# Patient Record
Sex: MALE | Race: WHITE | NOT HISPANIC OR LATINO | Employment: FULL TIME | URBAN - METROPOLITAN AREA
[De-identification: names, ages, dates, MRNs, and addresses within clinical notes are randomized per-mention and may not be internally consistent; named-entity substitution may affect disease eponyms.]

---

## 2017-01-21 ENCOUNTER — OFFICE VISIT (OUTPATIENT)
Dept: URGENT CARE | Facility: URGENT CARE | Age: 52
End: 2017-01-21
Payer: COMMERCIAL

## 2017-01-21 VITALS
HEART RATE: 103 BPM | OXYGEN SATURATION: 97 % | HEIGHT: 72 IN | BODY MASS INDEX: 24.52 KG/M2 | TEMPERATURE: 98.4 F | SYSTOLIC BLOOD PRESSURE: 134 MMHG | DIASTOLIC BLOOD PRESSURE: 83 MMHG | WEIGHT: 181 LBS

## 2017-01-21 DIAGNOSIS — H61.101: Primary | ICD-10-CM

## 2017-01-21 PROCEDURE — 99213 OFFICE O/P EST LOW 20 MIN: CPT | Performed by: FAMILY MEDICINE

## 2017-01-21 NOTE — PATIENT INSTRUCTIONS
Warm compress to ear.  Follow up with either Dermatology or ENT specialist for recheck.      Hematoma  A hematoma is a collection of blood trapped outside of a blood vessel. It is what we think of as a bruise or a contusion. It is usually seen under the skin as a black and blue spot on your arm or leg, or a bump on your head after an injury. It can be almost anywhere on or in your body. It can also occur in an internal organ where it can be more serious.  A hematoma is caused by an injury with damage to small blood vessels. This causes blood to leak into the tissues. Blood forms a pocket under the skin that swells and looks like a purplish patch.  Gradually the blood in the hematoma is absorbed back into the body. The swelling and pain of the hematoma will go away. This takes from 1 to 4 weeks, depending on the size of the hematoma. The skin over the hematoma may turn bluish then brown and yellow as the blood is dissolved and absorbed. Usually, this only takes a couple of weeks but can last months.  Home care    Limit motion of the joints near the hematoma. If the hematoma is large and painful, avoid sports and other vigorous physical activity until the swelling and pain goes away.    Apply an ice pack (ice cubes in a plastic bag, wrapped in a thin towel or a frozen bag of peas) over the injured area for 20 minutes every 1 to 2 hours the first day. Continue with ice packs 3 to 4 times a day for the next 2 days. Continue the use of ice packs for relief of pain and swelling as needed.    If you need anything for pain, you can take acetaminophen or ibuprofen, unless you were given a different pain medicine to use. Talk with your doctor before using these medicines if you have chronic liver or kidney disease, or ever had a stomach ulcer or gastrointestinal bleeding, or are taking blood thinner medicines.  Follow-up care  Follow up with your healthcare provider, or as advised. If X-rays or a CT scan were done, you will  be notified if there is a change in the reading, especially if it affects treatment.  When to seek medical advice  Call your healthcare provider right away f any of the following occur:    Redness around the hematoma    Increase in pain or warmth in the hematoma    Increase in size of the hematoma    Fever of 100.4 F (38 C) or higher, or as directed by your healthcare provider    If the hematoma is on the arm or leg, watch for:    Increased swelling or pain in the extremity    Numbness or tingling or blue color of the hand or foot    6012-5753 [employer]. [address] [place] [address]. All rights reserved. This information is not intended as a substitute for professional medical care. Always follow your healthcare professional's instructions.

## 2017-01-21 NOTE — PROGRESS NOTES
SUBJECTIVE:  Chief Complaint   Patient presents with     Urgent Care     Sore     c/o sore on right ear      Cesar Montejo is a 51 year old male who presents with a chief complaint of right ear soreness, unsure of duration of problems but states that last night as he was combing hair that he felt that the ear pinna was more thick than the other side.  Denies any trauma, no recent URI symptoms, no ear canal pain.  Patient is very anxious, has had basal cell carcinoma and squamous cell carcinoma and worried that has developed a cancerous lesion on his ear.    Past Medical History   Diagnosis Date     High cholesterol      Insomnia      Anxiety      Gastro-oesophageal reflux disease      Basal cell carcinoma      Squamous cell carcinoma (H)      Current Outpatient Prescriptions   Medication Sig Dispense Refill     mirtazapine (REMERON) 15 MG tablet Take 1 tablet (15 mg) by mouth At Bedtime 90 tablet 3     simvastatin (ZOCOR) 40 MG tablet Take 1 tablet (40 mg) by mouth At Bedtime 90 tablet 3     metroNIDAZOLE (METROCREAM) 0.75 % cream Apply topically 2 times daily 45 g 11     Omega-3 Fatty Acids (OMEGA-3 FISH OIL PO) Take 2 g by mouth daily       ASPIRIN PO Take 81 mg by mouth daily       Social History   Substance Use Topics     Smoking status: Former Smoker -- 0.50 packs/day for 5 years     Types: Cigarettes     Smokeless tobacco: Never Used      Comment: Only in college,one pack per week     Alcohol Use: Yes      Comment: social occ.       ROS:  CONSTITUTIONAL:NEGATIVE for fever, chills, change in weight  INTEGUMENTARY/SKIN: POSITIVE for lumps or bumps   ENT/MOUTH: NEGATIVE for ear, mouth and throat problems  RESP:NEGATIVE for significant cough or SOB  CV: NEGATIVE for chest pain, palpitations or peripheral edema  GI: NEGATIVE for nausea, abdominal pain, heartburn, or change in bowel habits      EXAM:   /83 mmHg  Pulse 103  Temp(Src) 98.4  F (36.9  C) (Tympanic)  Ht 6' (1.829 m)  Wt 181 lb (82.101 kg)   BMI 24.54 kg/m2  SpO2 97%  GENERAL APPEARANCE: healthy, alert and no distress  EARS: pinna appears normal, no masses or growth, no enlarge glands  SKIN: no suspicious lesions or rashes  PSYCH: alert, affect - anxious      ASSESSMENT/PLAN:  (H61.101) Pinna disorder, right  (primary encounter diagnosis)  Plan: OTOLARYNGOLOGY REFERRAL, DERMATOLOGY REFERRAL            Tried reassurance that pinna appears normal, okay to try warm compress, no concerns for infection and no skin changes that are consistent with basal cell or squamous cell carcinoma at this time.  Reviewed that may have sustained a small bruising on pinna of ear and this may be the reason for feeling a more thickness to area but no appreciable hematoma/cysts/nodules on area of concern.  Will place referral to ENT and Dermatology for further evaluation per patient's request.    Stu Martinez MD  January 21, 2017 2:05 PM

## 2017-01-21 NOTE — NURSING NOTE
Chief Complaint   Patient presents with     Urgent Care     Sore     c/o sore on right ear        Initial /83 mmHg  Pulse 103  Temp(Src) 98.4  F (36.9  C) (Tympanic)  Ht 6' (1.829 m)  Wt 181 lb (82.101 kg)  BMI 24.54 kg/m2  SpO2 97% Estimated body mass index is 24.54 kg/(m^2) as calculated from the following:    Height as of this encounter: 6' (1.829 m).    Weight as of this encounter: 181 lb (82.101 kg).  BP completed using cuff size: regular  Anjelica Thornton MA

## 2017-01-21 NOTE — MR AVS SNAPSHOT
After Visit Summary   1/21/2017    Cesar Montejo    MRN: [id number]           Patient Information     Date Of Birth          1965        Visit Information        Provider Department      1/21/2017 12:00 PM Stu Martinez MD [facility]        Today's Diagnoses     Pinna disorder, right    -  1       Care Instructions    Warm compress to ear.  Follow up with either Dermatology or ENT specialist for recheck.      Hematoma  A hematoma is a collection of blood trapped outside of a blood vessel. It is what we think of as a bruise or a contusion. It is usually seen under the skin as a black and blue spot on your arm or leg, or a bump on your head after an injury. It can be almost anywhere on or in your body. It can also occur in an internal organ where it can be more serious.  A hematoma is caused by an injury with damage to small blood vessels. This causes blood to leak into the tissues. Blood forms a pocket under the skin that swells and looks like a purplish patch.  Gradually the blood in the hematoma is absorbed back into the body. The swelling and pain of the hematoma will go away. This takes from 1 to 4 weeks, depending on the size of the hematoma. The skin over the hematoma may turn bluish then brown and yellow as the blood is dissolved and absorbed. Usually, this only takes a couple of weeks but can last months.  Home care    Limit motion of the joints near the hematoma. If the hematoma is large and painful, avoid sports and other vigorous physical activity until the swelling and pain goes away.    Apply an ice pack (ice cubes in a plastic bag, wrapped in a thin towel or a frozen bag of peas) over the injured area for 20 minutes every 1 to 2 hours the first day. Continue with ice packs 3 to 4 times a day for the next 2 days. Continue the use of ice packs for relief of pain and swelling as needed.    If you need anything for pain, you can take acetaminophen or ibuprofen,  unless you were given a different pain medicine to use. Talk with your doctor before using these medicines if you have chronic liver or kidney disease, or ever had a stomach ulcer or gastrointestinal bleeding, or are taking blood thinner medicines.  Follow-up care  Follow up with your healthcare provider, or as advised. If X-rays or a CT scan were done, you will be notified if there is a change in the reading, especially if it affects treatment.  When to seek medical advice  Call your healthcare provider right away f any of the following occur:    Redness around the hematoma    Increase in pain or warmth in the hematoma    Increase in size of the hematoma    Fever of 100.4 F (38 C) or higher, or as directed by your healthcare provider    If the hematoma is on the arm or leg, watch for:    Increased swelling or pain in the extremity    Numbness or tingling or blue color of the hand or foot    [id number] [employer]. [address] [place] [address]. All rights reserved. This information is not intended as a substitute for professional medical care. Always follow your healthcare professional's instructions.              Follow-ups after your visit        Additional Services     DERMATOLOGY REFERRAL       Your provider has referred you to: [facility]: Dermatology Clinic [facility] [phone]   [email or web address]    Please be aware that coverage of these services is subject to the terms and limitations of your health insurance plan.  Call member services at your health plan with any benefit or coverage questions.      Please bring the following with you to your appointment:    (1) Any X-Rays, CTs or MRIs which have been performed.  Contact the facility where they were done to arrange for  prior to your scheduled appointment.    (2) List of current medications  (3) This referral request   (4) Any documents/labs given to you for this referral             OTOLARYNGOLOGY REFERRAL       Your provider has referred you to: [facility]: Adult Ear, Nose and Throat Clinic (Otolaryngology) - [place] [phone]  [email or web address]    Please be aware that coverage of these services is subject to the terms and limitations of your health insurance plan.  Call member services at your health plan with any benefit or coverage questions.      Please bring the following with you to your appointment:    (1) Any X-Rays, CTs or MRIs which have been performed.  Contact the facility where they were done to arrange for  prior to your scheduled appointment.   (2) List of current medications  (3) This referral request   (4) Any documents/labs given to you for this referral                  Who to contact     If you have questions or need follow up information about today's clinic visit or your schedule please contact [facility] directly at [phone].  Normal or non-critical lab and imaging results will be communicated to you by Maestrohart, letter or phone within 4 business days after the clinic has received the results. If you do not hear from us within 7 days, please contact the clinic through Cylancet or phone. If you have a critical or abnormal lab result, we will notify you by phone as soon as possible.  Submit refill requests through myVBO or call your pharmacy and they will forward the refill request to us. Please allow 3 business days for your refill to be completed.          Additional Information About Your Visit        myVBO Information     myVBO gives you secure access to your electronic health record. If you see a primary care provider, you can also send messages to your care team and make appointments. If you have questions, please call your primary care clinic.  If you do not have a primary care provider, please call [phone] and they will assist you.        Care EveryWhere ID     This is your  Care EveryWhere ID. This could be used by other organizations to access your [place] medical records  [id number]        Your Vitals Were     Pulse Temperature Height BMI (Body Mass Index) Pulse Oximetry       103 98.4  F (36.9  C) (Tympanic) 6' (1.829 m) 24.54 kg/m2 97%        Blood Pressure from Last 3 Encounters:   01/21/17 134/83   12/16/16 112/74   12/11/16 122/90    Weight from Last 3 Encounters:   01/21/17 181 lb (82.101 kg)   12/16/16 182 lb 8 oz (82.781 kg)   10/05/16 186 lb 11.7 oz (84.7 kg)              We Performed the Following     DERMATOLOGY REFERRAL     OTOLARYNGOLOGY REFERRAL        Primary Care Provider Office Phone # Fax #    Rocky Messina [phone]       [facility] [address] [address]        Thank you!     Thank you for choosing [facility]  for your care. Our goal is always to provide you with excellent care. Hearing back from our patients is one way we can continue to improve our services. Please take a few minutes to complete the written survey that you may receive in the mail after your visit with us. Thank you!             Your Updated Medication List - Protect others around you: Learn how to safely use, store and throw away your medicines at www.disposemymeds.org.          This list is accurate as of: 1/21/17  1:20 PM.  Always use your most recent med list.                   Brand Name Dispense Instructions for use    ASPIRIN PO      Take 81 mg by mouth daily       metroNIDAZOLE 0.75 % cream    METROCREAM    45 g    Apply topically 2 times daily       mirtazapine 15 MG tablet    REMERON    90 tablet    Take 1 tablet (15 mg) by mouth At Bedtime       OMEGA-3 FISH OIL PO      Take 2 g by mouth daily       simvastatin 40 MG tablet    ZOCOR    90 tablet    Take 1 tablet (40 mg) by mouth At Bedtime

## 2017-01-25 ENCOUNTER — OFFICE VISIT (OUTPATIENT)
Dept: DERMATOLOGY | Facility: CLINIC | Age: 52
End: 2017-01-25

## 2017-01-25 DIAGNOSIS — D48.5 NEOPLASM OF UNCERTAIN BEHAVIOR OF SKIN: Primary | ICD-10-CM

## 2017-01-25 DIAGNOSIS — Z85.828 HISTORY OF NONMELANOMA SKIN CANCER: ICD-10-CM

## 2017-01-25 RX ORDER — LIDOCAINE HYDROCHLORIDE AND EPINEPHRINE 10; 10 MG/ML; UG/ML
3 INJECTION, SOLUTION INFILTRATION; PERINEURAL ONCE
Qty: 3 ML | Refills: 0 | OUTPATIENT
Start: 2017-01-25 | End: 2017-01-25

## 2017-01-25 ASSESSMENT — PAIN SCALES - GENERAL
PAINLEVEL: MILD PAIN (2)
PAINLEVEL: NO PAIN (0)

## 2017-01-25 NOTE — PATIENT INSTRUCTIONS
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
## 2017-01-25 NOTE — NURSING NOTE
Dermatology Rooming Note    Cesar Montejo's goals for this visit include:   Chief Complaint   Patient presents with     Derm Problem     Rosendo is here today for a growth behind his ear.      Saira Hernandez MA

## 2017-01-25 NOTE — MR AVS SNAPSHOT
After Visit Summary   1/25/2017    Cesar Montejo    MRN: [id number]           Patient Information     Date Of Birth          1965        Visit Information        Provider Department      1/25/2017 1:15 PM Seferino Quintana MD [facility] Dermatology        Today's Diagnoses     Neoplasm of uncertain behavior of skin    -  1       Care Instructions    Wound Care After a Biopsy    What is a skin biopsy?  A skin biopsy allows the doctor to examine a very small piece of tissue under the microscope to determine the diagnosis and the best treatment for the skin condition. A local anesthetic (numbing medicine)  is injected with a very small needle into the skin area to be tested. A small piece of skin is taken from the area. Sometimes a suture (stitch) is used.     What are the risks of a skin biopsy?  I will experience scar, bleeding, swelling, pain, crusting and redness. I may experience incomplete removal or recurrence. Risks of this procedure are excessive bleeding, bruising, infection, nerve damage, numbness, thick (hypertrophic or keloidal) scar and non-diagnostic biopsy.    How should I care for my wound for the first 24 hours?    Keep the wound dry and covered for 24 hours    If it bleeds, hold direct pressure on the area for 15 minutes. If bleeding does not stop then go to the emergency room    Avoid strenuous exercise the first 1-2 days or as your doctor instructs you    How should I care for the wound after 24 hours?    After 24 hours, remove the bandage    You may bathe or shower as normal    If you had a scalp biopsy, you can shampoo as usual and can use shower water to clean the biopsy site daily    Clean the wound twice a day with gentle soap and water    Do not scrub, be gentle    Apply white petroleum/Vaseline after cleaning the wound with a cotton swab or a clean finger, and keep the site covered with a Bandaid /bandage. Bandages are not necessary with a scalp biopsy    If you are  unable to cover the site with a Bandaid /bandage, re-apply ointment 2-3 times a day to keep the site moist. Moisture will help with healing    Avoid strenuous activity for first 1-2 days    Avoid lakes, rivers, pools, and oceans until the stitches are removed or the site is healed    How do I clean my wound?    Wash hands for 15 minutes with soap or use hand  before all wound care    Clean the wound with gentle soap and water    Apply white petroleum/Vaseline  to wound after it is clean    Replace the Bandaid /bandage to keep the wound covered for the first few days or as instructed by your doctor    If you had a scalp biopsy, warm shower water to the area on a daily basis should suffice    What should I use to clean my wound?     Cotton-tipped applicators (Qtips )    White petroleum jelly (Vaseline ). Use a clean new container and use Q-tips to apply.    Bandaids   as needed    Gentle soap     How should I care for my wound long term?    Do not get your wound dirty    Keep up with wound care for one week or until the area is healed.    A small scab will form and fall off by itself when the area is completely healed. The area will be red and will become pink in color as it heals. Sun protection is very important for how your scar will turn out. Sunscreen with an SPF 30 or greater is recommended once the area is healed.    You should have some soreness but it should be mild and slowly go away over several days. Talk to your doctor about using tylenol for pain,    When should I call my doctor?  If you have increased:     Pain or swelling    Pus or drainage (clear or slightly yellow drainage is ok)    Temperature over 100F    Spreading redness or warmth around wound    When will I hear about my results?  The biopsy results can take 2-3 weeks to come back. The clinic will call you with the results, send you a [employer] message, or have you schedule a follow-up clinic or phone time to discuss the results. Contact  our clinics if you do not hear from us in 3 weeks.     Who should I call with questions?    [facility]: [phone]     [facility]: [phone]    For urgent needs outside of business hours call the [facility] at [phone] and ask for the dermatology resident on call            Follow-ups after your visit        Who to contact     Please call your clinic at [phone] to:    Ask questions about your health    Make or cancel appointments    Discuss your medicines    Learn about your test results    Speak to your doctor   If you have compliments or concerns about an experience at your clinic, or if you wish to file a complaint, please contact [facility] Patient Relations at [phone] or email us at [email or web address]         Additional Information About Your Visit        MyChart Information     [employer] gives you secure access to your electronic health record. If you see a primary care provider, you can also send messages to your care team and make appointments. If you have questions, please call your primary care clinic.  If you do not have a primary care provider, please call [phone] and they will assist you.      [employer] is an electronic gateway that provides easy, online access to your medical records. With [employer], you can request a clinic appointment, read your test results, renew a prescription or communicate with your care team.     To access your existing account, please contact your [facility] or call [phone] for assistance.        Care EveryWhere ID     This is your Care EveryWhere ID. This could be used by other organizations to access your [facility] medical records  [id number]         Blood Pressure from Last 3 Encounters:   01/21/17 134/83   12/16/16 112/74   12/11/16 122/90    Weight from Last 3 Encounters:   01/21/17 82.101 kg (181 lb)    12/16/16 82.781 kg (182 lb 8 oz)   10/05/16 84.7 kg (186 lb 11.7 oz)              We Performed the Following     BIOPSY SKIN/SUBQ/MUC MEM, SINGLE LESION     Surgical pathology exam          Today's Medication Changes          These changes are accurate as of: 1/25/17  1:22 PM.  If you have any questions, ask your nurse or doctor.               Start taking these medicines.        Dose/Directions    lidocaine 1% with EPINEPHrine 1:100,000 1 %-1:691058 injection   Used for:  Neoplasm of uncertain behavior of skin   Started by:  Seferino Quintana MD        Dose:  3 mL   Inject 3 mLs into the skin once for 1 dose   Quantity:  3 mL   Refills:  0            Where to get your medicines      Some of these will need a paper prescription and others can be bought over the counter.  Ask your nurse if you have questions.     You don't need a prescription for these medications    - lidocaine 1% with EPINEPHrine 1:100,000 1 %-1:873758 injection             Primary Care Provider Office Phone # Fax #    Rocky Rigo Messina [phone]       [facility] [address] [facility] [address]  [facility] [address]        Thank you!     Thank you for choosing [facility]  for your care. Our goal is always to provide you with excellent care. Hearing back from our patients is one way we can continue to improve our services. Please take a few minutes to complete the written survey that you may receive in the mail after your visit with us. Thank you!             Your Updated Medication List - Protect others around you: Learn how to safely use, store and throw away your medicines at www.disposemymeds.org.          This list is accurate as of: 1/25/17  1:22 PM.  Always use your most recent med list.                   Brand Name Dispense Instructions for use    ASPIRIN PO      Take 81 mg by mouth daily       lidocaine 1% with EPINEPHrine 1:100,000 1 %-1:807785 injection     3 mL    Inject 3 mLs into the skin once  for 1 dose       metroNIDAZOLE 0.75 % cream    METROCREAM    45 g    Apply topically 2 times daily       mirtazapine 15 MG tablet    REMERON    90 tablet    Take 1 tablet (15 mg) by mouth At Bedtime       OMEGA-3 FISH OIL PO      Take 2 g by mouth daily       simvastatin 40 MG tablet    ZOCOR    90 tablet    Take 1 tablet (40 mg) by mouth At Bedtime
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
## 2017-01-28 ENCOUNTER — HOSPITAL ENCOUNTER (EMERGENCY)
Facility: CLINIC | Age: 52
Discharge: HOME | End: 2017-01-28
Attending: EMERGENCY MEDICINE | Admitting: EMERGENCY MEDICINE
Payer: COMMERCIAL

## 2017-01-28 VITALS
RESPIRATION RATE: 16 BRPM | SYSTOLIC BLOOD PRESSURE: 144 MMHG | DIASTOLIC BLOOD PRESSURE: 86 MMHG | HEART RATE: 81 BPM | TEMPERATURE: 97.5 F | BODY MASS INDEX: 25.36 KG/M2 | OXYGEN SATURATION: 94 % | WEIGHT: 187 LBS

## 2017-01-28 DIAGNOSIS — S86.812A STRAIN OF CALF MUSCLE, LEFT, INITIAL ENCOUNTER: ICD-10-CM

## 2017-01-28 PROCEDURE — 99283 EMERGENCY DEPT VISIT LOW MDM: CPT | Mod: Z6 | Performed by: EMERGENCY MEDICINE

## 2017-01-28 PROCEDURE — 99282 EMERGENCY DEPT VISIT SF MDM: CPT | Performed by: EMERGENCY MEDICINE

## 2017-01-28 NOTE — ED PROVIDER NOTES
History     Chief Complaint   Patient presents with     Leg Pain     L calf pain that started yesterday. Pt states he has increased warm. No redness.     HPI  Cesar Montejo is a 51 year old male who presents with pain in the left leg discomfort.   Pt notes that he was sitting in a position on Tuesday that caused his foot to become numb. Sensation fully returned and he did not have any pain afterwards. Yesterday he noted discomfort in the back of the left calf. He has had similar symptoms previously (see ED visit from 12/2016 with Dr. Kincaid). No past hx of DVT or PE. Directed to the ED today after calling the nurse line. No foot or ankle swelling. No skin redness, rash or lesions. No fall. Pt has a mostly sedentary job at a [employer]; pt is a  at the . He travels regularly and is scheduled to fly to [place] on Monday ( 3 days from now).     I have reviewed the Medications, Allergies, Past Medical and Surgical History, and Social History in the [employer] system.    Past Medical History   Diagnosis Date     High cholesterol      Insomnia      Anxiety      Gastro-oesophageal reflux disease      Basal cell carcinoma      Squamous cell carcinoma (H)        Past Surgical History   Procedure Laterality Date     Mohs micrographic procedure       Laser co2 lesion oral N/A 10/5/2016     Procedure: LASER CO2 LESION ORAL;  Surgeon: Josué Rojo DDS;  Location:  OR       Family History   Problem Relation Age of Onset     Asthma No family hx of      C.A.D. No family hx of      Prostate Cancer No family hx of      Lipids Mother      on meds     CEREBROVASCULAR DISEASE Mother      TIA     Alzheimer Disease Mother      Skin Cancer Mother      SCC     Lipids Father      on meds     Hypertension Father      controlled with meds     Thyroid Disease Father      ?not sure but possibly     DIABETES Father      Cancer - colorectal Maternal Grandmother      still alive at 99     CANCER Maternal Grandfather      lung but  lifetime smoker     Melanoma Maternal Grandfather        Social History   Substance Use Topics     Smoking status: Former Smoker -- 0.50 packs/day for 5 years     Types: Cigarettes     Smokeless tobacco: Never Used      Comment: Only in college,one pack per week     Alcohol Use: Yes      Comment: social occ.       Review of Systems   All systems were reviewed and are negative.       Physical Exam   BP: 144/86 mmHg  Pulse: 81  Temp: 97.5  F (36.4  C)  Resp: 16  Weight: 84.823 kg (187 lb)  SpO2: 94 %    Physical Exam  Gen:A&Ox3, no acute distress  HEENT:PERRL, no facial tenderness or wounds, head atraumatic  CV:RRR without murmurs  PULM:Clear to auscultation bilaterally  Abd:soft, nontender, nondistended. Bowel sounds present and normal  UE:No traumatic injuries, skin normal  LE:no traumatic injuries, skin normal, no LE edema. Focal area of muscle tenderness of the left gastroc. + DP and PT pulses bilaterally and normal distal perfusion. No venous stasis changes or varicosities.   Neuro:CN II-XII intact, strength 5/5 throughout, sensation intact, gait stable. Able to walk on heels and toes. Mild tremor.  Skin: no rashes or ecchymoses      ED Course     Procedures  Critical Care time:  none    Assessments & Plan (with Medical Decision Making)   50 yo M presenting with left calf pain.   Vitals stable.   Has focal muscle tenderness that is most likely a mild strain or sprain.   Very low suspicion for serious muscle disorder, electrolyte abnormality or DVT.  Pt has a hx of 2 negative US in the last year to assess for DVT and given my very low pretest probability I did not feel that testing was needed today. I have encouraged him to use ibuprofen or tylenol as needed for pain, and to do gentle stretching of the sore muscle. He will monitor for any sign of leg swelling or skin changes and return for re-evaluation if new symptoms develop.     OK to travel for work as scheduled. He and I discussed ways to prevent venous  stasis while traveling.     I have reviewed the nursing notes.    I have reviewed the findings, diagnosis, plan and need for follow up with the patient.    Discharge Medication List as of 1/28/2017  1:53 AM          Final diagnoses:   Strain of calf muscle, left, initial encounter       1/28/2017   [facility], [place], EMERGENCY DEPARTMENT  MD NEAL Posada, Isabella Quevedo MD  01/28/17 0201

## 2017-01-28 NOTE — ED AVS SNAPSHOT
[facility], Emergency Department    [address]    [facility] [address]    Phone:  [phone]    Fax:  [phone]                                       Cesar Montejo   MRN: [id number]    Department:  [facility], Emergency Department   Date of Visit:  1/28/2017           After Visit Summary Signature Page     I have received my discharge instructions, and my questions have been answered. I have discussed any challenges I see with this plan with the nurse or doctor.    ..........................................................................................................................................  Patient/Patient Representative Signature      ..........................................................................................................................................  Patient Representative Print Name and Relationship to Patient    ..................................................               ................................................  Date                                            Time    ..........................................................................................................................................  Reviewed by Signature/Title    ...................................................              ..............................................  Date                                                            Time

## 2017-01-28 NOTE — ED AVS SNAPSHOT
[facility], Emergency Department    [address]    [place] [address]    Phone:  [phone]    Fax:  [phone]                                       Cesar Montejo   MRN: [id number]    Department:  [facility], Emergency Department   Date of Visit:  1/28/2017           Patient Information     Date Of Birth          1965        Your diagnoses for this visit were:     Strain of calf muscle, left, initial encounter        You were seen by Isabella De La Cruz MD.        Discharge Instructions       Thank you for coming to the [facility] Emergency Department.     Please follow up with your primary care clinic on next week if pain does not go away with ibuprofen or tylenol, and gentle stretching. OK to stay as active as usual.     Return to the ER if you notice worsening pain, swelling in the foot, ankle, leg or thigh, or shortness of breath.     24 Hour Appointment Hotline       To make an appointment at any [facility] clinic, call [phone] ([phone]). If you don't have a family doctor or clinic, we will help you find one. [facility] clinics are conveniently located to serve the needs of you and your family.             Review of your medicines      Our records show that you are taking the medicines listed below. If these are incorrect, please call your family doctor or clinic.        Dose / Directions Last dose taken    ASPIRIN PO   Dose:  81 mg        Take 81 mg by mouth daily   Refills:  0        metroNIDAZOLE 0.75 % cream   Commonly known as:  METROCREAM   Quantity:  45 g        Apply topically 2 times daily   Refills:  11        mirtazapine 15 MG tablet   Commonly known as:  REMERON   Dose:  15 mg   Quantity:  90 tablet        Take 1 tablet (15 mg) by mouth At Bedtime   Refills:  3        OMEGA-3 FISH OIL PO   Dose:  2 g        Take 2 g by mouth daily   Refills:  0        simvastatin 40 MG tablet   Commonly known as:  ZOCOR   Dose:  40 mg   Quantity:  90  tablet        Take 1 tablet (40 mg) by mouth At Bedtime   Refills:  3                Orders Needing Specimen Collection     None      Pending Results     No orders found from 1/27/2017 to 1/29/2017.            Pending Culture Results     No orders found from 1/27/2017 to 1/29/2017.            Thank you for choosing [facility]       Thank you for choosing [facility] for your care. Our goal is always to provide you with excellent care. Hearing back from our patients is one way we can continue to improve our services. Please take a few minutes to complete the written survey that you may receive in the mail after you visit with us. Thank you!        [employer] Information     [employer] gives you secure access to your electronic health record. If you see a primary care provider, you can also send messages to your care team and make appointments. If you have questions, please call your primary care clinic.  If you do not have a primary care provider, please call [phone] and they will assist you.        Care EveryWhere ID     This is your Care EveryWhere ID. This could be used by other organizations to access your [facility] medical records  [id number]        After Visit Summary       This is your record. Keep this with you and show to your community pharmacist(s) and doctor(s) at your next visit.

## 2017-01-29 ENCOUNTER — APPOINTMENT (OUTPATIENT)
Dept: ULTRASOUND IMAGING | Facility: CLINIC | Age: 52
End: 2017-01-29
Attending: EMERGENCY MEDICINE
Payer: COMMERCIAL

## 2017-01-29 ENCOUNTER — HOSPITAL ENCOUNTER (EMERGENCY)
Facility: CLINIC | Age: 52
Discharge: HOME | End: 2017-01-29
Attending: EMERGENCY MEDICINE | Admitting: EMERGENCY MEDICINE
Payer: COMMERCIAL

## 2017-01-29 VITALS
DIASTOLIC BLOOD PRESSURE: 83 MMHG | HEART RATE: 84 BPM | OXYGEN SATURATION: 97 % | SYSTOLIC BLOOD PRESSURE: 117 MMHG | RESPIRATION RATE: 16 BRPM | TEMPERATURE: 98.3 F

## 2017-01-29 DIAGNOSIS — M79.662 PAIN OF LEFT LOWER LEG: ICD-10-CM

## 2017-01-29 PROCEDURE — 99284 EMERGENCY DEPT VISIT MOD MDM: CPT | Mod: 25 | Performed by: EMERGENCY MEDICINE

## 2017-01-29 PROCEDURE — 93971 EXTREMITY STUDY: CPT | Mod: LT

## 2017-01-29 PROCEDURE — 99283 EMERGENCY DEPT VISIT LOW MDM: CPT | Mod: Z6 | Performed by: EMERGENCY MEDICINE

## 2017-01-29 ASSESSMENT — ENCOUNTER SYMPTOMS
COUGH: 0
FEVER: 0
LIGHT-HEADEDNESS: 0
PALPITATIONS: 0
ABDOMINAL PAIN: 0
SHORTNESS OF BREATH: 1

## 2017-01-29 NOTE — ED AVS SNAPSHOT
[facility], Emergency Department    [address]    [place] [address]    Phone:  [phone]    Fax:  [phone]                                       Cesar Montejo   MRN: [id number]    Department:  [facility], Emergency Department   Date of Visit:  1/29/2017           Patient Information     Date Of Birth          1965        Your diagnoses for this visit were:     Pain of left lower leg        You were seen by Thomas Alegria MD.        Discharge Instructions       Please make an appointment to follow up with Your Primary Care Provider in 5-8 days if not improving.    Ibuprofen 600 mg every 8 hours with food as needed.      24 Hour Appointment Hotline       To make an appointment at any [place] clinic, call [phone] ([phone]). If you don't have a family doctor or clinic, we will help you find one. [place] clinics are conveniently located to serve the needs of you and your family.             Review of your medicines      Our records show that you are taking the medicines listed below. If these are incorrect, please call your family doctor or clinic.        Dose / Directions Last dose taken    ASPIRIN PO   Dose:  81 mg        Take 81 mg by mouth daily   Refills:  0        metroNIDAZOLE 0.75 % cream   Commonly known as:  METROCREAM   Quantity:  45 g        Apply topically 2 times daily   Refills:  11        mirtazapine 15 MG tablet   Commonly known as:  REMERON   Dose:  15 mg   Quantity:  90 tablet        Take 1 tablet (15 mg) by mouth At Bedtime   Refills:  3        OMEGA-3 FISH OIL PO   Dose:  2 g        Take 2 g by mouth daily   Refills:  0        simvastatin 40 MG tablet   Commonly known as:  ZOCOR   Dose:  40 mg   Quantity:  90 tablet        Take 1 tablet (40 mg) by mouth At Bedtime   Refills:  3                Procedures and tests performed during your visit     US Lower Extremity Venous Duplex Left      Orders Needing Specimen Collection     None      Pending  Results     No orders found from 1/28/2017 to 1/30/2017.            Pending Culture Results     No orders found from 1/28/2017 to 1/30/2017.            Thank you for choosing [facility]       Thank you for choosing [facility] for your care. Our goal is always to provide you with excellent care. Hearing back from our patients is one way we can continue to improve our services. Please take a few minutes to complete the written survey that you may receive in the mail after you visit with us. Thank you!        [employer] Information     [employer] gives you secure access to your electronic health record. If you see a primary care provider, you can also send messages to your care team and make appointments. If you have questions, please call your primary care clinic.  If you do not have a primary care provider, please call [phone] and they will assist you.        Care EveryWhere ID     This is your Care EveryWhere ID. This could be used by other organizations to access your [facility] medical records  [id number]        After Visit Summary       This is your record. Keep this with you and show to your community pharmacist(s) and doctor(s) at your next visit.

## 2017-01-29 NOTE — ED AVS SNAPSHOT
[facility], Emergency Department    [address]    [facility] [address]    Phone:  [phone]    Fax:  [phone]                                       Cesar Montejo   MRN: [id number]    Department:  [facility], Emergency Department   Date of Visit:  1/29/2017           After Visit Summary Signature Page     I have received my discharge instructions, and my questions have been answered. I have discussed any challenges I see with this plan with the nurse or doctor.    ..........................................................................................................................................  Patient/Patient Representative Signature      ..........................................................................................................................................  Patient Representative Print Name and Relationship to Patient    ..................................................               ................................................  Date                                            Time    ..........................................................................................................................................  Reviewed by Signature/Title    ...................................................              ..............................................  Date                                                            Time

## 2017-01-29 NOTE — DISCHARGE INSTRUCTIONS
Please make an appointment to follow up with Your Primary Care Provider in 5-8 days if not improving.    Ibuprofen 600 mg every 8 hours with food as needed.

## 2017-01-29 NOTE — ED PROVIDER NOTES
"  History     Chief Complaint   Patient presents with     Leg Pain     Onset 4 days ago with left calf pain, worsening now, noticed \"I am a little more short of breath than normal.\"     HPI  Cesar Montejo is a 51 year old male with a history of hypercholesterolemia, GERD, and anxiety and depression who presents for evaluation of left leg pain. The patient reports that he developed left calf pain on Thursday, 3 days ago, while sitting. The pain persisted, so he was seen in the ER here yesterday by Dr. De La Cruz. Pain was thought musculoskeletal, and with low suspicion for DVT ultrasound was deferred. The patient presents today with pain persisting. He describes pain in the left calf just below the knee. Pain has been migrating superiorly since onset. The patient also endorses intermittent pain in the left thigh and left ankle as well. He denies any recent trauma or new exercises. He denies any swelling of the leg. The patient reports that last night he was watching TV and doing light cleaning when he had an episode of mild shortness of breath with dizziness. He had another episode of this just prior to coming in, but he states he does not feel short of breath lying down here presently. The patient denies any palpitations or any feeling that he might pass out. The patient endorses a history of GERD with associated chronic chest discomfort. He has had some chest discomfort in the past few days and believes this is related to his GERD but is not entirely sure. The patient denies any cough, sore throat or recent illness.    Calcific tendonitis in right? Shoulder at one point    I have reviewed the Medications, Allergies, Past Medical and Surgical History, and Social History in the Epic system.    No current facility-administered medications for this encounter.     Current Outpatient Prescriptions   Medication     mirtazapine (REMERON) 15 MG tablet     Omega-3 Fatty Acids (OMEGA-3 FISH OIL PO)     simvastatin (ZOCOR) 40 MG " tablet     ASPIRIN PO     metroNIDAZOLE (METROCREAM) 0.75 % cream     Past Medical History   Diagnosis Date     High cholesterol      Insomnia      Anxiety      Gastro-oesophageal reflux disease      Basal cell carcinoma      Squamous cell carcinoma (H)        Past Surgical History   Procedure Laterality Date     Mohs micrographic procedure       Laser co2 lesion oral N/A 10/5/2016     Procedure: LASER CO2 LESION ORAL;  Surgeon: Josué Rojo DDS;  Location:  OR       Family History   Problem Relation Age of Onset     Asthma No family hx of      C.A.D. No family hx of      Prostate Cancer No family hx of      Lipids Mother      on meds     CEREBROVASCULAR DISEASE Mother      TIA     Alzheimer Disease Mother      Skin Cancer Mother      SCC     Lipids Father      on meds     Hypertension Father      controlled with meds     Thyroid Disease Father      ?not sure but possibly     DIABETES Father      Cancer - colorectal Maternal Grandmother      still alive at 99     CANCER Maternal Grandfather      lung but lifetime smoker     Melanoma Maternal Grandfather        Social History   Substance Use Topics     Smoking status: Former Smoker -- 0.50 packs/day for 5 years     Types: Cigarettes     Smokeless tobacco: Never Used      Comment: Only in college,one pack per week     Alcohol Use: Yes      Comment: social occ.      No Known Allergies    Review of Systems   Constitutional: Negative for fever.   Respiratory: Positive for shortness of breath (intermittent). Negative for cough.    Cardiovascular: Positive for chest pain (intermittent discomfort similar to GERD discomfort). Negative for palpitations and leg swelling.   Gastrointestinal: Negative for abdominal pain.   Musculoskeletal:        Left leg pain, see HPI   Neurological: Negative for syncope and light-headedness.   All other systems reviewed and are negative.      Physical Exam   BP: 118/77 mmHg  Pulse: 91  Heart Rate: 91  Temp: 97.9  F (36.6  C)  Resp:  16  SpO2: 95 %  Physical Exam   Constitutional: He is oriented to person, place, and time. He appears well-developed and well-nourished. No distress.   HENT:   Head: Normocephalic and atraumatic.   Eyes: No scleral icterus.   Neck: Normal range of motion. Neck supple.   Musculoskeletal:        Legs:  Neurological: He is alert and oriented to person, place, and time.   Skin: Skin is warm and dry. No rash noted. He is not diaphoretic. No erythema. No pallor.       ED Course     Procedures       4:26 PM  The patient was seen and examined by Thomas Alegria MD, in Room 02.        Critical Care time:  none               Labs Ordered and Resulted from Time of ED Arrival Up to the Time of Departure from the ED - No data to display    Assessments & Plan (with Medical Decision Making)   The patient has pain in the back of his thigh and calf on the left side.  There is no DVT on ultrasound.  He admits that he is very anxious and feels that his shortness of breath which is now resolved his more related to that.  His pain is likely secondary to muscle strain as there is no other objective finding of DVT and no trauma to warrant further imaging.  He will follow up with his doctor if his symptoms do not resolve significant consider doing a repeat ultrasound.  If his swelling gets worse she will return to the ED.    I have reviewed the nursing notes.    I have reviewed the findings, diagnosis, plan and need for follow up with the patient.    Discharge Medication List as of 1/29/2017  5:50 PM          Final diagnoses:   Pain of left lower leg     ILuis Miguel, am serving as a trained medical scribe to document services personally performed by Thomas Alegria MD, based on the provider's statements to me.      Thomas CHOI MD, was physically present and have reviewed and verified the accuracy of this note documented by Luis Miguel Klein.    1/29/2017   [facility], [place], EMERGENCY DEPARTMENT      Thomas Alegria,  MD  01/30/17 [id number]

## 2017-01-30 LAB — COPATH REPORT: NORMAL

## 2017-02-03 ENCOUNTER — OFFICE VISIT (OUTPATIENT)
Dept: FAMILY MEDICINE | Facility: CLINIC | Age: 52
End: 2017-02-03
Payer: COMMERCIAL

## 2017-02-03 VITALS
DIASTOLIC BLOOD PRESSURE: 74 MMHG | HEART RATE: 120 BPM | OXYGEN SATURATION: 95 % | SYSTOLIC BLOOD PRESSURE: 118 MMHG | WEIGHT: 177.7 LBS | TEMPERATURE: 97.6 F | HEIGHT: 72 IN | BODY MASS INDEX: 24.07 KG/M2

## 2017-02-03 DIAGNOSIS — B02.9 HERPES ZOSTER WITHOUT COMPLICATION: ICD-10-CM

## 2017-02-03 DIAGNOSIS — Z82.49 FH ISCHEMIC HEART DISEASE: ICD-10-CM

## 2017-02-03 DIAGNOSIS — R73.09 ELEVATED GLUCOSE: Primary | ICD-10-CM

## 2017-02-03 LAB — HBA1C MFR BLD: 5.4 % (ref 4.3–6)

## 2017-02-03 PROCEDURE — 83036 HEMOGLOBIN GLYCOSYLATED A1C: CPT | Performed by: FAMILY MEDICINE

## 2017-02-03 PROCEDURE — 36415 COLL VENOUS BLD VENIPUNCTURE: CPT | Performed by: FAMILY MEDICINE

## 2017-02-03 PROCEDURE — 99214 OFFICE O/P EST MOD 30 MIN: CPT | Performed by: FAMILY MEDICINE

## 2017-02-03 NOTE — MR AVS SNAPSHOT
After Visit Summary   2/3/2017    Cesar Montejo    MRN: [id number]           Patient Information     Date Of Birth          1965        Visit Information        Provider Department      2/3/2017 2:30 PM Rocky Messina MD [facility] [place] [place]        Today's Diagnoses     Elevated glucose    -  1     FH ischemic heart disease         Herpes zoster without complication            Follow-ups after your visit        Additional Services     CARDIOLOGY EVAL ADULT REFERRAL       Your provider has referred you to:  [facility]: [facility] [place]note 2[facility] and [facility] [phone]   [email or web address]    Please be aware that coverage of these services is subject to the terms and limitations of your health insurance plan.  Call member services at your health plan with any benefit or coverage questions.      Type of Referral:  New Cardiology Consult    Timeframe requested:  Within 1 month    Please bring the following to your appointment:  >>   Any x-rays, CTs or MRIs which have been performed.  Contact the facility where they were done to arrange for  prior to your scheduled appointment.  Any new CT, MRI or other procedures ordered by your specialist must be performed at a [facility] facility or coordinated by your clinic's referral office.   >>   List of current medications  >>   This referral request   >>   Any documents/labs given to you for this referral                  Who to contact     If you have questions or need follow up information about today's clinic visit or your schedule please contact [facility] directly at [phone].  Normal or non-critical lab and imaging results will be communicated to you by MyChart, letter or phone within 4 business days after the clinic has received the results. If you do not hear from us within 7 days, please contact the clinic through MyChart or phone.  If you have a critical or abnormal lab result, we will notify you by phone as soon as possible.  Submit refill requests through [employer] or call your pharmacy and they will forward the refill request to us. Please allow 3 business days for your refill to be completed.          Additional Information About Your Visit        [facility] Information     [employer] gives you secure access to your electronic health record. If you see a primary care provider, you can also send messages to your care team and make appointments. If you have questions, please call your primary care clinic.  If you do not have a primary care provider, please call [phone] and they will assist you.        Care EveryWhere ID     This is your Care EveryWhere ID. This could be used by other organizations to access your [facility] medical records  [id number]        Your Vitals Were     Pulse Temperature Height BMI (Body Mass Index) Pulse Oximetry       120 97.6  F (36.4  C) (Oral) 6' (1.829 m) 24.10 kg/m2 95%        Blood Pressure from Last 3 Encounters:   02/03/17 118/74   01/29/17 117/83   01/28/17 144/86    Weight from Last 3 Encounters:   02/03/17 177 lb 11.2 oz (80.604 kg)   01/28/17 187 lb (84.823 kg)   01/21/17 181 lb (82.101 kg)              We Performed the Following     CARDIOLOGY EVAL ADULT REFERRAL     Hemoglobin A1c        Primary Care Provider Office Phone # Fax #    Rocky Messina [phone]       [facility] [address] [address]        Thank you!     Thank you for choosing [facility]  for your care. Our goal is always to provide you with excellent care. Hearing back from our patients is one way we can continue to improve our services. Please take a few minutes to complete the written survey that you may receive in the mail after your visit with us. Thank you!             Your Updated Medication List - Protect others around you: Learn how to safely use, store  and throw away your medicines at www.disposemymeds.org.          This list is accurate as of: 2/3/17  2:43 PM.  Always use your most recent med list.                   Brand Name Dispense Instructions for use    ASPIRIN PO      Take 81 mg by mouth daily       metroNIDAZOLE 0.75 % cream    METROCREAM    45 g    Apply topically 2 times daily       mirtazapine 15 MG tablet    REMERON    90 tablet    Take 1 tablet (15 mg) by mouth At Bedtime       OMEGA-3 FISH OIL PO      Take 2 g by mouth daily       simvastatin 40 MG tablet    ZOCOR    90 tablet    Take 1 tablet (40 mg) by mouth At Bedtime

## 2017-02-03 NOTE — NURSING NOTE
Chief Complaint   Patient presents with     ER F/U     Derm Problem       Initial /74 mmHg  Pulse 120  Temp(Src) 97.6  F (36.4  C) (Oral)  Ht 6' (1.829 m)  Wt 177 lb 11.2 oz (80.604 kg)  BMI 24.10 kg/m2  SpO2 95% Estimated body mass index is 24.1 kg/(m^2) as calculated from the following:    Height as of this encounter: 6' (1.829 m).    Weight as of this encounter: 177 lb 11.2 oz (80.604 kg).  BP completed using cuff size: regular    Amanda Alejandro MA

## 2017-02-03 NOTE — PROGRESS NOTES
SUBJECTIVE:                                                    Cesar Montejo is a 51 year old male who presents to clinic today for the following health issues:      ED/UC Followup:    Facility:  [facility]    Date of visit: 1/31/2017  Reason for visit: Had a rash form on the left leg near knee.   Current Status: Was diagnosed with shingles. Did not have a shaving of the rash. Has itching, not much pain from the beginning and overall looks the same.         Encounter Diagnoses   Name Primary?     Elevated glucose, worries about DM Yes     FH ischemic heart disease, mom with CAD before 59 yo with no other CAD risk factors      Herpes zoster without complication         Problem list and histories reviewed & adjusted, as indicated.  Additional history: as documented    Problem list, Medication list, Allergies, and Medical/Social/Surgical histories reviewed in [facility] and updated as appropriate.    ROS:  Constitutional, HEENT, cardiovascular, pulmonary, gi and gu systems are negative, except as otherwise noted.    OBJECTIVE:                                                    /74 mmHg  Pulse 120  Temp(Src) 97.6  F (36.4  C) (Oral)  Ht 6' (1.829 m)  Wt 177 lb 11.2 oz (80.604 kg)  BMI 24.10 kg/m2  SpO2 95%  Body mass index is 24.1 kg/(m^2).  GENERAL: alert, no distress and fit  NECK: no adenopathy, no asymmetry, masses, or scars and thyroid normal to palpation  RESP: lungs clear to auscultation - no rales, rhonchi or wheezes  CV: regular rate and rhythm, normal S1 S2, no S3 or S4, no murmur, click or rub, no peripheral edema and peripheral pulses strong  ABDOMEN: soft, nontender, no hepatosplenomegaly, no masses and bowel sounds normal  SKIN: zosteriform eruption - left inner thigh    Diagnostic Test Results:  Results for orders placed or performed in visit on 02/03/17   Hemoglobin A1c   Result Value Ref Range    Hemoglobin A1C 5.4 4.3 - 6.0 %        ASSESSMENT/PLAN:                                                             1. Elevated glucose  negative A!C   work on diet/ exercise  - Hemoglobin A1c  - CARDIOLOGY EVAL ADULT REFERRAL    2.  ischemic heart disease  Follow up with consultant as planned.   - CARDIOLOGY EVAL ADULT REFERRAL    3. Herpes zoster without complication  resolving      See Patient Instructions    Rocky Messina MD  [facility] [place]

## 2017-02-06 ENCOUNTER — TELEPHONE (OUTPATIENT)
Dept: FAMILY MEDICINE | Facility: CLINIC | Age: 52
End: 2017-02-06

## 2017-02-06 ENCOUNTER — OFFICE VISIT (OUTPATIENT)
Dept: FAMILY MEDICINE | Facility: CLINIC | Age: 52
End: 2017-02-06
Payer: COMMERCIAL

## 2017-02-06 VITALS
HEART RATE: 104 BPM | TEMPERATURE: 96.5 F | SYSTOLIC BLOOD PRESSURE: 135 MMHG | OXYGEN SATURATION: 96 % | WEIGHT: 178.2 LBS | BODY MASS INDEX: 24.16 KG/M2 | DIASTOLIC BLOOD PRESSURE: 90 MMHG

## 2017-02-06 DIAGNOSIS — L24.9 IRRITANT CONTACT DERMATITIS, UNSPECIFIED TRIGGER: Primary | ICD-10-CM

## 2017-02-06 DIAGNOSIS — R59.0 ANTERIOR CERVICAL LYMPHADENOPATHY: ICD-10-CM

## 2017-02-06 LAB
BASOPHILS # BLD AUTO: 0 10E9/L (ref 0–0.2)
BASOPHILS NFR BLD AUTO: 0.8 %
DIFFERENTIAL METHOD BLD: NORMAL
EOSINOPHIL # BLD AUTO: 0.1 10E9/L (ref 0–0.7)
EOSINOPHIL NFR BLD AUTO: 1.2 %
ERYTHROCYTE [DISTWIDTH] IN BLOOD BY AUTOMATED COUNT: 13.3 % (ref 10–15)
HCT VFR BLD AUTO: 48 % (ref 40–53)
HGB BLD-MCNC: 16.2 G/DL (ref 13.3–17.7)
LYMPHOCYTES # BLD AUTO: 1.6 10E9/L (ref 0.8–5.3)
LYMPHOCYTES NFR BLD AUTO: 31.6 %
MCH RBC QN AUTO: 31.4 PG (ref 26.5–33)
MCHC RBC AUTO-ENTMCNC: 33.8 G/DL (ref 31.5–36.5)
MCV RBC AUTO: 93 FL (ref 78–100)
MONOCYTES # BLD AUTO: 0.7 10E9/L (ref 0–1.3)
MONOCYTES NFR BLD AUTO: 13.5 %
NEUTROPHILS # BLD AUTO: 2.8 10E9/L (ref 1.6–8.3)
NEUTROPHILS NFR BLD AUTO: 52.9 %
PLATELET # BLD AUTO: 201 10E9/L (ref 150–450)
RBC # BLD AUTO: 5.16 10E12/L (ref 4.4–5.9)
WBC # BLD AUTO: 5.2 10E9/L (ref 4–11)

## 2017-02-06 PROCEDURE — 99214 OFFICE O/P EST MOD 30 MIN: CPT | Performed by: PHYSICIAN ASSISTANT

## 2017-02-06 PROCEDURE — 85025 COMPLETE CBC W/AUTO DIFF WBC: CPT | Performed by: PHYSICIAN ASSISTANT

## 2017-02-06 PROCEDURE — 36415 COLL VENOUS BLD VENIPUNCTURE: CPT | Performed by: PHYSICIAN ASSISTANT

## 2017-02-06 RX ORDER — METHYLPREDNISOLONE 4 MG
TABLET, DOSE PACK ORAL
Qty: 21 TABLET | Refills: 0 | Status: SHIPPED | OUTPATIENT
Start: 2017-02-06 | End: 2017-04-13

## 2017-02-06 NOTE — PATIENT INSTRUCTIONS
Take medrol pack as directed  Use all lotions, soaps and detergents without fragrances or dyes  Return to clinic for any new or worsening symptoms or go to ER Urgent care in off hours      Atopic Dermatitis (Adult)  Atopic dermatitis is a dry, itchy red rash. It s also known as eczema. The rash is chronic, or ongoing. It can come and go over time. The disease is often genetic and passed down in families. It causes a problem with the skin barrier that makes the skin more sensitive to the environment and other factors. The increased skin sensitivity causes an itch, which causes scratching. Scratching can worsen the itching or also break the skin. This can put the skin at risk of infection.  The condition is most common in people with asthma, hay fever, hives, or dry or sensitive skin. The rash may be caused by extreme heat or heavy sweating. Skin irritants can cause the rash to flare up. These can include wool or silk clothing, grease, oils, some medicines, and harsh soaps and detergents. Emotional stress can also be a trigger.  Treatment is done to relieve the itching and inflammation of the skin.  Home care  Follow these tips to care for your condition:    Keep the areas of rash clean by bathing at least every other day. Use lukewarm water to bathe. Don t use hot water, which can dry out the skin.    Don t use soaps with strong detergents. Use mild soaps made for sensitive skin.    Apply a cream or ointment to damp skin right after bathing.    Avoid things that irritate your skin. Wear absorbent, soft fabrics next to the skin rather than rough or scratchy materials.    Use mild laundry soap free of scents and perfumes. Make sure to rinse all the soap out of your clothes.    Treat any skin infection as directed.    Use oral diphenhydramine to help reduce itching. This is an antihistamine you can buy at drug and grocery stores. It can make you sleepy, so use lower doses during the daytime. Or you can use loratadine.  This is an antihistamine that will not make you sleepy. Do not use diphenhydramine if you have glaucoma or have trouble urinating due to an enlarged prostate.  Follow-up care  If your symptoms don t get better or if they get worse in the next 7 days, make an appointment with your healthcare provider.  When to seek medical advice  Call your healthcare provider right away  if any of these occur:    Increasing area of redness or pain in the skin    Yellow crusts or wet drainage from the rash    Fever of 100.4 F (38 C) or higher, or as directed by your health care provider    3925-5744 [employer]. [address] [place] [address], [place] [address]. All rights reserved. This information is not intended as a substitute for professional medical care. Always follow your healthcare professional's instructions.

## 2017-02-06 NOTE — PROGRESS NOTES
SUBJECTIVE:                                                    Cesar Montejo is a 51 year old male who presents to clinic today for the following health issues:    Shingles     Onset: 1 week     Description:   Location: Inner left thigh, neck, and left arm  Character: painful, red  Itching (Pruritis): YES    Progression of Symptoms:  worsening    Accompanying Signs & Symptoms:  Fever: no   Body aches or joint pain: no  Sore throat symptoms: no  Recent cold symptoms: no    History:   Previous similar rash: no     Precipitating factors:   Exposure to similar rash: no   New exposures: None   Recent travel: no     Alleviating factors:   Acyclovir     Therapies Tried and outcome:     Was on a work trip last week, and he started having pain in his calf for several days prior  Developed a rash  Was seen in the ER (they were afraid of a clot). Negative for blood clot. He think the calf pain was unrelated to the rash  diagnosed with shingles. Started on acyclovir. The itching has gone away. The rash hasn't started crusting over yet so he's wondering if the rash is progressing like it should. He actually never developed blisters    About 3 days ago he developed a rash on his right neck and left wrist. It is very itchy. Looks a little like the rash on his leg when it started. No associated pain  He had squamous cell carcinoma removed on his right cheek and was told to look out for enlarged lymph nodes etc. He has noticed some enlarged lymph nodes on his neck and is concerned about this        Problem list and histories reviewed & adjusted, as indicated.  Additional history: as documented    ROS:  C: NEGATIVE for fever, chills, change in weight  E/M: NEGATIVE for ear, mouth and throat problems  R: NEGATIVE for significant cough or SOB  CV: NEGATIVE for chest pain, palpitations or peripheral edema  GI: NEGATIVE for nausea, abdominal pain, heartburn, or change in bowel habits    Patient Active Problem List   Diagnosis      Insomnia     CARDIOVASCULAR SCREENING; LDL GOAL LESS THAN 130     Hypercholesteremia     GERD (gastroesophageal reflux disease)     Mixed anxiety and depressive disorder     Knee pain     Right shoulder pain     Calcific tendonitis     Solar lentiginosis     Skin cancer screening     Dermatitis, seborrheic     Keratosis, actinic     Family history of skin cancer     AK (actinic keratosis)     History of actinic keratosis     Past Surgical History   Procedure Laterality Date     Mohs micrographic procedure       Laser co2 lesion oral N/A 10/5/2016     Procedure: LASER CO2 LESION ORAL;  Surgeon: Josué Rojo DDS;  Location:  OR       Social History   Substance Use Topics     Smoking status: Former Smoker -- 0.50 packs/day for 5 years     Types: Cigarettes     Smokeless tobacco: Never Used      Comment: Only in college,one pack per week     Alcohol Use: Yes      Comment: social occ.     Family History   Problem Relation Age of Onset     Asthma No family hx of      C.A.D. No family hx of      Prostate Cancer No family hx of      Lipids Mother      on meds     CEREBROVASCULAR DISEASE Mother      TIA     Alzheimer Disease Mother      Skin Cancer Mother      SCC     Lipids Father      on meds     Hypertension Father      controlled with meds     Thyroid Disease Father      ?not sure but possibly     DIABETES Father      Cancer - colorectal Maternal Grandmother      still alive at 99     CANCER Maternal Grandfather      lung but lifetime smoker     Melanoma Maternal Grandfather            Problem list, Medication list, Allergies, and Medical/Social/Surgical histories reviewed in [facility] and updated as appropriate.  Labs reviewed in EPIC    OBJECTIVE:                                                    /90 mmHg  Pulse 104  Temp(Src) 96.5  F (35.8  C) (Oral)  Wt 178 lb 3.2 oz (80.831 kg)  SpO2 96% Body mass index is 24.16 kg/(m^2).   GENERAL: healthy, alert, well nourished, well hydrated, no distress  EYES: Eyes  grossly normal to inspection, extraocular movements - intact, and PERRL  HENT: ear canals- normal; TMs- normal; Nose- normal; Mouth- no ulcers, no lesions.   NECK: no tenderness, mild anterior cervical adenopathy, no asymmetry, no masses, no stiffness; thyroid- normal to palpation  RESP: lungs clear to auscultation - no rales, no rhonchi, no wheezes  CV: regular rates and rhythm, normal S1 S2, no S3 or S4 and no murmur, no click or rub -  SKIN: red papular rash of right side of neck without vesicles. 2-3 mm papule of left volar wrist. No lymphangitis, fluctuance, induration, bleeding or discharge    Results for orders placed or performed in visit on 02/06/17 (from the past 24 hour(s))   CBC with platelets and differential   Result Value Ref Range    WBC 5.2 4.0 - 11.0 10e9/L    RBC Count 5.16 4.4 - 5.9 10e12/L    Hemoglobin 16.2 13.3 - 17.7 g/dL    Hematocrit 48.0 40.0 - 53.0 %    MCV 93 78 - 100 fl    MCH 31.4 26.5 - 33.0 pg    MCHC 33.8 31.5 - 36.5 g/dL    RDW 13.3 10.0 - 15.0 %    Platelet Count 201 150 - 450 10e9/L    Diff Method Automated Method     % Neutrophils 52.9 %    % Lymphocytes 31.6 %    % Monocytes 13.5 %    % Eosinophils 1.2 %    % Basophils 0.8 %    Absolute Neutrophil 2.8 1.6 - 8.3 10e9/L    Absolute Lymphocytes 1.6 0.8 - 5.3 10e9/L    Absolute Monocytes 0.7 0.0 - 1.3 10e9/L    Absolute Eosinophils 0.1 0.0 - 0.7 10e9/L    Absolute Basophils 0.0 0.0 - 0.2 10e9/L          ASSESSMENT/PLAN:                                                        ICD-10-CM    1. Irritant contact dermatitis, unspecified trigger L24.9 methylPREDNISolone (MEDROL DOSEPAK) 4 MG tablet   2. Anterior cervical lymphadenopathy R59.0 CBC with platelets and differential     Rash is actually more constant with contact dermatitis as opposed to shingles. Medrol pack to help with rash. He is not aware of any new contacts, but he did recently travel. Possible he came in contact with something while traveling. CBC reassuring. Also  reassured patient lymph nodes appear benign and reactive to his current rash. Patient also evaluated by Dr. Messina who agrees. Return to clinic for any new or worsening symptoms or go to ER Urgent care in off hours   Patient Instructions   Take medrol pack as directed  Use all lotions, soaps and detergents without fragrances or dyes  Return to clinic for any new or worsening symptoms or go to ER Urgent care in off hours      Atopic Dermatitis (Adult)  Atopic dermatitis is a dry, itchy red rash. It s also known as eczema. The rash is chronic, or ongoing. It can come and go over time. The disease is often genetic and passed down in families. It causes a problem with the skin barrier that makes the skin more sensitive to the environment and other factors. The increased skin sensitivity causes an itch, which causes scratching. Scratching can worsen the itching or also break the skin. This can put the skin at risk of infection.  The condition is most common in people with asthma, hay fever, hives, or dry or sensitive skin. The rash may be caused by extreme heat or heavy sweating. Skin irritants can cause the rash to flare up. These can include wool or silk clothing, grease, oils, some medicines, and harsh soaps and detergents. Emotional stress can also be a trigger.  Treatment is done to relieve the itching and inflammation of the skin.  Home care  Follow these tips to care for your condition:    Keep the areas of rash clean by bathing at least every other day. Use lukewarm water to bathe. Don t use hot water, which can dry out the skin.    Don t use soaps with strong detergents. Use mild soaps made for sensitive skin.    Apply a cream or ointment to damp skin right after bathing.    Avoid things that irritate your skin. Wear absorbent, soft fabrics next to the skin rather than rough or scratchy materials.    Use mild laundry soap free of scents and perfumes. Make sure to rinse all the soap out of your clothes.    Treat  any skin infection as directed.    Use oral diphenhydramine to help reduce itching. This is an antihistamine you can buy at drug and grocery stores. It can make you sleepy, so use lower doses during the daytime. Or you can use loratadine. This is an antihistamine that will not make you sleepy. Do not use diphenhydramine if you have glaucoma or have trouble urinating due to an enlarged prostate.  Follow-up care  If your symptoms don t get better or if they get worse in the next 7 days, make an appointment with your healthcare provider.  When to seek medical advice  Call your healthcare provider right away  if any of these occur:    Increasing area of redness or pain in the skin    Yellow crusts or wet drainage from the rash    Fever of 100.4 F (38 C) or higher, or as directed by your health care provider    4944-7476 [employer]. [address] [place] [address]. All rights reserved. This information is not intended as a substitute for professional medical care. Always follow your healthcare professional's instructions.                Estimated body mass index is 24.16 kg/(m^2) as calculated from the following:    Height as of 2/3/17: 6' (1.829 m).    Weight as of this encounter: 178 lb 3.2 oz (80.831 kg).       Argelia Hendrix [facility]  [facility] [place]

## 2017-02-06 NOTE — TELEPHONE ENCOUNTER
Telephone call to patient. He reports the following:     -rash began on left inner thigh, was seen in ED on 1/30/17  -rash is now on right side of neck, painful when touched  -itching, left wrist rash  -on day 2 of 7 antiviral medication (prescribed while in ED in [place])  -afebrile, no chills  -denies headache    Scheduled for same day appointment 2/6/17 at 1700.     Vida Liriano RN  [facility]

## 2017-02-06 NOTE — TELEPHONE ENCOUNTER
Pt was seen last week for shingles. He states he now has a rash on his neck and wrist that are consistent with shingles he had on his calf. Also he states he is having pain in his neck. He is hoping  can fit him into the schedule today to address this. Pt scheduled for Thursday at 130 pm first avail with . Please call him at [phone] OK to CARMELO thank you.    Fiordaliza CHILDERS  Central Scheduling

## 2017-02-09 ENCOUNTER — OFFICE VISIT (OUTPATIENT)
Dept: FAMILY MEDICINE | Facility: CLINIC | Age: 52
End: 2017-02-09
Payer: COMMERCIAL

## 2017-02-09 VITALS
HEART RATE: 92 BPM | DIASTOLIC BLOOD PRESSURE: 72 MMHG | WEIGHT: 180.4 LBS | OXYGEN SATURATION: 97 % | TEMPERATURE: 97 F | SYSTOLIC BLOOD PRESSURE: 134 MMHG | HEIGHT: 72 IN | BODY MASS INDEX: 24.43 KG/M2

## 2017-02-09 DIAGNOSIS — L25.8 CONTACT DERMATITIS DUE TO OTHER AGENT: Primary | ICD-10-CM

## 2017-02-09 DIAGNOSIS — S76.211A GROIN STRAIN, RIGHT, INITIAL ENCOUNTER: ICD-10-CM

## 2017-02-09 PROCEDURE — 99213 OFFICE O/P EST LOW 20 MIN: CPT | Performed by: FAMILY MEDICINE

## 2017-02-09 NOTE — NURSING NOTE
Chief Complaint   Patient presents with     Shingles       Initial /72 mmHg  Pulse 92  Temp(Src) 97  F (36.1  C) (Oral)  Ht 6' (1.829 m)  Wt 180 lb 6.4 oz (81.829 kg)  BMI 24.46 kg/m2  SpO2 97% Estimated body mass index is 24.46 kg/(m^2) as calculated from the following:    Height as of this encounter: 6' (1.829 m).    Weight as of this encounter: 180 lb 6.4 oz (81.829 kg).  Medication Reconciliation: complete     Amanda Alejandro MA

## 2017-02-09 NOTE — PROGRESS NOTES
SUBJECTIVE:                                                    Cesar Montejo is a 51 year old male who presents to clinic today for the following health issues:      Rash     Onset: few weeks      Description:   Location: left thigh and on neck  Character: raised, painful, red  Itching (Pruritis): YES    Progression of Symptoms:  same    Accompanying Signs & Symptoms:  Fever: no   Body aches or joint pain: no   Sore throat symptoms: no   Recent cold symptoms: no    History:   Previous similar rash: no     Precipitating factors:   Exposure to similar rash: no   New exposures: None   Recent travel: no      Therapies Tried and outcome: none      Has had pain on/ off with certain movements left groin    Problem list and histories reviewed & adjusted, as indicated.  Additional history: as documented    Problem list, Medication list, Allergies, and Medical/Social/Surgical histories reviewed in EPIC and updated as appropriate.    ROS:  Constitutional, HEENT, cardiovascular, pulmonary, gi and gu systems are negative, except as otherwise noted.    OBJECTIVE:                                                    /72 mmHg  Pulse 92  Temp(Src) 97  F (36.1  C) (Oral)  Ht 6' (1.829 m)  Wt 180 lb 6.4 oz (81.829 kg)  BMI 24.46 kg/m2  SpO2 97%  Body mass index is 24.46 kg/(m^2).  GENERAL: healthy, alert and no distress  MS: normal muscle tone and tenderness to palpation left upper inquinal ligament  SKIN: maculopapular eruption - left lower Abdominal wall and  neck , rash resolved on leg         ASSESSMENT/PLAN:                                                            1. Contact dermatitis due to other agent  Improving, limit soap/ chemicals  Use hydrocortisone as needed  Follow up by phone in 2 weeks if not improving.     2. Groin strain, right, initial encounter  Stretch/ avoid painful  activiteis  Follow up by phone in 2 weeks if not improving.       See Patient Instructions    Rocky Messina MD  [place]  [facility]

## 2017-04-12 ENCOUNTER — OFFICE VISIT (OUTPATIENT)
Dept: DERMATOLOGY | Facility: CLINIC | Age: 52
End: 2017-04-12

## 2017-04-12 DIAGNOSIS — D22.39 FIBROUS PAPULE OF NOSE: Primary | ICD-10-CM

## 2017-04-12 DIAGNOSIS — Z12.83 SKIN CANCER SCREENING: ICD-10-CM

## 2017-04-12 DIAGNOSIS — Z85.828 HISTORY OF NONMELANOMA SKIN CANCER: ICD-10-CM

## 2017-04-12 DIAGNOSIS — L57.0 AK (ACTINIC KERATOSIS): ICD-10-CM

## 2017-04-12 ASSESSMENT — PAIN SCALES - GENERAL: PAINLEVEL: NO PAIN (0)
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
## 2017-04-12 NOTE — PATIENT INSTRUCTIONS
Sun Protection    We recommend daily use of sunscreen of SPF 30 or higher, reapplied every 2 hours when outside or sooner if swimming or sweating. It is best to try to avoid prolonged midday sun whenever possibly, and wear wide-brimmed hats, long-sleeved shirts, particular when outdoors for extended time periods.

## 2017-04-12 NOTE — PROGRESS NOTES
SUBJECTIVE:                                                    Cesar Montejo is a 51 year old male who presents to clinic today for the following health issues:    Constipation     Onset: 2.5 weeks    Description:   Frequency of bowel movements: 2 weeks.  Stool consistency: small caliber    Progression of Symptoms:  same    Accompanying Signs & Symptoms:  Abdominal pain (cramping?): no, but did for 4 or 5 hours about 3 days ago  Blood in stool: YES- fresh blood on tissues (but has it at times from irritation)  Rectal pain: no- not pain but pressure  Nausea/vomiting: YES- nausea last night and some today (more likely from stress)  Weight loss or gain: YES- minor weight loss, 4 pounds roughly   History:   History of abdominal surgery: no     Precipitating factors:   Recent use of narcotics, anticholinergics, calcium channel blockers, antacids, or iron supplements: no   Chronic Laxative Use: no          Therapies Tried and outcome: none    He is quite anxious over his recent bowel problems  It started with having very small bowel movements (hasn't struggled with constipation before)  Then he went about 3 days without having a bowel movement followed by some hard stool and then some loose stool    He hasn't changed his diet recently  Drinks about 7-8 cups of water daily  Drinks about 3 cups of coffee daily  His typical diet consists of oatmeal, whole wheat pasta, fish, chicken breast, broccoli, bananas and oranges        Problem list and histories reviewed & adjusted, as indicated.  Additional history: as documented    ROS:  C: NEGATIVE for fever, chills, change in weight  E/M: NEGATIVE for ear, mouth and throat problems  R: NEGATIVE for significant cough or SOB  CV: NEGATIVE for chest pain, palpitations or peripheral edema  GI: NEGATIVE for hemorrhoids, jaundice, melena, vomiting and weight loss  : No frequency, urgency, hematuria, dysuria     Patient Active Problem List   Diagnosis     Insomnia     CARDIOVASCULAR  SCREENING; LDL GOAL LESS THAN 130     Hypercholesteremia     GERD (gastroesophageal reflux disease)     Mixed anxiety and depressive disorder     Knee pain     Right shoulder pain     Calcific tendonitis     Solar lentiginosis     Skin cancer screening     Dermatitis, seborrheic     Keratosis, actinic     Family history of skin cancer     AK (actinic keratosis)     History of actinic keratosis     Past Surgical History:   Procedure Laterality Date     LASER CO2 LESION ORAL N/A 10/5/2016    Procedure: LASER CO2 LESION ORAL;  Surgeon: Josué Rojo DDS;  Location: [facility] OR     MOHS MICROGRAPHIC PROCEDURE         Social History   Substance Use Topics     Smoking status: Former Smoker     Packs/day: 0.50     Years: 5.00     Types: Cigarettes     Smokeless tobacco: Never Used      Comment: Only in college,one pack per week     Alcohol use Yes      Comment: social occ.     Family History   Problem Relation Age of Onset     Lipids Mother      on meds     CEREBROVASCULAR DISEASE Mother      TIA     Alzheimer Disease Mother      Skin Cancer Mother      SCC     Lipids Father      on meds     Hypertension Father      controlled with meds     Thyroid Disease Father      ?not sure but possibly     DIABETES Father      Cancer - colorectal Maternal Grandmother      still alive at 99     CANCER Maternal Grandfather      lung but lifetime smoker     Melanoma Maternal Grandfather      Asthma No family hx of      C.A.D. No family hx of      Prostate Cancer No family hx of            Problem list, Medication list, Allergies, and Medical/Social/Surgical histories reviewed in [facility] and updated as appropriate.  Labs reviewed in EPIC    OBJECTIVE:                                                    /80  Pulse 105  Temp 97.1  F (36.2  C) (Oral)  Ht 6' (1.829 m)  Wt 180 lb (81.6 kg)  SpO2 97%  BMI 24.41 kg/m2 Body mass index is 24.41 kg/(m^2).   General appearance: alert,no distress  Hydration: well hydrated  Eyes: KEN  Nose:  normal  Oropharynx: normal without erythema, edema or exudates. moist mucus membranes.   Neck: normal, supple and no adenopathy  Lungs: clear to auscultation and percussion  Heart: regular rate and rhythm,no murmurs, clicks, rubs or gallops  Abdomen: flat, normal bowel sounds.   Tenderness: none, negative Kelley's, negative McBurney's  Masses: none  Organomegaly: none  Rectal: mild perirectal erythema. No hemorrhoids, blood or fissures. Stool brown.          ASSESSMENT/PLAN:                                                        ICD-10-CM    1. Slow transit constipation K59.01 XR KUB   2. BRBPR (bright red blood per rectum) K62.5 GASTROENTEROLOGY ADULT REF PROCEDURE ONLY     Patient is extremely concerned about having cancer so we'll go ahead and have him get a colonoscopy done. See plan below. Return to clinic for any new or worsening symptoms or go to ER Urgent care in off hours    Patient Instructions   Schedule colonoscopy  Start miralax 1 cap in 8 ounces of water daily  Use over the counter fleet enema to get things moving  Be sure to get 25 g grams of fiber daily  Keep drinking water and exercising  Get xray done  Return to clinic for any new or worsening symptoms or go to ER Urgent care in off hours     Eating a High-Fiber Diet  Fiber is what gives strength and structure to plants. Most grains, beans, vegetables, and fruits contain fiber. Foods rich in fiber are often low in calories and fat, and they fill you up more. They may also reduce your risks for certain health problems. To find out the amount of fiber in canned, packaged, or frozen foods, read the Nutrition Facts label. It tells you how much fiber is in a serving.    Types of fiber and their benefits  There are two types of fiber: insoluble and soluble. They both aid digestion and help you maintain a healthy weight.    Insoluble fiber. This is found in whole grains, cereals, certain fruits and vegetables such as apple skin, corn, and carrots.  Insoluble fiber may prevent constipation and reduce the risk for certain types of cancer.    Soluble fiber. This type of fiber is in oats, beans, and certain fruits and vegetables such as strawberries and peas. Soluble fiber can reduce cholesterol, which may help lower the risk for heart disease. It also helps control blood sugar levels.  Look for high-fiber foods  Try these foods to add fiber to your diet:    Whole-grain breads and cereals. Try to eat 6 to 8 ounces a day. Include wheat and oat bran cereals, whole-wheat muffins or toast, and corn tortillas in your meals.    Fruits. Try to eat 2 cups a day. Apples, oranges, strawberries, pears, and bananas are good sources. (Note: Fruit juice is low in fiber.)    Vegetables. Try to eat at least 2.5 cups a day. Add asparagus, carrots, broccoli, peas, and corn to your meals.    Beans. One cup of cooked lentils gives you over 15 grams of fiber. Try navy beans, lentils, and chickpeas.    Seeds. A small handful of seeds gives you about 3 grams of fiber. Try sunflower seeds.  Keep track of your fiber  Keep track of how much fiber you eat. Start by reading food labels. Then eat a variety of foods high in fiber. As you begin to eat more fiber, ask your healthcare provider how much water you should be drinking to keep your digestive system working smoothly.  You should aim for a certain amount of fiber in your diet each day. If you are a woman, that amount is between 25 and 28 grams per day. Men should aim for 30 to 33 grams per day. After age 50, your daily fiber needs drop to 22 grams for women and 28 grams for men.  Before you reach for the fiber supplements, think about this. Fiber is found naturally in healthy whole foods. It gives you that feeling of fullness after you eat. Taking fiber supplements or eating fiber-enriched foods will not give you this full feeling.  Your fiber intake is a good measure for the quality of your overall diet. If you are missing out on your  daily amount of fiber, you may be lacking other important nutrients as well.    9776-5506 [employer]. [address] [place] [address], [place] [address]. All rights reserved. This information is not intended as a substitute for professional medical care. Always follow your healthcare professional's instructions.        Constipation (Adult)  Constipation means that you have bowel movements that are less frequent than usual. Stools often become very hard and difficult to pass.  Constipation is very common. At some point in life it affects almost everyone. Since everyone's bowel habits are different, what is constipation to one person may not be to another. Your healthcare provider may do tests to diagnose constipation. It depends on what he or she finds when evaluating you.    Symptoms of constipation include:    Abdominal pain    Bloating    Vomiting    Painful bowel movements    Itching, swelling, bleeding, or pain around the anus  Causes  Constipation can have many causes. These include:    Diet low in fiber    Too much dairy    Not drinking enough liquids    Lack of exercise or physical activity. This is especially true for older adults.    Changes in lifestyle or daily routine, including pregnancy, aging, work, and travel    Frequent use or misuse of laxatives    Ignoring the urge to have a bowel movement or delaying it until later    Medicines, such as certain prescription pain medicines, iron supplements, antacids, certain antidepressants, and calcium supplements    Diseases like irritable bowel syndrome, bowel obstructions, stroke, diabetes, thyroid disease, Parkinson disease, hemorrhoids, and colon cancer  Complications  Potential complications of constipation can include:    Hemorrhoids    Rectal bleeding from hemorrhoids or anal fissures (skin tears)    Hernias    Dependency on laxatives    Chronic constipation    Fecal impaction    Bowel obstruction or perforation  Home care  All treatment should be  done after talking with your healthcare provider. This is especially true if you have another medical problems, are taking prescription medicines, or are an older adult. Treatment most often involves lifestyle changes. You may also need medicines. Your healthcare provider will tell you which will work best for you. Follow the advice below to help avoid this problem in the future.  Lifestyle changes  These lifestyle changes can help prevent constipation:    Diet. Eat a high-fiber diet, with fresh fruit and vegetables, and reduce dairy intake, meats, and processed foods    Fluids. It's important to get enough fluids each day. Drink plenty of water when you eat more fiber. If you are on diet that limits the amount of fluid you can have, talk about this with your healthcare provider.    Regular exercise. Check with your healthcare provider first.  Medications  Take any medicines as directed. Some laxatives are safe to use only every now and then. Others can be taken on a regular basis. Talk with your doctor or pharmacist if you have questions.  Prescription pain medicines can cause constipation. If you are taking this kind of medicine, ask your healthcare provider if you should also take a stool softener.  Medicines you may take to treat constipation include:    Fiber supplements    Stool softeners    Laxatives    Enemas    Rectal suppositories  Follow-up care  Follow up with your healthcare provider if symptoms don't get better in the next few days. You may need to have more tests or see a specialist.  Call 911  Call 911 if any of these occur:    Trouble breathing    Stiff, rigid abdomen that is severely painful to touch    Confusion    Fainting or loss of consciousness    Rapid heart rate    Chest pain  When to seek medical advice  Call your healthcare provider right away if any of these occur:    Fever over 100.4 F (38 C)    Failure to resume normal bowel movements    Pain in your abdomen or back gets worse    Nausea  or vomiting    Swelling in your abdomen    Blood in the stool    Black, tarry stool    Involuntary weight loss    Weakness    8611-4609 [employer]. [address] [place] [address], [place] [address]. All rights reserved. This information is not intended as a substitute for professional medical care. Always follow your healthcare professional's instructions.              Estimated body mass index is 24.41 kg/(m^2) as calculated from the following:    Height as of this encounter: 6' (1.829 m).    Weight as of this encounter: 180 lb (81.6 kg).       Argelia De Los Santos  [facility]

## 2017-04-12 NOTE — NURSING NOTE
Dermatology Rooming Note    Cesar Montejo's goals for this visit include:   Chief Complaint   Patient presents with     Derm Problem     Travis is here today for a skin check. Has concerning areas on his face.           Alyssa Trent LPN
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
## 2017-04-12 NOTE — MR AVS SNAPSHOT
After Visit Summary   4/12/2017    Cesar Montejo    MRN: [id number]           Patient Information     Date Of Birth          1965        Visit Information        Provider Department      4/12/2017 12:00 PM Seferino Quintana MD [facility] Dermatology        Today's Diagnoses     History of nonmelanoma skin cancer    -  1    Skin cancer screening        AK (actinic keratosis)          Care Instructions    Sun Protection    We recommend daily use of sunscreen of SPF 30 or higher, reapplied every 2 hours when outside or sooner if swimming or sweating. It is best to try to avoid prolonged midday sun whenever possibly, and wear wide-brimmed hats, long-sleeved shirts, particular when outdoors for extended time periods.        Follow-ups after your visit        Follow-up notes from your care team     Return in about 6 months (around 10/12/2017).      Your next 10 appointments already scheduled     Apr 13, 2017 12:00 PM CDT   Office Visit with Argelia De Los Santos PA-C   [facility] ([facility])    [address] [place] [id number] [phone]           Bring a current list of meds and any records pertaining to this visit.  For Physicals, please bring immunization records and any forms needing to be filled out.  Please arrive 10 minutes early to complete paperwork.            May 08, 2017  8:00 AM CDT   (Arrive by 7:45 AM)   New Patient Visit with Osman Clark MD   [facility] ([facility])    [address] [place] [id number] [phone]            Jun 08, 2017 12:45 PM CDT   (Arrive by 12:30 PM)   Return Visit with Rober Mari MD   [facility] Dermatology ([facility])    [address] [place] [id number] [phone]              Who to contact     Please call your clinic at [phone] to:    Ask questions about  your health    Make or cancel appointments    Discuss your medicines    Learn about your test results    Speak to your doctor   If you have compliments or concerns about an experience at your clinic, or if you wish to file a complaint, please contact [facility] Patient Relations at [phone] or email us at [email or web address]         Additional Information About Your Visit        MyChart Information     [employer] gives you secure access to your electronic health record. If you see a primary care provider, you can also send messages to your care team and make appointments. If you have questions, please call your primary care clinic.  If you do not have a primary care provider, please call [phone] and they will assist you.      [employer] is an electronic gateway that provides easy, online access to your medical records. With [employer], you can request a clinic appointment, read your test results, renew a prescription or communicate with your care team.     To access your existing account, please contact your [facility] or call [phone] for assistance.        Care EveryWhere ID     This is your Care EveryWhere ID. This could be used by other organizations to access your [place] medical records  [id number]         Blood Pressure from Last 3 Encounters:   02/09/17 134/72   02/06/17 135/90   02/03/17 118/74    Weight from Last 3 Encounters:   02/09/17 81.8 kg (180 lb 6.4 oz)   02/06/17 80.8 kg (178 lb 3.2 oz)   02/03/17 80.6 kg (177 lb 11.2 oz)              We Performed the Following     DESTRUCT PREMALIGNANT LESION, FIRST        Primary Care Provider Office Phone # Fax #    Rocky Messina [phone]       [facility] [address] [facility] [address]  [facility] [address]        Thank you!     Thank you for choosing [facility] DERMATOLOGY  for your care. Our goal is always to provide you with excellent care. Hearing  back from our patients is one way we can continue to improve our services. Please take a few minutes to complete the written survey that you may receive in the mail after your visit with us. Thank you!             Your Updated Medication List - Protect others around you: Learn how to safely use, store and throw away your medicines at www.disposemymeds.org.          This list is accurate as of: 4/12/17 12:19 PM.  Always use your most recent med list.                   Brand Name Dispense Instructions for use    ASPIRIN PO      Take 81 mg by mouth daily       methylPREDNISolone 4 MG tablet    MEDROL DOSEPAK    21 tablet    Follow package instructions       metroNIDAZOLE 0.75 % cream    METROCREAM    45 g    Apply topically 2 times daily       mirtazapine 15 MG tablet    REMERON    90 tablet    Take 1 tablet (15 mg) by mouth At Bedtime       OMEGA-3 FISH OIL PO      Take 2 g by mouth daily Reported on 4/12/2017       simvastatin 40 MG tablet    ZOCOR    90 tablet    Take 1 tablet (40 mg) by mouth At Bedtime

## 2017-04-13 ENCOUNTER — OFFICE VISIT (OUTPATIENT)
Dept: FAMILY MEDICINE | Facility: CLINIC | Age: 52
End: 2017-04-13
Payer: COMMERCIAL

## 2017-04-13 ENCOUNTER — TELEPHONE (OUTPATIENT)
Dept: GASTROENTEROLOGY | Facility: CLINIC | Age: 52
End: 2017-04-13

## 2017-04-13 ENCOUNTER — HOSPITAL ENCOUNTER (OUTPATIENT)
Dept: GENERAL RADIOLOGY | Facility: CLINIC | Age: 52
Discharge: HOME | End: 2017-04-13
Attending: PHYSICIAN ASSISTANT | Admitting: PHYSICIAN ASSISTANT
Payer: COMMERCIAL

## 2017-04-13 VITALS
TEMPERATURE: 97.1 F | HEIGHT: 72 IN | BODY MASS INDEX: 24.38 KG/M2 | DIASTOLIC BLOOD PRESSURE: 80 MMHG | WEIGHT: 180 LBS | SYSTOLIC BLOOD PRESSURE: 120 MMHG | OXYGEN SATURATION: 97 % | HEART RATE: 105 BPM

## 2017-04-13 DIAGNOSIS — K59.01 SLOW TRANSIT CONSTIPATION: ICD-10-CM

## 2017-04-13 DIAGNOSIS — K59.01 SLOW TRANSIT CONSTIPATION: Primary | ICD-10-CM

## 2017-04-13 DIAGNOSIS — Z12.11 ENCOUNTER FOR SCREENING COLONOSCOPY: Primary | ICD-10-CM

## 2017-04-13 DIAGNOSIS — K62.5 BRBPR (BRIGHT RED BLOOD PER RECTUM): ICD-10-CM

## 2017-04-13 PROCEDURE — 74010 XR KUB: CPT | Mod: 52

## 2017-04-13 PROCEDURE — 99214 OFFICE O/P EST MOD 30 MIN: CPT | Performed by: PHYSICIAN ASSISTANT

## 2017-04-13 NOTE — NURSING NOTE
Chief Complaint   Patient presents with     Constipation       Initial /80  Pulse 105  Temp 97.1  F (36.2  C) (Oral)  Ht 6' (1.829 m)  Wt 180 lb (81.6 kg)  SpO2 97%  BMI 24.41 kg/m2 Estimated body mass index is 24.41 kg/(m^2) as calculated from the following:    Height as of this encounter: 6' (1.829 m).    Weight as of this encounter: 180 lb (81.6 kg).    Xin Gaffney MA

## 2017-04-13 NOTE — MR AVS SNAPSHOT
After Visit Summary   4/13/2017    Cesar Montejo    MRN: [id number]           Patient Information     Date Of Birth          1965        Visit Information        Provider Department      4/13/2017 12:00 PM Argelia De Los Santos PA-C [facility]        Today's Diagnoses     Slow transit constipation    -  1    BRBPR (bright red blood per rectum)          Care Instructions    Schedule colonoscopy  Start miralax 1 cap in 8 ounces of water daily  Use over the counter fleet enema to get things moving  Be sure to get 25 g grams of fiber daily  Keep drinking water and exercising  Get xray done  Return to clinic for any new or worsening symptoms or go to ER Urgent care in off hours     Eating a High-Fiber Diet  Fiber is what gives strength and structure to plants. Most grains, beans, vegetables, and fruits contain fiber. Foods rich in fiber are often low in calories and fat, and they fill you up more. They may also reduce your risks for certain health problems. To find out the amount of fiber in canned, packaged, or frozen foods, read the Nutrition Facts label. It tells you how much fiber is in a serving.    Types of fiber and their benefits  There are two types of fiber: insoluble and soluble. They both aid digestion and help you maintain a healthy weight.    Insoluble fiber. This is found in whole grains, cereals, certain fruits and vegetables such as apple skin, corn, and carrots. Insoluble fiber may prevent constipation and reduce the risk for certain types of cancer.    Soluble fiber. This type of fiber is in oats, beans, and certain fruits and vegetables such as strawberries and peas. Soluble fiber can reduce cholesterol, which may help lower the risk for heart disease. It also helps control blood sugar levels.  Look for high-fiber foods  Try these foods to add fiber to your diet:    Whole-grain breads and cereals. Try to eat 6 to 8 ounces a day. Include wheat and oat bran  cereals, whole-wheat muffins or toast, and corn tortillas in your meals.    Fruits. Try to eat 2 cups a day. Apples, oranges, strawberries, pears, and bananas are good sources. (Note: Fruit juice is low in fiber.)    Vegetables. Try to eat at least 2.5 cups a day. Add asparagus, carrots, broccoli, peas, and corn to your meals.    Beans. One cup of cooked lentils gives you over 15 grams of fiber. Try navy beans, lentils, and chickpeas.    Seeds. A small handful of seeds gives you about 3 grams of fiber. Try sunflower seeds.  Keep track of your fiber  Keep track of how much fiber you eat. Start by reading food labels. Then eat a variety of foods high in fiber. As you begin to eat more fiber, ask your healthcare provider how much water you should be drinking to keep your digestive system working smoothly.  You should aim for a certain amount of fiber in your diet each day. If you are a woman, that amount is between 25 and 28 grams per day. Men should aim for 30 to 33 grams per day. After age 50, your daily fiber needs drop to 22 grams for women and 28 grams for men.  Before you reach for the fiber supplements, think about this. Fiber is found naturally in healthy whole foods. It gives you that feeling of fullness after you eat. Taking fiber supplements or eating fiber-enriched foods will not give you this full feeling.  Your fiber intake is a good measure for the quality of your overall diet. If you are missing out on your daily amount of fiber, you may be lacking other important nutrients as well.    0284-7663 [employer]. [address] [place] [address]. All rights reserved. This information is not intended as a substitute for professional medical care. Always follow your healthcare professional's instructions.        Constipation (Adult)  Constipation means that you have bowel movements that are less frequent than usual. Stools often become very hard and difficult to pass.  Constipation is  very common. At some point in life it affects almost everyone. Since everyone's bowel habits are different, what is constipation to one person may not be to another. Your healthcare provider may do tests to diagnose constipation. It depends on what he or she finds when evaluating you.    Symptoms of constipation include:    Abdominal pain    Bloating    Vomiting    Painful bowel movements    Itching, swelling, bleeding, or pain around the anus  Causes  Constipation can have many causes. These include:    Diet low in fiber    Too much dairy    Not drinking enough liquids    Lack of exercise or physical activity. This is especially true for older adults.    Changes in lifestyle or daily routine, including pregnancy, aging, work, and travel    Frequent use or misuse of laxatives    Ignoring the urge to have a bowel movement or delaying it until later    Medicines, such as certain prescription pain medicines, iron supplements, antacids, certain antidepressants, and calcium supplements    Diseases like irritable bowel syndrome, bowel obstructions, stroke, diabetes, thyroid disease, Parkinson disease, hemorrhoids, and colon cancer  Complications  Potential complications of constipation can include:    Hemorrhoids    Rectal bleeding from hemorrhoids or anal fissures (skin tears)    Hernias    Dependency on laxatives    Chronic constipation    Fecal impaction    Bowel obstruction or perforation  Home care  All treatment should be done after talking with your healthcare provider. This is especially true if you have another medical problems, are taking prescription medicines, or are an older adult. Treatment most often involves lifestyle changes. You may also need medicines. Your healthcare provider will tell you which will work best for you. Follow the advice below to help avoid this problem in the future.  Lifestyle changes  These lifestyle changes can help prevent constipation:    Diet. Eat a high-fiber diet, with fresh  fruit and vegetables, and reduce dairy intake, meats, and processed foods    Fluids. It's important to get enough fluids each day. Drink plenty of water when you eat more fiber. If you are on diet that limits the amount of fluid you can have, talk about this with your healthcare provider.    Regular exercise. Check with your healthcare provider first.  Medications  Take any medicines as directed. Some laxatives are safe to use only every now and then. Others can be taken on a regular basis. Talk with your doctor or pharmacist if you have questions.  Prescription pain medicines can cause constipation. If you are taking this kind of medicine, ask your healthcare provider if you should also take a stool softener.  Medicines you may take to treat constipation include:    Fiber supplements    Stool softeners    Laxatives    Enemas    Rectal suppositories  Follow-up care  Follow up with your healthcare provider if symptoms don't get better in the next few days. You may need to have more tests or see a specialist.  Call 911  Call 911 if any of these occur:    Trouble breathing    Stiff, rigid abdomen that is severely painful to touch    Confusion    Fainting or loss of consciousness    Rapid heart rate    Chest pain  When to seek medical advice  Call your healthcare provider right away if any of these occur:    Fever over 100.4 F (38 C)    Failure to resume normal bowel movements    Pain in your abdomen or back gets worse    Nausea or vomiting    Swelling in your abdomen    Blood in the stool    Black, tarry stool    Involuntary weight loss    Weakness    9549-5997 [employer]. [address] [place] [address]. All rights reserved. This information is not intended as a substitute for professional medical care. Always follow your healthcare professional's instructions.              Follow-ups after your visit        Additional Services     GASTROENTEROLOGY ADULT REF PROCEDURE ONLY       Last Lab  Result: Creatinine (mg/dL)       Date                     Value                 12/11/2016               0.82             ----------  Body mass index is 24.41 kg/(m^2).     Needed:  No  Language:  English    Patient will be contacted to schedule procedure.     Please be aware that coverage of these services is subject to the terms and limitations of your health insurance plan.  Call member services at your health plan with any benefit or coverage questions.  Any procedures must be performed at a [facility] facility OR coordinated by your clinic's referral office.    Please bring the following with you to your appointment:    (1) Any X-Rays, CTs or MRIs which have been performed.  Contact the facility where they were done to arrange for  prior to your scheduled appointment.    (2) List of current medications   (3) This referral request   (4) Any documents/labs given to you for this referral                  Your next 10 appointments already scheduled     May 08, 2017  8:00 AM CDT   (Arrive by 7:45 AM)   New Patient Visit with Osman Clark MD   [facility] ([facility])    [address] [place] [id number] [phone]            Jun 08, 2017 12:45 PM CDT   (Arrive by 12:30 PM)   Return Visit with Rober Mari MD   [facility] ([facility])    [address] [place] [id number] [phone]              Who to contact     If you have questions or need follow up information about today's clinic visit or your schedule please contact [facility] [place] directly at [phone].  Normal or non-critical lab and imaging results will be communicated to you by MyChart, letter or phone within 4 business days after the clinic has received the results. If you do not hear from us within 7 days, please contact the clinic through MyChart or phone. If you have a critical or  abnormal lab result, we will notify you by phone as soon as possible.  Submit refill requests through [email or web address] or call your pharmacy and they will forward the refill request to us. Please allow 3 business days for your refill to be completed.          Additional Information About Your Visit        [employer] Information     [email or web address] gives you secure access to your electronic health record. If you see a primary care provider, you can also send messages to your care team and make appointments. If you have questions, please call your primary care clinic.  If you do not have a primary care provider, please call [phone] and they will assist you.        Care EveryWhere ID     This is your Care EveryWhere ID. This could be used by other organizations to access your [facility] medical records  [id number]        Your Vitals Were     Pulse Temperature Height Pulse Oximetry BMI (Body Mass Index)       105 97.1  F (36.2  C) (Oral) 6' (1.829 m) 97% 24.41 kg/m2        Blood Pressure from Last 3 Encounters:   04/13/17 120/80   02/09/17 134/72   02/06/17 135/90    Weight from Last 3 Encounters:   04/13/17 180 lb (81.6 kg)   02/09/17 180 lb 6.4 oz (81.8 kg)   02/06/17 178 lb 3.2 oz (80.8 kg)              We Performed the Following     GASTROENTEROLOGY ADULT REF PROCEDURE ONLY        Primary Care Provider Office Phone # Fax #    Rocky Rigo Messina [phone]       [facility] [address] [facility] [address]  [facility] [address]        Thank you!     Thank you for choosing [facility]  for your care. Our goal is always to provide you with excellent care. Hearing back from our patients is one way we can continue to improve our services. Please take a few minutes to complete the written survey that you may receive in the mail after your visit with us. Thank you!             Your Updated Medication List - Protect others around you: Learn how to safely use, store and throw away your medicines at  www.disposemymeds.org.          This list is accurate as of: 4/13/17 12:26 PM.  Always use your most recent med list.                   Brand Name Dispense Instructions for use    ASPIRIN PO      Take 81 mg by mouth daily       methylPREDNISolone 4 MG tablet    MEDROL DOSEPAK    21 tablet    Follow package instructions       metroNIDAZOLE 0.75 % cream    METROCREAM    45 g    Apply topically 2 times daily       mirtazapine 15 MG tablet    REMERON    90 tablet    Take 1 tablet (15 mg) by mouth At Bedtime       OMEGA-3 FISH OIL PO      Take 2 g by mouth daily Reported on 4/12/2017       simvastatin 40 MG tablet    ZOCOR    90 tablet    Take 1 tablet (40 mg) by mouth At Bedtime

## 2017-04-13 NOTE — PATIENT INSTRUCTIONS
Schedule colonoscopy  Start miralax 1 cap in 8 ounces of water daily  Use over the counter fleet enema to get things moving  Be sure to get 25 g grams of fiber daily  Keep drinking water and exercising  Get xray done  Return to clinic for any new or worsening symptoms or go to ER Urgent care in off hours     Eating a High-Fiber Diet  Fiber is what gives strength and structure to plants. Most grains, beans, vegetables, and fruits contain fiber. Foods rich in fiber are often low in calories and fat, and they fill you up more. They may also reduce your risks for certain health problems. To find out the amount of fiber in canned, packaged, or frozen foods, read the Nutrition Facts label. It tells you how much fiber is in a serving.    Types of fiber and their benefits  There are two types of fiber: insoluble and soluble. They both aid digestion and help you maintain a healthy weight.    Insoluble fiber. This is found in whole grains, cereals, certain fruits and vegetables such as apple skin, corn, and carrots. Insoluble fiber may prevent constipation and reduce the risk for certain types of cancer.    Soluble fiber. This type of fiber is in oats, beans, and certain fruits and vegetables such as strawberries and peas. Soluble fiber can reduce cholesterol, which may help lower the risk for heart disease. It also helps control blood sugar levels.  Look for high-fiber foods  Try these foods to add fiber to your diet:    Whole-grain breads and cereals. Try to eat 6 to 8 ounces a day. Include wheat and oat bran cereals, whole-wheat muffins or toast, and corn tortillas in your meals.    Fruits. Try to eat 2 cups a day. Apples, oranges, strawberries, pears, and bananas are good sources. (Note: Fruit juice is low in fiber.)    Vegetables. Try to eat at least 2.5 cups a day. Add asparagus, carrots, broccoli, peas, and corn to your meals.    Beans. One cup of cooked lentils gives you over 15 grams of fiber. Try navy beans,  lentils, and chickpeas.    Seeds. A small handful of seeds gives you about 3 grams of fiber. Try sunflower seeds.  Keep track of your fiber  Keep track of how much fiber you eat. Start by reading food labels. Then eat a variety of foods high in fiber. As you begin to eat more fiber, ask your healthcare provider how much water you should be drinking to keep your digestive system working smoothly.  You should aim for a certain amount of fiber in your diet each day. If you are a woman, that amount is between 25 and 28 grams per day. Men should aim for 30 to 33 grams per day. After age 50, your daily fiber needs drop to 22 grams for women and 28 grams for men.  Before you reach for the fiber supplements, think about this. Fiber is found naturally in healthy whole foods. It gives you that feeling of fullness after you eat. Taking fiber supplements or eating fiber-enriched foods will not give you this full feeling.  Your fiber intake is a good measure for the quality of your overall diet. If you are missing out on your daily amount of fiber, you may be lacking other important nutrients as well.    4651-8202 [employer]. [address] [place], [place] [id number]. All rights reserved. This information is not intended as a substitute for professional medical care. Always follow your healthcare professional's instructions.        Constipation (Adult)  Constipation means that you have bowel movements that are less frequent than usual. Stools often become very hard and difficult to pass.  Constipation is very common. At some point in life it affects almost everyone. Since everyone's bowel habits are different, what is constipation to one person may not be to another. Your healthcare provider may do tests to diagnose constipation. It depends on what he or she finds when evaluating you.    Symptoms of constipation include:    Abdominal pain    Bloating    Vomiting    Painful bowel movements    Itching, swelling,  bleeding, or pain around the anus  Causes  Constipation can have many causes. These include:    Diet low in fiber    Too much dairy    Not drinking enough liquids    Lack of exercise or physical activity. This is especially true for older adults.    Changes in lifestyle or daily routine, including pregnancy, aging, work, and travel    Frequent use or misuse of laxatives    Ignoring the urge to have a bowel movement or delaying it until later    Medicines, such as certain prescription pain medicines, iron supplements, antacids, certain antidepressants, and calcium supplements    Diseases like irritable bowel syndrome, bowel obstructions, stroke, diabetes, thyroid disease, Parkinson disease, hemorrhoids, and colon cancer  Complications  Potential complications of constipation can include:    Hemorrhoids    Rectal bleeding from hemorrhoids or anal fissures (skin tears)    Hernias    Dependency on laxatives    Chronic constipation    Fecal impaction    Bowel obstruction or perforation  Home care  All treatment should be done after talking with your healthcare provider. This is especially true if you have another medical problems, are taking prescription medicines, or are an older adult. Treatment most often involves lifestyle changes. You may also need medicines. Your healthcare provider will tell you which will work best for you. Follow the advice below to help avoid this problem in the future.  Lifestyle changes  These lifestyle changes can help prevent constipation:    Diet. Eat a high-fiber diet, with fresh fruit and vegetables, and reduce dairy intake, meats, and processed foods    Fluids. It's important to get enough fluids each day. Drink plenty of water when you eat more fiber. If you are on diet that limits the amount of fluid you can have, talk about this with your healthcare provider.    Regular exercise. Check with your healthcare provider first.  Medications  Take any medicines as directed. Some laxatives  are safe to use only every now and then. Others can be taken on a regular basis. Talk with your doctor or pharmacist if you have questions.  Prescription pain medicines can cause constipation. If you are taking this kind of medicine, ask your healthcare provider if you should also take a stool softener.  Medicines you may take to treat constipation include:    Fiber supplements    Stool softeners    Laxatives    Enemas    Rectal suppositories  Follow-up care  Follow up with your healthcare provider if symptoms don't get better in the next few days. You may need to have more tests or see a specialist.  Call 911  Call 911 if any of these occur:    Trouble breathing    Stiff, rigid abdomen that is severely painful to touch    Confusion    Fainting or loss of consciousness    Rapid heart rate    Chest pain  When to seek medical advice  Call your healthcare provider right away if any of these occur:    Fever over 100.4 F (38 C)    Failure to resume normal bowel movements    Pain in your abdomen or back gets worse    Nausea or vomiting    Swelling in your abdomen    Blood in the stool    Black, tarry stool    Involuntary weight loss    Weakness    5404-9300 [employer]. [address] [place] [address], [place] [address]. All rights reserved. This information is not intended as a substitute for professional medical care. Always follow your healthcare professional's instructions.

## 2017-04-17 ENCOUNTER — HOSPITAL ENCOUNTER (OUTPATIENT)
Facility: CLINIC | Age: 52
Discharge: HOME | End: 2017-04-17
Attending: INTERNAL MEDICINE | Admitting: INTERNAL MEDICINE
Payer: COMMERCIAL

## 2017-04-17 VITALS
HEART RATE: 87 BPM | SYSTOLIC BLOOD PRESSURE: 117 MMHG | DIASTOLIC BLOOD PRESSURE: 59 MMHG | OXYGEN SATURATION: 96 % | RESPIRATION RATE: 11 BRPM

## 2017-04-17 LAB — COLONOSCOPY: NORMAL

## 2017-04-17 PROCEDURE — 45378 DIAGNOSTIC COLONOSCOPY: CPT | Performed by: INTERNAL MEDICINE

## 2017-04-17 PROCEDURE — 25000132 ZZH RX MED GY IP 250 OP 250 PS 637: Performed by: INTERNAL MEDICINE

## 2017-04-17 RX ORDER — SODIUM CHLORIDE, SODIUM LACTATE, POTASSIUM CHLORIDE, CALCIUM CHLORIDE 600; 310; 30; 20 MG/100ML; MG/100ML; MG/100ML; MG/100ML
INJECTION, SOLUTION INTRAVENOUS CONTINUOUS
Status: DISCONTINUED | OUTPATIENT
Start: 2017-04-17 | End: 2017-04-17 | Stop reason: HOSPADM

## 2017-04-17 RX ORDER — LIDOCAINE 40 MG/G
CREAM TOPICAL
Status: DISCONTINUED | OUTPATIENT
Start: 2017-04-17 | End: 2017-04-17 | Stop reason: HOSPADM

## 2017-04-17 RX ORDER — SIMETHICONE
LIQUID (ML) MISCELLANEOUS PRN
Status: DISCONTINUED | OUTPATIENT
Start: 2017-04-17 | End: 2017-04-17 | Stop reason: HOSPADM

## 2017-04-17 RX ORDER — ONDANSETRON 2 MG/ML
4 INJECTION INTRAMUSCULAR; INTRAVENOUS
Status: DISCONTINUED | OUTPATIENT
Start: 2017-04-17 | End: 2017-04-17 | Stop reason: HOSPADM

## 2017-04-17 NOTE — DISCHARGE INSTRUCTIONS
Diverticulosis    Diverticulosis means that small pouches have formed in the wall of your large intestine (colon). Most often, this problem causes no symptoms and is common as people age. But the pouches in the colon are at risk of becoming infected. When this happens, the condition is called diverticulitis. Although most people with diverticulosis never develop diverticulitis, it is still not uncommon. Rectal bleeding can also occur and in less common situations, a type of colon inflammation called colitis.  While most people do not have symptoms, some people with diverticulosis may have:    Abdominal cramps and pain    Bloating    Constipation    Change in bowel habits  Causes  The exact cause of diverticulosis (and diverticulitis) has not been proved, but a few things are associated with the condition:    Low-fiber diet    Constipation    Lack of exercise  Your healthcare provider will talk with you about how to manage your condition. Diet changes may be all that are needed to help control diverticulosis and prevent progression to diverticulitis. If you develop diverticulitis, you will likely need other treatments.  Home care  You may be told to take fiber supplements daily. Fiber adds bulk to the stool so that it passes through the colon more easily. Stool softeners may be recommended. You may also be given medications for pain relief. Be sure to take all medications as directed.  In the past, people were told to avoid corn, nuts, and seeds. This is no longer necessary.  Follow these guidelines when caring for yourself at home:    Eat unprocessed foods that are high in fiber. Whole grains, fruits, and vegetables are good choices.    Drink 6 to 8 glasses of water every day unless your healthcare provider has you limit how much fluid you should have.    Watch for changes in your bowel movements. Tell your provider if you notice any changes.    Begin an exercise program. Ask your provider how to get started.  Generally, walking is the best.    Get plenty of rest and sleep.  Follow-up care  Follow up with your healthcare provider, or as advised. Regular visits may be needed to check on your health. Sometimes special procedures such as colonoscopy, are needed after an episode of diverticulitis or blooding. Be sure to keep all your appointments.  If a stool sample was taken, or cultures were done, you should be told if they are positive, or if your treatment needs to be changed. You can call as directed for the results.  If X-rays were done, a radiologist will look at them. You will be told if there is a change in your treatment.  If antibiotics were prescribed, be sure to finish them all.  When to seek medical advice  Call your healthcare provider right away if any of these occur:    Fever of 100.4 F (38 C) or higher, or as directed by your healthcare provider    Severe cramps in the lower left side of the abdomen or pain that is getting worse    Tenderness in the lower left side of the abdomen or worsening pain throughout the abdomen    Diarrhea or constipation that doesn't get better within 24 hours    Nausea and vomiting    Bleeding from the rectum  Call 911  Call emergency services if any of the following occur:    Trouble breathing    Confusion    Very drowsy or trouble awakening    Fainting or loss of consciousness    Rapid heart rate    Chest pain    9354-2963 [employer]. [address] [place] [address], [place] [address]. All rights reserved. This information is not intended as a substitute for professional medical care. Always follow your healthcare professional's instructions.

## 2017-04-17 NOTE — IP AVS SNAPSHOT
MRN:[id number]                      After Visit Summary   4/17/2017    Cesar Montejo    MRN: [id number]           Thank you!     Thank you for choosing [facility] for your care. Our goal is always to provide you with excellent care. Hearing back from our patients is one way we can continue to improve our services. Please take a few minutes to complete the written survey that you may receive in the mail after you visit with us. Thank you!        Patient Information     Date Of Birth          1965        About your hospital stay     You were admitted on:  April 17, 2017 You last received care in the:  [facility], Endoscopy    You were discharged on:  April 17, 2017       Who to Call     For medical emergencies, please call 911.  For non-urgent questions about your medical care, please call your primary care provider or clinic, [phone]  For questions related to your surgery, please call your surgery clinic        Attending Provider     Provider Specialty    Larry Fields MD Gastroenterology       Primary Care Provider Office Phone # Fax #    Rocky Rigo Messina [phone]       [address] [address]        Your next 10 appointments already scheduled     May 08, 2017  8:00 AM CDT   (Arrive by 7:45 AM)   New Patient Visit with Osman Clark MD   [facility] ([facility] [place])    [address] [place] [address] [id number] [phone]            Jun 08, 2017 12:45 PM CDT   (Arrive by 12:30 PM)   Return Visit with Rober Mari MD   [facility] ([facility])    [address] [place] [address] [id number] [phone]              Further instructions from your care team         Diverticulosis    Diverticulosis means that small pouches have formed in the wall of your large intestine (colon). Most often, this  problem causes no symptoms and is common as people age. But the pouches in the colon are at risk of becoming infected. When this happens, the condition is called diverticulitis. Although most people with diverticulosis never develop diverticulitis, it is still not uncommon. Rectal bleeding can also occur and in less common situations, a type of colon inflammation called colitis.  While most people do not have symptoms, some people with diverticulosis may have:    Abdominal cramps and pain    Bloating    Constipation    Change in bowel habits  Causes  The exact cause of diverticulosis (and diverticulitis) has not been proved, but a few things are associated with the condition:    Low-fiber diet    Constipation    Lack of exercise  Your healthcare provider will talk with you about how to manage your condition. Diet changes may be all that are needed to help control diverticulosis and prevent progression to diverticulitis. If you develop diverticulitis, you will likely need other treatments.  Home care  You may be told to take fiber supplements daily. Fiber adds bulk to the stool so that it passes through the colon more easily. Stool softeners may be recommended. You may also be given medications for pain relief. Be sure to take all medications as directed.  In the past, people were told to avoid corn, nuts, and seeds. This is no longer necessary.  Follow these guidelines when caring for yourself at home:    Eat unprocessed foods that are high in fiber. Whole grains, fruits, and vegetables are good choices.    Drink 6 to 8 glasses of water every day unless your healthcare provider has you limit how much fluid you should have.    Watch for changes in your bowel movements. Tell your provider if you notice any changes.    Begin an exercise program. Ask your provider how to get started. Generally, walking is the best.    Get plenty of rest and sleep.  Follow-up care  Follow up with your healthcare provider, or as advised.  Regular visits may be needed to check on your health. Sometimes special procedures such as colonoscopy, are needed after an episode of diverticulitis or blooding. Be sure to keep all your appointments.  If a stool sample was taken, or cultures were done, you should be told if they are positive, or if your treatment needs to be changed. You can call as directed for the results.  If X-rays were done, a radiologist will look at them. You will be told if there is a change in your treatment.  If antibiotics were prescribed, be sure to finish them all.  When to seek medical advice  Call your healthcare provider right away if any of these occur:    Fever of 100.4 F (38 C) or higher, or as directed by your healthcare provider    Severe cramps in the lower left side of the abdomen or pain that is getting worse    Tenderness in the lower left side of the abdomen or worsening pain throughout the abdomen    Diarrhea or constipation that doesn't get better within 24 hours    Nausea and vomiting    Bleeding from the rectum  Call 911  Call emergency services if any of the following occur:    Trouble breathing    Confusion    Very drowsy or trouble awakening    Fainting or loss of consciousness    Rapid heart rate    Chest pain    [id number] [employer]. [address] [place] [address]. All rights reserved. This information is not intended as a substitute for professional medical care. Always follow your healthcare professional's instructions.          Pending Results     No orders found from 4/15/2017 to 4/18/2017.            Admission Information     Date & Time Provider Department Dept. Phone    4/17/2017 Larry Fields MD [facility] [place], [facility] [phone]      Your Vitals Were     Blood Pressure Pulse Respirations Pulse Oximetry          117/59 92 11 96%        MyChart Information     [employer] gives you secure access to your electronic health record. If you see a primary care provider, you can  also send messages to your care team and make appointments. If you have questions, please call your primary care clinic.  If you do not have a primary care provider, please call [phone] and they will assist you.        Care EveryWhere ID     This is your Care EveryWhere ID. This could be used by other organizations to access your [place] medical records  [id number]           Review of your medicines      UNREVIEWED medicines. Ask your doctor about these medicines        Dose / Directions    ASPIRIN PO        Dose:  81 mg   Take 81 mg by mouth daily   Refills:  0       metroNIDAZOLE 0.75 % cream   Commonly known as:  METROCREAM   Used for:  Acne rosacea        Apply topically 2 times daily   Quantity:  45 g   Refills:  11       mirtazapine 15 MG tablet   Commonly known as:  REMERON   Used for:  Mixed anxiety and depressive disorder, Insomnia        Dose:  15 mg   Take 1 tablet (15 mg) by mouth At Bedtime   Quantity:  90 tablet   Refills:  3       OMEGA-3 FISH OIL PO        Dose:  2 g   Take 2 g by mouth daily Reported on 4/12/2017   Refills:  0       simvastatin 40 MG tablet   Commonly known as:  ZOCOR   Used for:  Hypercholesteremia, CARDIOVASCULAR SCREENING; LDL GOAL LESS THAN 130        Dose:  40 mg   Take 1 tablet (40 mg) by mouth At Bedtime   Quantity:  90 tablet   Refills:  3                Protect others around you: Learn how to safely use, store and throw away your medicines at www.disposemymeds.org.             Medication List: This is a list of all your medications and when to take them. Check marks below indicate your daily home schedule. Keep this list as a reference.      Medications           Morning Afternoon Evening Bedtime As Needed    ASPIRIN PO   Take 81 mg by mouth daily                                metroNIDAZOLE 0.75 % cream   Commonly known as:  METROCREAM   Apply topically 2 times daily                                mirtazapine 15 MG tablet   Commonly known as:  REMERON   Take 1  tablet (15 mg) by mouth At Bedtime                                OMEGA-3 FISH OIL PO   Take 2 g by mouth daily Reported on 4/12/2017                                simvastatin 40 MG tablet   Commonly known as:  ZOCOR   Take 1 tablet (40 mg) by mouth At Bedtime

## 2017-04-17 NOTE — OR NURSING
Colonoscopy without sedation.  Pt wearing 2lpm 02 via NC.  No interventions.  Pt on left side for exam, tolerated procedure.  Post exam, pt denies SOB/CP/abd pain.

## 2017-04-17 NOTE — IP AVS SNAPSHOT
[facility] [place], Endoscopy    [address] [address]    Phone:  [phone]                                       After Visit Summary   4/17/2017    Cesar Montejo    MRN: [id number]           After Visit Summary Signature Page     I have received my discharge instructions, and my questions have been answered. I have discussed any challenges I see with this plan with the nurse or doctor.    ..........................................................................................................................................  Patient/Patient Representative Signature      ..........................................................................................................................................  Patient Representative Print Name and Relationship to Patient    ..................................................               ................................................  Date                                            Time    ..........................................................................................................................................  Reviewed by Signature/Title    ...................................................              ..............................................  Date                                                            Time

## 2017-04-27 ENCOUNTER — OFFICE VISIT (OUTPATIENT)
Dept: FAMILY MEDICINE | Facility: CLINIC | Age: 52
End: 2017-04-27
Payer: COMMERCIAL

## 2017-04-27 VITALS
TEMPERATURE: 97.1 F | DIASTOLIC BLOOD PRESSURE: 78 MMHG | OXYGEN SATURATION: 98 % | WEIGHT: 178.1 LBS | HEIGHT: 72 IN | SYSTOLIC BLOOD PRESSURE: 128 MMHG | HEART RATE: 113 BPM | BODY MASS INDEX: 24.12 KG/M2

## 2017-04-27 DIAGNOSIS — F41.8 MIXED ANXIETY AND DEPRESSIVE DISORDER: ICD-10-CM

## 2017-04-27 DIAGNOSIS — J06.9 VIRAL URI: ICD-10-CM

## 2017-04-27 DIAGNOSIS — Z87.448: ICD-10-CM

## 2017-04-27 DIAGNOSIS — R07.0 THROAT PAIN: Primary | ICD-10-CM

## 2017-04-27 LAB
DEPRECATED S PYO AG THROAT QL EIA: NORMAL
ERYTHROCYTE [DISTWIDTH] IN BLOOD BY AUTOMATED COUNT: 12.5 % (ref 10–15)
HCT VFR BLD AUTO: 49.1 % (ref 40–53)
HGB BLD-MCNC: 16.6 G/DL (ref 13.3–17.7)
MCH RBC QN AUTO: 31.5 PG (ref 26.5–33)
MCHC RBC AUTO-ENTMCNC: 33.8 G/DL (ref 31.5–36.5)
MCV RBC AUTO: 93 FL (ref 78–100)
MICRO REPORT STATUS: NORMAL
PLATELET # BLD AUTO: 226 10E9/L (ref 150–450)
RBC # BLD AUTO: 5.27 10E12/L (ref 4.4–5.9)
SPECIMEN SOURCE: NORMAL
WBC # BLD AUTO: 5.3 10E9/L (ref 4–11)

## 2017-04-27 PROCEDURE — 87880 STREP A ASSAY W/OPTIC: CPT | Performed by: FAMILY MEDICINE

## 2017-04-27 PROCEDURE — 87081 CULTURE SCREEN ONLY: CPT | Performed by: FAMILY MEDICINE

## 2017-04-27 PROCEDURE — 85027 COMPLETE CBC AUTOMATED: CPT | Performed by: FAMILY MEDICINE

## 2017-04-27 PROCEDURE — 99214 OFFICE O/P EST MOD 30 MIN: CPT | Performed by: FAMILY MEDICINE

## 2017-04-27 PROCEDURE — 36415 COLL VENOUS BLD VENIPUNCTURE: CPT | Performed by: FAMILY MEDICINE

## 2017-04-27 PROCEDURE — 80053 COMPREHEN METABOLIC PANEL: CPT | Performed by: FAMILY MEDICINE

## 2017-04-27 NOTE — MR AVS SNAPSHOT
After Visit Summary   4/27/2017    Cesar Montejo    MRN: [id number]           Patient Information     Date Of Birth          1965        Visit Information        Provider Department      4/27/2017 1:30 PM Rocky Messina MD [facility]        Today's Diagnoses     Throat pain    -  1    H/O cystic kidney disease        Mixed anxiety and depressive disorder           Follow-ups after your visit        Additional Services     OTOLARYNGOLOGY REFERRAL       Your provider has referred you to: N: [facility] [phone]   [email or web address]    Please be aware that coverage of these services is subject to the terms and limitations of your health insurance plan.  Call member services at your health plan with any benefit or coverage questions.      Please bring the following with you to your appointment:    (1) Any X-Rays, CTs or MRIs which have been performed.  Contact the facility where they were done to arrange for  prior to your scheduled appointment.   (2) List of current medications  (3) This referral request   (4) Any documents/labs given to you for this referral                  Your next 10 appointments already scheduled     May 08, 2017  8:00 AM CDT   (Arrive by 7:45 AM)   New Patient Visit with Osman Clark MD   [facility] ([facility])    [address] [place] [id number] [phone]            Jun 08, 2017 12:45 PM CDT   (Arrive by 12:30 PM)   Return Visit with Rober Mari MD   [facility] ([facility])    [address] [place] [address] [id number] [phone]              Future tests that were ordered for you today     Open Future Orders        Priority Expected Expires Ordered    US Renal Limited Routine 7/26/2017 4/27/2018 4/27/2017             Who to contact     If you have questions or need follow up information about today's clinic visit or your schedule please contact [facility] directly at [phone].  Normal or non-critical lab and imaging results will be communicated to you by MyChart, letter or phone within 4 business days after the clinic has received the results. If you do not hear from us within 7 days, please contact the clinic through [employer] or phone. If you have a critical or abnormal lab result, we will notify you by phone as soon as possible.  Submit refill requests through [employer] or call your pharmacy and they will forward the refill request to us. Please allow 3 business days for your refill to be completed.          Additional Information About Your Visit        [employer] Information     [employer] gives you secure access to your electronic health record. If you see a primary care provider, you can also send messages to your care team and make appointments. If you have questions, please call your primary care clinic.  If you do not have a primary care provider, please call [phone] and they will assist you.        Care EveryWhere ID     This is your Care EveryWhere ID. This could be used by other organizations to access your [place] medical records  [id number]        Your Vitals Were     Pulse Temperature Height Pulse Oximetry BMI (Body Mass Index)       113 97.1  F (36.2  C) (Oral) 6' (1.829 m) 98% 24.15 kg/m2        Blood Pressure from Last 3 Encounters:   04/27/17 128/78   04/17/17 117/59   04/13/17 120/80    Weight from Last 3 Encounters:   04/27/17 178 lb 1.6 oz (80.8 kg)   04/13/17 180 lb (81.6 kg)   02/09/17 180 lb 6.4 oz (81.8 kg)              We Performed the Following     CBC with platelets     Comprehensive metabolic panel     OTOLARYNGOLOGY REFERRAL     Strep, Rapid Screen        Primary Care Provider Office Phone # Fax #    Rocky Messina [phone]       [facility]  [place] [address]  [employer] [place] [address]        Thank you!     Thank you for choosing [facility]  for your care. Our goal is always to provide you with excellent care. Hearing back from our patients is one way we can continue to improve our services. Please take a few minutes to complete the written survey that you may receive in the mail after your visit with us. Thank you!             Your Updated Medication List - Protect others around you: Learn how to safely use, store and throw away your medicines at www.disposemymeds.org.          This list is accurate as of: 4/27/17  2:33 PM.  Always use your most recent med list.                   Brand Name Dispense Instructions for use    ASPIRIN PO      Take 81 mg by mouth daily       mirtazapine 15 MG tablet    REMERON    90 tablet    Take 1 tablet (15 mg) by mouth At Bedtime       simvastatin 40 MG tablet    ZOCOR    90 tablet    Take 1 tablet (40 mg) by mouth At Bedtime

## 2017-04-27 NOTE — PROGRESS NOTES
Chief Complaint   Patient presents with     URI     Pharyngitis       Initial /78  Pulse 113  Temp 97.1  F (36.2  C) (Oral)  Ht 6' (1.829 m)  Wt 178 lb 1.6 oz (80.8 kg)  SpO2 98%  BMI 24.15 kg/m2 Estimated body mass index is 24.15 kg/(m^2) as calculated from the following:    Height as of this encounter: 6' (1.829 m).    Weight as of this encounter: 178 lb 1.6 oz (80.8 kg).  Medication Reconciliation: complete     Amanda Alejandro MA

## 2017-04-27 NOTE — PROGRESS NOTES
SUBJECTIVE:                                                    Cesar Montejo is a 51 year old male who presents to clinic today for the following health issues:      Acute Illness   Acute illness concerns: cold   Onset: 5-6 days    Fever: no    Chills/Sweats: no    Headache (location?): no    Sinus Pressure:YES- some    Conjunctivitis:  no    Ear Pain: YES: both for a few days     Rhinorrhea: YES- some     Congestion: YES    Sore Throat: YES     Cough: YES - dry cough with a little bit of phlegm     Wheeze: no    Decreased Appetite: no     Nausea: no    Vomiting: no    Diarrhea:  no     Dysuria/Freq.: no    Fatigue/Achiness: YES    Sick/Strep Exposure: no     Therapies Tried and outcome: none      Encounter Diagnoses   Name Primary?     Throat pain as above Yes     H/O cystic kidney disease per a CT ? 15 years ago, never had follow up   no urinay symptoms       Mixed anxiety and depressive disorder         Problem list and histories reviewed & adjusted, as indicated.  Additional history: as documented    Labs reviewed in EPIC    Reviewed and updated as needed this visit by clinical staff       Reviewed and updated as needed this visit by Provider         ROS:  Constitutional, HEENT, cardiovascular, pulmonary, gi and gu systems are negative, except as otherwise noted.    OBJECTIVE:                                                    /78  Pulse 113  Temp 97.1  F (36.2  C) (Oral)  Ht 6' (1.829 m)  Wt 178 lb 1.6 oz (80.8 kg)  SpO2 98%  BMI 24.15 kg/m2  Body mass index is 24.15 kg/(m^2).  GENERAL: alert, mild distress and fatigued  EYES: Eyes grossly normal to inspection, PERRL and conjunctivae and sclerae normal  HENT: normal cephalic/atraumatic, ear canals and TM's normal, nose and mouth without ulcers or lesions, nasal mucosa edematous , rhinorrhea clear, oropharynx clear and oral mucous membranes moist  NECK: bilateral anterior cervical adenopathy, no asymmetry, masses, or scars and thyroid normal to  palpation  RESP: lungs clear to auscultation - no rales, rhonchi or wheezes  CV: regular rate and rhythm, normal S1 S2, no S3 or S4, no murmur, click or rub, no peripheral edema and peripheral pulses strong  ABDOMEN: soft, nontender, no hepatosplenomegaly, no masses and bowel sounds normal  SKIN: no suspicious lesions or rashes  PSYCH: mentation appears normal, affect flat, anxious, fatigued and judgement and insight intact  LYMPH: normal  post cervical, supraclavicular, and axillary nodes    Diagnostic Test Results:  Results for orders placed or performed in visit on 04/27/17   CBC with platelets   Result Value Ref Range    WBC 5.3 4.0 - 11.0 10e9/L    RBC Count 5.27 4.4 - 5.9 10e12/L    Hemoglobin 16.6 13.3 - 17.7 g/dL    Hematocrit 49.1 40.0 - 53.0 %    MCV 93 78 - 100 fl    MCH 31.5 26.5 - 33.0 pg    MCHC 33.8 31.5 - 36.5 g/dL    RDW 12.5 10.0 - 15.0 %    Platelet Count 226 150 - 450 10e9/L   Strep, Rapid Screen   Result Value Ref Range    Specimen Description Throat     Rapid Strep A Screen       NEGATIVE: No Group A streptococcal antigen detected by immunoassay, await   culture report.      Micro Report Status FINAL 04/27/2017         ASSESSMENT/PLAN:                                                            1. Throat pain  Viral syndrome  - Strep, Rapid Screen  - CBC with platelets  - OTOLARYNGOLOGY REFERRAL  - Beta strep group A culture    2. H/O cystic kidney disease  recheck  - US Renal Limited; Future  - Comprehensive metabolic panel    3. Mixed anxiety and depressive disorder  Well controlled   Follow up with consultant as planned.   - Comprehensive metabolic panel    Regular exercise  See Patient Instructions    Rocky Messina MD  [facility]

## 2017-04-28 LAB
ALBUMIN SERPL-MCNC: 4.6 G/DL (ref 3.4–5)
ALP SERPL-CCNC: 56 U/L (ref 40–150)
ALT SERPL W P-5'-P-CCNC: 26 U/L (ref 0–70)
ANION GAP SERPL CALCULATED.3IONS-SCNC: 8 MMOL/L (ref 3–14)
AST SERPL W P-5'-P-CCNC: 13 U/L (ref 0–45)
BACTERIA SPEC CULT: NORMAL
BILIRUB SERPL-MCNC: 0.6 MG/DL (ref 0.2–1.3)
BUN SERPL-MCNC: 10 MG/DL (ref 7–30)
CALCIUM SERPL-MCNC: 9.4 MG/DL (ref 8.5–10.1)
CHLORIDE SERPL-SCNC: 105 MMOL/L (ref 94–109)
CO2 SERPL-SCNC: 27 MMOL/L (ref 20–32)
CREAT SERPL-MCNC: 0.96 MG/DL (ref 0.66–1.25)
GFR SERPL CREATININE-BSD FRML MDRD: 83 ML/MIN/1.7M2
GLUCOSE SERPL-MCNC: 115 MG/DL (ref 70–99)
MICRO REPORT STATUS: NORMAL
POTASSIUM SERPL-SCNC: 4.1 MMOL/L (ref 3.4–5.3)
PROT SERPL-MCNC: 7.7 G/DL (ref 6.8–8.8)
SODIUM SERPL-SCNC: 140 MMOL/L (ref 133–144)
SPECIMEN SOURCE: NORMAL

## 2017-04-28 ASSESSMENT — ENCOUNTER SYMPTOMS
PANIC: 0
STIFFNESS: 0
NECK MASS: 0
HOARSE VOICE: 1
SYNCOPE: 0
ARTHRALGIAS: 0
HEARTBURN: 1
BLOATING: 1
PALPITATIONS: 1
MUSCLE CRAMPS: 1
LOSS OF CONSCIOUSNESS: 0
MUSCLE WEAKNESS: 0
EXERCISE INTOLERANCE: 0
INCREASED ENERGY: 0
INSOMNIA: 0
DEPRESSION: 0
POLYDIPSIA: 0
ORTHOPNEA: 0
LEG PAIN: 0
TASTE DISTURBANCE: 0
PARALYSIS: 0
NUMBNESS: 0
TREMORS: 1
POLYPHAGIA: 0
SEIZURES: 0
WEAKNESS: 0
HEADACHES: 0
ABDOMINAL PAIN: 1
JAUNDICE: 0
TACHYCARDIA: 0
TROUBLE SWALLOWING: 1
SORE THROAT: 1
CHILLS: 0
NERVOUS/ANXIOUS: 1
WEIGHT LOSS: 0
TINGLING: 0
LIGHT-HEADEDNESS: 0
BLOOD IN STOOL: 0
SLEEP DISTURBANCES DUE TO BREATHING: 0
LEG SWELLING: 0
ALTERED TEMPERATURE REGULATION: 0
RECTAL BLEEDING: 0
SINUS PAIN: 1
VOMITING: 0
BACK PAIN: 1
WEIGHT GAIN: 0
MYALGIAS: 0
SWOLLEN GLANDS: 1
DECREASED CONCENTRATION: 0
FATIGUE: 0
HYPOTENSION: 0
CONSTIPATION: 1
BRUISES/BLEEDS EASILY: 0
NECK PAIN: 1
FEVER: 0
SMELL DISTURBANCE: 0
SINUS CONGESTION: 1
HYPERTENSION: 1
DIARRHEA: 0
RECTAL PAIN: 0
DIZZINESS: 0
JOINT SWELLING: 0
HALLUCINATIONS: 0
NAUSEA: 0
DISTURBANCES IN COORDINATION: 0
CLAUDICATION: 1
SPEECH CHANGE: 0
DECREASED APPETITE: 0
NIGHT SWEATS: 0
BOWEL INCONTINENCE: 0
MEMORY LOSS: 0

## 2017-05-02 ENCOUNTER — OFFICE VISIT (OUTPATIENT)
Dept: FAMILY MEDICINE | Facility: CLINIC | Age: 52
End: 2017-05-02
Payer: COMMERCIAL

## 2017-05-02 VITALS
HEIGHT: 72 IN | BODY MASS INDEX: 24.73 KG/M2 | DIASTOLIC BLOOD PRESSURE: 66 MMHG | TEMPERATURE: 96.8 F | HEART RATE: 91 BPM | SYSTOLIC BLOOD PRESSURE: 112 MMHG | OXYGEN SATURATION: 98 % | WEIGHT: 182.6 LBS

## 2017-05-02 DIAGNOSIS — Z71.1 FEARED CONDITION NOT DEMONSTRATED: Primary | ICD-10-CM

## 2017-05-02 DIAGNOSIS — F41.8 MIXED ANXIETY AND DEPRESSIVE DISORDER: ICD-10-CM

## 2017-05-02 PROCEDURE — 99213 OFFICE O/P EST LOW 20 MIN: CPT | Performed by: FAMILY MEDICINE

## 2017-05-02 NOTE — PROGRESS NOTES
"  SUBJECTIVE:                                                    Cesar Montejo is a 51 year old male who presents to clinic today for the following health issues:  {Provider please address medication reconciliation discrepancies--rooming staff please delete if no med/rec issues}    {Superlists:964219}    {additional problems for provider to add:464594}    Problem list and histories reviewed & adjusted, as indicated.  Additional history: {NONE - AS DOCUMENTED:327259::\"as documented\"}    {HIST REVIEW/ LINKS 2:605663}    Reviewed and updated as needed this visit by clinical staff       Reviewed and updated as needed this visit by Provider         {PROVIDER CHARTING PREFERENCE:537444}    "

## 2017-05-02 NOTE — MR AVS SNAPSHOT
After Visit Summary   5/2/2017    Cesar Montejo    MRN: [id number]           Patient Information     Date Of Birth          1965        Visit Information        Provider Department      5/2/2017 2:30 PM Rocky Messina MD [facility]        Today's Diagnoses     Feared condition not demonstrated    -  1    Mixed anxiety and depressive disorder           Follow-ups after your visit        Your next 10 appointments already scheduled     May 08, 2017  8:00 AM CDT   (Arrive by 7:45 AM)   New Patient Visit with Osman Clark MD   [facility] ([facility])    [address] [place] [id number] [phone]            Jun 08, 2017 12:45 PM CDT   (Arrive by 12:30 PM)   Return Visit with Rober Mari MD   [facility] ([facility])    [address] [place] [address] [id number] [phone]              Who to contact     If you have questions or need follow up information about today's clinic visit or your schedule please contact [facility] directly at [phone].  Normal or non-critical lab and imaging results will be communicated to you by MyChart, letter or phone within 4 business days after the clinic has received the results. If you do not hear from us within 7 days, please contact the clinic through MyChart or phone. If you have a critical or abnormal lab result, we will notify you by phone as soon as possible.  Submit refill requests through [employer] or call your pharmacy and they will forward the refill request to us. Please allow 3 business days for your refill to be completed.          Additional Information About Your Visit        MyChart Information     [employer] gives you secure access to your electronic health record. If you see a primary care provider, you can also send messages to your care team and make appointments. If you have  questions, please call your primary care clinic.  If you do not have a primary care provider, please call [phone] and they will assist you.        Care EveryWhere ID     This is your Care EveryWhere ID. This could be used by other organizations to access your [place] medical records  [id number]        Your Vitals Were     Pulse Temperature Height Pulse Oximetry BMI (Body Mass Index)       91 96.8  F (36  C) (Oral) 6' (1.829 m) 98% 24.77 kg/m2        Blood Pressure from Last 3 Encounters:   05/02/17 112/66   04/27/17 128/78   04/17/17 117/59    Weight from Last 3 Encounters:   05/02/17 182 lb 9.6 oz (82.8 kg)   04/27/17 178 lb 1.6 oz (80.8 kg)   04/13/17 180 lb (81.6 kg)              Today, you had the following     No orders found for display       Primary Care Provider Office Phone # Fax #    Rocky Rigo Messina [phone]       [facility] [address] [address]        Thank you!     Thank you for choosing [facility]  for your care. Our goal is always to provide you with excellent care. Hearing back from our patients is one way we can continue to improve our services. Please take a few minutes to complete the written survey that you may receive in the mail after your visit with us. Thank you!             Your Updated Medication List - Protect others around you: Learn how to safely use, store and throw away your medicines at www.disposemymeds.org.          This list is accurate as of: 5/2/17  3:23 PM.  Always use your most recent med list.                   Brand Name Dispense Instructions for use    ASPIRIN PO      Take 81 mg by mouth daily       mirtazapine 15 MG tablet    REMERON    90 tablet    Take 1 tablet (15 mg) by mouth At Bedtime       simvastatin 40 MG tablet    ZOCOR    90 tablet    Take 1 tablet (40 mg) by mouth At Bedtime

## 2017-05-02 NOTE — PROGRESS NOTES
Chief Complaint   Patient presents with     RECHECK     Kidney issues       Initial /66  Pulse 91  Temp 96.8  F (36  C) (Oral)  Ht 6' (1.829 m)  Wt 182 lb 9.6 oz (82.8 kg)  SpO2 98%  BMI 24.77 kg/m2 Estimated body mass index is 24.77 kg/(m^2) as calculated from the following:    Height as of this encounter: 6' (1.829 m).    Weight as of this encounter: 182 lb 9.6 oz (82.8 kg).  Medication Reconciliation: complete     Amanda Alejandro MA

## 2017-05-02 NOTE — PROGRESS NOTES
SUBJECTIVE:                                                    Cesar Montejo is a 51 year old male who presents to clinic today for the following health issues:      Would like to discuss his recent ultrasound of his kidney. Has the results with him.    Encounter Diagnoses   Name Primary?     Feared condition not demonstrated Yes     Mixed anxiety and depressive disorder         Problem list and histories reviewed & adjusted, as indicated.  Additional history: as documented    Labs reviewed in [facility], care everywhere reviewed including CT/ ULTRASOUND from  back in [id number]    Reviewed and updated as needed this visit by clinical staff  Allergies       Reviewed and updated as needed this visit by Provider         ROS:  Constitutional, HEENT, cardiovascular, pulmonary, gi and gu systems are negative, except as otherwise noted.    OBJECTIVE:                                                    /66  Pulse 91  Temp 96.8  F (36  C) (Oral)  Ht 6' (1.829 m)  Wt 182 lb 9.6 oz (82.8 kg)  SpO2 98%  BMI 24.77 kg/m2  Body mass index is 24.77 kg/(m^2).  GENERAL: healthy, alert and no distress  PSYCH: mentation appears normal, anxious and judgement and insight intact    Diagnostic Test Results:  Results for orders placed or performed in visit on 04/28/17    Renal Limited    Narrative    EXAM: US RENAL LIMITED  4/28/2017 11:41 AM     HISTORY:  Personal history of other diseases of urinary system. 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       COMPARISON:  None available.    FINDINGS:    Right kidney: Measures 10.4 cm in length. Parenchyma is of normal  thickness and echogenicity. And echogenic focus in the interpolar  region of the right renal cortex may represent punctate renal stone,  however additional features of shadowing and twinkle artifact or not  definitively seen. No focal mass. No hydronephrosis.    Left kidney: Measures 10.6 cm in length.  Parenchyma is of normal  thickness and echogenicity. No focal mass. No hydronephrosis.     Bladder: Distended, unremarkable.      Impression    IMPRESSION:  Possible small nonobstructing right renal calculus. Otherwise normal  renal ultrasound without evidence of cystic renal disease.    I have personally reviewed the examination and initial interpretation  and I agree with the findings.    JUANCARLOS HOLLIDAY        ASSESSMENT/PLAN:                                                            1. Feared condition not demonstrated  Reviewed imaging from  10 years ago  Reviewed normal US    2. Mixed anxiety and depressive disorder  Doing better now  Follow up with consultant as planned.     20 minutes were spent with the patient with more than 50% of the time spent in counseling and coordination of care.   See Patient Instructions    Rocky Messina MD  [facility]

## 2017-05-04 ENCOUNTER — TRANSFERRED RECORDS (OUTPATIENT)
Dept: HEALTH INFORMATION MANAGEMENT | Facility: CLINIC | Age: 52
End: 2017-05-04

## 2017-05-05 ENCOUNTER — PRE VISIT (OUTPATIENT)
Dept: CARDIOLOGY | Facility: CLINIC | Age: 52
End: 2017-05-05

## 2017-05-08 ENCOUNTER — OFFICE VISIT (OUTPATIENT)
Dept: CARDIOLOGY | Facility: CLINIC | Age: 52
End: 2017-05-08
Attending: INTERNAL MEDICINE
Payer: COMMERCIAL

## 2017-05-08 VITALS
DIASTOLIC BLOOD PRESSURE: 76 MMHG | BODY MASS INDEX: 24.33 KG/M2 | OXYGEN SATURATION: 96 % | SYSTOLIC BLOOD PRESSURE: 115 MMHG | HEIGHT: 72 IN | HEART RATE: 84 BPM | WEIGHT: 179.6 LBS

## 2017-05-08 DIAGNOSIS — E78.5 HYPERLIPIDEMIA LDL GOAL <100: Primary | ICD-10-CM

## 2017-05-08 PROCEDURE — 99204 OFFICE O/P NEW MOD 45 MIN: CPT | Mod: GC | Performed by: INTERNAL MEDICINE

## 2017-05-08 PROCEDURE — 99212 OFFICE O/P EST SF 10 MIN: CPT | Mod: ZF

## 2017-05-08 RX ORDER — ATORVASTATIN CALCIUM 20 MG/1
20 TABLET, FILM COATED ORAL DAILY
Qty: 90 TABLET | Refills: 3 | Status: SHIPPED | OUTPATIENT
Start: 2017-05-08 | End: 2017-05-09 | Stop reason: ALTCHOICE

## 2017-05-08 ASSESSMENT — PAIN SCALES - GENERAL: PAINLEVEL: NO PAIN (0)

## 2017-05-08 NOTE — LETTER
5/8/2017      RE: Cesar Montejo  [address]     [facility] [address]       Dear Colleague,    Thank you for the opportunity to participate in the care of your patient, Cesar Montejo, at the [facility] acute [facility]. Please see a copy of my visit note below.    Date: May 8, 2017  Note: Cardiology Clinic New Patient  Author: Jacque Parmar MD    Reason for Visit: Patient with episode of chest pain, positive family history of CVD    HPI: Mr. Montejo is a 51-year-old male with PMH of HLD, BCC, SCC and anxiety who presents to cardiology clinic for CV risk assessment. He presented to the ED in December 2016 with ongoing left sided chest pain and mild SOB. His pain had started upon waking up from sleep. It was localized in the left lower chest above a small area, it would come and go, pressure-like. He could not identify alleviating factors but he thinks that it would get worse in deep inspiration. Finally, the pain went away on its own. He went to the ED where all tests (EKG, X-ray, D-dimer, troponin, CBC and CMP) were all normal with the exception of minimal elevation in blood sugar.  Of note, patient had stress echo in 2007 that was normal (see below) and MRA of the head and neck in 2015 that were normal too.     He smoked tobacco in the past (half a packet per day 18-23 years of age and 100 cigarettes per year 23-30 and never afterwards). He drinks 1-2 glasses of wine per day. He used marijuana in college but never cocaine or amphetamines. In terms of family history, his mother was diagnosed with unilateral carotid stenosis (70%) at the age of 57 and both his parents had TIA in their 60s. He exercises and he does stationary bike 6 days per week for half an hour, walks and other activities without any issues He had HbA1C check in 2/2017 and it was normal at 5.3%.     Today, he denies chest pain, SOB, palpitations, edema, diaphoresis. He has  not had any chest pain since December 2016.    ROS: All others reviewed and negative. Please see HPI for pertinent positives (10 point ROS).     PMH:   Past Medical History:   Diagnosis Date     Anxiety      Basal cell carcinoma      Gastro-oesophageal reflux disease      High cholesterol      Insomnia      Squamous cell carcinoma (H)        FH:   Family History   Problem Relation Age of Onset     Lipids Mother      on meds     CEREBROVASCULAR DISEASE Mother      TIA     Alzheimer Disease Mother      Skin Cancer Mother      SCC     Lipids Father      on meds     Hypertension Father      controlled with meds     Thyroid Disease Father      ?not sure but possibly     DIABETES Father      Cancer - colorectal Maternal Grandmother      still alive at 99     CANCER Maternal Grandfather      lung but lifetime smoker     Melanoma Maternal Grandfather      Asthma No family hx of      C.A.D. No family hx of      Prostate Cancer No family hx of        SH:   Social History     Social History     Marital status: Single     Spouse name: N/A     Number of children: N/A     Years of education: N/A     Occupational History     Not on file.     Social History Main Topics     Smoking status: Former Smoker     Packs/day: 0.50     Years: 5.00     Types: Cigarettes     Smokeless tobacco: Never Used      Comment: Only in college,one pack per week     Alcohol use Yes      Comment: social occ.     Drug use: No     Sexual activity: No     Other Topics Concern     Parent/Sibling W/ Cabg, Mi Or Angioplasty Before 65f 55m? No     Social History Narrative     Home Meds:   Current Outpatient Prescriptions on File Prior to Visit:  mirtazapine (REMERON) 15 MG tablet Take 1 tablet (15 mg) by mouth At Bedtime   simvastatin (ZOCOR) 20 MG tablet Take 1 tablet (20 mg) by mouth At Bedtime   ASPIRIN PO Take 81 mg by mouth daily     No current facility-administered medications on file prior to visit.     Physical examination:    Blood pressure 115/76,  pulse 84, height 1.829 m (6'), weight 81.5 kg (179 lb 9.6 oz), SpO2 96 %.  General: NAD, AAx3  HEENT: Externally normal, sclera clear  CV: regular rhythm, s1, s2, no murmurs or rubs  Lungs: CTAB, non-labored breathing, no wheezing, no crackles  Abd: soft, non-distended, non-tender  Ext: no edema  Skin: no rashes or concerning lesions noted  Neuro: grossly non-focal  Psych: mood/affect appropriate    Labs:    Results for JONATHAN FIORE (MRN [id number]) as of 5/8/2017 09:02   Ref. Range 9/26/2016 09:57 12/11/2016 11:29 12/11/2016 11:37 2/3/2017 14:45 4/27/2017 14:08   Hemoglobin A1C Latest Ref Range: 4.3 - 6.0 %    5.4    Cholesterol Latest Ref Range: <200 mg/dL 173       CRP Inflammation Latest Ref Range: 0.0 - 8.0 mg/L   <2.9     HDL Cholesterol Latest Ref Range: >39 mg/dL 39 (L)       LDL Cholesterol Calculated Latest Ref Range: <100 mg/dL 101 (H)       Non HDL Cholesterol Latest Ref Range: <130 mg/dL 134 (H)       Triglycerides Latest Ref Range: <150 mg/dL 165 (H)       Troponin I Latest Ref Range: 0.00 - 0.10 ug/L  0.00      TSH Latest Ref Range: 0.40 - 4.00 mU/L 1.32         EKG 12/11/2016: Sinus rhythm at 81 bpm with sinus arrhythmia.      Imaging:    Stress echocardiogram 03/2007:  Interpretation Summary  Normal exercise echocardiogram without evidence of coronary artery disease or stress-induced ischemia.  Normal resting LV function with EF of approximately 60-65%; normal response to exercise with increase to approximately 70-75%.  No stress induced regional wall motion abnormalities.  No ECG evidence of ischemia.  No subjective evidence of ischemia.  No significant valvular disease noted on routine screening color flow Doppler and pulsed Doppler examination.  Reduced functional capacity.     Stress Findings  No electrocardiographic evidence of ischemia.  Target Heart Rate was achieved.     MRA head and neck (2015):  Normal    Assessment:    Mr. Fiore is a 51-year-old male with a PMH of HLD, BCC, SCC and  anxiety who presents to cardiology clinic for CV risk assessment. His risk factor profile is: (+) HLD, (-)HTN, (-)DM, (+) Hx of tobacco, (+) Family history. He follows a healthy lifestyle now. His cholesterol was marginally abnormal in 09/26/2016 at 101. Given the patient's risk and concerns for CVD and his LDL cholesterol levels, we will switch him from Zocor 20 mg daily to Lipitor 20 mg daily since it is more potent. We discussed diet and exercise as preventive measures in addition to statin therapy. We will see Mr. Montejo back on an as-needed basis.    Case discussed and patient seen with Dr. Clark. Plan reflects our mutual opinion.     Jacque Parmar MD  Cardiology Fellow  [phone]        [facility] Cardiovascular Division    I have seen and examined the patient, reviewed labs and tests. I have discussed my findings and treatment recommendations with Dr. aJcque Parmar and agree with his assessment and plan as outlined in the note.    Osman Clark MD

## 2017-05-08 NOTE — MR AVS SNAPSHOT
After Visit Summary   5/8/2017    Cesar Montejo    MRN: [id number]           Patient Information     Date Of Birth          1965        Visit Information        Provider Department      5/8/2017 8:00 AM Osman Clark MD [facility]        Today's Diagnoses     Hyperlipidemia LDL goal <100    -  1      Care Instructions    You were seen today in the Cardiovascular Clinic at the [facility].     Cardiology Providers you saw during your visit: Dr Osman Clark  Diagnosis: High cholestrol    Results: No testing today    Recommendations:    1.  Please stop You Zocor (Simvastatin).  2. Please start Lipitor (Atorvastatin) 20 mg daily in the evening.    Follow-up: Follow up with Dr Clark as needed.  Please follow up with your primary care doctor.       For emergencies call 911.    For any scheduling needs, please call [phone].    Thank you for your visit today! If you have questions or concerns about today's visit, please call me.      Mary Larios RN  Cardiology Care Coordinator  [facility]  [email or web address]  [phone]           Follow-ups after your visit        Follow-up notes from your care team     Return if symptoms worsen or fail to improve, for Dr. Clark.      Your next 10 appointments already scheduled     Jun 08, 2017 12:45 PM CDT   (Arrive by 12:30 PM)   Return Visit with Rober Mari MD   [facility] ([facility])    [address] [place] [id number] [phone]              Who to contact     If you have questions or need follow up information about today's clinic visit or your schedule please contact [facility] directly at [phone].  Normal or non-critical lab and imaging results will be communicated to you by MyChart, letter or phone within 4 business days after the clinic has received the results. If you do not hear from  us within 7 days, please contact the clinic through [employer] or phone. If you have a critical or abnormal lab result, we will notify you by phone as soon as possible.  Submit refill requests through [employer] or call your pharmacy and they will forward the refill request to us. Please allow 3 business days for your refill to be completed.          Additional Information About Your Visit        [employer] Information     [employer] gives you secure access to your electronic health record. If you see a primary care provider, you can also send messages to your care team and make appointments. If you have questions, please call your primary care clinic.  If you do not have a primary care provider, please call [phone] and they will assist you.        Care EveryWhere ID     This is your Care EveryWhere ID. This could be used by other organizations to access your [facility] medical records  [id number]        Your Vitals Were     Pulse Height Pulse Oximetry BMI (Body Mass Index)          84 1.829 m (6') 96% 24.36 kg/m2         Blood Pressure from Last 3 Encounters:   05/08/17 115/76   05/02/17 112/66   04/27/17 128/78    Weight from Last 3 Encounters:   05/08/17 81.5 kg (179 lb 9.6 oz)   05/02/17 82.8 kg (182 lb 9.6 oz)   04/27/17 80.8 kg (178 lb 1.6 oz)              Today, you had the following     No orders found for display         Today's Medication Changes          These changes are accurate as of: 5/8/17  9:13 AM.  If you have any questions, ask your nurse or doctor.               Start taking these medicines.        Dose/Directions    atorvastatin 20 MG tablet   Commonly known as:  LIPITOR   Used for:  Hyperlipidemia LDL goal <100   Started by:  Osman Clark MD        Dose:  20 mg   Take 1 tablet (20 mg) by mouth daily   Quantity:  90 tablet   Refills:  3         Stop taking these medicines if you haven't already. Please contact your care team if you have questions.     simvastatin 40 MG tablet   Commonly  known as:  ZOCOR   Stopped by:  Osman Clark MD                Where to get your medicines      These medications were sent to [facility] [address] [place] [address] [address] AT [address] [place] [address] & [address]  [address] [facility] [address]     Phone:  [phone]     atorvastatin 20 MG tablet                Primary Care Provider Office Phone # Fax #    Rocky Rigo Messina [phone]       [facility] [address] [facility] [address]  [facility] [address]        Thank you!     Thank you for choosing [facility]  for your care. Our goal is always to provide you with excellent care. Hearing back from our patients is one way we can continue to improve our services. Please take a few minutes to complete the written survey that you may receive in the mail after your visit with us. Thank you!             Your Updated Medication List - Protect others around you: Learn how to safely use, store and throw away your medicines at www.disposemymeds.org.          This list is accurate as of: 5/8/17  9:13 AM.  Always use your most recent med list.                   Brand Name Dispense Instructions for use    ASPIRIN PO      Take 81 mg by mouth daily       atorvastatin 20 MG tablet    LIPITOR    90 tablet    Take 1 tablet (20 mg) by mouth daily       mirtazapine 15 MG tablet    REMERON    90 tablet    Take 1 tablet (15 mg) by mouth At Bedtime

## 2017-05-08 NOTE — NURSING NOTE
Chief Complaint   Patient presents with     New Patient     heart problem -- 51 yr old male with PMH significant for ischemic heart disease and elevated glucise presenting for evaluation        Med Reconcile: Reviewed and verified all current medications with the patient. The updated medication list was printed and given to the patient.  New Medication: Patient was educated regarding newly prescribed medication, including discussion of  the indication, administration, side effects, and when to report to MD or RN. Patient demonstrated understanding of this information and agreed to call with further questions or concerns.  Return Appointment: Patient given instructions regarding scheduling next clinic visit. Patient demonstrated understanding of this information and agreed to call with further questions or concerns.  Medication Change: Patient was educated regarding prescribed medication change, including discussion of the indication, administration, side effects, and when to report to MD or RN. Patient demonstrated understanding of this information and agreed to call with further questions or concerns.  Patient stated he understood all health information given and agreed to call with further questions or concerns.     Mary Larios RN, BSN  Cardiology Care Coordinator  [facility]  [email or web address]  [phone]

## 2017-05-08 NOTE — NURSING NOTE
Chief Complaint   Patient presents with     New Patient     heart problem -- 51 yr old male with PMH significant for ischemic heart disease and elevated glucise presenting for evaluation

## 2017-05-08 NOTE — PATIENT INSTRUCTIONS
You were seen today in the Cardiovascular Clinic at the [facility].     Cardiology Providers you saw during your visit: Dr Osman Clark  Diagnosis: High cholestrol    Results: No testing today    Recommendations:    1.  Please stop You Zocor (Simvastatin).  2. Please start Lipitor (Atorvastatin) 20 mg daily in the evening.    Follow-up: Follow up with Dr Clark as needed.  Please follow up with your primary care doctor.       For emergencies call 911.    For any scheduling needs, please call [phone].    Thank you for your visit today! If you have questions or concerns about today's visit, please call me.      Mary Larios RN  Cardiology Care Coordinator  [facility]  [email or web address]  [phone]

## 2017-05-08 NOTE — PROGRESS NOTES
Date: May 8, 2017  Note: Cardiology Clinic New Patient  Author: Jacque Parmar MD    Reason for Visit: Patient with episode of chest pain, positive family history of CVD    HPI: Mr. Montejo is a 51-year-old male with PMH of HLD, BCC, SCC and anxiety who presents to cardiology clinic for CV risk assessment. He presented to the ED in December 2016 with ongoing left sided chest pain and mild SOB. His pain had started upon waking up from sleep. It was localized in the left lower chest above a small area, it would come and go, pressure-like. He could not identify alleviating factors but he thinks that it would get worse in deep inspiration. Finally, the pain went away on its own. He went to the ED where all tests (EKG, X-ray, D-dimer, troponin, CBC and CMP) were all normal with the exception of minimal elevation in blood sugar.  Of note, patient had stress echo in 2007 that was normal (see below) and MRA of the head and neck in 2015 that were normal too.     He smoked tobacco in the past (half a packet per day 18-23 years of age and 100 cigarettes per year 23-30 and never afterwards). He drinks 1-2 glasses of wine per day. He used marijuana in college but never cocaine or amphetamines. In terms of family history, his mother was diagnosed with unilateral carotid stenosis (70%) at the age of 57 and both his parents had TIA in their 60s. He exercises and he does stationary bike 6 days per week for half an hour, walks and other activities without any issues He had HbA1C check in 2/2017 and it was normal at 5.3%.     Today, he denies chest pain, SOB, palpitations, edema, diaphoresis. He has not had any chest pain since December 2016.    ROS: All others reviewed and negative. Please see HPI for pertinent positives (10 point ROS).     PMH:   Past Medical History:   Diagnosis Date     Anxiety      Basal cell carcinoma      Gastro-oesophageal reflux disease      High cholesterol      Insomnia      Squamous cell carcinoma (H)         FH:   Family History   Problem Relation Age of Onset     Lipids Mother      on meds     CEREBROVASCULAR DISEASE Mother      TIA     Alzheimer Disease Mother      Skin Cancer Mother      SCC     Lipids Father      on meds     Hypertension Father      controlled with meds     Thyroid Disease Father      ?not sure but possibly     DIABETES Father      Cancer - colorectal Maternal Grandmother      still alive at 99     CANCER Maternal Grandfather      lung but lifetime smoker     Melanoma Maternal Grandfather      Asthma No family hx of      C.A.D. No family hx of      Prostate Cancer No family hx of        SH:   Social History     Social History     Marital status: Single     Spouse name: N/A     Number of children: N/A     Years of education: N/A     Occupational History     Not on file.     Social History Main Topics     Smoking status: Former Smoker     Packs/day: 0.50     Years: 5.00     Types: Cigarettes     Smokeless tobacco: Never Used      Comment: Only in college,one pack per week     Alcohol use Yes      Comment: social occ.     Drug use: No     Sexual activity: No     Other Topics Concern     Parent/Sibling W/ Cabg, Mi Or Angioplasty Before 65f 55m? No     Social History Narrative     Home Meds:   Current Outpatient Prescriptions on File Prior to Visit:  mirtazapine (REMERON) 15 MG tablet Take 1 tablet (15 mg) by mouth At Bedtime   simvastatin (ZOCOR) 20 MG tablet Take 1 tablet (20 mg) by mouth At Bedtime   ASPIRIN PO Take 81 mg by mouth daily     No current facility-administered medications on file prior to visit.     Physical examination:    Blood pressure 115/76, pulse 84, height 1.829 m (6'), weight 81.5 kg (179 lb 9.6 oz), SpO2 96 %.  General: NAD, AAx3  HEENT: Externally normal, sclera clear  CV: regular rhythm, s1, s2, no murmurs or rubs  Lungs: CTAB, non-labored breathing, no wheezing, no crackles  Abd: soft, non-distended, non-tender  Ext: no edema  Skin: no rashes or concerning lesions  noted  Neuro: grossly non-focal  Psych: mood/affect appropriate    Labs:    Results for JONATHAN FIORE (MRN [id number]) as of 5/8/2017 09:02   Ref. Range 9/26/2016 09:57 12/11/2016 11:29 12/11/2016 11:37 2/3/2017 14:45 4/27/2017 14:08   Hemoglobin A1C Latest Ref Range: 4.3 - 6.0 %    5.4    Cholesterol Latest Ref Range: <200 mg/dL 173       CRP Inflammation Latest Ref Range: 0.0 - 8.0 mg/L   <2.9     HDL Cholesterol Latest Ref Range: >39 mg/dL 39 (L)       LDL Cholesterol Calculated Latest Ref Range: <100 mg/dL 101 (H)       Non HDL Cholesterol Latest Ref Range: <130 mg/dL 134 (H)       Triglycerides Latest Ref Range: <150 mg/dL 165 (H)       Troponin I Latest Ref Range: 0.00 - 0.10 ug/L  0.00      TSH Latest Ref Range: 0.40 - 4.00 mU/L 1.32         EKG 12/11/2016: Sinus rhythm at 81 bpm with sinus arrhythmia.      Imaging:    Stress echocardiogram 03/2007:  Interpretation Summary  Normal exercise echocardiogram without evidence of coronary artery disease or stress-induced ischemia.  Normal resting LV function with EF of approximately 60-65%; normal response to exercise with increase to approximately 70-75%.  No stress induced regional wall motion abnormalities.  No ECG evidence of ischemia.  No subjective evidence of ischemia.  No significant valvular disease noted on routine screening color flow Doppler and pulsed Doppler examination.  Reduced functional capacity.     Stress Findings  No electrocardiographic evidence of ischemia.  Target Heart Rate was achieved.     MRA head and neck (2015):  Normal    Assessment:    Mr. Fiore is a 51-year-old male with a PMH of HLD, BCC, SCC and anxiety who presents to cardiology clinic for CV risk assessment. His risk factor profile is: (+) HLD, (-)HTN, (-)DM, (+) Hx of tobacco, (+) Family history. He follows a healthy lifestyle now. His cholesterol was marginally abnormal in 09/26/2016 at 101. Given the patient's risk and concerns for CVD and his LDL cholesterol levels, we will  switch him from Zocor 20 mg daily to Lipitor 20 mg daily since it is more potent. We discussed diet and exercise as preventive measures in addition to statin therapy. We will see Mr. Montejo back on an as-needed basis.    Case discussed and patient seen with Dr. Clark. Plan reflects our mutual opinion.     Jacque Parmar MD  Cardiology Fellow  [phone]        [facility]    I have seen and examined the patient, reviewed labs and tests. I have discussed my findings and treatment recommendations with Dr. Jacque Parmar and agree with his assessment and plan as outlined in the note.    Osman Clark MD

## 2017-05-09 ENCOUNTER — CARE COORDINATION (OUTPATIENT)
Dept: CARDIOLOGY | Facility: CLINIC | Age: 52
End: 2017-05-09

## 2017-05-09 DIAGNOSIS — E78.5 HYPERLIPIDEMIA LDL GOAL <100: Primary | ICD-10-CM

## 2017-05-09 NOTE — PROGRESS NOTES
Patient was started on Lipitor 20 mg tablets yesterday in clinic but decided to stay on Zocor.  Will sent a script for Zocor 20 mg as patient states he is only taking 20 mg daily not the 40 mg that was in his chart.    Mary Larios RN, BSN  Cardiology Care Coordinator  [facility]  [email or web address]  [phone]

## 2017-05-11 RX ORDER — SIMVASTATIN 20 MG
20 TABLET ORAL AT BEDTIME
Qty: 90 TABLET | Refills: 3 | Status: SHIPPED | OUTPATIENT
Start: 2017-05-11 | End: 2018-01-11 | Stop reason: DRUGHIGH

## 2017-06-08 ENCOUNTER — OFFICE VISIT (OUTPATIENT)
Dept: DERMATOLOGY | Facility: CLINIC | Age: 52
End: 2017-06-08

## 2017-06-08 DIAGNOSIS — Z85.828 HISTORY OF BASAL CELL CANCER: Primary | ICD-10-CM

## 2017-06-08 ASSESSMENT — PAIN SCALES - GENERAL: PAINLEVEL: NO PAIN (0)

## 2017-06-08 NOTE — PROGRESS NOTES
"[facility] Dermatology Note      Dermatology Problem List:  1. NMSC   -Superficial BCC, left cheeck   -SCCis right cheek, s/p Mohs 6/9/2016  2. Actinic keratoses  3. Actinic chelitis     Encounter Date: Jun 8, 2017    CC:  6 month skin check       History of Present Illness:  Mr. Cesar Montejo is a 51 year old male who presents today with a \"bump\" on left ear and \"rough patches\" on his forehead, additionally is here for his 6 month skin check due to his h/o superficial BCC on left cheek and SCC on right cheek 6/2016.     He is concerned about a \"bump\" on behind his left year, but it does not bleed, itch, or cause him pain.     Additionally, his forehead has some regions that feel more \"rough\" to him and he has previously had actinic keratoses in this region.      Has a history of sun exposure as a , cyclist and grounds keeps through teenage years and 20s.     He denies any lesions that are nonhealing, painful, itchy, burning, or bleeding.       Past Medical History:   Patient Active Problem List   Diagnosis     Insomnia     CARDIOVASCULAR SCREENING; LDL GOAL LESS THAN 130     Hypercholesteremia     GERD (gastroesophageal reflux disease)     Mixed anxiety and depressive disorder     Knee pain     Right shoulder pain     Calcific tendonitis     Solar lentiginosis     Skin cancer screening     Dermatitis, seborrheic     Keratosis, actinic     Family history of skin cancer     AK (actinic keratosis)     History of actinic keratosis     Past Medical History:   Diagnosis Date     Anxiety      Basal cell carcinoma      Gastro-oesophageal reflux disease      High cholesterol      Insomnia      Squamous cell carcinoma (H)      Past Surgical History:   Procedure Laterality Date     COLONOSCOPY N/A 4/17/2017    Procedure: COLONOSCOPY;  Surgeon: Larry Fields MD;  Location:  GI     LASER CO2 LESION ORAL N/A 10/5/2016    Procedure: LASER CO2 LESION ORAL;  Surgeon: Josué Rojo DDS;  " Location: [facility] OR     MOHS MICROGRAPHIC PROCEDURE         Social History:  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 The patient denies use of tanning beds.    Family History:  There is a family history of non-melanoma skin cancer in the patient's mother (BCC and SCC).   Additionally maternal grandfather and uncle had melanoma.     Medications:  Current Outpatient Prescriptions   Medication Sig Dispense Refill     simvastatin (ZOCOR) 20 MG tablet Take 1 tablet (20 mg) by mouth At Bedtime 90 tablet 3     mirtazapine (REMERON) 15 MG tablet Take 1 tablet (15 mg) by mouth At Bedtime 90 tablet 3     ASPIRIN PO Take 81 mg by mouth daily       No Known Allergies      Review of Systems:  -Constitutional: The patient denies fatigue, fevers, chills, unintended weight loss, and night sweats.  -HEENT: Patient denies nonhealing oral sores.  -Skin: As above in HPI. No additional skin concerns.    Physical exam:  Vitals: There were no vitals taken for this visit.  GEN: This is a well developed, well-nourished male in no acute distress, in a pleasant mood.    SKIN: Full skin, which includes the head/face, both arms, chest, back, abdomen,both legs, groin buttocks, digits and/or nails, was examined.  - round 4mm  hypopigmented macule at lesion of previously treated with cryotherapy, has been noted on previous exams and has not changed.   Well healed scars at the sites of prior surgery, no nodularity or erythema  -No other lesions of concern on areas examined.     Impression/Plan:  1. History of BCC and SCCis.  CLinically no evidence of recurrence today.    6 month skin exam today. Reassured patient that we do not see any concerning lesions. Discussed more frequent follow ups as the patient feels more comfortable being examined every 3 months instead of 6 months.     Follow 3 months.     Staff Involved:  Scribed by Manan Horan MS3 for Dr. Mari.      The medical student  acted as a scribe with respect to this patient.  I have performed all the key elements of the history and physical    Rober Mari MD  Dermatology Attending

## 2017-06-08 NOTE — NURSING NOTE
Dermatology Rooming Note    Cesar Montejo's goals for this visit include:   Chief Complaint   Patient presents with     Derm Problem     Cesar is here today for a skin check.  Has a concerning bump behind his left ear, and some rough spots on his forehead.         Alyssa Trent LPN

## 2017-06-08 NOTE — MR AVS SNAPSHOT
After Visit Summary   6/8/2017    Cesar Montejo    MRN: [id number]           Patient Information     Date Of Birth          1965        Visit Information        Provider Department      6/8/2017 12:45 PM Rober Mari MD [facility] Dermatology         Follow-ups after your visit        Your next 10 appointments already scheduled     Sep 07, 2017  1:00 PM CDT   (Arrive by 12:45 PM)   Return Visit with MD LISETH [facility] Dermatology ([facility] [place])    [address] [place] [id number] [phone]              Who to contact     Please call your clinic at [phone] to:    Ask questions about your health    Make or cancel appointments    Discuss your medicines    Learn about your test results    Speak to your doctor   If you have compliments or concerns about an experience at your clinic, or if you wish to file a complaint, please contact [facility] Patient Relations at [phone] or email us at [email or web address]         Additional Information About Your Visit        MyChart Information     [employer] gives you secure access to your electronic health record. If you see a primary care provider, you can also send messages to your care team and make appointments. If you have questions, please call your primary care clinic.  If you do not have a primary care provider, please call [phone] and they will assist you.      [employer] is an electronic gateway that provides easy, online access to your medical records. With [employer], you can request a clinic appointment, read your test results, renew a prescription or communicate with your care team.     To access your existing account, please contact your [facility] or call [phone] for assistance.        Care EveryWhere ID     This is your Care EveryWhere ID. This could be used by other organizations to access your  [facility] medical records  [id number]         Blood Pressure from Last 3 Encounters:   05/08/17 115/76   05/02/17 112/66   04/27/17 128/78    Weight from Last 3 Encounters:   05/08/17 81.5 kg (179 lb 9.6 oz)   05/02/17 82.8 kg (182 lb 9.6 oz)   04/27/17 80.8 kg (178 lb 1.6 oz)              Today, you had the following     No orders found for display       Primary Care Provider Office Phone # Fax #    Rocky Rigo Messina [phone]       [facility] [address] [address]        Thank you!     Thank you for choosing [facility] DERMATOLOGY  for your care. Our goal is always to provide you with excellent care. Hearing back from our patients is one way we can continue to improve our services. Please take a few minutes to complete the written survey that you may receive in the mail after your visit with us. Thank you!             Your Updated Medication List - Protect others around you: Learn how to safely use, store and throw away your medicines at www.disposemymeds.org.          This list is accurate as of: 6/8/17  1:34 PM.  Always use your most recent med list.                   Brand Name Dispense Instructions for use    ASPIRIN PO      Take 81 mg by mouth daily       mirtazapine 15 MG tablet    REMERON    90 tablet    Take 1 tablet (15 mg) by mouth At Bedtime       simvastatin 20 MG tablet    ZOCOR    90 tablet    Take 1 tablet (20 mg) by mouth At Bedtime

## 2017-06-08 NOTE — LETTER
"6/8/2017       RE: Cesar Montejo  [address]     [facility] [address]     Dear Colleague,    Thank you for referring your patient, Cesar Montejo, to the [facility] DERMATOLOGY at [facility] Please see a copy of my visit note below.    [facility] Dermatology Note      Dermatology Problem List:  1. NMSC   -Superficial BCC, left cheeck   -SCCis right cheek, s/p Mohs 6/9/2016  2. Actinic keratoses  3. Actinic chelitis     Encounter Date: Jun 8, 2017    CC:  6 month skin check       History of Present Illness:  Mr. Cesar Montejo is a 51 year old male who presents today with a \"bump\" on left ear and \"rough patches\" on his forehead, additionally is here for his 6 month skin check due to his h/o superficial BCC on left cheek and SCC on right cheek 6/2016.     He is concerned about a \"bump\" on behind his left year, but it does not bleed, itch, or cause him pain.     Additionally, his forehead has some regions that feel more \"rough\" to him and he has previously had actinic keratoses in this region.      Has a history of sun exposure as a , cyclist and grounds keeps through teenage years and 20s.     He denies any lesions that are nonhealing, painful, itchy, burning, or bleeding.       Past Medical History:   Patient Active Problem List   Diagnosis     Insomnia     CARDIOVASCULAR SCREENING; LDL GOAL LESS THAN 130     Hypercholesteremia     GERD (gastroesophageal reflux disease)     Mixed anxiety and depressive disorder     Knee pain     Right shoulder pain     Calcific tendonitis     Solar lentiginosis     Skin cancer screening     Dermatitis, seborrheic     Keratosis, actinic     Family history of skin cancer     AK (actinic keratosis)     History of actinic keratosis     Past Medical History:   Diagnosis Date     Anxiety      Basal cell carcinoma      Gastro-oesophageal reflux disease      High cholesterol      Insomnia      " Squamous cell carcinoma (H)      Past Surgical History:   Procedure Laterality Date     COLONOSCOPY N/A 4/17/2017    Procedure: COLONOSCOPY;  Surgeon: Larry Fields MD;  Location:  GI     LASER CO2 LESION ORAL N/A 10/5/2016    Procedure: LASER CO2 LESION ORAL;  Surgeon: Josué Rojo DDS;  Location:  OR     MOHS MICROGRAPHIC PROCEDURE         Social History:  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 The patient denies use of tanning beds.    Family History:  There is a family history of non-melanoma skin cancer in the patient's mother (BCC and SCC).   Additionally maternal grandfather and uncle had melanoma.     Medications:  Current Outpatient Prescriptions   Medication Sig Dispense Refill     simvastatin (ZOCOR) 20 MG tablet Take 1 tablet (20 mg) by mouth At Bedtime 90 tablet 3     mirtazapine (REMERON) 15 MG tablet Take 1 tablet (15 mg) by mouth At Bedtime 90 tablet 3     ASPIRIN PO Take 81 mg by mouth daily       No Known Allergies      Review of Systems:  -Constitutional: The patient denies fatigue, fevers, chills, unintended weight loss, and night sweats.  -HEENT: Patient denies nonhealing oral sores.  -Skin: As above in HPI. No additional skin concerns.    Physical exam:  Vitals: There were no vitals taken for this visit.  GEN: This is a well developed, well-nourished male in no acute distress, in a pleasant mood.    SKIN: Full skin, which includes the head/face, both arms, chest, back, abdomen,both legs, groin buttocks, digits and/or nails, was examined.  - round 4mm  hypopigmented macule at lesion of previously treated with cryotherapy, has been noted on previous exams and has not changed.   Well healed scars at the sites of prior surgery, no nodularity or erythema  -No other lesions of concern on areas examined.     Impression/Plan:  1. History of BCC and SCCis.  CLinically no evidence of recurrence today.    6 month skin exam today.  Reassured patient that we do not see any concerning lesions. Discussed more frequent follow ups as the patient feels more comfortable being examined every 3 months instead of 6 months.     Follow 3 months.     Staff Involved:  Scribed by Manan Horan MS3 for Dr. Mari.      The medical student acted as a scribe with respect to this patient.  I have performed all the key elements of the history and physical    Rober Mari MD  Dermatology Attending

## 2017-06-17 PROBLEM — Z85.828 HISTORY OF BASAL CELL CANCER: Status: ACTIVE | Noted: 2017-06-17

## 2017-07-31 ENCOUNTER — OFFICE VISIT (OUTPATIENT)
Dept: URGENT CARE | Facility: URGENT CARE | Age: 52
End: 2017-07-31
Payer: COMMERCIAL

## 2017-07-31 VITALS
TEMPERATURE: 98.4 F | OXYGEN SATURATION: 96 % | HEART RATE: 96 BPM | WEIGHT: 184.2 LBS | SYSTOLIC BLOOD PRESSURE: 128 MMHG | BODY MASS INDEX: 24.98 KG/M2 | DIASTOLIC BLOOD PRESSURE: 84 MMHG
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
## 2017-07-31 PROCEDURE — 99213 OFFICE O/P EST LOW 20 MIN: CPT | Performed by: FAMILY MEDICINE

## 2017-07-31 RX ORDER — DOXYCYCLINE 100 MG/1
200 CAPSULE ORAL ONCE
Qty: 2 CAPSULE | Refills: 0 | Status: SHIPPED | OUTPATIENT
Start: 2017-07-31 | End: 2017-07-31

## 2017-07-31 NOTE — MR AVS SNAPSHOT
After Visit Summary   7/31/2017    Cesar Montejo    MRN: [id number]           Patient Information     Date Of Birth          1965        Visit Information        Provider Department      7/31/2017 7:00 PM Hansel Elena DO [facility]        Today's Diagnoses     Tick bite, initial encounter    -  1       Follow-ups after your visit        Your next 10 appointments already scheduled     Sep 07, 2017  1:00 PM CDT   (Arrive by 12:45 PM)   Return Visit with Rober Mari MD   [facility] Dermatology ([facility] [place])    [address] [place] [address]  3rd [facility] [address] [phone]              Who to contact     If you have questions or need follow up information about today's clinic visit or your schedule please contact [facility] directly at [phone].  Normal or non-critical lab and imaging results will be communicated to you by MyChart, letter or phone within 4 business days after the clinic has received the results. If you do not hear from us within 7 days, please contact the clinic through MyChart or phone. If you have a critical or abnormal lab result, we will notify you by phone as soon as possible.  Submit refill requests through [employer] or call your pharmacy and they will forward the refill request to us. Please allow 3 business days for your refill to be completed.          Additional Information About Your Visit        MyChart Information     [employer] gives you secure access to your electronic health record. If you see a primary care provider, you can also send messages to your care team and make appointments. If you have questions, please call your primary care clinic.  If you do not have a primary care provider, please call [phone] and they will assist you.        Care EveryWhere ID     This is your Care EveryWhere ID. This could be used by other organizations to access your [place]  medical records  [id number]        Your Vitals Were     Pulse Temperature Pulse Oximetry BMI (Body Mass Index)          96 98.4  F (36.9  C) (Oral) 96% 24.98 kg/m2         Blood Pressure from Last 3 Encounters:   07/31/17 128/84   05/08/17 115/76   05/02/17 112/66    Weight from Last 3 Encounters:   07/31/17 184 lb 3.2 oz (83.6 kg)   05/08/17 179 lb 9.6 oz (81.5 kg)   05/02/17 182 lb 9.6 oz (82.8 kg)              Today, you had the following     No orders found for display         Today's Medication Changes          These changes are accurate as of: 7/31/17  7:44 PM.  If you have any questions, ask your nurse or doctor.               Start taking these medicines.        Dose/Directions    doxycycline 100 MG capsule   Commonly known as:  VIBRAMYCIN   Used for:  Tick bite, initial encounter   Started by:  Hansel Elena,         Dose:  200 mg   Take 2 capsules (200 mg) by mouth once for 1 dose   Quantity:  2 capsule   Refills:  0            Where to get your medicines      These medications were sent to [facility] [address] [address]     Phone:  [phone]     doxycycline 100 MG capsule                Primary Care Provider Office Phone # Fax #    Rocky Rigo Messina [phone]       [facility] [address] [facility] [address]  [facility] [address]        Equal Access to Services     CAROLINA PLASCENCIA AH: Hadii desean ruano hadkwano Soidris, waaxda luqadaha, qaybta kaalmada adeegyakelby, waxay nicole nelson. So [facility] [phone].    ATENCIÓN: Si habla español, tiene a persaud disposición servicios gratuitos de asistencia lingüística. Llame al [phone].    We comply with applicable federal civil rights laws and [place] laws. We do not discriminate on the basis of race, color, national origin, age, disability sex, sexual orientation or gender identity.            Thank you!     Thank you for choosing [facility]  for your care. Our goal is always to provide you with excellent care. Hearing back from our patients is one way we can continue to improve our services. Please take a few minutes to complete the written survey that you may receive in the mail after your visit with us. Thank you!             Your Updated Medication List - Protect others around you: Learn how to safely use, store and throw away your medicines at www.disposemymeds.org.          This list is accurate as of: 7/31/17  7:44 PM.  Always use your most recent med list.                   Brand Name Dispense Instructions for use Diagnosis    ASPIRIN PO      Take 81 mg by mouth daily        doxycycline 100 MG capsule    VIBRAMYCIN    2 capsule    Take 2 capsules (200 mg) by mouth once for 1 dose    Tick bite, initial encounter       mirtazapine 15 MG tablet    REMERON    90 tablet    Take 1 tablet (15 mg) by mouth At Bedtime    Mixed anxiety and depressive disorder, Insomnia       simvastatin 20 MG tablet    ZOCOR    90 tablet    Take 1 tablet (20 mg) by mouth At Bedtime    Hyperlipidemia LDL goal <100

## 2017-08-01 NOTE — PROGRESS NOTES
SUBJECTIVE:Cesar Montejo is a 51 year old male who presents to the clinic today for a rash.  Onset of rash was 1 day(s) ago.   Rash is still present.   Location of the rash: lower leg.  Associated symptoms include: redness.    Symptoms are mild and rash seems to be stable.  Therapies tried to improve the rash: removal of FB.  Previous history of a similar rash? No  Recent exposure history: tick (unknown type)    Past Medical History:   Diagnosis Date     Anxiety      Basal cell carcinoma      Gastro-oesophageal reflux disease      High cholesterol      Insomnia      Squamous cell carcinoma      No Known Allergies  Social History   Substance Use Topics     Smoking status: Former Smoker     Packs/day: 0.50     Years: 5.00     Types: Cigarettes     Smokeless tobacco: Never Used      Comment: Only in college,one pack per week     Alcohol use Yes      Comment: social occ.       ROS:CONSTITUTIONAL:NEGATIVE for fever, chills, change in weight    EXAM: VITALS: /84 (BP Location: Right arm, Patient Position: Chair, Cuff Size: Adult Regular)  Pulse 96  Temp 98.4  F (36.9  C) (Oral)  Wt 184 lb 3.2 oz (83.6 kg)  SpO2 96%  BMI 24.98 kg/m2  General:healthy,alert,no distress    Location: lower leg     Distribution: localized     Lesion grouping: unilateral     Lesion type: macular     Color: red with no other findingsPERTINENT EXAM: GENERAL APPEARANCE: healthy, alert and no distress      ICD-10-CM    1. Tick bite, initial encounter W57.XXXA doxycycline (VIBRAMYCIN) 100 MG capsule       Follow-up with primary clinic if not improving

## 2017-08-01 NOTE — NURSING NOTE
Chief Complaint   Patient presents with     Insect Bites     possible deer tick bite on lt ankle x noticed yesterday        Initial /84 (BP Location: Right arm, Patient Position: Chair, Cuff Size: Adult Regular)  Pulse 96  Temp 98.4  F (36.9  C) (Oral)  Wt 184 lb 3.2 oz (83.6 kg)  SpO2 96%  BMI 24.98 kg/m2 Estimated body mass index is 24.98 kg/(m^2) as calculated from the following:    Height as of 5/8/17: 6' (1.829 m).    Weight as of this encounter: 184 lb 3.2 oz (83.6 kg).  Medication Reconciliation: complete

## 2017-08-14 ENCOUNTER — OFFICE VISIT (OUTPATIENT)
Dept: DERMATOLOGY | Facility: CLINIC | Age: 52
End: 2017-08-14

## 2017-08-14 DIAGNOSIS — L98.8 SKIN MACULE: Primary | ICD-10-CM

## 2017-08-14 ASSESSMENT — PAIN SCALES - GENERAL: PAINLEVEL: NO PAIN (0)

## 2017-08-14 NOTE — MR AVS SNAPSHOT
After Visit Summary   8/14/2017    Cesar Montejo    MRN: [id number]           Patient Information     Date Of Birth          1965        Visit Information        Provider Department      8/14/2017 9:30 AM Cherie Gillespie MD [facility] Dermatology        Today's Diagnoses     Skin macule    -  1       Follow-ups after your visit        Your next 10 appointments already scheduled     Sep 07, 2017  1:00 PM CDT   (Arrive by 12:45 PM)   Return Visit with Rober Mari MD   [facility] Dermatology ([facility] [place])    [address] [place] [address] [phone]              Who to contact     Please call your clinic at [phone] to:    Ask questions about your health    Make or cancel appointments    Discuss your medicines    Learn about your test results    Speak to your doctor   If you have compliments or concerns about an experience at your clinic, or if you wish to file a complaint, please contact [facility] Patient Relations at [phone] or email us at [email or web address]         Additional Information About Your Visit        MyChart Information     [employer] gives you secure access to your electronic health record. If you see a primary care provider, you can also send messages to your care team and make appointments. If you have questions, please call your primary care clinic.  If you do not have a primary care provider, please call [phone] and they will assist you.      [employer] is an electronic gateway that provides easy, online access to your medical records. With [employer], you can request a clinic appointment, read your test results, renew a prescription or communicate with your care team.     To access your existing account, please contact your [facility] Clinic or call [phone] for assistance.        Care EveryWhere ID     This is your Care EveryWhere ID. This  could be used by other organizations to access your [place] medical records  [id number]         Blood Pressure from Last 3 Encounters:   07/31/17 128/84   05/08/17 115/76   05/02/17 112/66    Weight from Last 3 Encounters:   07/31/17 83.6 kg (184 lb 3.2 oz)   05/08/17 81.5 kg (179 lb 9.6 oz)   05/02/17 82.8 kg (182 lb 9.6 oz)              Today, you had the following     No orders found for display       Primary Care Provider Office Phone # Fax #    Rocky Rigo Messina [phone]       [address] [facility] [address]  [facility] [address]        Equal Access to Services     BRIANNA [facility] GulfportKRISTAL : Hadii desean ruano hadasho Soidris, waaxda luqadaha, qaybta kaalmada adeegyada, prashant jameson . So [facility] [phone].    ATENCIÓN: Si habla español, tiene a persaud disposición servicios gratuitos de asistencia lingüística. [facility] [phone].    We comply with applicable federal civil rights laws and [place] laws. We do not discriminate on the basis of race, color, national origin, age, disability sex, sexual orientation or gender identity.            Thank you!     Thank you for choosing [facility]  for your care. Our goal is always to provide you with excellent care. Hearing back from our patients is one way we can continue to improve our services. Please take a few minutes to complete the written survey that you may receive in the mail after your visit with us. Thank you!             Your Updated Medication List - Protect others around you: Learn how to safely use, store and throw away your medicines at www.disposemymeds.org.          This list is accurate as of: 8/14/17 11:59 PM.  Always use your most recent med list.                   Brand Name Dispense Instructions for use Diagnosis    ASPIRIN PO      Take 81 mg by mouth daily        mirtazapine 15 MG tablet    REMERON    90 tablet    Take 1 tablet (15 mg) by mouth At Bedtime    Mixed anxiety and depressive disorder, Insomnia        simvastatin 20 MG tablet    ZOCOR    90 tablet    Take 1 tablet (20 mg) by mouth At Bedtime    Hyperlipidemia LDL goal <100

## 2017-08-14 NOTE — PROGRESS NOTES
[facility] Dermatology Note    Dermatology Problem List:  1. NMSC  -Superficial BCC, left cheeck  -SCCis right cheek, s/p Mohs 6/9/2016    2. Actinic keratoses    3. Actinic chelitis  -s/p CO2 laser ablation of lower lip vermilion 10/5/16  -plans to have scalpel lower lip vermilionectomy at the end of August due to recurrence on biopsy      Encounter Date: Aug 14, 2017    CC:  Chief Complaint   Patient presents with     Derm Problem     Cesar is here for concerns of a lesion on his right thigh, that he noticed 6 weeks ago.         History of Present Illness:  Mr. Cesar Montejo is a 51 year old male who presents as a follow-up for a new spot on his R anterior thigh. The patient was last seen by Dr. Mari 6/8/17 when a 3-month skin check was performed, and no concerning lesions were present.  Over the past 6 weeks, he has noticed a new spot developing on his R anterior thigh.  He denies any pain, burning or pruritus associated with the lesion.  A few weeks ago, he tried to scratch the lesion and a small amount of bleeding occurred. He does not recall any trauma     Past Medical History:   Patient Active Problem List   Diagnosis     Insomnia     CARDIOVASCULAR SCREENING; LDL GOAL LESS THAN 130     Hypercholesteremia     GERD (gastroesophageal reflux disease)     Mixed anxiety and depressive disorder     Knee pain     Right shoulder pain     Calcific tendonitis     Solar lentiginosis     Skin cancer screening     Dermatitis, seborrheic     Keratosis, actinic     Family history of skin cancer     AK (actinic keratosis)     History of actinic keratosis     History of basal cell cancer     Past Medical History:   Diagnosis Date     Anxiety      Basal cell carcinoma      Gastro-oesophageal reflux disease      High cholesterol      Insomnia      Squamous cell carcinoma      Past Surgical History:   Procedure Laterality Date     COLONOSCOPY N/A 4/17/2017    Procedure: COLONOSCOPY;  Surgeon: Donnie  MD Laryr;  Location:  GI     LASER CO2 LESION ORAL N/A 10/5/2016    Procedure: LASER CO2 LESION ORAL;  Surgeon: Josué Rojo DDS;  Location:  OR     MOHS MICROGRAPHIC PROCEDURE       Social History:  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 The patient denies use of tanning beds.     Family History:  There is a family history of non-melanoma skin cancer in the patient's mother (BCC and SCC).   Additionally maternal grandfather and uncle had melanoma.     Medications:  Current Outpatient Prescriptions   Medication Sig Dispense Refill     simvastatin (ZOCOR) 20 MG tablet Take 1 tablet (20 mg) by mouth At Bedtime 90 tablet 3     mirtazapine (REMERON) 15 MG tablet Take 1 tablet (15 mg) by mouth At Bedtime 90 tablet 3     ASPIRIN PO Take 81 mg by mouth daily       No Known Allergies    Review of Systems:  -As per HPI  -Constitutional: The patient denies fatigue, fevers, chills, unintended weight loss, and night sweats.  -HEENT: Patient denies nonhealing oral sores.  -Skin: As above in HPI. No additional skin concerns.    Physical exam:  Vitals: There were no vitals taken for this visit.  GEN: This is a well developed, well-nourished male in no acute distress, in a pleasant mood.    SKIN: Focused examination of the face, R leg and back was performed.  -yellow, flaking scale present along lower lip  -0.3 x 0.3 mm erythematous/violaceous macule on R anterior thigh with one telangiectasia present on dermoscopy, but no concerning features for malignant present at this time   -Diffuse band of hyperpigmentation on posterior neck  -3 x light brown junctional nevi scattered across back with no concerning features present at this time  -No other lesions of concern on areas examined.     Impression/Plan:  1. 0.3 x 0.3 mm erythematous/violaceous macule on R anterior thigh. No concerning features present on dermoscopy upon evaluation today. Appears to be  consistent with hyperpigmented scar.    Benign nature was discussed. Reassured Mr. Montejo that this lesion does not appear to be consistent with an amelanotic melanoma which often has more of a palpable component. No further intervention required at this time.     Photographs taken for future reference, and to monitor clinically    Follow-up in 1 month is scheduled with Dr. Mari for a skin check.     Staff Involved:  Scribed by Areli Baig, MS4 for Dr. Gillespie.    .The medical student acted as scribe for this encounter.  The encounter documented above was completely performed by myself.  Cherie Gillespie MD

## 2017-08-14 NOTE — LETTER
8/14/2017       RE: Cesar Montejo  [address]   [facility] [address]     Dear Colleague,    Thank you for referring your patient, Cesar Montejo, to the [facility] DERMATOLOGY at [facility]. Please see a copy of my visit note below.    [facility] Dermatology Note    Dermatology Problem List:  1. NMSC  -Superficial BCC, left cheeck  -SCCis right cheek, s/p Mohs 6/9/2016    2. Actinic keratoses    3. Actinic chelitis  -s/p CO2 laser ablation of lower lip vermilion 10/5/16  -plans to have scalpel lower lip vermilionectomy at the end of August due to recurrence on biopsy      Encounter Date: Aug 14, 2017    CC:  Chief Complaint   Patient presents with     Derm Problem     Cesar is here for concerns of a lesion on his right thigh, that he noticed 6 weeks ago.         History of Present Illness:  Mr. Cesar Montejo is a 51 year old male who presents as a follow-up for a new spot on his R anterior thigh. The patient was last seen by Dr. Mari 6/8/17 when a 3-month skin check was performed, and no concerning lesions were present.  Over the past 6 weeks, he has noticed a new spot developing on his R anterior thigh.  He denies any pain, burning or pruritus associated with the lesion.  A few weeks ago, he tried to scratch the lesion and a small amount of bleeding occurred. He does not recall any trauma     Past Medical History:   Patient Active Problem List   Diagnosis     Insomnia     CARDIOVASCULAR SCREENING; LDL GOAL LESS THAN 130     Hypercholesteremia     GERD (gastroesophageal reflux disease)     Mixed anxiety and depressive disorder     Knee pain     Right shoulder pain     Calcific tendonitis     Solar lentiginosis     Skin cancer screening     Dermatitis, seborrheic     Keratosis, actinic     Family history of skin cancer     AK (actinic keratosis)     History of actinic keratosis     History of basal cell cancer     Past Medical History:    Diagnosis Date     Anxiety      Basal cell carcinoma      Gastro-oesophageal reflux disease      High cholesterol      Insomnia      Squamous cell carcinoma      Past Surgical History:   Procedure Laterality Date     COLONOSCOPY N/A 4/17/2017    Procedure: COLONOSCOPY;  Surgeon: Larry Fields MD;  Location:  GI     LASER CO2 LESION ORAL N/A 10/5/2016    Procedure: LASER CO2 LESION ORAL;  Surgeon: Josué Rojo DDS;  Location: [facility] OR     MOHS MICROGRAPHIC PROCEDURE       Social History:  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 The patient denies use of tanning beds.     Family History:  There is a family history of non-melanoma skin cancer in the patient's mother (BCC and SCC).   Additionally maternal grandfather and uncle had melanoma.     Medications:  Current Outpatient Prescriptions   Medication Sig Dispense Refill     simvastatin (ZOCOR) 20 MG tablet Take 1 tablet (20 mg) by mouth At Bedtime 90 tablet 3     mirtazapine (REMERON) 15 MG tablet Take 1 tablet (15 mg) by mouth At Bedtime 90 tablet 3     ASPIRIN PO Take 81 mg by mouth daily       No Known Allergies    Review of Systems:  -As per HPI  -Constitutional: The patient denies fatigue, fevers, chills, unintended weight loss, and night sweats.  -HEENT: Patient denies nonhealing oral sores.  -Skin: As above in HPI. No additional skin concerns.    Physical exam:  Vitals: There were no vitals taken for this visit.  GEN: This is a well developed, well-nourished male in no acute distress, in a pleasant mood.    SKIN: Focused examination of the face, R leg and back was performed.  -yellow, flaking scale present along lower lip  -0.3 x 0.3 mm erythematous/violaceous macule on R anterior thigh with one telangiectasia present on dermoscopy, but no concerning features for malignant present at this time   -Diffuse band of hyperpigmentation on posterior neck  -3 x light brown junctional nevi  scattered across back with no concerning features present at this time  -No other lesions of concern on areas examined.     Impression/Plan:  1. 0.3 x 0.3 mm erythematous/violaceous macule on R anterior thigh. No concerning features present on dermoscopy upon evaluation today. Appears to be consistent with hyperpigmented scar.    Benign nature was discussed. Reassured Mr. Montejo that this lesion does not appear to be consistent with an amelanotic melanoma which often has more of a palpable component. No further intervention required at this time.     Photographs taken for future reference, and to monitor clinically    Follow-up in 1 month is scheduled with Dr. Mari for a skin check.     Staff Involved:  Scribed by Areli Baig MS4 for Dr. Gillespie.    .The medical student acted as scribe for this encounter.  The encounter documented above was completely performed by myself.  Cherie Gillespie MD

## 2017-08-14 NOTE — NURSING NOTE
Dermatology Rooming Note    Cesar Montejo's goals for this visit include:   Chief Complaint   Patient presents with     Derm Problem     Cesar is here for concerns of a lesion on his right thigh, that he noticed 6 weeks ago.           Alyssa Trent LPN

## 2017-09-07 ENCOUNTER — OFFICE VISIT (OUTPATIENT)
Dept: DERMATOLOGY | Facility: CLINIC | Age: 52
End: 2017-09-07

## 2017-09-07 DIAGNOSIS — L56.8 ACTINIC CHEILITIS: ICD-10-CM

## 2017-09-07 DIAGNOSIS — D18.01 CHERRY ANGIOMA: ICD-10-CM

## 2017-09-07 DIAGNOSIS — Z85.828 HISTORY OF NONMELANOMA SKIN CANCER: ICD-10-CM

## 2017-09-07 DIAGNOSIS — L82.1 SEBORRHEIC KERATOSIS: Primary | ICD-10-CM

## 2017-09-07 ASSESSMENT — PAIN SCALES - GENERAL: PAINLEVEL: NO PAIN (0)

## 2017-09-07 NOTE — LETTER
9/7/2017       RE: Cesar Montejo  [address]   [facility] [address]     Dear Colleague,    Thank you for referring your patient, Cesar Montejo, to the [facility] DERMATOLOGY at [facility]. Please see a copy of my visit note below.    [facility] Dermatology Note      Dermatology Problem List:  1. NMSC  -Superficial BCC, left cheek  -SCCIS right cheek, s/p Mohs 6/16    2. Actinic keratoses    3. Actinic cheilitis  -s/p CO2 laser ablation of lower lip vermilion 10/16  -s/p lower lip vermilionectomy 8/2017    Encounter Date: Sep 7, 2017    CC:  Chief Complaint   Patient presents with     Skin Check     Personal hx of BCC and SCC. Cesar has one spot of concern today.       History of Present Illness:  Mr. Cesar Montejo is a 51 year old male with hx of skin cancer who presents as a follow-up for skin check. The patient was last seen on 8/14/17 when he presented for evaluation of a lesion on his R thigh thought to be a hyperpigmented scar. Mr. Montejo states that today he has two concerning spots. One is on his right cheek and has been present for a couple of months, and is concerning for him because of his history of skin cancer in that area. He has another spot on his left thumb that he reports has bled without trauma. The patient notes that he had his lower lip vermilionectomy several weeks ago as he'd had a biopsy showing reoccurrence of his actinic cheilitis despite a previous CO2 laser ablation.    Past Medical History:   Patient Active Problem List   Diagnosis     Insomnia     CARDIOVASCULAR SCREENING; LDL GOAL LESS THAN 130     Hypercholesteremia     GERD (gastroesophageal reflux disease)     Mixed anxiety and depressive disorder     Knee pain     Right shoulder pain     Calcific tendonitis     Solar lentiginosis     Skin cancer screening     Dermatitis, seborrheic     Keratosis, actinic     Family history of skin cancer     AK (actinic  keratosis)     History of actinic keratosis     History of basal cell cancer     Past Medical History:   Diagnosis Date     Anxiety      Basal cell carcinoma      Gastro-oesophageal reflux disease      High cholesterol      Insomnia      Squamous cell carcinoma      Past Surgical History:   Procedure Laterality Date     COLONOSCOPY N/A 4/17/2017    Procedure: COLONOSCOPY;  Surgeon: Larry Fields MD;  Location:  GI     LASER CO2 LESION ORAL N/A 10/5/2016    Procedure: LASER CO2 LESION ORAL;  Surgeon: Josué Rojo DDS;  Location:  OR     MOHS MICROGRAPHIC PROCEDURE         Social History:  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    Family History:  Family hx of non-melanoma skin cancer in patient's mother (BCC and SCC).  Additionally maternal grandfather and uncle had melanoma.    Medications:  Current Outpatient Prescriptions   Medication Sig Dispense Refill     simvastatin (ZOCOR) 20 MG tablet Take 1 tablet (20 mg) by mouth At Bedtime 90 tablet 3     mirtazapine (REMERON) 15 MG tablet Take 1 tablet (15 mg) by mouth At Bedtime 90 tablet 3     ASPIRIN PO Take 81 mg by mouth daily       No Known Allergies      Review of Systems:  -Not pertinent to the current visit.  -Skin: As above in HPI. No additional skin concerns.    Physical exam:  Vitals: There were no vitals taken for this visit.  GEN: This is a well developed, well-nourished male in no acute distress, in a pleasant mood.    SKIN: Full skin, which includes the head/face, both arms, chest, back, abdomen,both legs, genitalia and/or groin buttocks, digits and/or nails, was examined.  -left cheek: 1-2 mm brown macule  -Left thumb: 1 mm flesh colored papule with blood vessels visible under dermatoscope  -No other lesions of concern on areas examined.     Impression/Plan:  1. Seborrheic keratosis, non irritated, left cheek    Benign nature was discussed. No further intervention required at this time.     2. Angioma, left  thumb    Benign nature was discussed.No further intervention required at this time.     3. History of nonmelanoma skin ca x2 - clinically GIOVANI today.  Reassurance as to benign exam findings.    4. Actinic cheilitis, s/p lip vermillionectomy by outside provider.  Clinically no evidence of lip dysplasia today - reassurance.    Follow-up in 6 months, earlier for new or changing lesions.     Staff Involved:  Scribed by Brianna Elena MS4 for Dr. Mari.      The medical student acted as a scribe with respect to this patient.  I have performed all the key elements of the history and physical.    Rober Mari MD  Dermatology Attending

## 2017-09-07 NOTE — PROGRESS NOTES
[facility] Dermatology Note      Dermatology Problem List:  1. NMSC  -Superficial BCC, left cheek  -SCCIS right cheek, s/p Mohs 6/16    2. Actinic keratoses    3. Actinic cheilitis  -s/p CO2 laser ablation of lower lip vermilion 10/16  -s/p lower lip vermilionectomy 8/2017    Encounter Date: Sep 7, 2017    CC:  Chief Complaint   Patient presents with     Skin Check     Personal hx of BCC and SCC. Cesar has one spot of concern today.         History of Present Illness:  Mr. Cesar Montejo is a 51 year old male with hx of skin cancer who presents as a follow-up for skin check. The patient was last seen on 8/14/17 when he presented for evaluation of a lesion on his R thigh thought to be a hyperpigmented scar. Mr. Montejo states that today he has two concerning spots. One is on his right cheek and has been present for a couple of months, and is concerning for him because of his history of skin cancer in that area. He has another spot on his left thumb that he reports has bled without trauma. The patient notes that he had his lower lip vermilionectomy several weeks ago as he'd had a biopsy showing reoccurrence of his actinic cheilitis despite a previous CO2 laser ablation.    Past Medical History:   Patient Active Problem List   Diagnosis     Insomnia     CARDIOVASCULAR SCREENING; LDL GOAL LESS THAN 130     Hypercholesteremia     GERD (gastroesophageal reflux disease)     Mixed anxiety and depressive disorder     Knee pain     Right shoulder pain     Calcific tendonitis     Solar lentiginosis     Skin cancer screening     Dermatitis, seborrheic     Keratosis, actinic     Family history of skin cancer     AK (actinic keratosis)     History of actinic keratosis     History of basal cell cancer     Past Medical History:   Diagnosis Date     Anxiety      Basal cell carcinoma      Gastro-oesophageal reflux disease      High cholesterol      Insomnia      Squamous cell carcinoma      Past Surgical History:    Procedure Laterality Date     COLONOSCOPY N/A 4/17/2017    Procedure: COLONOSCOPY;  Surgeon: Larry Fields MD;  Location:  GI     LASER CO2 LESION ORAL N/A 10/5/2016    Procedure: LASER CO2 LESION ORAL;  Surgeon: Josué Rojo DDS;  Location: [facility] OR     MOHS MICROGRAPHIC PROCEDURE         Social History:  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    Family History:  Family hx of non-melanoma skin cancer in patient's mother (BCC and SCC).  Additionally maternal grandfather and uncle had melanoma.    Medications:  Current Outpatient Prescriptions   Medication Sig Dispense Refill     simvastatin (ZOCOR) 20 MG tablet Take 1 tablet (20 mg) by mouth At Bedtime 90 tablet 3     mirtazapine (REMERON) 15 MG tablet Take 1 tablet (15 mg) by mouth At Bedtime 90 tablet 3     ASPIRIN PO Take 81 mg by mouth daily       No Known Allergies      Review of Systems:  -Not pertinent to the current visit.  -Skin: As above in HPI. No additional skin concerns.    Physical exam:  Vitals: There were no vitals taken for this visit.  GEN: This is a well developed, well-nourished male in no acute distress, in a pleasant mood.    SKIN: Full skin, which includes the head/face, both arms, chest, back, abdomen,both legs, genitalia and/or groin buttocks, digits and/or nails, was examined.  -left cheek: 1-2 mm brown macule  -Left thumb: 1 mm flesh colored papule with blood vessels visible under dermatoscope  -No other lesions of concern on areas examined.     Impression/Plan:  1. Seborrheic keratosis, non irritated, left cheek    Benign nature was discussed. No further intervention required at this time.     2. Angioma, left thumb    Benign nature was discussed.No further intervention required at this time.     3. History of nonmelanoma skin ca x2 - clinically GIOVANI today.  Reassurance as to benign exam findings.    4. Actinic cheilitis, s/p lip vermillionectomy by outside provider.  Clinically no evidence of lip  dysplasia today - reassurance.    Follow-up in 6 months, earlier for new or changing lesions.     Staff Involved:  Scribed by Brianna Elena, MS4 for Dr. Mari.      The medical student acted as a scribe with respect to this patient.  I have performed all the key elements of the history and physical.    Rober Mari MD  Dermatology Attending

## 2017-09-07 NOTE — NURSING NOTE
Dermatology Rooming Note    Cesar Montejo's goals for this visit include:   Chief Complaint   Patient presents with     Skin Check     Personal hx of BCC and SCC. Cesar has one spot of concern today.     Martha Calvillo, CMA

## 2017-09-07 NOTE — MR AVS SNAPSHOT
After Visit Summary   9/7/2017    Cesar Montejo    MRN: [id number]           Patient Information     Date Of Birth          1965        Visit Information        Provider Department      9/7/2017 1:00 PM Rober Mari MD [facility] Dermatology        Today's Diagnoses     Seborrheic keratosis    -  1    Cherry angioma        Actinic cheilitis        History of nonmelanoma skin cancer           Follow-ups after your visit        Who to contact     Please call your clinic at [phone] to:    Ask questions about your health    Make or cancel appointments    Discuss your medicines    Learn about your test results    Speak to your doctor   If you have compliments or concerns about an experience at your clinic, or if you wish to file a complaint, please contact [facility] Patient Relations at [phone] or email us at [email or web address]         Additional Information About Your Visit        MyChart Information     [employer] gives you secure access to your electronic health record. If you see a primary care provider, you can also send messages to your care team and make appointments. If you have questions, please call your primary care clinic.  If you do not have a primary care provider, please call [phone] and they will assist you.      [employer] is an electronic gateway that provides easy, online access to your medical records. With [employer], you can request a clinic appointment, read your test results, renew a prescription or communicate with your care team.     To access your existing account, please contact your [facility] or call [phone] for assistance.        Care EveryWhere ID     This is your Care EveryWhere ID. This could be used by other organizations to access your [facility] medical records  [id number]         Blood Pressure from Last 3 Encounters:   07/31/17 128/84   05/08/17 115/76   05/02/17 112/66     Weight from Last 3 Encounters:   07/31/17 83.6 kg (184 lb 3.2 oz)   05/08/17 81.5 kg (179 lb 9.6 oz)   05/02/17 82.8 kg (182 lb 9.6 oz)              Today, you had the following     No orders found for display       Primary Care Provider Office Phone # Fax #    Rocky Rigo Messina [phone]       [address] [facility] [address]  [facility] [address]        Equal Access to Services     [facility] : Hadii aad ku hadasho Soomaali, waaxda luqadaha, qaybta kaalmada adeegyada, waxay idiin haybeto jameson . So [facility] [phone].    ATENCIÓN: Si habla español, tiene a persaud disposición servicios gratuitos de asistencia lingüística. Ll[facility]den[facility] [phone].    We comply with applicable federal civil rights laws and [place] laws. We do not discriminate on the basis of race, color, national origin, age, disability, sex, sexual orientation, or gender identity.            Thank you!     Thank you for choosing [facility] DERMATOLOGY  for your care. Our goal is always to provide you with excellent care. Hearing back from our patients is one way we can continue to improve our services. Please take a few minutes to complete the written survey that you may receive in the mail after your visit with us. Thank you!             Your Updated Medication List - Protect others around you: Learn how to safely use, store and throw away your medicines at www.disposemymeds.org.          This list is accurate as of: 9/7/17 11:59 PM.  Always use your most recent med list.                   Brand Name Dispense Instructions for use Diagnosis    ASPIRIN PO      Take 81 mg by mouth daily        mirtazapine 15 MG tablet    REMERON    90 tablet    Take 1 tablet (15 mg) by mouth At Bedtime    Mixed anxiety and depressive disorder, Insomnia       simvastatin 20 MG tablet    ZOCOR    90 tablet    Take 1 tablet (20 mg) by mouth At Bedtime    Hyperlipidemia LDL goal <100

## 2017-10-01 PROBLEM — D18.01 CHERRY ANGIOMA: Status: ACTIVE | Noted: 2017-10-01

## 2017-10-01 PROBLEM — L82.1 SEBORRHEIC KERATOSIS: Status: ACTIVE | Noted: 2017-10-01

## 2017-10-01 PROBLEM — L56.8 ACTINIC CHEILITIS: Status: ACTIVE | Noted: 2017-10-01

## 2017-10-01 PROBLEM — Z85.828 HISTORY OF NONMELANOMA SKIN CANCER: Status: ACTIVE | Noted: 2017-10-01

## 2017-11-14 ENCOUNTER — APPOINTMENT (OUTPATIENT)
Dept: CT IMAGING | Facility: CLINIC | Age: 52
End: 2017-11-14
Attending: EMERGENCY MEDICINE
Payer: COMMERCIAL

## 2017-11-14 ENCOUNTER — HOSPITAL ENCOUNTER (EMERGENCY)
Facility: CLINIC | Age: 52
Discharge: HOME | End: 2017-11-14
Attending: EMERGENCY MEDICINE | Admitting: EMERGENCY MEDICINE
Payer: COMMERCIAL

## 2017-11-14 VITALS
HEART RATE: 92 BPM | BODY MASS INDEX: 25.23 KG/M2 | OXYGEN SATURATION: 96 % | RESPIRATION RATE: 16 BRPM | DIASTOLIC BLOOD PRESSURE: 87 MMHG | TEMPERATURE: 97.1 F | SYSTOLIC BLOOD PRESSURE: 138 MMHG | WEIGHT: 186 LBS

## 2017-11-14 DIAGNOSIS — R53.83 FATIGUE, UNSPECIFIED TYPE: ICD-10-CM

## 2017-11-14 DIAGNOSIS — R41.0 CONFUSION: ICD-10-CM

## 2017-11-14 LAB
ALBUMIN SERPL-MCNC: 4.2 G/DL (ref 3.4–5)
ALP SERPL-CCNC: 49 U/L (ref 40–150)
ALT SERPL W P-5'-P-CCNC: 28 U/L (ref 0–70)
ANION GAP SERPL CALCULATED.3IONS-SCNC: 7 MMOL/L (ref 3–14)
AST SERPL W P-5'-P-CCNC: 16 U/L (ref 0–45)
BASOPHILS # BLD AUTO: 0.1 10E9/L (ref 0–0.2)
BASOPHILS NFR BLD AUTO: 0.9 %
BILIRUB SERPL-MCNC: 0.9 MG/DL (ref 0.2–1.3)
BUN SERPL-MCNC: 15 MG/DL (ref 7–30)
CALCIUM SERPL-MCNC: 8.8 MG/DL (ref 8.5–10.1)
CHLORIDE SERPL-SCNC: 107 MMOL/L (ref 94–109)
CO2 SERPL-SCNC: 26 MMOL/L (ref 20–32)
CREAT SERPL-MCNC: 0.85 MG/DL (ref 0.66–1.25)
DIFFERENTIAL METHOD BLD: NORMAL
EOSINOPHIL # BLD AUTO: 0 10E9/L (ref 0–0.7)
EOSINOPHIL NFR BLD AUTO: 0.4 %
ERYTHROCYTE [DISTWIDTH] IN BLOOD BY AUTOMATED COUNT: 12.2 % (ref 10–15)
GFR SERPL CREATININE-BSD FRML MDRD: >90 ML/MIN/1.7M2
GLUCOSE SERPL-MCNC: 99 MG/DL (ref 70–99)
HCT VFR BLD AUTO: 47.4 % (ref 40–53)
HGB BLD-MCNC: 15.9 G/DL (ref 13.3–17.7)
IMM GRANULOCYTES # BLD: 0 10E9/L (ref 0–0.4)
IMM GRANULOCYTES NFR BLD: 0.2 %
LYMPHOCYTES # BLD AUTO: 1.4 10E9/L (ref 0.8–5.3)
LYMPHOCYTES NFR BLD AUTO: 27.3 %
MCH RBC QN AUTO: 31.3 PG (ref 26.5–33)
MCHC RBC AUTO-ENTMCNC: 33.5 G/DL (ref 31.5–36.5)
MCV RBC AUTO: 93 FL (ref 78–100)
MONOCYTES # BLD AUTO: 0.7 10E9/L (ref 0–1.3)
MONOCYTES NFR BLD AUTO: 13.3 %
NEUTROPHILS # BLD AUTO: 3.1 10E9/L (ref 1.6–8.3)
NEUTROPHILS NFR BLD AUTO: 57.9 %
NRBC # BLD AUTO: 0 10*3/UL
NRBC BLD AUTO-RTO: 0 /100
PLATELET # BLD AUTO: 182 10E9/L (ref 150–450)
POTASSIUM SERPL-SCNC: 3.9 MMOL/L (ref 3.4–5.3)
PROT SERPL-MCNC: 7.3 G/DL (ref 6.8–8.8)
RBC # BLD AUTO: 5.08 10E12/L (ref 4.4–5.9)
SODIUM SERPL-SCNC: 140 MMOL/L (ref 133–144)
WBC # BLD AUTO: 5.3 10E9/L (ref 4–11)

## 2017-11-14 PROCEDURE — 99285 EMERGENCY DEPT VISIT HI MDM: CPT | Mod: 25 | Performed by: EMERGENCY MEDICINE

## 2017-11-14 PROCEDURE — 70450 CT HEAD/BRAIN W/O DYE: CPT

## 2017-11-14 PROCEDURE — 80053 COMPREHEN METABOLIC PANEL: CPT | Performed by: EMERGENCY MEDICINE

## 2017-11-14 PROCEDURE — 99285 EMERGENCY DEPT VISIT HI MDM: CPT | Mod: Z6 | Performed by: EMERGENCY MEDICINE

## 2017-11-14 PROCEDURE — 85025 COMPLETE CBC W/AUTO DIFF WBC: CPT | Performed by: EMERGENCY MEDICINE

## 2017-11-14 ASSESSMENT — ENCOUNTER SYMPTOMS
TREMORS: 0
NECK PAIN: 0
BACK PAIN: 0
COLOR CHANGE: 0
SEIZURES: 0
DIFFICULTY URINATING: 0
AGITATION: 0
HEADACHES: 0
SHORTNESS OF BREATH: 0
ADENOPATHY: 0
POLYDIPSIA: 0
SPEECH DIFFICULTY: 0
CHILLS: 0
VOMITING: 0
NECK STIFFNESS: 0
NAUSEA: 0
NUMBNESS: 0
SORE THROAT: 0
LIGHT-HEADEDNESS: 1
DIZZINESS: 0
BRUISES/BLEEDS EASILY: 0
WEAKNESS: 0
ABDOMINAL PAIN: 0
FEVER: 0

## 2017-11-14 NOTE — ED AVS SNAPSHOT
[facility], Emergency Department    [address]    [facility] [address]    Phone:  [phone]    Fax:  [phone]                                       Cesar Montejo   MRN: [id number]    Department:  [facility], Emergency Department   Date of Visit:  11/14/2017           After Visit Summary Signature Page     I have received my discharge instructions, and my questions have been answered. I have discussed any challenges I see with this plan with the nurse or doctor.    ..........................................................................................................................................  Patient/Patient Representative Signature      ..........................................................................................................................................  Patient Representative Print Name and Relationship to Patient    ..................................................               ................................................  Date                                            Time    ..........................................................................................................................................  Reviewed by Signature/Title    ...................................................              ..............................................  Date                                                            Time

## 2017-11-14 NOTE — ED AVS SNAPSHOT
[facility], Emergency Department    [address]    [place] [address]    Phone:  [phone]    Fax:  [phone]                                       Cesar Montejo   MRN: [id number]    Department:  [facility], Emergency Department   Date of Visit:  11/14/2017           Patient Information     Date Of Birth          1965        Your diagnoses for this visit were:     Fatigue, unspecified type     Confusion        You were seen by Susan Cespedes MD.        Discharge Instructions       Please make an appointment to follow up with Your Primary Care Provider in 2 days for further evaluation and recommendations. If your symptoms worsen please come back to the emergency department.          Discharge References/Attachments     CONFUSION (ENGLISH)    FATIGUE, MANAGING (ENGLISH)      24 Hour Appointment Hotline       To make an appointment at any [facility] clinic, call [phone] ([phone]). If you don't have a family doctor or clinic, we will help you find one. [facility] are conveniently located to serve the needs of you and your family.             Review of your medicines      Our records show that you are taking the medicines listed below. If these are incorrect, please call your family doctor or clinic.        Dose / Directions Last dose taken    ASPIRIN PO   Dose:  81 mg        Take 81 mg by mouth daily   Refills:  0        FISH OIL PO   Dose:  2 capsule        Take 2 capsules by mouth daily   Refills:  0        mirtazapine 15 MG tablet   Commonly known as:  REMERON   Dose:  15 mg   Quantity:  90 tablet        Take 1 tablet (15 mg) by mouth At Bedtime   Refills:  3        simvastatin 20 MG tablet   Commonly known as:  ZOCOR   Dose:  20 mg   Quantity:  90 tablet        Take 1 tablet (20 mg) by mouth At Bedtime   Refills:  3        UNABLE TO FIND        MEDICATION NAME: tumeric supplement daily   Refills:  0        VITAMIN D (CHOLECALCIFEROL) PO   Dose:  1 tablet        Take 1  tablet by mouth daily   Refills:  0                Procedures and tests performed during your visit     CBC with platelets differential    CT Head w/o Contrast    Comprehensive metabolic panel    Peripheral IV: Standard      Orders Needing Specimen Collection     None      Pending Results     Date and Time Order Name Status Description    11/14/2017 1133 CT Head w/o Contrast Preliminary             Pending Culture Results     No orders found from 11/12/2017 to 11/15/2017.            Pending Results Instructions     If you had any lab results that were not finalized at the time of your Discharge, you can call the ED Lab Result RN at [phone]. You will be contacted by this team for any positive Lab results or changes in treatment. The nurses are available 7 days a week from 10A to 6:30P.  You can leave a message 24 hours per day and they will return your call.        Thank you for choosing [facility]       Thank you for choosing [facility] for your care. Our goal is always to provide you with excellent care. Hearing back from our patients is one way we can continue to improve our services. Please take a few minutes to complete the written survey that you may receive in the mail after you visit with us. Thank you!        [employer] Information     [employer] gives you secure access to your electronic health record. If you see a primary care provider, you can also send messages to your care team and make appointments. If you have questions, please call your primary care clinic.  If you do not have a primary care provider, please call [phone] and they will assist you.        Care EveryWhere ID     This is your Care EveryWhere ID. This could be used by other organizations to access your [facility] medical records  [id number]        Equal Access to Services     CAROLINA PLASCENCIA : Jackie Mcdaniel, omayra houston, qaprashant lang . So [facility]  [phone].    ATENCIÓN: Si habla español, tiene a persaud disposición servicios gratuitos de asistencia lingüística. Llame al [phone].    We comply with applicable federal civil rights laws and [place] laws. We do not discriminate on the basis of race, color, national origin, age, disability, sex, sexual orientation, or gender identity.            After Visit Summary       This is your record. Keep this with you and show to your community pharmacist(s) and doctor(s) at your next visit.

## 2017-11-14 NOTE — ED NOTES
Pt reports history of concussions, he reports having post-concussion syndrome 2012. He states that he feels similar to having a concussion right now, but has had no injuries recently.

## 2017-11-14 NOTE — DISCHARGE INSTRUCTIONS
Please make an appointment to follow up with Your Primary Care Provider in 2 days for further evaluation and recommendations. If your symptoms worsen please come back to the emergency department.

## 2017-11-14 NOTE — ED PROVIDER NOTES
History     Chief Complaint   Patient presents with     Altered Mental Status     progressively more confused over the past 5-6 days (unable to concentrate, mental fatigue, making mistakes at work), hx of multiple concussions     Dizziness     balance worsening     HPI  Cesar Montejo is a 52 year old male with a history of hypercholesterolemia, GERD, anxiety, and depression who presents to the Emergency Department today for evaluation of confusion and dizziness. The patient states that about a week ago he started to develop issues with his balance. He then notes that on Thursday he started to develop confusion and difficulty concentrating. He reports that it became noticeably worse on Saturday when he was at the [employer] game and was not able to track what was going on during the game. He went to work today and yesterday and states that he was not able to get anything done as he was unable to concentrate. 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 He denies any recent head trauma but does have a history of concussions. Patient denies any medication for HTN or thyroid disease. He also denies a history of bleeding disorders or hemophilia. No pain or fevers.    I have reviewed the Medications, Allergies, Past Medical and Surgical History, and Social History in the Epic system.    Review of Systems   Constitutional: Negative for chills and fever.   HENT: Negative for congestion and sore throat.    Eyes: Negative for visual disturbance.   Respiratory: Negative for shortness of breath.    Cardiovascular: Negative for chest pain.   Gastrointestinal: Negative for abdominal pain, nausea and vomiting.   Endocrine: Negative for polydipsia and polyuria.   Genitourinary: Negative for difficulty urinating.   Musculoskeletal: Negative for back pain, gait problem, neck pain and neck stiffness.   Skin: Negative for color  change.   Allergic/Immunologic: Negative for immunocompromised state.   Neurological: Positive for light-headedness. Negative for dizziness, tremors, seizures, syncope, speech difficulty, weakness, numbness and headaches.        Balance issues   Hematological: Negative for adenopathy. Does not bruise/bleed easily.   Psychiatric/Behavioral: Negative for agitation and behavioral problems.       Physical Exam   BP: (!) 140/96  Pulse: 92  Heart Rate: 86  Temp: 97.1  F (36.2  C)  Resp: 16  Weight: 84.4 kg (186 lb)  SpO2: 95 %      Physical Exam   Constitutional: He is oriented to person, place, and time. He appears well-developed and well-nourished. No distress.   HENT:   Head: Normocephalic and atraumatic.   Right Ear: External ear normal.   Left Ear: External ear normal.   Nose: Nose normal.   Mouth/Throat: Oropharynx is clear and moist. No oropharyngeal exudate.   Eyes: Conjunctivae and EOM are normal. Pupils are equal, round, and reactive to light. No scleral icterus.   Neck: Normal range of motion. Neck supple.   Cardiovascular: Normal rate, normal heart sounds and intact distal pulses.    Pulmonary/Chest: Effort normal and breath sounds normal. No respiratory distress. He has no wheezes. He has no rales.   Abdominal: Soft. Bowel sounds are normal. There is no tenderness.   Musculoskeletal: Normal range of motion. He exhibits no edema or tenderness.   Lymphadenopathy:     He has no cervical adenopathy.   Neurological: He is alert and oriented to person, place, and time. He has normal strength and normal reflexes. He displays normal reflexes. No cranial nerve deficit or sensory deficit. He exhibits normal muscle tone. Coordination and gait normal. GCS eye subscore is 4. GCS verbal subscore is 5. GCS motor subscore is 6.   Reflex Scores:       Patellar reflexes are 2+ on the right side and 2+ on the left side.       Achilles reflexes are 2+ on the right side and 2+ on the left side.  No dysarthria, dysmetria,  diplopia, disdiadochokinesia. Strength 5/5 in b/l UEs with  and b/l LEs with dorsi- and plantar-flexion against resistance. Sensation to light touch and 2-point discrimination intact in b/l distal UEs and LEs. CNs II-XII intact. Normal gait.   Skin: Skin is warm. No rash noted. He is not diaphoretic.   Psychiatric: He has a normal mood and affect. His behavior is normal. Judgment and thought content normal.   Nursing note and vitals reviewed.      ED Course   11:23 AM  The patient was seen and examined by Dr. Cespedes in Room 03.    ED Course     Procedures             Critical Care time:  none             Labs Ordered and Resulted from Time of ED Arrival Up to the Time of Departure from the ED   CBC WITH PLATELETS DIFFERENTIAL   COMPREHENSIVE METABOLIC PANEL   PERIPHERAL IV CATHETER            Assessments & Plan (with Medical Decision Making)   52-year-old man presenting with several days of confusion, lightheadedness and balance problems. Differential diagnosis: electrolyte abnormalities, dehydration, unlikely stroke, brain tumor, unlikely intracranial hemorrhage.    After thorough history and physical exam patient appears to be in no acute distress. There are no obvious neurologic deficits on the examination. I will obtain CT of the head and laboratory studies for further diagnostic evaluation. Patient agrees with the plan.     Patient s laboratory studies returned with no leukocytosis, WBC is normal at 5,300. There is no anemia, hemoglobin is normal at 15.9. Electrolytes show no evidence of dehydration, creatinine is normal 0.85. LFTs are normal. Sodium is normal at 140. I reviewed the patient s CT of the head and I read the radiology report; there is no evidence of intracranial hemorrhage or mass or any other etiology to explain patient s symptoms. At this point I believe patient is stable for discharge with primary care follow-up for further evaluation. He agrees with the plan. Gait is normal at  discharge.    I have reviewed the nursing notes.    I have reviewed the findings, diagnosis, plan and need for follow up with the patient.    Discharge Medication List as of 11/14/2017  1:39 PM          Final diagnoses:   Fatigue, unspecified type   Confusion   I, Jean Collazo, am serving as a trained medical scribe to document services personally performed by Susan Cespedes MD, based on the provider's statements to me.   I, Susan Cespedes MD, was physically present and have reviewed and verified the accuracy of this note documented by Jean Collazo.     11/14/2017   [facility], [place], EMERGENCY DEPARTMENT     Susan Cespedes MD  11/14/17 2070

## 2017-11-21 NOTE — PROGRESS NOTES
SUBJECTIVE:   Cesar Montejo is a 52 year old male who presents to clinic today for the following health issues:    Dizziness  Onset: 2 weeks     Description:   Do you feel faint:  YES  Does it feel like the surroundings (bed, room) are moving: no   Unsteady/off balance: YES  Have you passed out or fallen: no     Intensity: severe    Progression of Symptoms:  same    Accompanying Signs & Symptoms:  Heart palpitations: no   Nausea, vomiting: no   Weakness in arms or legs: YES- legs  Fatigue: YES  Vision or speech changes: no   Ringing in ears (Tinnitus): YES  Hearing Loss: no     History:   Head trauma/concussion hx: YES- several concussions   Previous similar symptoms: not unexplained   Recent bleeding history: no     Precipitating factors:   Worse with activity or head movement: YES  Any new medications (BP?): no   Alcohol/drug abuse/withdrawal: no     Alleviating factors:   Does staying in a fixed position give relief:  YES    Therapies Tried and outcome: Nothing tried, went to ER 11/14/17    Encounter Diagnoses   Name Primary?     Dizziness Yes     Palpitations at times      Sleep disturbance      Mixed anxiety and depressive disorder      History of multiple concussions       Depression and Anxiety Follow-Up    Status since last visit: No change    Other associated symptoms:None    Complicating factors:     Significant life event: No     Current substance abuse: None    PHQ-9 Score and MyChart F/U Questions 9/29/2015 4/15/2016 12/16/2016   Total Score 1 1 0   Q9: Suicide Ideation Not at all Not at all Not at all     PREMA-7 SCORE 12/16/2016   Total Score 1       PHQ-9  English  PHQ-9   Any Language  GAD7  Suicide Assessment Five-step Evaluation and Treatment (SAFE-T)        Problem list and histories reviewed & adjusted, as indicated.  Additional history: as documented    Labs reviewed in EPIC    Reviewed and updated as needed this visit by clinical staff     Reviewed and updated as needed this visit by  Provider         ROS:  Constitutional, HEENT, cardiovascular, pulmonary, gi and gu systems are negative, except as otherwise noted.      OBJECTIVE:   /84 (BP Location: Right arm, Patient Position: Chair, Cuff Size: Adult Regular)  Pulse 99  Temp 98.5  F (36.9  C) (Oral)  Wt 179 lb (81.2 kg)  SpO2 98%  BMI 24.28 kg/m2  Body mass index is 24.28 kg/(m^2).  GENERAL: alert, no distress and fatigued  EYES: Eyes grossly normal to inspection, PERRL and conjunctivae and sclerae normal  HENT: ear canals and TM's normal, nose and mouth without ulcers or lesions  NECK: no adenopathy, no asymmetry, masses, or scars and thyroid normal to palpation  RESP: lungs clear to auscultation - no rales, rhonchi or wheezes  CV: regular rate and rhythm, normal S1 S2, no S3 or S4, no murmur, click or rub, no peripheral edema and peripheral pulses strong  ABDOMEN: soft, nontender, no hepatosplenomegaly, no masses and bowel sounds normal  SKIN: no suspicious lesions or rashes  NEURO: Normal strength and tone, mentation intact and speech normal  PSYCH: mentation appears normal, affect flat, anxious and judgement and insight intact    Diagnostic Test Results:  Results for orders placed or performed in visit on 11/24/17   CBC with platelets   Result Value Ref Range    WBC 5.1 4.0 - 11.0 10e9/L    RBC Count 5.35 4.4 - 5.9 10e12/L    Hemoglobin 17.1 13.3 - 17.7 g/dL    Hematocrit 50.1 40.0 - 53.0 %    MCV 94 78 - 100 fl    MCH 32.0 26.5 - 33.0 pg    MCHC 34.1 31.5 - 36.5 g/dL    RDW 12.9 10.0 - 15.0 %    Platelet Count 218 150 - 450 10e9/L     negative Head CT last week  ASSESSMENT/PLAN:             1. Dizziness  Likely BPPV, patient declined meds  - EKG 12-lead complete w/read - Clinics  - CBC with platelets  - Comprehensive metabolic panel  - TSH with free T4 reflex  - NEUROLOGY ADULT REFERRAL  patient very worried about having multiple system atropy. I tried to reassure him that this is rare and unlikely  2. Palpitations  normal  exam  - EKG 12-lead complete w/read - Clinics  - CBC with platelets  - Comprehensive metabolic panel  - TSH with free T4 reflex    3. Sleep disturbance  Needs eval  - SLEEP EVALUATION & MANAGEMENT REFERRAL - [facility] [place]  [phone] (Age 2 and up); Future    4. Mixed anxiety and depressive disorder  Worse , seeing psych  Follow up with consultant as planned.     5. History of multiple concussions  Discussed issues  - NEUROLOGY ADULT REFERRAL  - SLEEP EVALUATION & MANAGEMENT REFERRAL - [facility] [place]  [phone] (Age 2 and up); Future    Regular exercise  See Patient Instructions    Rocky Messina MD  [facility]

## 2017-11-24 ENCOUNTER — OFFICE VISIT (OUTPATIENT)
Dept: FAMILY MEDICINE | Facility: CLINIC | Age: 52
End: 2017-11-24
Payer: COMMERCIAL

## 2017-11-24 VITALS
OXYGEN SATURATION: 98 % | TEMPERATURE: 98.5 F | DIASTOLIC BLOOD PRESSURE: 84 MMHG | WEIGHT: 179 LBS | HEART RATE: 99 BPM | BODY MASS INDEX: 24.28 KG/M2 | SYSTOLIC BLOOD PRESSURE: 112 MMHG

## 2017-11-24 DIAGNOSIS — R00.2 PALPITATIONS: ICD-10-CM

## 2017-11-24 DIAGNOSIS — Z87.820 HISTORY OF MULTIPLE CONCUSSIONS: ICD-10-CM

## 2017-11-24 DIAGNOSIS — R42 DIZZINESS: Primary | ICD-10-CM

## 2017-11-24 DIAGNOSIS — G47.9 SLEEP DISTURBANCE: ICD-10-CM

## 2017-11-24 DIAGNOSIS — F41.8 MIXED ANXIETY AND DEPRESSIVE DISORDER: ICD-10-CM

## 2017-11-24 LAB
ERYTHROCYTE [DISTWIDTH] IN BLOOD BY AUTOMATED COUNT: 12.9 % (ref 10–15)
HCT VFR BLD AUTO: 50.1 % (ref 40–53)
HGB BLD-MCNC: 17.1 G/DL (ref 13.3–17.7)
MCH RBC QN AUTO: 32 PG (ref 26.5–33)
MCHC RBC AUTO-ENTMCNC: 34.1 G/DL (ref 31.5–36.5)
MCV RBC AUTO: 94 FL (ref 78–100)
PLATELET # BLD AUTO: 218 10E9/L (ref 150–450)
RBC # BLD AUTO: 5.35 10E12/L (ref 4.4–5.9)
WBC # BLD AUTO: 5.1 10E9/L (ref 4–11)

## 2017-11-24 PROCEDURE — 93000 ELECTROCARDIOGRAM COMPLETE: CPT | Performed by: FAMILY MEDICINE

## 2017-11-24 PROCEDURE — 36415 COLL VENOUS BLD VENIPUNCTURE: CPT | Performed by: FAMILY MEDICINE

## 2017-11-24 PROCEDURE — 80053 COMPREHEN METABOLIC PANEL: CPT | Performed by: FAMILY MEDICINE

## 2017-11-24 PROCEDURE — 99214 OFFICE O/P EST MOD 30 MIN: CPT | Performed by: FAMILY MEDICINE

## 2017-11-24 PROCEDURE — 84443 ASSAY THYROID STIM HORMONE: CPT | Performed by: FAMILY MEDICINE

## 2017-11-24 PROCEDURE — 85027 COMPLETE CBC AUTOMATED: CPT | Performed by: FAMILY MEDICINE

## 2017-11-24 NOTE — MR AVS SNAPSHOT
After Visit Summary   11/24/2017    Cesar Montejo    MRN: [id number]           Patient Information     Date Of Birth          1965        Visit Information        Provider Department      11/24/2017 1:30 PM Rocky Messina MD [facility]        Today's Diagnoses     Dizziness    -  1    Palpitations        Sleep disturbance           Follow-ups after your visit        Additional Services     NEUROLOGY ADULT REFERRAL       Your provider has referred you for the following:   Consult at [facility]: Neurology Clinic [facility] [phone]   [email or web address]  General Neurology    Please be aware that coverage of these services is subject to the terms and limitations of your health insurance plan.  Call member services at your health plan with any benefit or coverage questions.      Please bring the following with you to your appointment:    (1) Any X-Rays, CTs or MRIs which have been performed.  Contact the facility where they were done to arrange for  prior to your scheduled appointment.    (2) List of current medications  (3) This referral request   (4) Any documents/labs given to you for this referral            SLEEP EVALUATION & MANAGEMENT REFERRAL - [facility] / [facility]  [phone] (Age 2 and up)       Please be aware that coverage of these services is subject to the terms and limitations of your health insurance plan.  Call member services at your health plan with any benefit or coverage questions.      Please bring the following to your appointment:    >>   List of current medications   >>   This referral request   >>   Any documents/labs given to you for this referral                      Future tests that were ordered for you today     Open Future Orders        Priority Expected Expires Ordered    SLEEP EVALUATION & MANAGEMENT REFERRAL - [facility]  [place] [facility]  [phone] (Age 2 and up) Routine  11/24/2018 11/24/2017            Who to contact     If you have questions or need follow up information about today's clinic visit or your schedule please contact [facility] directly at [phone].  Normal or non-critical lab and imaging results will be communicated to you by MyChart, letter or phone within 4 business days after the clinic has received the results. If you do not hear from us within 7 days, please contact the clinic through Vidmind[employer] or phone. If you have a critical or abnormal lab result, we will notify you by phone as soon as possible.  Submit refill requests through Pirq or call your pharmacy and they will forward the refill request to us. Please allow 3 business days for your refill to be completed.          Additional Information About Your Visit        MyChart Information     Pirq gives you secure access to your electronic health record. If you see a primary care provider, you can also send messages to your care team and make appointments. If you have questions, please call your primary care clinic.  If you do not have a primary care provider, please call [phone] and they will assist you.        Care EveryWhere ID     This is your Care EveryWhere ID. This could be used by other organizations to access your [place] medical records  [id number]        Your Vitals Were     Pulse Temperature Pulse Oximetry BMI (Body Mass Index)          99 98.5  F (36.9  C) (Oral) 98% 24.28 kg/m2         Blood Pressure from Last 3 Encounters:   11/24/17 112/84   11/14/17 138/87   07/31/17 128/84    Weight from Last 3 Encounters:   11/24/17 179 lb (81.2 kg)   11/14/17 186 lb (84.4 kg)   07/31/17 184 lb 3.2 oz (83.6 kg)              We Performed the Following     CBC with platelets     Comprehensive metabolic panel     EKG 12-lead complete w/read - Clinics     NEUROLOGY ADULT REFERRAL     TSH with free T4 reflex         Primary Care Provider Office Phone # Fax #    Rocky Rigo Messina [phone]       [address]  [facility] [address]        Equal Access to Services     CAROLINA PLASCENCIA : Hadii aad ku hadkwano Sojeremiahali, waaxda luqadaha, qaybta kaalmada adeegyada, prashant marquezemmy streetfidencio vinson trino nelson. So [facility] [phone].    ATENCIÓN: Si habla español, tiene a persaud disposición servicios gratuitos de asistencia lingüística. Llame al [phone].    We comply with applicable federal civil rights laws and [place] laws. We do not discriminate on the basis of race, color, national origin, age, disability, sex, sexual orientation, or gender identity.            Thank you!     Thank you for choosing [facility]  for your care. Our goal is always to provide you with excellent care. Hearing back from our patients is one way we can continue to improve our services. Please take a few minutes to complete the written survey that you may receive in the mail after your visit with us. Thank you!             Your Updated Medication List - Protect others around you: Learn how to safely use, store and throw away your medicines at www.disposemymeds.org.          This list is accurate as of: 11/24/17  2:32 PM.  Always use your most recent med list.                   Brand Name Dispense Instructions for use Diagnosis    ASPIRIN PO      Take 81 mg by mouth daily        FISH OIL PO      Take 2 capsules by mouth daily        mirtazapine 15 MG tablet    REMERON    90 tablet    Take 1 tablet (15 mg) by mouth At Bedtime    Mixed anxiety and depressive disorder, Insomnia       simvastatin 20 MG tablet    ZOCOR    90 tablet    Take 1 tablet (20 mg) by mouth At Bedtime    Hyperlipidemia LDL goal <100       UNABLE TO FIND      MEDICATION NAME: tumeric supplement daily        VITAMIN D (CHOLECALCIFEROL) PO      Take 1 tablet by mouth daily

## 2017-11-24 NOTE — NURSING NOTE
Chief Complaint   Patient presents with     Dizziness       Initial /84 (BP Location: Right arm, Patient Position: Chair, Cuff Size: Adult Regular)  Pulse 99  Temp 98.5  F (36.9  C) (Oral)  Wt 179 lb (81.2 kg)  SpO2 98%  BMI 24.28 kg/m2 Estimated body mass index is 24.28 kg/(m^2) as calculated from the following:    Height as of 5/8/17: 6' (1.829 m).    Weight as of this encounter: 179 lb (81.2 kg).  Medication Reconciliation: complete     Demetria Peck CMA

## 2017-11-25 LAB
ALBUMIN SERPL-MCNC: 4.4 G/DL (ref 3.4–5)
ALP SERPL-CCNC: 53 U/L (ref 40–150)
ALT SERPL W P-5'-P-CCNC: 26 U/L (ref 0–70)
ANION GAP SERPL CALCULATED.3IONS-SCNC: 13 MMOL/L (ref 3–14)
AST SERPL W P-5'-P-CCNC: 13 U/L (ref 0–45)
BILIRUB SERPL-MCNC: 0.8 MG/DL (ref 0.2–1.3)
BUN SERPL-MCNC: 17 MG/DL (ref 7–30)
CALCIUM SERPL-MCNC: 9 MG/DL (ref 8.5–10.1)
CHLORIDE SERPL-SCNC: 105 MMOL/L (ref 94–109)
CO2 SERPL-SCNC: 21 MMOL/L (ref 20–32)
CREAT SERPL-MCNC: 0.93 MG/DL (ref 0.66–1.25)
GFR SERPL CREATININE-BSD FRML MDRD: 86 ML/MIN/1.7M2
GLUCOSE SERPL-MCNC: 106 MG/DL (ref 70–99)
POTASSIUM SERPL-SCNC: 4.5 MMOL/L (ref 3.4–5.3)
PROT SERPL-MCNC: 7.5 G/DL (ref 6.8–8.8)
SODIUM SERPL-SCNC: 139 MMOL/L (ref 133–144)
TSH SERPL DL<=0.005 MIU/L-ACNC: 0.98 MU/L (ref 0.4–4)

## 2017-11-27 ENCOUNTER — TELEPHONE (OUTPATIENT)
Dept: FAMILY MEDICINE | Facility: CLINIC | Age: 52
End: 2017-11-27

## 2017-11-27 DIAGNOSIS — F41.8 MIXED ANXIETY AND DEPRESSIVE DISORDER: ICD-10-CM

## 2017-11-27 DIAGNOSIS — F07.81 POSTCONCUSSION SYNDROME: ICD-10-CM

## 2017-11-27 DIAGNOSIS — G44.009 ATYPICAL CLUSTER HEADACHE: ICD-10-CM

## 2017-11-27 DIAGNOSIS — Z87.448: Primary | ICD-10-CM

## 2017-11-27 NOTE — TELEPHONE ENCOUNTER
Dr. Messina,     Patient called to see if there is another neurology clinic he could go to that might have a sooner appointment available, the first available at [facility] is 1/11/17.       Vida Liriano RN  [facility]

## 2017-11-27 NOTE — TELEPHONE ENCOUNTER
He can try   Orders Placed This Encounter     NEUROLOGY ADULT REFERRAL     Referral Type:   Consultation

## 2017-11-29 ENCOUNTER — HOSPITAL ENCOUNTER (EMERGENCY)
Facility: CLINIC | Age: 52
Discharge: HOME | End: 2017-11-29
Attending: EMERGENCY MEDICINE | Admitting: EMERGENCY MEDICINE
Payer: COMMERCIAL

## 2017-11-29 ENCOUNTER — APPOINTMENT (OUTPATIENT)
Dept: GENERAL RADIOLOGY | Facility: CLINIC | Age: 52
End: 2017-11-29
Attending: EMERGENCY MEDICINE
Payer: COMMERCIAL

## 2017-11-29 ENCOUNTER — APPOINTMENT (OUTPATIENT)
Dept: MRI IMAGING | Facility: CLINIC | Age: 52
End: 2017-11-29
Attending: EMERGENCY MEDICINE
Payer: COMMERCIAL

## 2017-11-29 VITALS
SYSTOLIC BLOOD PRESSURE: 126 MMHG | RESPIRATION RATE: 16 BRPM | OXYGEN SATURATION: 99 % | HEART RATE: 71 BPM | DIASTOLIC BLOOD PRESSURE: 91 MMHG | TEMPERATURE: 97.9 F

## 2017-11-29 DIAGNOSIS — R42 DISEQUILIBRIUM: ICD-10-CM

## 2017-11-29 DIAGNOSIS — M25.522 PAIN IN JOINT INVOLVING UPPER ARM, LEFT: ICD-10-CM

## 2017-11-29 LAB
ALBUMIN SERPL-MCNC: 4 G/DL (ref 3.4–5)
ALP SERPL-CCNC: 45 U/L (ref 40–150)
ALT SERPL W P-5'-P-CCNC: 26 U/L (ref 0–70)
ANION GAP SERPL CALCULATED.3IONS-SCNC: 9 MMOL/L (ref 3–14)
AST SERPL W P-5'-P-CCNC: 20 U/L (ref 0–45)
BASOPHILS # BLD AUTO: 0 10E9/L (ref 0–0.2)
BASOPHILS NFR BLD AUTO: 0.8 %
BILIRUB SERPL-MCNC: 0.5 MG/DL (ref 0.2–1.3)
BUN SERPL-MCNC: 11 MG/DL (ref 7–30)
CALCIUM SERPL-MCNC: 8.4 MG/DL (ref 8.5–10.1)
CHLORIDE SERPL-SCNC: 110 MMOL/L (ref 94–109)
CO2 SERPL-SCNC: 25 MMOL/L (ref 20–32)
CREAT SERPL-MCNC: 0.72 MG/DL (ref 0.66–1.25)
DIFFERENTIAL METHOD BLD: NORMAL
EOSINOPHIL # BLD AUTO: 0 10E9/L (ref 0–0.7)
EOSINOPHIL NFR BLD AUTO: 0.8 %
ERYTHROCYTE [DISTWIDTH] IN BLOOD BY AUTOMATED COUNT: 12.5 % (ref 10–15)
GFR SERPL CREATININE-BSD FRML MDRD: >90 ML/MIN/1.7M2
GLUCOSE SERPL-MCNC: 94 MG/DL (ref 70–99)
HCT VFR BLD AUTO: 44 % (ref 40–53)
HGB BLD-MCNC: 14.8 G/DL (ref 13.3–17.7)
IMM GRANULOCYTES # BLD: 0 10E9/L (ref 0–0.4)
IMM GRANULOCYTES NFR BLD: 0.2 %
LYMPHOCYTES # BLD AUTO: 2 10E9/L (ref 0.8–5.3)
LYMPHOCYTES NFR BLD AUTO: 39.9 %
MCH RBC QN AUTO: 31.6 PG (ref 26.5–33)
MCHC RBC AUTO-ENTMCNC: 33.6 G/DL (ref 31.5–36.5)
MCV RBC AUTO: 94 FL (ref 78–100)
MONOCYTES # BLD AUTO: 0.5 10E9/L (ref 0–1.3)
MONOCYTES NFR BLD AUTO: 10.2 %
NEUTROPHILS # BLD AUTO: 2.5 10E9/L (ref 1.6–8.3)
NEUTROPHILS NFR BLD AUTO: 48.1 %
NRBC # BLD AUTO: 0 10*3/UL
NRBC BLD AUTO-RTO: 0 /100
PLATELET # BLD AUTO: 191 10E9/L (ref 150–450)
POTASSIUM SERPL-SCNC: 4.2 MMOL/L (ref 3.4–5.3)
PROT SERPL-MCNC: 7.1 G/DL (ref 6.8–8.8)
RBC # BLD AUTO: 4.68 10E12/L (ref 4.4–5.9)
SODIUM SERPL-SCNC: 144 MMOL/L (ref 133–144)
TROPONIN I BLD-MCNC: 0 UG/L (ref 0–0.1)
TROPONIN I SERPL-MCNC: <0.015 UG/L (ref 0–0.04)
WBC # BLD AUTO: 5.1 10E9/L (ref 4–11)

## 2017-11-29 PROCEDURE — 85025 COMPLETE CBC W/AUTO DIFF WBC: CPT | Performed by: EMERGENCY MEDICINE

## 2017-11-29 PROCEDURE — 84484 ASSAY OF TROPONIN QUANT: CPT | Performed by: EMERGENCY MEDICINE

## 2017-11-29 PROCEDURE — 70553 MRI BRAIN STEM W/O & W/DYE: CPT

## 2017-11-29 PROCEDURE — 99285 EMERGENCY DEPT VISIT HI MDM: CPT | Mod: 25 | Performed by: EMERGENCY MEDICINE

## 2017-11-29 PROCEDURE — 70544 MR ANGIOGRAPHY HEAD W/O DYE: CPT

## 2017-11-29 PROCEDURE — 93005 ELECTROCARDIOGRAM TRACING: CPT | Performed by: EMERGENCY MEDICINE

## 2017-11-29 PROCEDURE — A9585 GADOBUTROL INJECTION: HCPCS | Performed by: EMERGENCY MEDICINE

## 2017-11-29 PROCEDURE — 84484 ASSAY OF TROPONIN QUANT: CPT

## 2017-11-29 PROCEDURE — 93010 ELECTROCARDIOGRAM REPORT: CPT | Mod: Z6 | Performed by: EMERGENCY MEDICINE

## 2017-11-29 PROCEDURE — 80053 COMPREHEN METABOLIC PANEL: CPT | Performed by: EMERGENCY MEDICINE

## 2017-11-29 PROCEDURE — 71020 XR CHEST 2 VW: CPT

## 2017-11-29 PROCEDURE — 25000128 H RX IP 250 OP 636: Performed by: EMERGENCY MEDICINE

## 2017-11-29 RX ORDER — GADOBUTROL 604.72 MG/ML
7.5 INJECTION INTRAVENOUS ONCE
Status: COMPLETED | OUTPATIENT
Start: 2017-11-29 | End: 2017-11-29

## 2017-11-29 RX ADMIN — GADOBUTROL 8 ML: 604.72 INJECTION INTRAVENOUS at 20:45

## 2017-11-29 ASSESSMENT — ENCOUNTER SYMPTOMS
FEVER: 0
NECK STIFFNESS: 0
SHORTNESS OF BREATH: 0
NUMBNESS: 1
HEADACHES: 0

## 2017-11-29 NOTE — ED AVS SNAPSHOT
[facility], Emergency Department    [address]    [place] [address]    Phone:  [phone]    Fax:  [phone]                                       Cesar Montejo   MRN: [id number]    Department:  [facility], Emergency Department   Date of Visit:  11/29/2017           Patient Information     Date Of Birth          1965        Your diagnoses for this visit were:     Pain in joint involving upper arm, left     Disequilibrium        You were seen by Memo Vasques MD.      Follow-up Information     Follow up with Rocky Messina MD.    Specialty:  Family Practice    Contact information:    [address]  [facility] [address] [phone]          Discharge Instructions         Dizziness (Uncertain Cause)  Dizziness is a common symptom. It may be described as lightheadedness, spinning, or feeling like you are going to faint. Dizziness can have many causes.  Be sure to tell the healthcare provider about:    All medicines you take, including prescription, over-the-counter, herbs, and supplements    Any other symptoms you have    Any health problems you are being treated for    Anything that causes the dizziness to get worse or better  Today's exam did not show an exact cause for your dizziness. Other tests may be needed. Follow up with your healthcare provider.  Home care    Dizziness that occurs with sudden standing may be a sign of mild dehydration. Drink extra fluids for the next few days.    If you recently started a new medicine, stopped a medicine, or had the dose of a current medicine changed, talk with the prescribing healthcare provider. Your medicine plan may need adjustment.    If dizziness lasts more than a few seconds, sit or lie down until it passes. This may help prevent injury in case you pass out.    Do not drive or use power tools or dangerous equipment until you have had no dizziness for at least 48 hours.  Follow-up care  Follow up with your  healthcare provider for further evaluation within the next 7 days or as advised.  When to seek medical advice  Call your healthcare provider for any of the following:    Worsening of symptoms or new symptoms    Passing out or seizure    Repeated vomiting    Headache    Palpitations (the sense that your heart is fluttering or beating fast or hard)    Shortness of breath    Blood in vomit or stool (black or red color)    Weakness of an arm or leg or one side of the face    Vision or hearing changes    Trouble walking or speaking    Chest, arm, neck, back, or jaw pain  Date Last Reviewed: 8/23/2015 2000-2017 [employer]. [address] [place] [address]. All rights reserved. This information is not intended as a substitute for professional medical care. Always follow your healthcare professional's instructions.          Future Appointments        Provider Department Dept Phone Center    12/7/2017 10:30 AM Mane Camarena MD [facility] [phone]     12/14/2017 9:00 AM FELIX Salazar [facility], Sleep Study [phone] [place]    1/11/2018 1:00 PM James Arreola MD [facility] [phone] [facility]      24 Hour Appointment Hotline       To make an appointment at any [facility], call [phone] ([phone]). If you don't have a family doctor or clinic, we will help you find one. [facility] are conveniently located to serve the needs of you and your family.             Review of your medicines      Our records show that you are taking the medicines listed below. If these are incorrect, please call your family doctor or clinic.        Dose / Directions Last dose taken    ASPIRIN PO   Dose:  81 mg        Take 81 mg by mouth daily   Refills:  0        FISH OIL PO   Dose:  2 capsule        Take 2 capsules by mouth daily   Refills:  0        mirtazapine 15 MG tablet   Commonly known as:  REMERON   Dose:  15 mg    Quantity:  90 tablet        Take 1 tablet (15 mg) by mouth At Bedtime   Refills:  3        simvastatin 20 MG tablet   Commonly known as:  ZOCOR   Dose:  20 mg   Quantity:  90 tablet        Take 1 tablet (20 mg) by mouth At Bedtime   Refills:  3        UNABLE TO FIND        MEDICATION NAME: tumeric supplement daily   Refills:  0        VITAMIN D (CHOLECALCIFEROL) PO   Dose:  1 tablet        Take 1 tablet by mouth daily   Refills:  0                Procedures and tests performed during your visit     Procedure/Test Number of Times Performed    CBC with platelets differential 1    Comprehensive metabolic panel 1    EKG 12 lead 1    ISTAT troponin nursing POCT 1    MR Brain w/o & w Contrast 1    MRA Head w/o Contrast Angiogram 1    Troponin I 2    Troponin POCT 1    XR Chest 2 Views 1      Orders Needing Specimen Collection     None      Pending Results     No orders found from 11/27/2017 to 11/30/2017.            Pending Culture Results     No orders found from 11/27/2017 to 11/30/2017.            Pending Results Instructions     If you had any lab results that were not finalized at the time of your Discharge, you can call the ED Lab Result RN at [phone]. You will be contacted by this team for any positive Lab results or changes in treatment. The nurses are available 7 days a week from 10A to 6:30P.  You can leave a message 24 hours per day and they will return your call.        Thank you for choosing [facility]       Thank you for choosing [facility] for your care. Our goal is always to provide you with excellent care. Hearing back from our patients is one way we can continue to improve our services. Please take a few minutes to complete the written survey that you may receive in the mail after you visit with us. Thank you!        [employer] Information     [employer] gives you secure access to your electronic health record. If you see a primary care provider, you can also send messages to your care team and make  appointments. If you have questions, please call your primary care clinic.  If you do not have a primary care provider, please call [phone] and they will assist you.        Care EveryWhere ID     This is your Care EveryWhere ID. This could be used by other organizations to access your [place] medical records  [id number]        Equal Access to Services     [place] PLASCENCIA : Jackie Mcdaniel, omayra houston, prashant massey . So [facility] [phone].    ATENCIÓN: Si habla español, tiene a persaud disposición servicios gratuitos de asistencia lingüística. Angelica al [phone].    We comply with applicable federal civil rights laws and [place] laws. We do not discriminate on the basis of race, color, national origin, age, disability, sex, sexual orientation, or gender identity.            After Visit Summary       This is your record. Keep this with you and show to your community pharmacist(s) and doctor(s) at your next visit.

## 2017-11-29 NOTE — TELEPHONE ENCOUNTER
Return call to patient - discussed Neurology options - opted for [facility] - scheduled 12/7/2017 - advised to call clinic to discuss any forms that need to be completed prior - patient verbalized understanding - no further questions at this time    Closing encounter - no further actions needed at this time    Vivien Spann RN

## 2017-11-29 NOTE — ED AVS SNAPSHOT
[facility], Emergency Department    [address]    [facility] [address]    Phone:  [phone]    Fax:  [phone]                                       Cesar Montejo   MRN: [id number]    Department:  [facility], Emergency Department   Date of Visit:  11/29/2017           After Visit Summary Signature Page     I have received my discharge instructions, and my questions have been answered. I have discussed any challenges I see with this plan with the nurse or doctor.    ..........................................................................................................................................  Patient/Patient Representative Signature      ..........................................................................................................................................  Patient Representative Print Name and Relationship to Patient    ..................................................               ................................................  Date                                            Time    ..........................................................................................................................................  Reviewed by Signature/Title    ...................................................              ..............................................  Date                                                            Time

## 2017-11-30 LAB — INTERPRETATION ECG - MUSE: NORMAL

## 2017-11-30 NOTE — DISCHARGE INSTRUCTIONS
Dizziness (Uncertain Cause)  Dizziness is a common symptom. It may be described as lightheadedness, spinning, or feeling like you are going to faint. Dizziness can have many causes.  Be sure to tell the healthcare provider about:    All medicines you take, including prescription, over-the-counter, herbs, and supplements    Any other symptoms you have    Any health problems you are being treated for    Anything that causes the dizziness to get worse or better  Today's exam did not show an exact cause for your dizziness. Other tests may be needed. Follow up with your healthcare provider.  Home care    Dizziness that occurs with sudden standing may be a sign of mild dehydration. Drink extra fluids for the next few days.    If you recently started a new medicine, stopped a medicine, or had the dose of a current medicine changed, talk with the prescribing healthcare provider. Your medicine plan may need adjustment.    If dizziness lasts more than a few seconds, sit or lie down until it passes. This may help prevent injury in case you pass out.    Do not drive or use power tools or dangerous equipment until you have had no dizziness for at least 48 hours.  Follow-up care  Follow up with your healthcare provider for further evaluation within the next 7 days or as advised.  When to seek medical advice  Call your healthcare provider for any of the following:    Worsening of symptoms or new symptoms    Passing out or seizure    Repeated vomiting    Headache    Palpitations (the sense that your heart is fluttering or beating fast or hard)    Shortness of breath    Blood in vomit or stool (black or red color)    Weakness of an arm or leg or one side of the face    Vision or hearing changes    Trouble walking or speaking    Chest, arm, neck, back, or jaw pain  Date Last Reviewed: 8/23/2015 2000-2017 [employer]. [address] [place] [address], [place] [address]. All rights reserved. This information is not intended as  a substitute for professional medical care. Always follow your healthcare professional's instructions.

## 2017-11-30 NOTE — ED PROVIDER NOTES
History     Chief Complaint   Patient presents with     Chest Pain     CP for 45 minutes withalso lt shoulder pain/Also c/o indigestion     HPI  Cesar Montejo is a 52 year old male with history of hyperlipidemia, anxiety, and prediabetes arriving to the Emergency Department for evaluation of left-sided sensory change.  Patient was evaluated in Emergency Department approximately, 15 days ago, at which time he had a chief complaint of altered mental status dizziness.  The patient had a very thorough workup including negative CT scan, white blood cells, as well as, electrolytes.  The patient states that today he developed left-sided sensory change which he believe he awoke with. He reports this tingling on the left upper extremity.  This is persistent throughout the course of the day, now primarily involving the left hand.  He denies other sensory deficits in the face, left lower extremity, or right lower extremity.  Additionally, the patient states he s had imbalance issues, trouble walking a straight line which is atypical for him.  He reports no loss of motor function.  He denies headache or head trauma.  Reports no fever or neck stiffness.  Over the course of the day,  the patient did develop a single sharp shooting episode of pain in the anteromedial portion of the biceps.  He states he became anxious with this and noted a transient chest pain that has resolved. This was described as tightness, mild in intensity with no associated breathing difficulties.  He reports no cardiac history or personal TIA history, but states he does have an upcoming follow-up with neurology for evaluation of this.  He reports no new medication changes. He denies illicit substance use.  No history of early cardiac disease.    This part of the medical record was transcribed by Luana Berrios Scribe, from a dictation done by Memo Vasques MD.     PAST MEDICAL HISTORY  Past Medical History:   Diagnosis Date     Anxiety      Basal  cell carcinoma      Gastro-oesophageal reflux disease      High cholesterol      Insomnia      Squamous cell carcinoma      PAST SURGICAL HISTORY  Past Surgical History:   Procedure Laterality Date     COLONOSCOPY N/A 4/17/2017    Procedure: COLONOSCOPY;  Surgeon: Larry Fields MD;  Location: [facility] GI     LASER CO2 LESION ORAL N/A 10/5/2016    Procedure: LASER CO2 LESION ORAL;  Surgeon: Josué Rojo DDS;  Location: [facility] OR     MOHS MICROGRAPHIC PROCEDURE       FAMILY HISTORY  Family History   Problem Relation Age of Onset     Lipids Mother      on meds     CEREBROVASCULAR DISEASE Mother      TIA     Alzheimer Disease Mother      Skin Cancer Mother      SCC     Lipids Father      on meds     Hypertension Father      controlled with meds     Thyroid Disease Father      ?not sure but possibly     DIABETES Father      Cancer - colorectal Maternal Grandmother      still alive at 99     CANCER Maternal Grandfather      lung but lifetime smoker     Melanoma Maternal Grandfather      Asthma No family hx of      C.A.D. No family hx of      Prostate Cancer No family hx of      SOCIAL HISTORY  Social History   Substance Use Topics     Smoking status: Former Smoker     Packs/day: 0.50     Years: 5.00     Types: Cigarettes     Smokeless tobacco: Never Used      Comment: Only in college,one pack per week     Alcohol use Yes      Comment: has a glass of wine occasionally, nothing within the past 24 hours     MEDICATIONS  No current facility-administered medications for this encounter.      Current Outpatient Prescriptions   Medication     Omega-3 Fatty Acids (FISH OIL PO)     VITAMIN D, CHOLECALCIFEROL, PO     simvastatin (ZOCOR) 20 MG tablet     mirtazapine (REMERON) 15 MG tablet     ASPIRIN PO     [DISCONTINUED] simvastatin (ZOCOR) 40 MG tablet     UNABLE TO FIND     ALLERGIES  No Known Allergies      I have reviewed the Medications, Allergies, Past Medical and Surgical History, and Social History in the Epic  system.    Review of Systems   Constitutional: Negative for fever.   Respiratory: Negative for shortness of breath.    Cardiovascular: Positive for chest pain (transient; now resolved).   Musculoskeletal: Negative for neck stiffness.   Neurological: Positive for numbness (left sided sensory deficits in LUE and left fore arm). Negative for headaches.        Positive for imbalance issue.    All other systems reviewed and are negative.      Physical Exam   BP: 139/90  Pulse: 71  Resp: 16  SpO2: 99 %      Physical Exam   Constitutional: He is oriented to person, place, and time. He appears well-developed and well-nourished. No distress.   HENT:   Head: Normocephalic and atraumatic.   Mouth/Throat: Oropharynx is clear and moist.   Eyes: Conjunctivae are normal. Pupils are equal, round, and reactive to light.   Neck: Normal range of motion.   Cardiovascular: Normal rate.    Pulmonary/Chest: Effort normal. No respiratory distress.   Musculoskeletal: He exhibits no edema or deformity.   Neurological: He is alert and oriented to person, place, and time. He has normal strength. No cranial nerve deficit or sensory deficit. GCS eye subscore is 4. GCS verbal subscore is 5. GCS motor subscore is 6.   Skin: Skin is warm. No rash noted.   Psychiatric: He has a normal mood and affect. His behavior is normal.       ED Course     ED Course     Procedures             Labs Ordered and Resulted from Time of ED Arrival Up to the Time of Departure from the ED   COMPREHENSIVE METABOLIC PANEL - Abnormal; Notable for the following:        Result Value    Chloride 110 (*)     Calcium 8.4 (*)     All other components within normal limits   CBC WITH PLATELETS DIFFERENTIAL   TROPONIN I   ISTAT TROPONIN NURSING POCT   TROPONIN POCT            Assessments & Plan (with Medical Decision Making)   52 year old male with history of hyperlipidemia and anxiety arriving to Emergency Department for persistent imbalance issues in new left-sided sensory  change.  Upon arrival, as the patient did have a transient left-sided chest tightness, he did undergo ECG which demonstrates no sign of strain or ischemic type pattern.  I obtained a  troponin which was negative.  Based on timing of onset of symptoms, I would obtain a 3 hour delta , and with low clinical suspicion, hold on serial cardiac biomarkers.  Presently, he is chest pain-free. He is speaking full sentences without evidence of increased work up breathing or respiratory distress.  This otherwise sounds atypical for dissection, pneumothorax, or pulmonary embolus.  I am concerned about his reported history of persistent imbalance issues and left-sided sensory change.  On my examination, there is no obvious neurologic deficit.  No gaze preference or deviation. Tongue protrudes midline and is without fasciculations.  He has no dysmetria.  He does report persistent sensory change on light touch in the left upper extremity, primarily from the forearm extending into the hands and ulnar distributions.  I feel this is somewhat atypical for stroke or TIA. He does have persistent imbalance issues which seems to be not consistent with vertigo.  He s had a negative CT scan for 15 days ago, and thus I would perform an MRI to further evaluate for potential ischemic disease.    MRI without obvious pathology.  No neuro deficits in the ED.  He has ambulated unassisted and at this time I believe may return to home with close outpatient follow up as needed or call/return emergently with change or worsening of symptoms.      This part of the medical record was transcribed by Isacc Noguera Medical Scribe, from a dictation done by Memo Vasques MD.       I have reviewed the nursing notes.    I have reviewed the findings, diagnosis, plan and need for follow up with the patient.    New Prescriptions    No medications on file       Final diagnoses:   Pain in joint involving upper arm, left   Disequilibrium       11/29/2017   [facility],  [place], EMERGENCY DEPARTMENT     Memo Vasques MD  11/29/17 [id number]

## 2017-12-07 ENCOUNTER — OFFICE VISIT (OUTPATIENT)
Dept: NEUROLOGY | Facility: CLINIC | Age: 52
End: 2017-12-07
Payer: COMMERCIAL

## 2017-12-07 VITALS
DIASTOLIC BLOOD PRESSURE: 88 MMHG | WEIGHT: 176.9 LBS | RESPIRATION RATE: 16 BRPM | TEMPERATURE: 98.7 F | BODY MASS INDEX: 23.96 KG/M2 | HEART RATE: 104 BPM | SYSTOLIC BLOOD PRESSURE: 128 MMHG | HEIGHT: 72 IN

## 2017-12-07 DIAGNOSIS — R29.818 DIFFICULTY BALANCING: Primary | ICD-10-CM

## 2017-12-07 DIAGNOSIS — Z87.820 HX OF MULTIPLE CONCUSSIONS: ICD-10-CM

## 2017-12-07 DIAGNOSIS — R41.9 COGNITIVE COMPLAINTS: ICD-10-CM

## 2017-12-07 PROCEDURE — 99244 OFF/OP CNSLTJ NEW/EST MOD 40: CPT | Performed by: PSYCHIATRY & NEUROLOGY

## 2017-12-07 NOTE — NURSING NOTE
COGNITIVE SCREEN  1) Repeat 3 items (Banana, Sunrise, Chair)    2) Clock draw: NORMAL  3) 3 item recall: Recalls 3 objects  Results: 3 items recalled: COGNITIVE IMPAIRMENT LESS LIKELY    Mini-CogTM Copyright S Prakash. Licensed by the author for use in [facility]; reprinted with permission ([email or web address]). All rights reserved.        Cesia Israel [facility]

## 2017-12-07 NOTE — MR AVS SNAPSHOT
After Visit Summary   12/7/2017    Cesar Montejo    MRN: [id number]           Patient Information     Date Of Birth          1965        Visit Information        Provider Department      12/7/2017 10:30 AM Mane Camarena MD [facility]        Today's Diagnoses     Difficulty balancing    -  1    Cognitive complaints        Hx of multiple concussions          Care Instructions    AFTER VISIT SUMMARY (AVS):    At today's visit we discussed various diagnostic possibilities for your symptoms and the plan, which includes:  Orders Placed This Encounter   Procedures     PHYSICAL THERAPY REFERRAL    I agree with ordered sleep evaluation. I do not feel that any additional investigations are needed from neurological standpoint at this time. Please return if your symptoms worsen.    No new medications were ordered.    Next follow-up appointment is on as needed basis.    Please do not hesitate to call me with any questions or concerns.    Thanks.            Follow-ups after your visit        Additional Services     PHYSICAL THERAPY REFERRAL       *This therapy referral will be filtered to a centralized scheduling office at [facility] and the patient will receive a call to schedule an appointment at a [facility] location most convenient for them. *     [facility] provides Physical Therapy evaluation and treatment and many specialty services across the [facility] system.  If requesting a specialty program, please choose from the list below.    If you have not heard from the scheduling office within 2 business days, please call [phone] for all locations, with the exception of [place], please call [phone].  Treatment: Evaluation & Treatment  Special Instructions/Modalities: Balance eval and treat  Special Programs: Balance/Vestibular    Please be aware that coverage of these services is subject to the terms and limitations of  "your health insurance plan.  Call member services at your health plan with any benefit or coverage questions.      **Note to Provider:  If you are referring outside of [place] for the therapy appointment, please list the name of the location in the \"special instructions\" above, print the referral and give to the patient to schedule the appointment.                  Follow-up notes from your care team     Return if symptoms worsen or fail to improve.      Your next 10 appointments already scheduled     Dec 14, 2017  9:00 AM CST   New Sleep Patient with FELIX Salazar CNP   [facility], [place], Sleep Study ([facility])    [address] [place] [id number] [phone]            Jan 11, 2018  1:00 PM CST   (Arrive by 12:45 PM)   New Patient Visit with James Arreola MD   [facility] ([facility])    [address]  [address] [facility] [address] [phone]              Who to contact     If you have questions or need follow up information about today's clinic visit or your schedule please contact [facility] directly at [phone].  Normal or non-critical lab and imaging results will be communicated to you by MyChart, letter or phone within 4 business days after the clinic has received the results. If you do not hear from us within 7 days, please contact the clinic through hikehart or phone. If you have a critical or abnormal lab result, we will notify you by phone as soon as possible.  Submit refill requests through [employer] or call your pharmacy and they will forward the refill request to us. Please allow 3 business days for your refill to be completed.          Additional Information About Your Visit        hikehart Information     [employer] Software gives you secure access to your electronic health record. If you see a primary care provider, you can also send messages to your care team and make " "appointments. If you have questions, please call your primary care clinic.  If you do not have a primary care provider, please call [phone] and they will assist you.        Care EveryWhere ID     This is your Care EveryWhere ID. This could be used by other organizations to access your [facility] medical records  [id number]        Your Vitals Were     Pulse Temperature Respirations Height BMI (Body Mass Index)       104 98.7  F (37.1  C) (Oral) 16 1.835 m (6' 0.25\") 23.83 kg/m2        Blood Pressure from Last 3 Encounters:   12/07/17 128/88   11/29/17 (!) 126/91   11/24/17 112/84    Weight from Last 3 Encounters:   12/07/17 80.2 kg (176 lb 14.4 oz)   11/24/17 81.2 kg (179 lb)   11/14/17 84.4 kg (186 lb)              We Performed the Following     PHYSICAL THERAPY REFERRAL        Primary Care Provider Office Phone # Fax #    Rocky Messina [phone]       [address] [place], [place] [address]E [facility] [address]  [facility] [address]        Equal Access to Services     [facility]: Hadii aad ku hadasho Soomaali, waaxda luqadaha, qaybta kaalmada adeegyada, prashant jameson . So [facility] [phone].    ATENCIÓN: Si habla español, tiene a persaud disposición servicios gratuitos de asistencia lingüística. [facility] [phone].    We comply with applicable federal civil rights laws and [place] laws. We do not discriminate on the basis of race, color, national origin, age, disability, sex, sexual orientation, or gender identity.            Thank you!     Thank you for choosing [facility]  for your care. Our goal is always to provide you with excellent care. Hearing back from our patients is one way we can continue to improve our services. Please take a few minutes to complete the written survey that you may receive in the mail after your visit with us. Thank you!             Your Updated Medication List - Protect others around you: Learn how to safely use, store and throw away your " medicines at www.disposemymeds.org.          This list is accurate as of: 12/7/17 11:28 AM.  Always use your most recent med list.                   Brand Name Dispense Instructions for use Diagnosis    ASPIRIN PO      Take 81 mg by mouth daily        FISH OIL PO      Take 2 capsules by mouth daily        mirtazapine 15 MG tablet    REMERON    90 tablet    Take 1 tablet (15 mg) by mouth At Bedtime    Mixed anxiety and depressive disorder, Insomnia       simvastatin 20 MG tablet    ZOCOR    90 tablet    Take 1 tablet (20 mg) by mouth At Bedtime    Hyperlipidemia LDL goal <100       UNABLE TO FIND      MEDICATION NAME: tumeric supplement daily        VITAMIN D (CHOLECALCIFEROL) PO      Take 1 tablet by mouth daily

## 2017-12-07 NOTE — PROGRESS NOTES
INITIAL NEUROLOGY CONSULTATION    DATE OF VISIT: 12/7/2017  CLINIC LOCATION: [facility]  MRN: [id number]  PATIENT NAME: Cesar Montejo  YOB: 1965    PRIMARY CARE PROVIDER: Rocky Messina MD     REASON FOR VISIT: Imbalance, history of multiple concussions.    HISTORY OF PRESENT ILLNESS:                                                    Mr. Cesar Montejo is 52 year old right handed male patient with history of multiple concussions, anxiety, depression, and insomnia, who was seen in consultation today requested by Rocky Messina MD, for imbalance.    Per patient's report, he suffered repeated concussions: four in 2010 and one in 2012. In 2010 he recovered relatively well; however, 2012 head injury resulted in prolonged postconcussion syndrome for 1.5 years in form of cognitive and balance difficulties.     The patient was evaluated by Dr. Elizondo ([facility]) in 2012 for memory concerns felt to be related to postconcussion syndrome. EEG was normal. Neuropsychologic evaluation demonstrated findings consistent with postconcussion syndrome.    Cognitive difficulty eventually resolved, but imbalance has never fully recovered. The patient continued to experience mild difficulty walking since that time. About one month ago, the imbalance worsened. Currently, the patient feels that he cannot stand still and also has difficulty standing on 1 foot. He feels that he walks with irregular pace. Symptoms usually worse by the end of the day, especially if it was cognitively challenging, and the patient is tired. The rest makes his symptoms slightly better. He did not notice any other aggravating or alleviating factors. At times he also feels lightheaded. It occurs when he assumes upright position and lasts up to 30 seconds. He feels that his heart rate usually goes up at that time. He denies any other associated symptoms. He did not try any  treatments.    In addition, for about the same period of time the patient noticed difficulty focusing and concentrating. He does not notice any significant memory difficulties or other cognitive concerns. Occasionally, he might forget a name of the person, but his job performance is not affected. He is also able to keep track of important appointments and dates, his finances, and medications without difficulty. His mother has advanced Alzheimer's disease, and the patient is her legal guardian, which could be stressful. The additional stressor is a new boss at work (new mario), who is difficult to work with. The patient is also very concerned and anxious regarding his own health and possibility of developing the serious neurodegenerative disorder, such as Alzheimer's disease, Lewy body dementia, or multiple system atrophy. He is concerned that he might have a REM behavior sleep disorder. His primary care provider ordered sleep evaluation.    Finally, recently the patient experienced left shoulder pain along with associated left arm subjective weakness and numbness. His symptoms were evaluated in the emergency department on 11/29/2017 and 11/14/2017 (summarized below).    Recent laboratory evaluation included normal CMP, CBC, troponin, and TSH (0.98).    Brain MRI and head MRA from 11/29/2017 were normal. Earlier head CT from 11/14/2017 was also normal. The patient had MRI brain along with neck and head MRA in 2015 performed for dizziness and imbalance. They were also normal, so as earlier head CT's from 2010, according to reports available in Care Everywhere.    Prior neurological history: negative for migraine headaches, stroke, brain neoplasms, seizure disorders, multiple sclerosis, meningitis, encephalitis, and major head injuries.    Neurologic Review of Systems - no amaurosis, diplopia, abnormal speech, unilateral numbness or weakness. He endorses insomnia, fatigue, sinus problems, heartburn, constipation,  anxiety, occasional numbness and tingling in all extremities, and hand tremor. These complaints have been already discussed with his primary care and other medical providers. Otherwise, he denies any other complaints on 14-point comprehensive review of systems.    PAST MEDICAL/SURGICAL HISTORY:                                                    I personally reviewed patient's past medical and surgical history with the patient at today's visit.  Past Medical History:   Diagnosis Date     Anxiety      Basal cell carcinoma      Gastro-oesophageal reflux disease      High cholesterol      Insomnia      Squamous cell carcinoma      Past Surgical History:   Procedure Laterality Date     COLONOSCOPY N/A 4/17/2017    Procedure: COLONOSCOPY;  Surgeon: Larry Fields MD;  Location: [facility] GI     LASER CO2 LESION ORAL N/A 10/5/2016    Procedure: LASER CO2 LESION ORAL;  Surgeon: Josué Rjoo DDS;  Location: [facility] OR     MOHS MICROGRAPHIC PROCEDURE       MEDICATIONS:                                                    I personally reviewed patient's medications and allergies with the patient at today's visit.  Current Outpatient Prescriptions on File Prior to Visit:  Omega-3 Fatty Acids (FISH OIL PO) Take 2 capsules by mouth daily   UNABLE TO FIND MEDICATION NAME: tumeric supplement daily   VITAMIN D, CHOLECALCIFEROL, PO Take 1 tablet by mouth daily   simvastatin (ZOCOR) 20 MG tablet Take 1 tablet (20 mg) by mouth At Bedtime   mirtazapine (REMERON) 15 MG tablet Take 1 tablet (15 mg) by mouth At Bedtime   ASPIRIN PO Take 81 mg by mouth daily     ALLERGIES:                                                    No Known Allergies  FAMILY/SOCIAL HISTORY:                                                    Family and social history was reviewed with the patient at today's visit. Family history is positive for TIA and Alzheimer's disease.   Problem (# of Occurrences) Relation (Name,Age of Onset)    Alzheimer Disease (1) Mother    CANCER  "(1) Maternal Grandfather: lung but lifetime smoker    CEREBROVASCULAR DISEASE (1) Mother: TIA    Cancer - colorectal (1) Maternal Grandmother: still alive at 99    DIABETES (1) Father    Hypertension (1) Father: controlled with meds    Lipids (2) Mother: on meds, Father: on meds    Melanoma (1) Maternal Grandfather    Skin Cancer (1) Mother: SCC    Thyroid Disease (1) Father: ?not sure but possibly       Negative family history of: Asthma, C.A.D., Prostate Cancer        Single, lives alone. Former smoker, quit in 1993. Consumes 1-2 glasses of red wine daily. Denies use of recreational drugs. 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  Social History   Substance Use Topics     Smoking status: Former Smoker     Packs/day: 0.50     Years: 5.00     Types: Cigarettes     Smokeless tobacco: Never Used      Comment: Only in college,one pack per week     Alcohol use Yes      Comment: has a glass of wine occasionally, nothing within the past 24 hours     REVIEW OF SYSTEMS:                                                    Patient has completed a [facility] Patient Health History, including a 14-system review which was personally reviewed, and pertinent positives are listed in HPI. He denies any additional problems on the further questioning.    EXAM:                                                    VITAL SIGNS:   /88 (BP Location: Right arm, Patient Position: Standing, Cuff Size: Adult Large)  Pulse 104  Temp 98.7  F (37.1  C) (Oral)  Resp 16  Ht 1.835 m (6' 0.25\")  Wt 80.2 kg (176 lb 14.4 oz)  BMI 23.83 kg/m2    Orthostatic vital signs:  Vitals:    12/07/17 1003 12/07/17 1119 12/07/17 1120   BP: 118/82 128/82 128/88   BP Location: Right arm Right arm Right arm   Patient Position: Sitting Supine Standing   Cuff Size: Adult Large Adult Large Adult Large   Pulse: 96 80 104   Resp: 16     Temp: 98.7  F (37.1  C)     TempSrc: Oral     Weight: " "80.2 kg (176 lb 14.4 oz)     Height: 1.835 m (6' 0.25\")       General: pt is in NAD, cooperative.  Skin: normal turgor, moist mucous membranes, no lesions/rashes noticed.  HEENT: ATNC, EOMI, PERRL, white sclera, normal conjunctiva, no nystagmus or ptosis. No carotid bruits bilaterally.  Respiratory: lung sounds clear to auscultation bilaterally, no crackles, wheezes, rhonchi. Symmetric lung excursion, no accessory respiratory muscle use.  Cardiovascular: normal S1/S2, no murmurs/rubs/gallops.   Abdomen: Not distended.   : deferred.    Neurological:  Mental: alert, follows commands, mini-cog is 5/5 with 3/3 on memory recall, no aphasia or dysarthria. Fund of knowledge is appropriate for age.  Cranial Nerves:  CN II: visual acuity - able to accurately count fingers with each eye. Visual fields intact, fundi: discs sharp, no papilledema and normal vessels bilaterally.  CN III, IV, VI: EOM intact, pupils equal and reactive  CN V: facial sensation nl  CN VII: face symmetric, no facial droop  CN VIII: hearing normal. Head impulse test is negative bilaterally.  CN IX: palate elevation symmetric, uvula at midline  CN XI SCM normal, shoulder shrug nl  CN XII: tongue midline  Motor: Strength: 5/5 in all major groups of all extremities. Normal tone. Has mild bilateral postural and action low amplitude high-frequency hand tremor. No abnormal movements. No pronator drift b/l.  Reflexes: Triceps, biceps, brachioradialis, patellar, and achilles reflexes normal and symmetric. No clonus noted. Toes are down-going b/l.   Sensory: temperature, light touch, pinprick, and vibration intact. Romberg: negative.  Coordination: FNF and heel-shin tests intact b/l. No dysdiadochokinesia with rapid alternating movements.  Gait:  Normal, able to tandem, toe, and heel walk. Has mild difficulty with reverse tandem walk.    DATA:     LABS: I personally reviewed the following labs:  Office Visit on 11/24/2017   Component Date Value Ref Range " Status     WBC 11/24/2017 5.1  4.0 - 11.0 10e9/L Final     RBC Count 11/24/2017 5.35  4.4 - 5.9 10e12/L Final     Hemoglobin 11/24/2017 17.1  13.3 - 17.7 g/dL Final     Hematocrit 11/24/2017 50.1  40.0 - 53.0 % Final     MCV 11/24/2017 94  78 - 100 fl Final     MCH 11/24/2017 32.0  26.5 - 33.0 pg Final     MCHC 11/24/2017 34.1  31.5 - 36.5 g/dL Final     RDW 11/24/2017 12.9  10.0 - 15.0 % Final     Platelet Count 11/24/2017 218  150 - 450 10e9/L Final     Sodium 11/24/2017 139  133 - 144 mmol/L Final     Potassium 11/24/2017 4.5  3.4 - 5.3 mmol/L Final     Chloride 11/24/2017 105  94 - 109 mmol/L Final     Carbon Dioxide 11/24/2017 21  20 - 32 mmol/L Final     Anion Gap 11/24/2017 13  3 - 14 mmol/L Final     Glucose 11/24/2017 106* 70 - 99 mg/dL Final     Urea Nitrogen 11/24/2017 17  7 - 30 mg/dL Final     Creatinine 11/24/2017 0.93  0.66 - 1.25 mg/dL Final     GFR Estimate 11/24/2017 86  >60 mL/min/1.7m2 Final    Non  GFR Calc     GFR Estimate If Black 11/24/2017 >90  >60 mL/min/1.7m2 Final    African American GFR Calc     Calcium 11/24/2017 9.0  8.5 - 10.1 mg/dL Final     Bilirubin Total 11/24/2017 0.8  0.2 - 1.3 mg/dL Final     Albumin 11/24/2017 4.4  3.4 - 5.0 g/dL Final     Protein Total 11/24/2017 7.5  6.8 - 8.8 g/dL Final     Alkaline Phosphatase 11/24/2017 53  40 - 150 U/L Final     ALT 11/24/2017 26  0 - 70 U/L Final     AST 11/24/2017 13  0 - 45 U/L Final     TSH 11/24/2017 0.98  0.40 - 4.00 mU/L Final     EEG/IMAGING/OTHER STUDIES: I reviewed pertinent medical records, including review of several available brain/vessel imaging studies and scanned reports, scanned previous neurology notes, scanned EEG report, and Care Everywhere, as documented in the history of present illness.    ASSESSMENT and PLAN:      ASSESSMENT: Cesar Montejo is a 52 year old male patient with history of multiple concussions, anxiety, depression, and insomnia, who presents with chronic imbalance since 2012, that  recently worsened.    As we thoroughly discussed with the patient, his neurological exam today is notable for bilateral hand tremor but, otherwise, non-focal. We reviewed together in detail recent brain and vessel images that are unremarkable.    We also had prolonged discussion regarding various diagnostic possibilities for patient's symptoms. At this point, I do not feel that they are related to emerging Alzheimer's disease, multiple system atrophy, or other neurodegenerative condition. I would fully support ordered sleep evaluation to see if he has REM behavior sleep disorder. In such case, the likelihood of developing neurodegenerative condition, such as Parkinson's disease or Parkinson plus disorder, would be increased and regular neurologist follow-up would be recommended.    I also do not appreciate any peripheral vestibular dysfunction, cerebellar pathology, and other central causes for perceived imbalance. His symptoms might be related to chronic posttraumatic dizziness versus increased anxiety and stress level that need to be adequately treated. Advised the patient to discuss it with his primary care provider. In addition, I feel that physical therapy for balance will be beneficial. Orthostatic hypotension (was not orthostatic today) and postural orthostatic tachycardia syndrome (heart rate increased by 24 beats upon standing) were also considered, but felt less likely.    Concentration difficulty might improve with the treatment of his anxiety and related mental health conditions. I counseled the patient that continued cognitive concerns could be further evaluated with repeat neuropsychologic evaluation in the future if they progress or affect his function.    DIAGNOSES:    ICD-10-CM    1. Difficulty balancing R29.818 PHYSICAL THERAPY REFERRAL   2. Cognitive complaints R41.9    3. Hx of multiple concussions Z87.820      PLAN: At today's visit we thoroughly discussed various diagnostic possibilities for  patient's symptoms and the plan, which includes:  Orders Placed This Encounter   Procedures     PHYSICAL THERAPY REFERRAL    I agree with ordered sleep evaluation. I do not feel that any additional investigations are needed from neurological standpoint at this time. I advised the patient to return if his symptoms worsen.    No new medications were ordered.    Next follow-up appointment is on as needed basis.    I encouraged the patient to call me with any questions or concerns.    Total Time:  61 minutes with > 50% spent counseling the patient on stated above assessment and recommendations, including nature of the diagnosis, possible future w/u and the results of performed testing, proposed plan, and prognosis. An additional time was used to answer multiple questions that the patient had.    Mane Camarena MD  / Neurology  [place]  (Chart documentation was completed in part with Dragon voice-recognition software. Even though reviewed, some grammatical, spelling, and word errors may remain.)

## 2017-12-07 NOTE — PATIENT INSTRUCTIONS
AFTER VISIT SUMMARY (AVS):    At today's visit we discussed various diagnostic possibilities for your symptoms and the plan, which includes:  Orders Placed This Encounter   Procedures     PHYSICAL THERAPY REFERRAL    I agree with ordered sleep evaluation. I do not feel that any additional investigations are needed from neurological standpoint at this time. Please return if your symptoms worsen.    No new medications were ordered.    Next follow-up appointment is on as needed basis.    Please do not hesitate to call me with any questions or concerns.    Thanks.

## 2017-12-14 ENCOUNTER — OFFICE VISIT (OUTPATIENT)
Dept: SLEEP MEDICINE | Facility: CLINIC | Age: 52
End: 2017-12-14
Attending: FAMILY MEDICINE
Payer: COMMERCIAL

## 2017-12-14 VITALS
DIASTOLIC BLOOD PRESSURE: 85 MMHG | HEART RATE: 99 BPM | HEIGHT: 72 IN | BODY MASS INDEX: 24.92 KG/M2 | OXYGEN SATURATION: 96 % | RESPIRATION RATE: 16 BRPM | WEIGHT: 184 LBS | SYSTOLIC BLOOD PRESSURE: 123 MMHG

## 2017-12-14 DIAGNOSIS — G47.00 INSOMNIA, UNSPECIFIED TYPE: ICD-10-CM

## 2017-12-14 DIAGNOSIS — R06.83 SNORING: Primary | ICD-10-CM

## 2017-12-14 DIAGNOSIS — G47.50 PARASOMNIA: ICD-10-CM

## 2017-12-14 DIAGNOSIS — F41.9 ANXIETY: ICD-10-CM

## 2017-12-14 DIAGNOSIS — G47.52 RBD (REM BEHAVIORAL DISORDER): ICD-10-CM

## 2017-12-14 PROCEDURE — 99211 OFF/OP EST MAY X REQ PHY/QHP: CPT | Mod: ZF

## 2017-12-14 NOTE — MR AVS SNAPSHOT
After Visit Summary   12/14/2017    Cesar Montejo    MRN: [id number]           Patient Information     Date Of Birth          1965        Visit Information        Provider Department      12/14/2017 9:00 AM Tiffanie Martinez APRN [facility] [place], Sleep Study        Today's Diagnoses     Snoring    -  1    RBD (REM behavioral disorder)          Care Instructions    If you need to cancel your sleep study, please call as soon as possible.  If you were prescribed a sleep aid, bring that to the sleep lab.  (bring in you own sleep aid)    Please remember: do not drive if sleepy    Triggers of confusional arousals: insufficient sleep, alcohol, stress/anxiety.  Your BMI is Body mass index is 24.79 kg/(m^2).  Weight management is a personal decision.  If you are interested in exploring weight loss strategies, the following discussion covers the approaches that may be successful. Body mass index (BMI) is one way to tell whether you are at a healthy weight, overweight, or obese. It measures your weight in relation to your height.  A BMI of 18.5 to 24.9 is in the healthy range. A person with a BMI of 25 to 29.9 is considered overweight, and someone with a BMI of 30 or greater is considered obese. More than two-thirds of American adults are considered overweight or obese.  Being overweight or obese increases the risk for further weight gain. Excess weight may lead to heart disease and diabetes.  Creating and following plans for healthy eating and physical activity may help you improve your health.  Weight control is part of healthy lifestyle and includes exercise, emotional health, and healthy eating habits. Careful eating habits lifelong are the mainstay of weight control. Though there are significant health benefits from weight loss, long-term weight loss with diet alone may be very difficult to achieve- studies show long-term success with dietary management in less than 10% of people. Attaining  a healthy weight may be especially difficult to achieve in those with severe obesity. In some cases, medications, devices and surgical management might be considered.  What can you do?  If you are overweight or obese and are interested in methods for weight loss, you should discuss this with your provider.     Consider reducing daily calorie intake by 500 calories.     Keep a food journal.     Avoiding skipping meals, consider cutting portions instead.    Diet combined with exercise helps maintain muscle while optimizing fat loss. Strength training is particularly important for building and maintaining muscle mass. Exercise helps reduce stress, increase energy, and improves fitness. Increasing exercise without diet control, however, may not burn enough calories to loose weight.       Start walking three days a week 10-20 minutes at a time    Work towards walking thirty minutes five days a week     Eventually, increase the speed of your walking for 1-2 minutes at time    In addition, we recommend that you review healthy lifestyles and methods for weight loss available through the National Institutes of Health patient information sites:  http://win.niddk.nih.gov/publications/index.htm    And look into health and wellness programs that may be available through your health insurance provider, employer, local community center, or [employer].                  Follow-ups after your visit        Follow-up notes from your care team     Return for psg, follow up with me.      Your next 10 appointments already scheduled     Jan 11, 2018  1:00 PM CST   (Arrive by 12:45 PM)   New Patient Visit with James Arreola MD   [facility] ([facility] [place] [facility])    [address] [place] [id number] [phone]              Future tests that were ordered for you today     Open Future Orders        Priority Expected Expires Ordered    Comprehensive Sleep Study Routine  6/12/2018 12/14/2017  "           Who to contact     If you have questions or need follow up information about today's clinic visit or your schedule please contact [facility], SLEEP STUDY directly at [phone].  Normal or non-critical lab and imaging results will be communicated to you by MyChart, letter or phone within 4 business days after the clinic has received the results. If you do not hear from us within 7 days, please contact the clinic through MyChart or phone. If you have a critical or abnormal lab result, we will notify you by phone as soon as possible.  Submit refill requests through [employer] or call your pharmacy and they will forward the refill request to us. Please allow 3 business days for your refill to be completed.          Additional Information About Your Visit        [employer] Information     [employer] gives you secure access to your electronic health record. If you see a primary care provider, you can also send messages to your care team and make appointments. If you have questions, please call your primary care clinic.  If you do not have a primary care provider, please call [phone] and they will assist you.        Care EveryWhere ID     This is your Care EveryWhere ID. This could be used by other organizations to access your [facility] medical records  [id number]        Your Vitals Were     Pulse Respirations Height Pulse Oximetry BMI (Body Mass Index)       99 16 1.835 m (6' 0.24\") 96% 24.79 kg/m2        Blood Pressure from Last 3 Encounters:   12/14/17 123/85   12/07/17 128/88   11/29/17 (!) 126/91    Weight from Last 3 Encounters:   12/14/17 83.5 kg (184 lb)   12/07/17 80.2 kg (176 lb 14.4 oz)   11/24/17 81.2 kg (179 lb)              We Performed the Following     SLEEP EVALUATION & MANAGEMENT REFERRAL - ADULT -[facility]  [phone] (Age 2 and up)        Primary Care Provider Office Phone # Fax #    Rocky Messina [phone] "       [address] [facility] [address]  [facility] [address]        Equal Access to Services     CARLOINA PLASCENCIA : Hadii aad ku hadkwanmer Soidris, waaxda luqadaha, qaybta kaalmada cesar, prashant mckenziedeeptirajesh nelson. So [facility] [phone].    ATENCIÓN: Si habla español, tiene a persaud disposición servicios gratuitos de asistencia lingüística. Llame al [phone].    We comply with applicable federal civil rights laws and [place] laws. We do not discriminate on the basis of race, color, national origin, age, disability, sex, sexual orientation, or gender identity.            Thank you!     Thank you for choosing [facility], SLEEP STUDY  for your care. Our goal is always to provide you with excellent care. Hearing back from our patients is one way we can continue to improve our services. Please take a few minutes to complete the written survey that you may receive in the mail after your visit with us. Thank you!             Your Updated Medication List - Protect others around you: Learn how to safely use, store and throw away your medicines at www.disposemymeds.org.          This list is accurate as of: 12/14/17 11:00 AM.  Always use your most recent med list.                   Brand Name Dispense Instructions for use Diagnosis    ASPIRIN PO      Take 81 mg by mouth daily        FISH OIL PO      Take 2 capsules by mouth daily        mirtazapine 15 MG tablet    REMERON    90 tablet    Take 1 tablet (15 mg) by mouth At Bedtime    Mixed anxiety and depressive disorder, Insomnia       simvastatin 20 MG tablet    ZOCOR    90 tablet    Take 1 tablet (20 mg) by mouth At Bedtime    Hyperlipidemia LDL goal <100       UNABLE TO FIND      MEDICATION NAME: tumeric supplement daily        VITAMIN D (CHOLECALCIFEROL) PO      Take 1 tablet by mouth daily

## 2017-12-14 NOTE — PROGRESS NOTES
Allergies:    No Known Allergies    Medications:    Current Outpatient Prescriptions   Medication Sig Dispense Refill     Omega-3 Fatty Acids (FISH OIL PO) Take 2 capsules by mouth daily       UNABLE TO FIND MEDICATION NAME: tumeric supplement daily       VITAMIN D, CHOLECALCIFEROL, PO Take 1 tablet by mouth daily       simvastatin (ZOCOR) 20 MG tablet Take 1 tablet (20 mg) by mouth At Bedtime 90 tablet 3     mirtazapine (REMERON) 15 MG tablet Take 1 tablet (15 mg) by mouth At Bedtime 90 tablet 3     ASPIRIN PO Take 81 mg by mouth daily         Problem List:  Patient Active Problem List    Diagnosis Date Noted     History of multiple concussions 11/24/2017     Priority: Medium     Seborrheic keratosis 10/01/2017     Priority: Medium     Cherry angioma 10/01/2017     Priority: Medium     Actinic cheilitis 10/01/2017     Priority: Medium     History of nonmelanoma skin cancer 10/01/2017     Priority: Medium     History of basal cell cancer 06/17/2017     Priority: Medium     History of actinic keratosis 12/21/2016     Priority: Medium     AK (actinic keratosis) 08/03/2016     Priority: Medium     Solar lentiginosis 05/17/2016     Priority: Medium     Skin cancer screening 05/17/2016     Priority: Medium     Dermatitis, seborrheic 05/17/2016     Priority: Medium     Keratosis, actinic 05/17/2016     Priority: Medium     Family history of skin cancer 05/17/2016     Priority: Medium     Right shoulder pain 04/19/2016     Priority: Medium     Calcific tendonitis 04/19/2016     Priority: Medium     Knee pain 02/27/2014     Priority: Medium     Mixed anxiety and depressive disorder 08/25/2012     Priority: Medium     Insomnia 02/10/2012     Priority: Medium     CARDIOVASCULAR SCREENING; LDL GOAL LESS THAN 130 02/10/2012     Priority: Medium     Hypercholesteremia 02/10/2012     Priority: Medium     GERD (gastroesophageal reflux disease) 02/10/2012     Priority: Medium        Past Medical/Surgical History:  Past Medical  "History:   Diagnosis Date     Anxiety      Basal cell carcinoma      Gastro-oesophageal reflux disease      High cholesterol      Insomnia      Squamous cell carcinoma      Past Surgical History:   Procedure Laterality Date     COLONOSCOPY N/A 4/17/2017    Procedure: COLONOSCOPY;  Surgeon: Larry Fields MD;  Location: [facility] GI     LASER CO2 LESION ORAL N/A 10/5/2016    Procedure: LASER CO2 LESION ORAL;  Surgeon: Josué Rojo DDS;  Location: [facility] OR     MOHS MICROGRAPHIC PROCEDURE             Physical Examination:  Vitals: /85  Pulse 99  Resp 16  Ht 1.835 m (6' 0.24\")  Wt 83.5 kg (184 lb)  SpO2 96%  BMI 24.79 kg/m2  BMI= Body mass index is 24.79 kg/(m^2).    Neck Cir (cm): 41 cm    [place] Total Score 12/14/2017   Total score - [place] 5       GENERAL APPEARANCE: alert and mild distress  EYES: Eyes grossly normal to inspection  HENT: oropharynx crowded, uvula elongated, soft palate dependent and narrow oral airwa  NECK: thyroid normal to palpation  RESP: lungs clear to auscultation - no rales, rhonchi or wheezes  CV: regular rates and rhythm, normal S1 S2, no S3 or S4 and no murmur, click or rub  Musculoskeletal:  Mallampati Class: IV.  Tonsillar Stage: 1  hidden by pillars.  Dental: Dentition in good condition.  Good advancement of lower jaw without tmd  Skin: without facial rash                "

## 2017-12-14 NOTE — NURSING NOTE
"Chief Complaint   Patient presents with     Consult     insomnia and balance issues. Per the patient would lke to rule out rem behavior disorder       Initial /85  Pulse 99  Resp 16  Ht 1.835 m (6' 0.24\")  Wt 83.5 kg (184 lb)  SpO2 96%  BMI 24.79 kg/m2 Estimated body mass index is 24.79 kg/(m^2) as calculated from the following:    Height as of this encounter: 1.835 m (6' 0.24\").    Weight as of this encounter: 83.5 kg (184 lb).  Medication Reconciliation: complete   Dana Recinos [facility]      "

## 2017-12-14 NOTE — PATIENT INSTRUCTIONS
If you need to cancel your sleep study, please call as soon as possible.  If you were prescribed a sleep aid, bring that to the sleep lab.  (bring in you own sleep aid)    Please remember: do not drive if sleepy    Triggers of confusional arousals: insufficient sleep, alcohol, stress/anxiety.  Your BMI is Body mass index is 24.79 kg/(m^2).  Weight management is a personal decision.  If you are interested in exploring weight loss strategies, the following discussion covers the approaches that may be successful. Body mass index (BMI) is one way to tell whether you are at a healthy weight, overweight, or obese. It measures your weight in relation to your height.  A BMI of 18.5 to 24.9 is in the healthy range. A person with a BMI of 25 to 29.9 is considered overweight, and someone with a BMI of 30 or greater is considered obese. More than two-thirds of American adults are considered overweight or obese.  Being overweight or obese increases the risk for further weight gain. Excess weight may lead to heart disease and diabetes.  Creating and following plans for healthy eating and physical activity may help you improve your health.  Weight control is part of healthy lifestyle and includes exercise, emotional health, and healthy eating habits. Careful eating habits lifelong are the mainstay of weight control. Though there are significant health benefits from weight loss, long-term weight loss with diet alone may be very difficult to achieve- studies show long-term success with dietary management in less than 10% of people. Attaining a healthy weight may be especially difficult to achieve in those with severe obesity. In some cases, medications, devices and surgical management might be considered.  What can you do?  If you are overweight or obese and are interested in methods for weight loss, you should discuss this with your provider.     Consider reducing daily calorie intake by 500 calories.     Keep a food journal.      Avoiding skipping meals, consider cutting portions instead.    Diet combined with exercise helps maintain muscle while optimizing fat loss. Strength training is particularly important for building and maintaining muscle mass. Exercise helps reduce stress, increase energy, and improves fitness. Increasing exercise without diet control, however, may not burn enough calories to loose weight.       Start walking three days a week 10-20 minutes at a time    Work towards walking thirty minutes five days a week     Eventually, increase the speed of your walking for 1-2 minutes at time    In addition, we recommend that you review healthy lifestyles and methods for weight loss available through the National Institutes of Health patient information sites:  http://win.niddk.nih.gov/publications/index.htm    And look into health and wellness programs that may be available through your health insurance provider, employer, local community center, or [employer].

## 2017-12-15 NOTE — PROGRESS NOTES
DATE OF SERVICE:  12/14/2017      CHIEF COMPLAINT:  I have been asked to see . Cesar Montejo who prefers to be called Travis in consultation by Dr. Rocky Messina for problems with worsening insomnia, worsening balance problems and patient reports a concern for possible RBD.      HISTORY OF PRESENT ILLNESS:  The patient reports a previous diagnosis of insomnia done following a polysomnogram around 2007.  He was studied at [facility] or [facility].  He was having difficulties with sleep initiation and he was put on mirtazapine.  It was reported he had significant alpha intrusion since that study.  He reports that he has developed more of a problem with sleep maintenance than with sleep initiation.  He does not have significant excessive daytime sleepiness, but is tired all the time.  He is under a fair amount of stress both professionally and then socially being the guardian and power of  of his mother who has Alzheimer's and has had several biopsies for various types of cancers.      He enters bed most frequently at about 10:30 at night with the lights out at about 11:00 and he is asleep in 10-20 minutes.  Without mirtazapine, it does take a significant length of time.  He is an admitted night owl; however, his schedule does not vary often.  On weekends enters bed at about 1:00 a.m.  Exits bed at 7:00 a.m. on workdays, 9:00 a.m. on weekends.  Wakes up at least 5 times per night, but may only be awake for 1-4 minutes.  Total sleep time is about 6-1/2 to 7 hours, he does not nap on weekends.  Activities in bed do not include screens or reading.      SLEEP DISRUPTIONS:  He notices that he will have a tremor as he relaxes in bed,  or at any point of time he may jerk himself awake when he begins to fall asleep. He also may jerk himself awake as he  enters a very vivid dream.  He will then re-fall back asleep without any difficulties.  He does not feel he does kicking.  He reports a rare nightmare, has  minimal effect on daytime function, but certainly can be an expression of his stress level.  This has been going on for some time but seems to be more frequent recently.  He has been noted to sit up in bed while still asleep and seems to be moving things around and is doing the same in his dream.  He also will have very vivid dreams about losing his passport which he has concerns about since he travels significantly.  He denies any symptoms of hypnopompic hallucinations.  No signs of cataplexy or significant sleep paralysis,  has never left the bed, does talk and seems to be reenacting some dream, grinds his teeth.  Family members do report that snoring is loud.  He will have a rare snort arousal, but he has not been observed by others to not be breathing.  He has a dry throat in the morning and trouble breathing through his nose at times.  He has significant difficulties with heartburn or reflux, having been on Nexium for a couple years, stopping it due to issues with regard to bone density.  Previous to that, he reports that he may have had less GERD, but did have some bloating while he was on Nexium and is concerned about side effects of drug, uncertain if sleep is more restorative on or off Nexium.  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  Sleep environment is conducive to good sleep.  Family history is positive for snoring, sleep apnea and RLS.        ALCOHOL AND CAFFEINE:  Will have some caffeine at about 2:00 p.m.  Otherwise, he has less than 3 servings a day.  Alcohol:  He will have 1 or 2 glasses I believe of wine between the hours of 8:00 and 9:00 p.m.  With friends on a weekend, he might have 3 beers at a bar, but that is infrequent.  No use of marijuana, nicotine; does exercise.      His  [place] Sleepiness Scale is 5/24.  States that fatigue and tiredness is affecting his productivity at work but denies any difficulties with driving and 20/30 days he is tired and fatigued, 20/30 days stress and anxiety have been an issue over the past 3-4 years.  He has not been prescribed anything else besides mirtazapine and seems to not be interested when we did discuss this, but is open to considering a referral and I will refer him to Dr. Alford at the [facility].      REVIEW OF SYSTEMS:  A 14-point comprehensive review of systems is completed and negative with the exception of the following:   CONSTITUTIONAL:  Occasional awakening with sweats with a dream.     EYES:  Occasional blurred vision.     ENT:  Sinus pain, postnasal drip and runny nose have been chronic for him.     CARDIOVASCULAR:  Occasional irregular beats.  He can awaken from sleep with this 3-5 times a year, is pre-hypertensive in his blood pressure.   NERVOUS SYSTEM:  Numbness, weakness in arms or legs, he is seeing Neurology for that so occasional might be anxiety he admits.    SKIN:  Unexplained rashes in the last year.   DIGESTIVE:  Constipation, diarrhea.   MUSCLES AND BONES:  Has muscle pain.   MENTAL HEALTH:  Anxiety.      PAST MEDICAL HISTORY:  Multiple surgeries for skin cancer.  Medical conditions:  GERD, skin cancer, prediabetes, insomnia, high cholesterol, tremor and 2 previous concussions.  He is on mirtazapine, Zocor, turmeric, fish oil and a low dose aspirin.     Allergies:    No Known Allergies    Medications:    Current Outpatient Prescriptions   Medication Sig Dispense Refill     Omega-3 Fatty Acids (FISH OIL PO) Take 2 capsules by mouth daily       UNABLE TO FIND MEDICATION NAME: tumeric supplement daily       VITAMIN D, CHOLECALCIFEROL, PO Take 1 tablet by mouth daily       simvastatin (ZOCOR) 20 MG tablet Take 1 tablet (20 mg) by mouth At Bedtime 90 tablet 3     mirtazapine (REMERON) 15 MG tablet Take 1 tablet  (15 mg) by mouth At Bedtime 90 tablet 3     ASPIRIN PO Take 81 mg by mouth daily         Problem List:  Patient Active Problem List    Diagnosis Date Noted     History of multiple concussions 11/24/2017     Priority: Medium     Seborrheic keratosis 10/01/2017     Priority: Medium     Cherry angioma 10/01/2017     Priority: Medium     Actinic cheilitis 10/01/2017     Priority: Medium     History of nonmelanoma skin cancer 10/01/2017     Priority: Medium     History of basal cell cancer 06/17/2017     Priority: Medium     History of actinic keratosis 12/21/2016     Priority: Medium     AK (actinic keratosis) 08/03/2016     Priority: Medium     Solar lentiginosis 05/17/2016     Priority: Medium     Skin cancer screening 05/17/2016     Priority: Medium     Dermatitis, seborrheic 05/17/2016     Priority: Medium     Keratosis, actinic 05/17/2016     Priority: Medium     Family history of skin cancer 05/17/2016     Priority: Medium     Right shoulder pain 04/19/2016     Priority: Medium     Calcific tendonitis 04/19/2016     Priority: Medium     Knee pain 02/27/2014     Priority: Medium     Mixed anxiety and depressive disorder 08/25/2012     Priority: Medium     Insomnia 02/10/2012     Priority: Medium     CARDIOVASCULAR SCREENING; LDL GOAL LESS THAN 130 02/10/2012     Priority: Medium     Hypercholesteremia 02/10/2012     Priority: Medium     GERD (gastroesophageal reflux disease) 02/10/2012     Priority: Medium        Past Medical/Surgical History:  Past Medical History:   Diagnosis Date     Anxiety      Basal cell carcinoma      Gastro-oesophageal reflux disease      High cholesterol      Insomnia      Squamous cell carcinoma      Past Surgical History:   Procedure Laterality Date     COLONOSCOPY N/A 4/17/2017    Procedure: COLONOSCOPY;  Surgeon: Larry Fields MD;  Location:  GI     LASER CO2 LESION ORAL N/A 10/5/2016    Procedure: LASER CO2 LESION ORAL;  Surgeon: Josué Rojo DDS;  Location:  OR     MOHS  "MICROGRAPHIC PROCEDURE       Physical Examination:  Vitals: /85  Pulse 99  Resp 16  Ht 1.835 m (6' 0.24\")  Wt 83.5 kg (184 lb)  SpO2 96%  BMI 24.79 kg/m2  BMI= Body mass index is 24.79 kg/(m^2).    Neck Cir (cm): 41 cm    [place] Total Score 12/14/2017   Total score - [place] 5       GENERAL APPEARANCE: alert and mild distress  EYES: Eyes grossly normal to inspection  HENT: oropharynx crowded, uvula elongated, soft palate dependent and narrow oral airwa  NECK: thyroid normal to palpation  RESP: lungs clear to auscultation - no rales, rhonchi or wheezes  CV: regular rates and rhythm, normal S1 S2, no S3 or S4 and no murmur, click or rub  Musculoskeletal:  Mallampati Class: IV.  Tonsillar Stage: 1  hidden by pillars.  Dental: Dentition in good condition.  Good advancement of lower jaw without tmd  Skin: without facial rash     ASSESSMENT AND PLAN:  It is my impression that Mr. Montejo, who has a STOP-Bang score of 6 and although he does not have a regular bed partner, had past history of loud snoring, fragmented sleep, dry throat, GERD, teeth grinding, has a moderate pretest probability for obstructive sleep apnea.  He also is concerned regarding the possible development of a neurologic disease such as Parkinson's or perhaps Alzheimer's down the road with his taking his mirtazapine, as this drug may unmasks these symptoms in the setting of some what sounds like confusional arousals, some dream enactment. Nothing aggressive as one would expect to find out of REM sleep, no fighting dreams or no sleep-related injury.  Due to his concern about possible increased muscle tone, the frequency of his stressful nightmares and his concerns about possible sleep apnea, I have ordered a split night polysomnogram and the following plan of care has been developed:   1.  Snoring and a STOP-Bang score of 6/8 with some confusional arousals and parasomnias . Split night polysomnogram with parasomnia and 4 limb monitoring.  We " did discuss treatment options for obstructive sleep apnea and he seems open to most.  I will see him in followup following this study.  He will continue to take his mirtazapine as he has up to this point with regard to this study.   2.  Insomnia.  Pretty good control at sleep initiation, despite excessive stress; however, sleep fragmentation has become more of a problem along with what he presumes to be a sleep apnea-hypopnea syndrome.  He is interested down the road in considering getting off of mirtazapine and I think improved control over his anxiety may help in that matter as well.   3.  Parasomnias.  It sounds like confusional arousals, some sorting or moving of things while he is sleeping, some sitting up in bed.  A 4-limb monitoring and close attention to muscle tone while in REM sleep.  I did discuss the fact that observing this on 1 particular night, either abnormal behaviors or muscle tone elevation, may be somewhat limited, especially on his mirtazapine, but he wishes to proceed in this regard.  We did talk about possible melatonin as a treatment option.  He had seen clonazepam being discussed, but we will have a further discussion about some of the side effects of clonazepam versus melatonin.     4. RBD: see above.  4.  Anxiety.  Anxiety possibly playing a role and as well some insufficient sleep and anxiety, possibly alcohol close to bedtime possibly playing a role in his confusional arousals as well as some of his nightmares and I will refer him to Varun Alford.      Thank you for allowing me to participate in this kind man's care.      Time spent with patient 60 minutes, of which greater than 50% was spent in counseling, education and coordination of care.         FELIX KURTZ, CNP             D: 2017 13:06   T: 2017 22:24   MT:       Name:     JONATHAN FIORE   MRN:      [id number]        Account:      [id number]   :      1965           Visit Date:   2017       Document: [id number]

## 2018-01-02 NOTE — TELEPHONE ENCOUNTER
APPT INFO    Date /Time: 1/11/18   Reason for Appt: Dizziness/Concussion/Imbalance/Headache   Ref Provider/Clinic: Calin Resendiz   Are there internal records? Yes/No?  IF YES, list clinic names: Yes  See Above  Sleep  [facility] ED   Are there outside records? Yes/No? No   Patient Contact (Y/N) & Call Details: No referred   Action: Reviewed records; Records are in EPIC     OUTSIDE RECORDS CHECKLIST     CLINIC NAME COMMENTS REC (x) IMG (x)

## 2018-01-09 ENCOUNTER — TELEPHONE (OUTPATIENT)
Dept: SLEEP MEDICINE | Facility: CLINIC | Age: 53
End: 2018-01-09

## 2018-01-10 DIAGNOSIS — F41.8 MIXED ANXIETY AND DEPRESSIVE DISORDER: ICD-10-CM

## 2018-01-10 DIAGNOSIS — G47.00 INSOMNIA: ICD-10-CM

## 2018-01-10 NOTE — TELEPHONE ENCOUNTER
"Requested Prescriptions   Pending Prescriptions Disp Refills     mirtazapine (REMERON) 15 MG tablet [Pharmacy Med Name: MIRTAZAPINE 15MG TABLETS] 90 tablet 0    Last Written Prescription Date:  12/19/16  Last Fill Quantity: 90,  # refills: 3   Last Office Visit with G, P or [facility] prescribing provider:  11/24/17   Future Office Visit:      Sig: TAKE 1 TABLET(15 MG) BY MOUTH AT BEDTIME    Atypical Antidepressants Protocol Passed    1/10/2018 10:12 AM       Passed - Recent or future visit with authorizing provider's specialty    Patient had office visit in the last year or has a visit in the next 30 days with authorizing provider.  See \"Patient Info\" tab in inbasket, or \"Choose Columns\" in Meds & Orders section of the refill encounter.              Passed - Patient is age 18 or older          "

## 2018-01-11 ENCOUNTER — OFFICE VISIT (OUTPATIENT)
Dept: NEUROLOGY | Facility: CLINIC | Age: 53
End: 2018-01-11
Payer: COMMERCIAL

## 2018-01-11 ENCOUNTER — PRE VISIT (OUTPATIENT)
Dept: NEUROLOGY | Facility: CLINIC | Age: 53
End: 2018-01-11

## 2018-01-11 VITALS
WEIGHT: 185.1 LBS | OXYGEN SATURATION: 97 % | HEART RATE: 98 BPM | SYSTOLIC BLOOD PRESSURE: 135 MMHG | BODY MASS INDEX: 24.53 KG/M2 | HEIGHT: 73 IN | TEMPERATURE: 97.4 F | RESPIRATION RATE: 20 BRPM | DIASTOLIC BLOOD PRESSURE: 85 MMHG

## 2018-01-11 DIAGNOSIS — F09 COGNITIVE DISORDER: Primary | ICD-10-CM

## 2018-01-11 RX ORDER — SIMVASTATIN 40 MG
0.5 TABLET ORAL DAILY
COMMUNITY
Start: 2011-08-23 | End: 2019-02-01

## 2018-01-11 RX ORDER — OMEGA-3/DHA/EPA/FISH OIL 60 MG-90MG
1 CAPSULE ORAL DAILY
COMMUNITY
End: 2018-03-21

## 2018-01-11 ASSESSMENT — ENCOUNTER SYMPTOMS
PANIC: 0
TROUBLE SWALLOWING: 0
PALPITATIONS: 1
INCREASED ENERGY: 0
WEIGHT GAIN: 0
DIZZINESS: 1
WEIGHT LOSS: 0
SORE THROAT: 0
HYPOTENSION: 0
SINUS CONGESTION: 1
ABDOMINAL PAIN: 0
FEVER: 0
SEIZURES: 0
CONSTIPATION: 0
HOARSE VOICE: 0
PARALYSIS: 0
INSOMNIA: 1
MEMORY LOSS: 0
BLOOD IN STOOL: 0
NIGHT SWEATS: 0
LEG PAIN: 0
HYPERTENSION: 0
POLYDIPSIA: 0
DECREASED CONCENTRATION: 1
EXERCISE INTOLERANCE: 0
RECTAL PAIN: 0
SINUS PAIN: 1
TINGLING: 1
HEARTBURN: 1
FATIGUE: 1
DIARRHEA: 0
JAUNDICE: 0
DECREASED APPETITE: 0
SMELL DISTURBANCE: 0
LOSS OF CONSCIOUSNESS: 0
CHILLS: 1
HEADACHES: 0
HALLUCINATIONS: 0
DEPRESSION: 0
TASTE DISTURBANCE: 0
TREMORS: 1
VOMITING: 0
SYNCOPE: 0
BOWEL INCONTINENCE: 0
NECK MASS: 0
ORTHOPNEA: 0
NERVOUS/ANXIOUS: 1
POLYPHAGIA: 0
NAUSEA: 0
NUMBNESS: 0
SPEECH CHANGE: 0
DISTURBANCES IN COORDINATION: 1
WEAKNESS: 0
SLEEP DISTURBANCES DUE TO BREATHING: 0
LIGHT-HEADEDNESS: 0

## 2018-01-11 ASSESSMENT — PAIN SCALES - GENERAL: PAINLEVEL: NO PAIN (0)

## 2018-01-11 NOTE — PROGRESS NOTES
2018        Rocky Messina MD   [facility]    [address]   [place] [address]      RE: Cesar Montejo    MRN: [id number]   : 1965              Dear Dr. Messina:         Cesar Montejo is a 55-year-old who came in for the Neurology Clinic to get a second opinion regarding his thoughts that he may have a postconcussion syndrome with cognitive decline and imbalance.      He was seen in this clinic before on  and at that time reported also the same thing - cognitive and balance issue.  He has had several concussions; 4 in  without major loss of consciousness and in  without loss of consciousness in either.  Some childhood concussions were seen.  He had been evaluated for memory concern by Dr. Elizondo and they thought it was related to postconcussion syndrome.  The patient had some neuropsych testing which apparently supported that.  He does complain of poor balance as well since last year's concussions.  He had an MRI recently which was reviewed and is completely normal.      His balance problems are usually later in the day and related to heavy work and he feels like when he had concussions.  His cognitive decline was nonspecific.  He has had no major medical problems.      He has had imaging studies, as I say, which will be described below.  Occasionally he gets arm and hand tingling in the right and he was taken to the emergency room and evaluated.      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  He feels, however, his articles are not as scholarly as they have been in the past.      SOCIAL HISTORY:  Indicates he drinks very little wine.  He has a history of generalized anxiety disorder which he is not discussing.      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      He has had neuropsych in  which was said to have been supportive of concussive syndrome.  He diagnosis  lists right shoulder pain, neck pain, mixed anxiety and depressive disorder, history of multiple concussions, seborrheic dermatitis, colitis.      The patient on exam is a very pleasant man.  His blood pressure is 135/85.  Pulse is 98.  His mental status exam is excellent.  He was very inquisitive in terms of questions.  Cranial nerves showed normal symmetrical face.  The eye movements are full without nystagmus and there is no abnormal saccadic activity of the eyes.  Pursuit is normal.  The motor exam and cerebellar are normal.  His sensory exam is also normal.  There are no frontal release signs.  His gait and station are unremarkable.  He does have a fine tremor of the outstretched hand.  There was no cogwheeling, minimal tremor of the neck but there was a slight tremor of his legs.  He says his father had essential tremor.      In summary, this man complains of cognitive decline and balance issue and has completely normal neurological exam.  He has had neuropsych testing which supposedly has shown a postconcussion syndrome pattern.  I have reviewed the MRI and there is no lesion to account for some of his complaints and compared with the one in 2015 there has been no cortical atrophy.  Cerebellar folia are more prominent than normal.  On his exam, he has no cerebellar findings.  His motor exam is normal without any cogwheel problems.      In summary, we will do full neuropsych testing and try to compare from  to see if there is any cognitive problem developing.        Sincerely,        MD KILLIAN Garcia MD             D: 2018 13:58   T: 2018 14:43   MT: tb      Name:     JONATHAN FIORE   MRN:      [id number]        Account:      [id number]   :      1965           Service Date: 2018      Document: [id number]

## 2018-01-11 NOTE — NURSING NOTE
Chief Complaint   Patient presents with     Consult     [facility]- BALANCE AND COGNITIVE PROBLEMS     Anoop Saucedo, CMA

## 2018-01-11 NOTE — LETTER
2018       RE: Cesar Montejo  [address]   [facility] [id number]     Dear Colleague,    Thank you for referring your patient, Cesar Montejo, to the [facility] NEUROLOGY at [facility]. Please see a copy of my visit note below.    2018        Rocky Messina MD   [facility]    [address]   [place] [address]      RE: Cesar Montejo    MRN: [id number]   : 1965              Dear Dr. 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      He was seen in this clinic before on  and at that time reported also the same thing - cognitive and balance issue.  He has had several concussions; 4 in  without major loss of consciousness and in  without loss of consciousness in either.  Some childhood concussions were seen.  He had been evaluated for memory concern by Dr. Elizondo and they thought it was related to postconcussion syndrome.  The patient had some neuropsych testing which apparently supported that.  He does complain of poor balance as well since last year's concussions.  He had an MRI recently which was reviewed and is completely normal.      His balance problems are usually later in the day and related to heavy work and he feels like when he had concussions.  His cognitive decline was nonspecific.  He has had no major medical problems.      He has had imaging studies, as I say, which will be described below.  Occasionally he gets arm and hand tingling in the right and he was taken to the emergency room and evaluated.      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  He feels, however, his articles are not as scholarly as they have been in the past.      SOCIAL HISTORY:  Indicates he drinks very  little wine.  He has a history of generalized anxiety disorder which he is not discussing.      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      He has had neuropsych in 2012 which was said to have been supportive of concussive syndrome.  He diagnosis lists right shoulder pain, neck pain, mixed anxiety and depressive disorder, history of multiple concussions, seborrheic dermatitis, colitis.      The patient on exam is a very pleasant man.  His blood pressure is 135/85.  Pulse is 98.  His mental status exam is excellent.  He was very inquisitive in terms of questions.  Cranial nerves showed normal symmetrical face.  The eye movements are full without nystagmus and there is no abnormal saccadic activity of the eyes.  Pursuit is normal.  The motor exam and cerebellar are normal.  His sensory exam is also normal.  There are no frontal release signs.  His gait and station are unremarkable.  He does have a fine tremor of the outstretched hand.  There was no cogwheeling, minimal tremor of the neck but there was a slight tremor of his legs.  He says his father had essential tremor.      In summary, this man complains of cognitive decline and balance issue and has completely normal neurological exam.  He has had neuropsych testing which supposedly has shown a postconcussion syndrome pattern.  I have reviewed the MRI and there is no lesion to account for some of his complaints and compared with the one in 2015 there has been no cortical atrophy.  Cerebellar folia are more prominent than normal.  On his exam, he has no cerebellar findings.  His motor exam is normal without any cogwheel problems.      In summary, we will do full neuropsych testing and try to compare from 2012 to see if there is any cognitive problem developing.        Sincerely,        James Arreola MD                 D: 01/11/2018 13:58   T: 01/11/2018 14:43   MT: tb      Name:     JONATHAN FIORE   MRN:       [id number]        Account:      [id number]   :      1965           Service Date: 2018

## 2018-01-11 NOTE — MR AVS SNAPSHOT
After Visit Summary   1/11/2018    Cesar Montejo    MRN: [id number]           Patient Information     Date Of Birth          1965        Visit Information        Provider Department      1/11/2018 1:00 PM James Arreola MD [facility]        Today's Diagnoses     Cognitive disorder    -  1       Follow-ups after your visit        Additional Services     NEUROPSYCHOLOGY REFERRAL       Your provider has referred you to:  Cognitive declines reported. NP don in 2012 please compare. Alleges memory loss and other cognitive domain issues.  All scheduling is subject to the client's specific insurance plan & benefits, provider/location availability, and provider clinical specialities.  Please arrive 15 minutes early for your first appointment and bring your completed paperwork.    Please be aware that coverage of these services is subject to the terms and limitations of your health insurance plan.  Call member services at your health plan with any benefit or coverage questions.    Please bring the following to your appointment:  >>   Any x-rays, CTs or MRIs which have been performed.  Contact the facility where they were done to arrange for  prior to your scheduled appointment.  Any new CT, MRI or other procedures ordered by your specialist must be performed at a [facility] or coordinated by your clinic's referral office.    >>   List of current medications   >>   This referral request   >>   Any documents/labs given to you for this referral                  Follow-up notes from your care team     Return if symptoms worsen or fail to improve.      Your next 10 appointments already scheduled     Jan 23, 2018  8:00 PM CST   PSG Split with SLEEP STUDY  6   [facility], Sleep Study ([facility])    [address] [place] [address]   [phone]            Feb 06, 2018  1:00 PM CST   Return Sleep Patient with Tiffanie Almaraz  "FELIX Martinez CNP   [facility], [place], Sleep Study ([facility])    [address] [place] [id number] [phone]              Who to contact     Please call your clinic at [phone] to:    Ask questions about your health    Make or cancel appointments    Discuss your medicines    Learn about your test results    Speak to your doctor   If you have compliments or concerns about an experience at your clinic, or if you wish to file a complaint, please contact [facility] Patient Relations at [phone] or email us at [email or web address]         Additional Information About Your Visit        SamEnricoharYeHive Information     [employer] gives you secure access to your electronic health record. If you see a primary care provider, you can also send messages to your care team and make appointments. If you have questions, please call your primary care clinic.  If you do not have a primary care provider, please call [phone] and they will assist you.      [employer] is an electronic gateway that provides easy, online access to your medical records. With [employer], you can request a clinic appointment, read your test results, renew a prescription or communicate with your care team.     To access your existing account, please contact your [facility] or call [phone] for assistance.        Care EveryWhere ID     This is your Care EveryWhere ID. This could be used by other organizations to access your [place] medical records  [id number]        Your Vitals Were     Pulse Temperature Respirations Height Pulse Oximetry BMI (Body Mass Index)    98 97.4  F (36.3  C) 20 1.842 m (6' 0.5\") 97% 24.76 kg/m2       Blood Pressure from Last 3 Encounters:   01/11/18 135/85   12/14/17 123/85   12/07/17 128/88    Weight from Last 3 Encounters:   01/11/18 84 kg (185 lb 1.6 oz)   12/14/17 83.5 kg (184 lb)   12/07/17 80.2 kg (176 " lb 14.4 oz)              We Performed the Following     NEUROPSYCHOLOGY REFERRAL          Today's Medication Changes          These changes are accurate as of: 1/11/18 11:59 PM.  If you have any questions, ask your nurse or doctor.               These medicines have changed or have updated prescriptions.        Dose/Directions    Fish Oil 500 MG Caps   This may have changed:  Another medication with the same name was removed. Continue taking this medication, and follow the directions you see here.   Changed by:  James Arreola MD        Dose:  1 capsule   Take 1 capsule by mouth daily   Refills:  0       simvastatin 40 MG tablet   Commonly known as:  ZOCOR   This may have changed:  Another medication with the same name was removed. Continue taking this medication, and follow the directions you see here.   Changed by:  James Arreola MD        Dose:  0.5 tablet   Take 0.5 tablets by mouth daily   Refills:  0         Stop taking these medicines if you haven't already. Please contact your care team if you have questions.     UNABLE TO FIND   Stopped by:  James Arreola MD                    Primary Care Provider Office Phone # Fax #    Rocky Rigo Messina [phone]       [address] [facility] [address]  [facility] [address]        Equal Access to Services     [facility] AH: Hadii aad bindu hadasho Sojeremiahali, waaxda luqadaha, qaybta kaalmada adeegyada, prashant jameson . So [facility] [phone].    ATENCIÓN: Si habla español, tiene a persaud disposición servicios gratuitos de asistencia lingüística. Kajalame al [phone].    We comply with applicable federal civil rights laws and [place] laws. We do not discriminate on the basis of race, color, national origin, age, disability, sex, sexual orientation, or gender identity.            Thank you!     Thank you for choosing [facility] NEUROLOGY  for your care. Our goal is always to provide you with excellent care. Hearing back from our patients  is one way we can continue to improve our services. Please take a few minutes to complete the written survey that you may receive in the mail after your visit with us. Thank you!             Your Updated Medication List - Protect others around you: Learn how to safely use, store and throw away your medicines at www.disposemymeds.org.          This list is accurate as of: 1/11/18 11:59 PM.  Always use your most recent med list.                   Brand Name Dispense Instructions for use Diagnosis    ASPIRIN PO      Take 81 mg by mouth daily        Fish Oil 500 MG Caps      Take 1 capsule by mouth daily        mirtazapine 15 MG tablet    REMERON    90 tablet    TAKE 1 TABLET(15 MG) BY MOUTH AT BEDTIME    Mixed anxiety and depressive disorder, Insomnia       simvastatin 40 MG tablet    ZOCOR     Take 0.5 tablets by mouth daily        VITAMIN D (CHOLECALCIFEROL) PO      Take 1,000 Units by mouth daily

## 2018-01-12 RX ORDER — MIRTAZAPINE 15 MG/1
TABLET, FILM COATED ORAL
Qty: 90 TABLET | Refills: 3 | Status: SHIPPED | OUTPATIENT
Start: 2018-01-12 | End: 2019-01-21

## 2018-01-12 NOTE — TELEPHONE ENCOUNTER
PHQ-9 SCORE 9/29/2015 4/15/2016 12/16/2016   Total Score - - -   Total Score 1 1 0     PREMA-7 SCORE 12/16/2016   Total Score 1       Prescription approved per [facility] Refill Protocol.    Signed Prescriptions:                        Disp   Refills    mirtazapine (REMERON) 15 MG tablet         90 tab*3        Sig: TAKE 1 TABLET(15 MG) BY MOUTH AT BEDTIME  Authorizing Provider: KATHI MAURICIO  Ordering User: JAZMINE HE      Closing encounter - no further actions needed at this time    Jazmine He RN

## 2018-01-18 ENCOUNTER — TELEPHONE (OUTPATIENT)
Dept: SLEEP MEDICINE | Facility: CLINIC | Age: 53
End: 2018-01-18

## 2018-01-18 NOTE — TELEPHONE ENCOUNTER
Contacted patient to confirm scheduled study on Tuesday 1/23/18. Patient cancelled due to work conflict. Did not wish to reschedule at this time. Will contact us if needed.

## 2018-01-21 ENCOUNTER — HOSPITAL ENCOUNTER (EMERGENCY)
Facility: CLINIC | Age: 53
Discharge: HOME | End: 2018-01-21
Attending: EMERGENCY MEDICINE | Admitting: EMERGENCY MEDICINE
Payer: COMMERCIAL

## 2018-01-21 ENCOUNTER — APPOINTMENT (OUTPATIENT)
Dept: CT IMAGING | Facility: CLINIC | Age: 53
End: 2018-01-21
Attending: EMERGENCY MEDICINE
Payer: COMMERCIAL

## 2018-01-21 VITALS
SYSTOLIC BLOOD PRESSURE: 131 MMHG | DIASTOLIC BLOOD PRESSURE: 91 MMHG | HEART RATE: 65 BPM | OXYGEN SATURATION: 95 % | WEIGHT: 183.25 LBS | BODY MASS INDEX: 24.51 KG/M2 | RESPIRATION RATE: 16 BRPM | TEMPERATURE: 98 F

## 2018-01-21 DIAGNOSIS — M54.2 NECK PAIN: ICD-10-CM

## 2018-01-21 LAB
ANION GAP SERPL CALCULATED.3IONS-SCNC: 9 MMOL/L (ref 3–14)
BASOPHILS # BLD AUTO: 0 10E9/L (ref 0–0.2)
BASOPHILS NFR BLD AUTO: 0.6 %
BUN SERPL-MCNC: 16 MG/DL (ref 7–30)
CALCIUM SERPL-MCNC: 9.1 MG/DL (ref 8.5–10.1)
CHLORIDE SERPL-SCNC: 107 MMOL/L (ref 94–109)
CO2 SERPL-SCNC: 26 MMOL/L (ref 20–32)
CREAT BLD-MCNC: 0.8 MG/DL (ref 0.66–1.25)
CREAT SERPL-MCNC: 0.88 MG/DL (ref 0.66–1.25)
DIFFERENTIAL METHOD BLD: NORMAL
EOSINOPHIL # BLD AUTO: 0.1 10E9/L (ref 0–0.7)
EOSINOPHIL NFR BLD AUTO: 0.8 %
ERYTHROCYTE [DISTWIDTH] IN BLOOD BY AUTOMATED COUNT: 12.5 % (ref 10–15)
GFR SERPL CREATININE-BSD FRML MDRD: >90 ML/MIN/1.7M2
GFR SERPL CREATININE-BSD FRML MDRD: >90 ML/MIN/1.7M2
GLUCOSE SERPL-MCNC: 113 MG/DL (ref 70–99)
HCT VFR BLD AUTO: 50.4 % (ref 40–53)
HGB BLD-MCNC: 17.1 G/DL (ref 13.3–17.7)
IMM GRANULOCYTES # BLD: 0 10E9/L (ref 0–0.4)
IMM GRANULOCYTES NFR BLD: 0.2 %
LYMPHOCYTES # BLD AUTO: 2.1 10E9/L (ref 0.8–5.3)
LYMPHOCYTES NFR BLD AUTO: 31.9 %
MCH RBC QN AUTO: 31.8 PG (ref 26.5–33)
MCHC RBC AUTO-ENTMCNC: 33.9 G/DL (ref 31.5–36.5)
MCV RBC AUTO: 94 FL (ref 78–100)
MONOCYTES # BLD AUTO: 0.8 10E9/L (ref 0–1.3)
MONOCYTES NFR BLD AUTO: 12.5 %
NEUTROPHILS # BLD AUTO: 3.6 10E9/L (ref 1.6–8.3)
NEUTROPHILS NFR BLD AUTO: 54 %
NRBC # BLD AUTO: 0 10*3/UL
NRBC BLD AUTO-RTO: 0 /100
PLATELET # BLD AUTO: 190 10E9/L (ref 150–450)
POTASSIUM SERPL-SCNC: 3.8 MMOL/L (ref 3.4–5.3)
RBC # BLD AUTO: 5.38 10E12/L (ref 4.4–5.9)
SODIUM SERPL-SCNC: 142 MMOL/L (ref 133–144)
WBC # BLD AUTO: 6.6 10E9/L (ref 4–11)

## 2018-01-21 PROCEDURE — 25000128 H RX IP 250 OP 636: Performed by: EMERGENCY MEDICINE

## 2018-01-21 PROCEDURE — 99284 EMERGENCY DEPT VISIT MOD MDM: CPT | Mod: Z6 | Performed by: EMERGENCY MEDICINE

## 2018-01-21 PROCEDURE — 70496 CT ANGIOGRAPHY HEAD: CPT

## 2018-01-21 PROCEDURE — 80048 BASIC METABOLIC PNL TOTAL CA: CPT | Performed by: EMERGENCY MEDICINE

## 2018-01-21 PROCEDURE — 82565 ASSAY OF CREATININE: CPT

## 2018-01-21 PROCEDURE — 85025 COMPLETE CBC W/AUTO DIFF WBC: CPT | Performed by: EMERGENCY MEDICINE

## 2018-01-21 PROCEDURE — 99285 EMERGENCY DEPT VISIT HI MDM: CPT | Mod: 25 | Performed by: EMERGENCY MEDICINE

## 2018-01-21 PROCEDURE — 25000125 ZZHC RX 250: Performed by: EMERGENCY MEDICINE

## 2018-01-21 RX ORDER — IOPAMIDOL 755 MG/ML
100 INJECTION, SOLUTION INTRAVASCULAR ONCE
Status: COMPLETED | OUTPATIENT
Start: 2018-01-21 | End: 2018-01-21

## 2018-01-21 RX ADMIN — SODIUM CHLORIDE 80 ML: 9 INJECTION, SOLUTION INTRAVENOUS at 16:25

## 2018-01-21 RX ADMIN — IOPAMIDOL 70 ML: 755 INJECTION, SOLUTION INTRAVENOUS at 16:25

## 2018-01-21 ASSESSMENT — ENCOUNTER SYMPTOMS
TROUBLE SWALLOWING: 0
FEVER: 0
NAUSEA: 0
PALPITATIONS: 0
DIAPHORESIS: 0
SLEEP DISTURBANCE: 0
FACIAL SWELLING: 0
SORE THROAT: 0
SHORTNESS OF BREATH: 0
BRUISES/BLEEDS EASILY: 0
NECK PAIN: 1
HEADACHES: 0
SINUS PAIN: 0
BACK PAIN: 0
ARTHRALGIAS: 0
WEAKNESS: 0
COLOR CHANGE: 0
DYSPHORIC MOOD: 0
CONSTIPATION: 0
COUGH: 0
CONFUSION: 0
NERVOUS/ANXIOUS: 0
DIARRHEA: 0
RHINORRHEA: 0
VOICE CHANGE: 0
HEMATURIA: 0
DIFFICULTY URINATING: 0
BLOOD IN STOOL: 0
DYSURIA: 0
SPEECH DIFFICULTY: 0
DIZZINESS: 0
NUMBNESS: 0
EYE PAIN: 0
VOMITING: 0
EYE REDNESS: 0
ADENOPATHY: 0
LIGHT-HEADEDNESS: 0
CHILLS: 0
ABDOMINAL PAIN: 0
FACIAL ASYMMETRY: 0
FREQUENCY: 0
POLYDIPSIA: 0
NECK STIFFNESS: 0

## 2018-01-21 NOTE — DISCHARGE INSTRUCTIONS
Please make an appointment to follow up with Your Primary Care Provider within 7 days.   The cause of your neck pain is not clear.  You can use ibuprofen or Tylenol as needed for pain.  Heat or ice to the area may also help.

## 2018-01-21 NOTE — ED NOTES
Patient feels as if he has noticed some more instability (unsteady gait) while standing/walking. Has been to neurologists-but nothing dx at this time. -Per patient.

## 2018-01-21 NOTE — ED AVS SNAPSHOT
[facility], Emergency Department    [address]    [facility] [place] [address]    Phone:  [phone]    Fax:  [phone]                                       Cesar Montejo   MRN: [id number]    Department:  [facility], Emergency Department   Date of Visit:  1/21/2018           After Visit Summary Signature Page     I have received my discharge instructions, and my questions have been answered. I have discussed any challenges I see with this plan with the nurse or doctor.    ..........................................................................................................................................  Patient/Patient Representative Signature      ..........................................................................................................................................  Patient Representative Print Name and Relationship to Patient    ..................................................               ................................................  Date                                            Time    ..........................................................................................................................................  Reviewed by Signature/Title    ...................................................              ..............................................  Date                                                            Time

## 2018-01-21 NOTE — ED AVS SNAPSHOT
[facility], Emergency Department    [address]    [place] [address]    Phone:  [phone]    Fax:  [phone]                                       Cesar Montejo   MRN: [id number]    Department:  [facility], Emergency Department   Date of Visit:  1/21/2018           Patient Information     Date Of Birth          1965        Your diagnoses for this visit were:     Neck pain, uncertain cause        You were seen by Adrienne Ruiz MD.        Discharge Instructions       Please make an appointment to follow up with Your Primary Care Provider within 7 days.   The cause of your neck pain is not clear.  You can use ibuprofen or Tylenol as needed for pain.  Heat or ice to the area may also help.    Discharge References/Attachments     NECK PAIN (ENGLISH)      24 Hour Appointment Hotline       To make an appointment at any [facility] clinic, call [phone] ([phone]). If you don't have a family doctor or clinic, we will help you find one. [facility] clinics are conveniently located to serve the needs of you and your family.             Review of your medicines      Our records show that you are taking the medicines listed below. If these are incorrect, please call your family doctor or clinic.        Dose / Directions Last dose taken    ASPIRIN PO   Dose:  81 mg        Take 81 mg by mouth daily   Refills:  0        Fish Oil 500 MG Caps   Dose:  1 capsule        Take 1 capsule by mouth daily   Refills:  0        mirtazapine 15 MG tablet   Commonly known as:  REMERON   Quantity:  90 tablet        TAKE 1 TABLET(15 MG) BY MOUTH AT BEDTIME   Refills:  3        simvastatin 40 MG tablet   Commonly known as:  ZOCOR   Dose:  0.5 tablet        Take 0.5 tablets by mouth daily   Refills:  0        VITAMIN D (CHOLECALCIFEROL) PO   Dose:  1000 Units        Take 1,000 Units by mouth daily   Refills:  0                Procedures and tests performed during your visit     Basic metabolic panel    CBC with  platelets differential    CT Head Neck Angio w/o & w Contrast    Creatinine POCT    ISTAT creatinine nursing POCT      Orders Needing Specimen Collection     None      Pending Results     Date and Time Order Name Status Description    1/21/2018 1516 CT Head Neck Angio w/o & w Contrast Preliminary             Pending Culture Results     No orders found from 1/19/2018 to 1/22/2018.            Pending Results Instructions     If you had any lab results that were not finalized at the time of your Discharge, you can call the ED Lab Result RN at [phone]. You will be contacted by this team for any positive Lab results or changes in treatment. The nurses are available 7 days a week from 10A to 6:30P.  You can leave a message 24 hours per day and they will return your call.        Thank you for choosing [facility]       Thank you for choosing [facility] for your care. Our goal is always to provide you with excellent care. Hearing back from our patients is one way we can continue to improve our services. Please take a few minutes to complete the written survey that you may receive in the mail after you visit with us. Thank you!        DLS Information     DLS gives you secure access to your electronic health record. If you see a primary care provider, you can also send messages to your care team and make appointments. If you have questions, please call your primary care clinic.  If you do not have a primary care provider, please call [phone] and they will assist you.        Care EveryWhere ID     This is your Care EveryWhere ID. This could be used by other organizations to access your [facility] medical records  [id number]        Equal Access to Services     [facility] PLASCENCIA : Hadii desean Mcdaniel, watyda celso, qaybta kaalmada adeegyada, waxay nicole madsen adefidencio nelson. So [facility] [phone].    ATENCIÓN: Si habla español, tiene a persaud disposición servicios gratuitos de asistencia lingüística.  Angelica mcdaniel [phone].    We comply with applicable federal civil rights laws and [place] laws. We do not discriminate on the basis of race, color, national origin, age, disability, sex, sexual orientation, or gender identity.            After Visit Summary       This is your record. Keep this with you and show to your community pharmacist(s) and doctor(s) at your next visit.

## 2018-01-21 NOTE — ED PROVIDER NOTES
History     Chief Complaint   Patient presents with     Neck Pain     Has had balance problems 1 month ago and was evaluated for stroke.  2 days ago started to have right sided neck and face pain.  Denies numbness or tingling.  States he has it around his right eye.     HPI  Cesar Montejo is a 52 year old male with a history of basal and squamous cell carcinoma (status post Mohs surgery in 2016), numerous concussions, and hypercholesterolemia who presents to the emergency department for the evaluation of right sided neck and face pain.    Today in the emergency department, the patient is complaining of a 2 day history of sudden-onset right-sided anterolateral constant neck and face pain.  He states that the pain begins just above to his clavicle and follows up to end just below the jawline.  This has been gradually worsening since its onset, but the patient denies trying any over-the-counter medications for pain.  He states that it is worsened with movement of the jaw, swallowing, or turning his head, and he describes the quality of the pain to be dull and throbbing.  He denies any swelling or erythema, chest pain differing from baseline GERD pain, shortness of breath, palpitations, wheezing, cough, numbness, tingling, weakness, changes to gait, confusion, speech difficulties, or fevers.  No previous history of neck pain.  No familial or personal history of connective tissue disorders or CAD.  The patient, as mentioned, has a history of squamous cell carcinoma for which he underwent Mohs surgery in 2016, and he is primarily worried about a potential malignant etiology of today's presentation.  There is no history of trauma and he denies fevers.  He was working at his desk when the pain started.    Social: The patient is a professor of science history.  He is a former smoker      PAST MEDICAL HISTORY:   Past Medical History:   Diagnosis Date     Anxiety      Basal cell carcinoma      Gastro-oesophageal reflux  disease      High cholesterol      Insomnia      Squamous cell carcinoma        PAST SURGICAL HISTORY:   Past Surgical History:   Procedure Laterality Date     COLONOSCOPY N/A 4/17/2017    Procedure: COLONOSCOPY;  Surgeon: Larry Fields MD;  Location: [facility]     LASER CO2 LESION ORAL N/A 10/5/2016    Procedure: LASER CO2 LESION ORAL;  Surgeon: Josué Rojo DDS;  Location: [facility] OR     MOHS MICROGRAPHIC PROCEDURE         FAMILY HISTORY:   Family History   Problem Relation Age of Onset     Lipids Mother      on meds     CEREBROVASCULAR DISEASE Mother      TIA     Alzheimer Disease Mother      Skin Cancer Mother      SCC     Lipids Father      on meds     Hypertension Father      controlled with meds     Thyroid Disease Father      ?not sure but possibly     DIABETES Father      CEREBROVASCULAR DISEASE Father      Cancer - colorectal Maternal Grandmother      still alive at 99     CANCER Maternal Grandfather      lung but lifetime smoker     Melanoma Maternal Grandfather      Asthma No family hx of      C.A.D. No family hx of      Prostate Cancer No family hx of        SOCIAL HISTORY:   Social History   Substance Use Topics     Smoking status: Former Smoker     Packs/day: 0.50     Years: 5.00     Types: Cigarettes     Quit date: 1/11/1993     Smokeless tobacco: Never Used     Alcohol use 0.6 - 1.2 oz/week     1 - 2 Glasses of wine per week      Comment: DAILY       Patient's Medications   New Prescriptions    No medications on file   Previous Medications    ASPIRIN PO    Take 81 mg by mouth daily    MIRTAZAPINE (REMERON) 15 MG TABLET    TAKE 1 TABLET(15 MG) BY MOUTH AT BEDTIME    OMEGA-3 FATTY ACIDS (FISH OIL) 500 MG CAPS    Take 1 capsule by mouth daily    SIMVASTATIN (ZOCOR) 40 MG TABLET    Take 0.5 tablets by mouth daily    VITAMIN D, CHOLECALCIFEROL, PO    Take 1,000 Units by mouth daily    Modified Medications    No medications on file   Discontinued Medications    No medications on file        No Known  Allergies        I have reviewed the Medications, Allergies, Past Medical and Surgical History, and Social History in the Epic system.    Review of Systems   Constitutional: Negative for chills, diaphoresis and fever.   HENT: Negative for congestion, dental problem, drooling, ear pain, facial swelling, rhinorrhea, sinus pain, sore throat, tinnitus, trouble swallowing and voice change.         R facial pain   Eyes: Negative for pain, redness and visual disturbance.   Respiratory: Negative for cough and shortness of breath.    Cardiovascular: Negative for chest pain, palpitations and leg swelling.   Gastrointestinal: Negative for abdominal pain, blood in stool, constipation, diarrhea, nausea and vomiting.   Endocrine: Negative for polydipsia and polyuria.   Genitourinary: Negative for difficulty urinating, dysuria, frequency, hematuria and urgency.   Musculoskeletal: Positive for neck pain. Negative for arthralgias, back pain, gait problem and neck stiffness.   Skin: Negative for color change and rash.   Allergic/Immunologic: Negative for immunocompromised state.   Neurological: Negative for dizziness, facial asymmetry, speech difficulty, weakness, light-headedness, numbness and headaches.   Hematological: Negative for adenopathy. Does not bruise/bleed easily.   Psychiatric/Behavioral: Negative for confusion, dysphoric mood and sleep disturbance. The patient is not nervous/anxious.        Physical Exam   BP: (!) 125/92  Pulse: 104  Temp: 98  F (36.7  C)  Resp: 16  Weight: 83.1 kg (183 lb 4 oz)  SpO2: 96 %      Physical Exam   Neck: Trachea normal, normal range of motion and full passive range of motion without pain. Neck supple. Normal carotid pulses and no JVD present. No tracheal tenderness present. Carotid bruit is not present. No tracheal deviation present.       Pulmonary/Chest: No stridor.     Physical Exam   Constitutional:   well nourished, well developed, resting comfortably, anxious   HENT:   Head:  Normocephalic and atraumatic.   Eyes: Conjunctivae are normal. Pupils are equal, round, and reactive to light.   EOMI  pharynx has no erythema or exudate, mucous membranes are moist, uvula midline, floor of mouth is soft,  Neck:   See diagram above no adenopathy, no bony tenderness  Cardiovascular: regular rate and rhythm without murmurs or gallops  Pulmonary/Chest: Clear to auscultation bilaterally, with no wheezes or retractions. No respiratory distress.  GI: Soft with good bowel sounds.  Non-tender, non-distended, with no guarding, no rebound, no peritoneal signs.   Back:  No bony or CVA tenderness   Musculoskeletal:  no edema or clubbing   Skin: Skin is warm and dry. No rash noted.   Neurological: alert and oriented to person, place, and time. Nonfocal exam, cranial nerves II through XII are grossly intact.  Psychiatric:  anxiousmood and affect.   ED Course     ED Course     Procedures             Critical Care time:  none           Results for orders placed or performed during the hospital encounter of 01/21/18 (from the past 24 hour(s))   CBC with platelets differential   Result Value Ref Range    WBC 6.6 4.0 - 11.0 10e9/L    RBC Count 5.38 4.4 - 5.9 10e12/L    Hemoglobin 17.1 13.3 - 17.7 g/dL    Hematocrit 50.4 40.0 - 53.0 %    MCV 94 78 - 100 fl    MCH 31.8 26.5 - 33.0 pg    MCHC 33.9 31.5 - 36.5 g/dL    RDW 12.5 10.0 - 15.0 %    Platelet Count 190 150 - 450 10e9/L    Diff Method Automated Method     % Neutrophils 54.0 %    % Lymphocytes 31.9 %    % Monocytes 12.5 %    % Eosinophils 0.8 %    % Basophils 0.6 %    % Immature Granulocytes 0.2 %    Nucleated RBCs 0 0 /100    Absolute Neutrophil 3.6 1.6 - 8.3 10e9/L    Absolute Lymphocytes 2.1 0.8 - 5.3 10e9/L    Absolute Monocytes 0.8 0.0 - 1.3 10e9/L    Absolute Eosinophils 0.1 0.0 - 0.7 10e9/L    Absolute Basophils 0.0 0.0 - 0.2 10e9/L    Abs Immature Granulocytes 0.0 0 - 0.4 10e9/L    Absolute Nucleated RBC 0.0    Basic metabolic panel   Result Value Ref  Range    Sodium 142 133 - 144 mmol/L    Potassium 3.8 3.4 - 5.3 mmol/L    Chloride 107 94 - 109 mmol/L    Carbon Dioxide 26 20 - 32 mmol/L    Anion Gap 9 3 - 14 mmol/L    Glucose 113 (H) 70 - 99 mg/dL    Urea Nitrogen 16 7 - 30 mg/dL    Creatinine 0.88 0.66 - 1.25 mg/dL    GFR Estimate >90 >60 mL/min/1.7m2    GFR Estimate If Black >90 >60 mL/min/1.7m2    Calcium 9.1 8.5 - 10.1 mg/dL   Creatinine POCT   Result Value Ref Range    Creatinine 0.8 0.66 - 1.25 mg/dL    GFR Estimate >90 >60 mL/min/1.7m2    GFR Estimate If Black >90 >60 mL/min/1.7m2   CT Head Neck Angio w/o & w Contrast    Narrative    CT ANGIOGRAM OF THE HEAD AND NECK WITHOUT AND WITH CONTRAST  1/21/2018  4:38 PM     HISTORY: Right anterior/lateral neck pain, history of squamous cell  carcinoma of right cheek, Mohs surgery in 2016.     TECHNIQUE:  Precontrast localizing scans were followed by CT  angiography with an injection of 70 mL Isovue 370 IV with scans  through the head and neck.  Images were transferred to a separate 3-D  workstation where multiplanar reformations and 3-D images were  created.  Estimates of carotid stenoses are made relative to the  distal internal carotid artery diameters except as noted. Radiation  dose for this scan was reduced using automated exposure control,  adjustment of the mA and/or kV according to patient size, or iterative  reconstruction technique.    COMPARISON: MR angiogram 11/29/2017    CT HEAD FINDINGS:  No contrast enhancing lesions.   Cerebral blood  flow is grossly normal.    CT ANGIOGRAM HEAD FINDINGS:  Arteries are widely patent with no  aneurysm, significant stenosis, occlusion or intraarterial thrombus.  Venous circulation is unremarkable.     CT ANGIOGRAM NECK FINDINGS:   Right carotid artery: No significant stenosis.      Left carotid artery: No significant stenosis.      Vertebral arteries: No significant stenosis.      Other findings: Cysts in the maxillary sinuses.      Impression    IMPRESSION: Normal  CT angiogram head and neck. No change.       Labs Ordered and Resulted from Time of ED Arrival Up to the Time of Departure from the ED   BASIC METABOLIC PANEL - Abnormal; Notable for the following:        Result Value    Glucose 113 (*)     All other components within normal limits   CBC WITH PLATELETS DIFFERENTIAL   CREATININE POCT   ISTAT CREATININE NURSING POCT            Assessments & Plan (with Medical Decision Making)       I have reviewed the nursing notes.  Emergency Department course:  The patient was seen and examined at 1505 pm.     Chart review shows the patient was seen on 11/29/2017 in the emergency department after presenting with complaints of a left-sided sensory change.  The patient was thoroughly evaluated, and due to report of persistent sensory change of light touch in the left upper extremity, primarily from the forearm extending into the hands and ulnar distributions, he underwent an MRI/A, as a previous CT scan 15 days prior was negative, to further evaluate for potential ischemic change.  This was negative for any sign of intracranial hemorrhage, mass, or recent infarct, though a cyst in the floor the right maxillary antrum was noted with no discernible change when compared to 4/18/2015.    Patient was then seen in neurology clinic on 12/7/2017, where it was thought that his symptoms were likely secondary to postconcussion syndrome with cognitive decline and imbalance, as he had been seen in the same clinic in 12/07 with similar complaints of cognitive and balance issues and a subsequent neuropsychological evaluation supported such a diagnosis.  He presented again to see a different neurologist on 1/11/2018 seeking a second opinion, and though the prior diagnosis was supported, he was referred to do another full neuropsychological evaluation for the purpose of comparison to the one done most recently in 2012.    Today the patient presents with left anteriolateral neck pain with no objective  neurologic deficits findings.    Laboratory studies show an unremarkable CBC and basic metabolic panel apart from a slightly elevated glucose of 113.    I obtained a CT angiogram head and neck to rule out dissection or mass.  CT Angio was read as normal, with widely patent arteries.    The etiology of the patient's neck pain is unclear.  His laboratory and radiographic studies are unremarkable.  I recommend that he take ibuprofen or Tylenol as needed for pain.  He can use heat or ice to the sore areas.  He should follow-up with his regular physician within a week.       I have reviewed the findings, diagnosis, plan and need for follow up with the patient.    New Prescriptions    No medications on file       Final diagnoses:   Neck pain, uncertain cause   I, Matthew Alvarez , am serving as a trained medical scribe to document services personally performed by Adrienne Ruiz MD, based on the provider's statements to me.      IAdrienne MD, was physically present and have reviewed and verified the accuracy of this note documented by Matthew Alvarez.   This note was created in part by the use of Dragon voice recognition dictation system. Inadvertent grammatical errors and typographical errors may still exist.  Adrienne Ruiz MD          1/21/2018   [facility], [place], EMERGENCY DEPARTMENT     Adrienne Ruiz MD  01/21/18 [id number]

## 2018-02-07 ENCOUNTER — OFFICE VISIT (OUTPATIENT)
Dept: NEUROPSYCHOLOGY | Facility: CLINIC | Age: 53
End: 2018-02-07
Payer: COMMERCIAL

## 2018-02-07 DIAGNOSIS — R41.3 COMPLAINTS OF MEMORY DISTURBANCE: Primary | ICD-10-CM

## 2018-02-07 NOTE — MR AVS SNAPSHOT
After Visit Summary   2/7/2018    Cesar Montejo    MRN: [id number]           Patient Information     Date Of Birth          1965        Visit Information        Provider Department      2/7/2018 12:30 PM [place], PhD [facility] Neuropsychology        Today's Diagnoses     Complaints of memory disturbance    -  1       Follow-ups after your visit        Who to contact     Please call your clinic at [phone] to:    Ask questions about your health    Make or cancel appointments    Discuss your medicines    Learn about your test results    Speak to your doctor            Additional Information About Your Visit        MyChart Information     [employer] gives you secure access to your electronic health record. If you see a primary care provider, you can also send messages to your care team and make appointments. If you have questions, please call your primary care clinic.  If you do not have a primary care provider, please call [phone] and they will assist you.      [employer] is an electronic gateway that provides easy, online access to your medical records. With [employer], you can request a clinic appointment, read your test results, renew a prescription or communicate with your care team.     To access your existing account, please contact your [facility] or call [phone] for assistance.        Care EveryWhere ID     This is your Care EveryWhere ID. This could be used by other organizations to access your [facility] medical records  [id number]         Blood Pressure from Last 3 Encounters:   01/21/18 (!) 131/91   01/11/18 135/85   12/14/17 123/85    Weight from Last 3 Encounters:   01/21/18 83.1 kg (183 lb 4 oz)   01/11/18 84 kg (185 lb 1.6 oz)   12/14/17 83.5 kg (184 lb)              We Performed the Following     [id number] TESTING, PER HR/PSYCHOLOGIST     NEUROPSYCH TESTING BY [facility]        Primary Care Provider Office Phone # Fax #    Rocky  Rigo Messina [phone]       [address] [facility] [address]  [facility] [address]        Equal Access to Services     [place] PLASCENCIA : Hadii aad ku hadkwanmer Valeriaidris, watyda julio césarqfabian, qajoeta kaalmada cesar, prashant madsen jadfidencio vinson laAriadnebeto nelson. So [facility] [phone].    ATENCIÓN: Si habla español, tiene a persaud disposición servicios gratuitos de asistencia lingüística. Llame al [phone].    We comply with applicable federal civil rights laws and [place] laws. We do not discriminate on the basis of race, color, national origin, age, disability, sex, sexual orientation, or gender identity.            Thank you!     Thank you for choosing [facility] NEUROPSYCHOLOGY  for your care. Our goal is always to provide you with excellent care. Hearing back from our patients is one way we can continue to improve our services. Please take a few minutes to complete the written survey that you may receive in the mail after your visit with us. Thank you!             Your Updated Medication List - Protect others around you: Learn how to safely use, store and throw away your medicines at www.disposemymeds.org.          This list is accurate as of 2/7/18 11:59 PM.  Always use your most recent med list.                   Brand Name Dispense Instructions for use Diagnosis    ASPIRIN PO      Take 81 mg by mouth daily        Fish Oil 500 MG Caps      Take 1 capsule by mouth daily        mirtazapine 15 MG tablet    REMERON    90 tablet    TAKE 1 TABLET(15 MG) BY MOUTH AT BEDTIME    Mixed anxiety and depressive disorder, Insomnia       simvastatin 40 MG tablet    ZOCOR     Take 0.5 tablets by mouth daily        VITAMIN D (CHOLECALCIFEROL) PO      Take 1,000 Units by mouth daily
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
## 2018-02-16 NOTE — PROGRESS NOTES
Date: 18  Staff: ADAMS  Tech:    MM  NAME: Cesar Montejo  MRN:  [id number]  :  [birth year]  Age: 52  Sex: Male  Hand: Right  Educ:  20+    DCT   Err: 1 U  E-score: 8    WRAT4   SS %ile Grade Equiv.     Word Reading  116 86 >12.9     Math Computation 130 98 >12.9    WAIS-IV     VCI: (103) CHUYITA: (117)      WMI: 105 PSI:  94      FSIQ: (106)     Raw SS     Similarities  26 10     Vocabulary  44 11     Block Design  45 11     Matrix Reasoning 23 15     Digit Span  25 9     Arithmetic  18 13     Symbol Search 28 9     Coding  59 9    [place] AST   Err: Repetition, Written Math (but declined to actually write down problem; other mental math problem was correct)   Figures: OK, slightly small    COWAT (FAS)   Raw: 30  SS: 8 T: 37    Animal Naming Test   Raw: 16  SS: 8 T: 35    [place] Naming Test   Raw: 54/60 SS: 9 T: 35    Trail Making Test    Raw  Err SS T   A 47  1 6 29   B 73  0 10 43    Letter Cancellation Test   Time:  135   Err: 9    Matthew-O   Raw T %ile     Time to Copy  175   >16     Copy    29  ?1     Short Delay Recall 17 45 31     Long Delay Recall 13 36 8     Recognition Total 19 41 18    AVLT     Trial 1    2    3     4     5     B   6    30     60    5    9    11   10   13   3    11   10   8          Mean (SD)   T5: 12.1(2.1)    T6: 9.9(2.8)  30 : 9.9(3.2)        30  Recog Hits/FPs 12/2 M(SD): 13.9(1.4)       60  Recog Hits/FPs 12/      AVLT-X Exagg. Index 2    WMS-IV  Raw SS/%ile     LM I  20 8     LM II  22 10     LM Recog. 26 >75th     VR I  33 9     VR II  22 9     VR Recog. 6 51-75th    Finger Tapping    Avg  SS T   RH  52.67 10 46   LH 36.33 6 30     Grooved Pegboard    Raw  Drops SS T   RH  64  0 10 51   LH 88  3 7 39    BDI-II  Raw: 4 Interpretation: Minimal    STAI  S: 33 %ile: 52 Interpretation: Average  T: 37 %ile: 68 Interpretation: Average    MMPI-2-RF   RCd: 42   RC1: 61   RC2: 42   RC3: 43   RC4: 54   RC6: 43   RC7: 48   RC8: 39   RC9: 46

## 2018-02-21 NOTE — PROGRESS NOTES
SUBJECTIVE:   Cesar Montejo is a 52 year old male who presents to clinic today for the following health issues:    Acute Illness   Acute illness concerns: Sinus problems  Onset: Over 1 year, headaches x3 weeks     Fever: no    Chills/Sweats: no    Headache (location?): YES- top of head, temples    Sinus Pressure:YES    Conjunctivitis:  no    Ear Pain: YES- occasional ear pain over the last 3 months. Pain lasts 20-30 seconds     Rhinorrhea: no    Congestion: YES- varies day to day     Sore Throat: no     Cough: no    Wheeze: no    Decreased Appetite: no    Nausea: no    Vomiting: no    Diarrhea:  no    Dysuria/Freq.: no    Fatigue/Achiness: no    Sick/Strep Exposure: no     Therapies Tried and outcome: Nothing tried       Seeing neurology for his symptoms, is declining a slppe study, awaiting neuropsych testing    Problem list and histories reviewed & adjusted, as indicated.  Additional history: as documented    Labs reviewed in EPIC    Reviewed and updated as needed this visit by clinical staff       Reviewed and updated as needed this visit by Provider         ROS:  Constitutional, HEENT, cardiovascular, pulmonary, gi and gu systems are negative, except as otherwise noted.    OBJECTIVE:     /88 (BP Location: Right arm, Patient Position: Chair, Cuff Size: Adult Regular)  Pulse 110  Temp 97.6  F (36.4  C) (Oral)  Wt 188 lb (85.3 kg)  SpO2 98%  BMI 25.15 kg/m2  Body mass index is 25.15 kg/(m^2).  GENERAL: alert, mild distress and fatigued  HENT: normal cephalic/atraumatic, ear canals and TM's normal, nose and mouth without ulcers or lesions, nasal mucosa edematous , rhinorrhea green, oropharynx clear, oral mucous membranes moist and sinuses: frontal, ethmoid, sphenoid tenderness on left, frontal, ethmoid swelling on right  RESP: lungs clear to auscultation - no rales, rhonchi or wheezes  CV: regular rate and rhythm, normal S1 S2, no S3 or S4, no murmur, click or rub, no peripheral edema and peripheral  pulses strong  ABDOMEN: soft, nontender, no hepatosplenomegaly, no masses and bowel sounds normal  NEURO: Normal strength and tone, mentation intact and speech normal    Diagnostic Test Results:  none     ASSESSMENT/PLAN:             1. Acute sinusitis with symptoms greater than 10 days  Will treat  - amoxicillin (AMOXIL) 500 MG capsule; Take 1 capsule (500 mg) by mouth 3 times daily  Dispense: 30 capsule; Refill: 0    Try steam/ test  Follow up only if unimproved.   Rocky Messina MD  [facility] [place]

## 2018-02-22 ENCOUNTER — OFFICE VISIT (OUTPATIENT)
Dept: FAMILY MEDICINE | Facility: CLINIC | Age: 53
End: 2018-02-22
Payer: COMMERCIAL

## 2018-02-22 VITALS
SYSTOLIC BLOOD PRESSURE: 126 MMHG | BODY MASS INDEX: 25.15 KG/M2 | WEIGHT: 188 LBS | TEMPERATURE: 97.6 F | OXYGEN SATURATION: 98 % | HEART RATE: 110 BPM | DIASTOLIC BLOOD PRESSURE: 88 MMHG

## 2018-02-22 DIAGNOSIS — J01.90 ACUTE SINUSITIS WITH SYMPTOMS GREATER THAN 10 DAYS: Primary | ICD-10-CM

## 2018-02-22 PROCEDURE — 99213 OFFICE O/P EST LOW 20 MIN: CPT | Performed by: FAMILY MEDICINE

## 2018-02-22 RX ORDER — AMOXICILLIN 500 MG/1
500 CAPSULE ORAL 3 TIMES DAILY
Qty: 30 CAPSULE | Refills: 0 | Status: SHIPPED | OUTPATIENT
Start: 2018-02-22 | End: 2018-03-21

## 2018-02-22 NOTE — MR AVS SNAPSHOT
After Visit Summary   2/22/2018    Cesar Montejo    MRN: [id number]           Patient Information     Date Of Birth          1965        Visit Information        Provider Department      2/22/2018 1:15 PM Rocky Messina [place] [facility] [place]        Today's Diagnoses     Acute sinusitis with symptoms greater than 10 days    -  1       Follow-ups after your visit        Who to contact     If you have questions or need follow up information about today's clinic visit or your schedule please contact [facility] directly at [phone].  Normal or non-critical lab and imaging results will be communicated to you by [employer], letter or phone within 4 business days after the clinic has received the results. If you do not hear from us within 7 days, please contact the clinic through [employer] or phone. If you have a critical or abnormal lab result, we will notify you by phone as soon as possible.  Submit refill requests through [employer] or call your pharmacy and they will forward the refill request to us. Please allow 3 business days for your refill to be completed.          Additional Information About Your Visit        MyChart Information     [employer] gives you secure access to your electronic health record. If you see a primary care provider, you can also send messages to your care team and make appointments. If you have questions, please call your primary care clinic.  If you do not have a primary care provider, please call [phone] and they will assist you.        Care EveryWhere ID     This is your Care EveryWhere ID. This could be used by other organizations to access your [facility] medical records  [id number]        Your Vitals Were     Pulse Temperature Pulse Oximetry BMI (Body Mass Index)          110 97.6  F (36.4  C) (Oral) 98% 25.15 kg/m2         Blood Pressure from Last 3 Encounters:   02/22/18 126/88   01/21/18 (!) 131/91   01/11/18 135/85    Weight from  Last 3 Encounters:   02/22/18 188 lb (85.3 kg)   01/21/18 183 lb 4 oz (83.1 kg)   01/11/18 185 lb 1.6 oz (84 kg)              Today, you had the following     No orders found for display         Today's Medication Changes          These changes are accurate as of 2/22/18  2:34 PM.  If you have any questions, ask your nurse or doctor.               Start taking these medicines.        Dose/Directions    amoxicillin 500 MG capsule   Commonly known as:  AMOXIL   Used for:  Acute sinusitis with symptoms greater than 10 days   Started by:  Rocky Messina MD        Dose:  500 mg   Take 1 capsule (500 mg) by mouth 3 times daily   Quantity:  30 capsule   Refills:  0            Where to get your medicines      These medications were sent to [facility] [address] [place] [address] & [address], [place] [address]     Phone:  [phone]     amoxicillin 500 MG capsule                Primary Care Provider Office Phone # Fax #    Rocky Messina [phone]       [address] [facility] [address]  [facility] [address]        Equal Access to Services     [facility] Center[facility]RISTAL AH: Hadii aad ku hadasho Soomaali, waaxda luqadaha, qaybta kaalmada adeegyada, prashant nelson. So [facility] [phone].    ATENCIÓN: Si habla español, tiene a persaud disposición servicios gratuitos de asistencia lingüística. [facility] [phone].    We comply with applicable federal civil rights laws and [place] laws. We do not discriminate on the basis of race, color, national origin, age, disability, sex, sexual orientation, or gender identity.            Thank you!     Thank you for choosing [facility] [place]  for your care. Our goal is always to provide you with excellent care. Hearing back from our patients is one way we can continue to improve our services. Please take a few minutes to complete the written survey that you may receive in the mail  after your visit with us. Thank you!             Your Updated Medication List - Protect others around you: Learn how to safely use, store and throw away your medicines at www.disposemymeds.org.          This list is accurate as of 2/22/18  2:34 PM.  Always use your most recent med list.                   Brand Name Dispense Instructions for use Diagnosis    amoxicillin 500 MG capsule    AMOXIL    30 capsule    Take 1 capsule (500 mg) by mouth 3 times daily    Acute sinusitis with symptoms greater than 10 days       ASPIRIN PO      Take 81 mg by mouth daily        Fish Oil 500 MG Caps      Take 1 capsule by mouth daily        mirtazapine 15 MG tablet    REMERON    90 tablet    TAKE 1 TABLET(15 MG) BY MOUTH AT BEDTIME    Mixed anxiety and depressive disorder, Insomnia       simvastatin 40 MG tablet    ZOCOR     Take 0.5 tablets by mouth daily        TURMERIC PO           VITAMIN D (CHOLECALCIFEROL) PO      Take 1,000 Units by mouth daily

## 2018-02-22 NOTE — NURSING NOTE
"Chief Complaint   Patient presents with     Sinus Problem     Headache       Initial /88 (BP Location: Right arm, Patient Position: Chair, Cuff Size: Adult Regular)  Pulse 110  Temp 97.6  F (36.4  C) (Oral)  Wt 188 lb (85.3 kg)  SpO2 98%  BMI 25.15 kg/m2 Estimated body mass index is 25.15 kg/(m^2) as calculated from the following:    Height as of 1/11/18: 6' 0.5\" (1.842 m).    Weight as of this encounter: 188 lb (85.3 kg).  Medication Reconciliation: complete     Demetria Peck CMA    "

## 2018-03-08 ENCOUNTER — OFFICE VISIT (OUTPATIENT)
Dept: FAMILY MEDICINE | Facility: CLINIC | Age: 53
End: 2018-03-08
Payer: COMMERCIAL

## 2018-03-08 VITALS
WEIGHT: 186.7 LBS | OXYGEN SATURATION: 95 % | SYSTOLIC BLOOD PRESSURE: 140 MMHG | DIASTOLIC BLOOD PRESSURE: 92 MMHG | HEART RATE: 130 BPM | BODY MASS INDEX: 24.97 KG/M2 | TEMPERATURE: 97.8 F

## 2018-03-08 DIAGNOSIS — J32.2 CHRONIC ETHMOIDAL SINUSITIS: ICD-10-CM

## 2018-03-08 DIAGNOSIS — G44.219 EPISODIC TENSION-TYPE HEADACHE, NOT INTRACTABLE: ICD-10-CM

## 2018-03-08 DIAGNOSIS — E78.00 HYPERCHOLESTEREMIA: Primary | ICD-10-CM

## 2018-03-08 DIAGNOSIS — R03.0 ELEVATED BLOOD PRESSURE READING WITHOUT DIAGNOSIS OF HYPERTENSION: ICD-10-CM

## 2018-03-08 PROCEDURE — 80053 COMPREHEN METABOLIC PANEL: CPT | Performed by: FAMILY MEDICINE

## 2018-03-08 PROCEDURE — 99214 OFFICE O/P EST MOD 30 MIN: CPT | Performed by: FAMILY MEDICINE

## 2018-03-08 PROCEDURE — 80061 LIPID PANEL: CPT | Performed by: FAMILY MEDICINE

## 2018-03-08 PROCEDURE — 36415 COLL VENOUS BLD VENIPUNCTURE: CPT | Performed by: FAMILY MEDICINE

## 2018-03-08 RX ORDER — AZITHROMYCIN 250 MG/1
TABLET, FILM COATED ORAL
Qty: 6 TABLET | Refills: 0 | Status: SHIPPED | OUTPATIENT
Start: 2018-03-08 | End: 2018-03-21

## 2018-03-08 NOTE — PROGRESS NOTES
SUBJECTIVE:   Cesar Montejo is a 52 year old male who presents to clinic today for the following health issues:    Acute Illness   Acute illness concerns: f/u on illness   Onset: Ongoing     Fever: no    Chills/Sweats: YES- chills at times     Headache (location?): YES- pain on the top of his head, intermittent     Sinus Pressure:YES    Conjunctivitis:  no    Ear Pain: not over the past 2 weeks     Rhinorrhea: no    Congestion: YES- off and on     Sore Throat: no     Cough: no    Wheeze: no    Decreased Appetite: no    Nausea: no    Vomiting: no    Diarrhea:  no    Dysuria/Freq.: no    Fatigue/Achiness: no    Sick/Strep Exposure: no     Therapies Tried and outcome: Finished amoxicillin but still having symptoms     Other questions/concerns: Patient feels that when he stands up from a sitting position his heart starts racing and his bp drops causing him to feel dizzy    Encounter Diagnoses   Name Primary?     Episodic tension-type headache, not intractable      Hypercholesteremia, wants recheck on his diet Yes     Chronic ethmoidal sinusitis      Elevated blood pressure reading without diagnosis of hypertension, notes rapid pulse, light headedness when sitin g up         Problem list and histories reviewed & adjusted, as indicated.  Additional history: as documented    Labs reviewed in EPIC    Reviewed and updated as needed this visit by clinical staff       Reviewed and updated as needed this visit by Provider         ROS:  Constitutional, HEENT, cardiovascular, pulmonary, gi and gu systems are negative, except as otherwise noted.    OBJECTIVE:     BP (!) 140/92  Pulse 130  Temp 97.8  F (36.6  C) (Oral)  Wt 186 lb 11.2 oz (84.7 kg)  SpO2 95%  BMI 24.97 kg/m2  Body mass index is 24.97 kg/(m^2).  GENERAL: alert and fatigued  HENT: normal cephalic/atraumatic, ear canals and TM's normal, nose and mouth without ulcers or lesions, nasal mucosa edematous , rhinorrhea yellow, oropharynx clear, oral mucous membranes  moist and sinuses: ethmoid, sphenoid tenderness on left  NECK: no adenopathy, no asymmetry, masses, or scars and thyroid normal to palpation  RESP: lungs clear to auscultation - no rales, rhonchi or wheezes  CV: regular rate and rhythm, normal S1 S2, no S3 or S4, no murmur, click or rub, no peripheral edema and peripheral pulses strong  ABDOMEN: soft, nontender, no hepatosplenomegaly, no masses and bowel sounds normal   foot exam: normal DP and PT pulses, no trophic changes or ulcerative lesions and normal sensory exam    Diagnostic Test Results:  Results for orders placed or performed during the hospital encounter of 01/21/18   CT Head Neck Angio w/o & w Contrast    Narrative    CT ANGIOGRAM OF THE HEAD AND NECK WITHOUT AND WITH CONTRAST  1/21/2018  4:38 PM     HISTORY: Right anterior/lateral neck pain, history of squamous cell  carcinoma of right cheek, Mohs surgery in 2016.     TECHNIQUE:  Precontrast localizing scans were followed by CT  angiography with an injection of 70 mL Isovue 370 IV with scans  through the head and neck.  Images were transferred to a separate 3-D  workstation where multiplanar reformations and 3-D images were  created.  Estimates of carotid stenoses are made relative to the  distal internal carotid artery diameters except as noted. Radiation  dose for this scan was reduced using automated exposure control,  adjustment of the mA and/or kV according to patient size, or iterative  reconstruction technique.    COMPARISON: MR angiogram 11/29/2017    CT HEAD FINDINGS:  No contrast enhancing lesions.   Cerebral blood  flow is grossly normal.    CT ANGIOGRAM HEAD FINDINGS:  Arteries are widely patent with no  aneurysm, significant stenosis, occlusion or intraarterial thrombus.  Venous circulation is unremarkable.     CT ANGIOGRAM NECK FINDINGS:   Right carotid artery: No significant stenosis.      Left carotid artery: No significant stenosis.      Vertebral arteries: No significant stenosis.       Other findings: Cysts in the maxillary sinuses.      Impression    IMPRESSION: Normal CT angiogram head and neck. No change.     KAT NG MD   CBC with platelets differential   Result Value Ref Range    WBC 6.6 4.0 - 11.0 10e9/L    RBC Count 5.38 4.4 - 5.9 10e12/L    Hemoglobin 17.1 13.3 - 17.7 g/dL    Hematocrit 50.4 40.0 - 53.0 %    MCV 94 78 - 100 fl    MCH 31.8 26.5 - 33.0 pg    MCHC 33.9 31.5 - 36.5 g/dL    RDW 12.5 10.0 - 15.0 %    Platelet Count 190 150 - 450 10e9/L    Diff Method Automated Method     % Neutrophils 54.0 %    % Lymphocytes 31.9 %    % Monocytes 12.5 %    % Eosinophils 0.8 %    % Basophils 0.6 %    % Immature Granulocytes 0.2 %    Nucleated RBCs 0 0 /100    Absolute Neutrophil 3.6 1.6 - 8.3 10e9/L    Absolute Lymphocytes 2.1 0.8 - 5.3 10e9/L    Absolute Monocytes 0.8 0.0 - 1.3 10e9/L    Absolute Eosinophils 0.1 0.0 - 0.7 10e9/L    Absolute Basophils 0.0 0.0 - 0.2 10e9/L    Abs Immature Granulocytes 0.0 0 - 0.4 10e9/L    Absolute Nucleated RBC 0.0    Basic metabolic panel   Result Value Ref Range    Sodium 142 133 - 144 mmol/L    Potassium 3.8 3.4 - 5.3 mmol/L    Chloride 107 94 - 109 mmol/L    Carbon Dioxide 26 20 - 32 mmol/L    Anion Gap 9 3 - 14 mmol/L    Glucose 113 (H) 70 - 99 mg/dL    Urea Nitrogen 16 7 - 30 mg/dL    Creatinine 0.88 0.66 - 1.25 mg/dL    GFR Estimate >90 >60 mL/min/1.7m2    GFR Estimate If Black >90 >60 mL/min/1.7m2    Calcium 9.1 8.5 - 10.1 mg/dL   Creatinine POCT   Result Value Ref Range    Creatinine 0.8 0.66 - 1.25 mg/dL    GFR Estimate >90 >60 mL/min/1.7m2    GFR Estimate If Black >90 >60 mL/min/1.7m2       ASSESSMENT/PLAN:             1. Episodic tension-type headache, not intractable  Slightly better, saw neurology  Follow up with consultant as planned.     2. Hypercholesteremia  recheck  - Comprehensive metabolic panel  - Lipid Profile      3. Chronic ethmoidal sinusitis  Steam/- azithromycin (ZITHROMAX) 250 MG tablet; Two tablets first day, then one  tablet daily for four days.  Dispense: 6 tablet; Refill: 0  - OTOLARYNGOLOGY REFERRAL    4. Elevated blood pressure reading without diagnosis of hypertension  Possible LOCO      Regular exercise  Follow up in 1 month.     Rocky Messina MD  [facility] [place]

## 2018-03-08 NOTE — MR AVS SNAPSHOT
After Visit Summary   3/8/2018    Cesar Montejo    MRN: [id number]           Patient Information     Date Of Birth          1965        Visit Information        Provider Department      3/8/2018 1:30 PM Rocky Messina [place] [facility] [place]        Today's Diagnoses     Hypercholesteremia    -  1    Chronic ethmoidal sinusitis           Follow-ups after your visit        Additional Services     OTOLARYNGOLOGY REFERRAL       Your provider has referred you to: [facility]: [facility] [phone]   [email or web address]    Please be aware that coverage of these services is subject to the terms and limitations of your health insurance plan.  Call member services at your health plan with any benefit or coverage questions.      Please bring the following with you to your appointment:    (1) Any X-Rays, CTs or MRIs which have been performed.  Contact the facility where they were done to arrange for  prior to your scheduled appointment.   (2) List of current medications  (3) This referral request   (4) Any documents/labs given to you for this referral                  Who to contact     If you have questions or need follow up information about today's clinic visit or your schedule please contact [facility] [place] directly at [phone].  Normal or non-critical lab and imaging results will be communicated to you by MyChart, letter or phone within 4 business days after the clinic has received the results. If you do not hear from us within 7 days, please contact the clinic through MyChart or phone. If you have a critical or abnormal lab result, we will notify you by phone as soon as possible.  Submit refill requests through [employer] or call your pharmacy and they will forward the refill request to us. Please allow 3 business days for your refill to be completed.          Additional Information  About Your Visit        import2hart Information     [employer] gives you secure access to your electronic health record. If you see a primary care provider, you can also send messages to your care team and make appointments. If you have questions, please call your primary care clinic.  If you do not have a primary care provider, please call [phone] and they will assist you.        Care EveryWhere ID     This is your Care EveryWhere ID. This could be used by other organizations to access your [facility] medical records  [id number]        Your Vitals Were     Pulse Temperature Pulse Oximetry BMI (Body Mass Index)          105 97.8  F (36.6  C) (Oral) 95% 24.97 kg/m2         Blood Pressure from Last 3 Encounters:   03/08/18 132/90   02/22/18 126/88   01/21/18 (!) 131/91    Weight from Last 3 Encounters:   03/08/18 186 lb 11.2 oz (84.7 kg)   02/22/18 188 lb (85.3 kg)   01/21/18 183 lb 4 oz (83.1 kg)              We Performed the Following     Comprehensive metabolic panel     Lipid Profile     OTOLARYNGOLOGY REFERRAL          Today's Medication Changes          These changes are accurate as of 3/8/18  1:54 PM.  If you have any questions, ask your nurse or doctor.               Start taking these medicines.        Dose/Directions    azithromycin 250 MG tablet   Commonly known as:  ZITHROMAX   Used for:  Hypercholesteremia        Two tablets first day, then one tablet daily for four days.   Quantity:  6 tablet   Refills:  0            Where to get your medicines      These medications were sent to [facility] [address] [place] [address] & [address], [place] [address]     Phone:  [phone]     azithromycin 250 MG tablet                Primary Care Provider Office Phone # Fax #    Rocky Messina [phone]       [address] [place] [address]        Equal Access to Services     CAROLINA PLASCENCIA AH: Jackie Mcdaniel,  watyda celso, qaybta kaalbharti guerrreo, prashant bertramin hayaan jadfidencio magalyrosa maria lajorgeemmy ah. So [facility] [phone].    ATENCIÓN: Si suze pina, tiene a persaud disposición servicios gratuitos de asistencia lingüística. Angelica al [phone].    We comply with applicable federal civil rights laws and [place] laws. We do not discriminate on the basis of race, color, national origin, age, disability, sex, sexual orientation, or gender identity.            Thank you!     Thank you for choosing [facility] [place]  for your [place]. Our goal is always to provide you with excellent care. Hearing back from our patients is one way we can continue to improve our services. Please take a few minutes to complete the written survey that you may receive in the mail after your visit with us. Thank you!             Your Updated Medication List - Protect others around you: Learn how to safely use, store and throw away your medicines at www.disposemymeds.org.          This list is accurate as of 3/8/18  1:54 PM.  Always use your most recent med list.                   Brand Name Dispense Instructions for use Diagnosis    amoxicillin 500 MG capsule    AMOXIL    30 capsule    Take 1 capsule (500 mg) by mouth 3 times daily    Acute sinusitis with symptoms greater than 10 days       ASPIRIN PO      Take 81 mg by mouth daily        azithromycin 250 MG tablet    ZITHROMAX    6 tablet    Two tablets first day, then one tablet daily for four days.    Hypercholesteremia       Fish Oil 500 MG Caps      Take 1 capsule by mouth daily        mirtazapine 15 MG tablet    REMERON    90 tablet    TAKE 1 TABLET(15 MG) BY MOUTH AT BEDTIME    Mixed anxiety and depressive disorder, Insomnia       simvastatin 40 MG tablet    ZOCOR     Take 0.5 tablets by mouth daily        TURMERIC PO           VITAMIN D (CHOLECALCIFEROL) PO      Take 1,000 Units by mouth daily

## 2018-03-09 ENCOUNTER — TELEPHONE (OUTPATIENT)
Dept: FAMILY MEDICINE | Facility: CLINIC | Age: 53
End: 2018-03-09

## 2018-03-09 LAB
ALBUMIN SERPL-MCNC: 4.4 G/DL (ref 3.4–5)
ALP SERPL-CCNC: 51 U/L (ref 40–150)
ALT SERPL W P-5'-P-CCNC: 25 U/L (ref 0–70)
ANION GAP SERPL CALCULATED.3IONS-SCNC: 8 MMOL/L (ref 3–14)
AST SERPL W P-5'-P-CCNC: 18 U/L (ref 0–45)
BILIRUB SERPL-MCNC: 0.7 MG/DL (ref 0.2–1.3)
BUN SERPL-MCNC: 12 MG/DL (ref 7–30)
CALCIUM SERPL-MCNC: 8.9 MG/DL (ref 8.5–10.1)
CHLORIDE SERPL-SCNC: 104 MMOL/L (ref 94–109)
CHOLEST SERPL-MCNC: 223 MG/DL
CO2 SERPL-SCNC: 25 MMOL/L (ref 20–32)
CREAT SERPL-MCNC: 0.83 MG/DL (ref 0.66–1.25)
GFR SERPL CREATININE-BSD FRML MDRD: >90 ML/MIN/1.7M2
GLUCOSE SERPL-MCNC: 88 MG/DL (ref 70–99)
HDLC SERPL-MCNC: 34 MG/DL
LDLC SERPL CALC-MCNC: 167 MG/DL
NONHDLC SERPL-MCNC: 189 MG/DL
POTASSIUM SERPL-SCNC: 4.1 MMOL/L (ref 3.4–5.3)
PROT SERPL-MCNC: 7.2 G/DL (ref 6.8–8.8)
SODIUM SERPL-SCNC: 137 MMOL/L (ref 133–144)
TRIGL SERPL-MCNC: 109 MG/DL

## 2018-03-13 ENCOUNTER — OFFICE VISIT (OUTPATIENT)
Dept: NEUROLOGY | Facility: CLINIC | Age: 53
End: 2018-03-13
Payer: COMMERCIAL

## 2018-03-13 VITALS
HEIGHT: 72 IN | HEART RATE: 102 BPM | BODY MASS INDEX: 24.72 KG/M2 | DIASTOLIC BLOOD PRESSURE: 90 MMHG | SYSTOLIC BLOOD PRESSURE: 123 MMHG | WEIGHT: 182.5 LBS

## 2018-03-13 DIAGNOSIS — R00.0 TACHYCARDIA: ICD-10-CM

## 2018-03-13 DIAGNOSIS — R41.3 MEMORY LOSS: Primary | ICD-10-CM

## 2018-03-13 RX ORDER — DONEPEZIL HYDROCHLORIDE 5 MG/1
10 TABLET, FILM COATED ORAL AT BEDTIME
Qty: 60 TABLET | Refills: 3 | Status: SHIPPED | OUTPATIENT
Start: 2018-03-13 | End: 2018-05-09

## 2018-03-13 ASSESSMENT — PAIN SCALES - GENERAL: PAINLEVEL: NO PAIN (0)

## 2018-03-13 NOTE — MR AVS SNAPSHOT
After Visit Summary   3/13/2018    Cesar Montejo    MRN: [id number]           Patient Information     Date Of Birth          1965        Visit Information        Provider Department      3/13/2018 11:00 AM James Arreola MD [facility] Neurology        Today's Diagnoses     Memory loss    -  1    Tachycardia           Follow-ups after your visit        Additional Services     CARDIOLOGY EVAL ADULT REFERRAL       Preferred location:Dr Riccardo Tran--assess for autonomic dysautonomia with overactive--with reactive tachycardia of 55 beats increase from baseline of 80.? Tilt table test    Please be aware that coverage of these services is subject to the terms and limitations of your health insurance plan.  Call member services at your health plan with any benefit or coverage questions.      Please bring the following to your appointment:  Any x-rays, CTs or MRIs which have been performed. Contact the facility where they were done to arrange for  prior to your scheduled appointment.    List of current medications  This referral request   Any documents/labs given to you for this referral                  Follow-up notes from your care team     Return in about 3 months (around 6/13/2018).      Your next 10 appointments already scheduled     Jun 14, 2018  2:00 PM CDT   (Arrive by 1:45 PM)   Return Visit with James Arreola MD   [facility] Neurology ([facility])    [address]  3rd Floor  [facility] [address] [phone]            Jul 12, 2018  5:00 PM CDT   (Arrive by 4:45 PM)   POSTURAL ORTHOSTATIC SYCOPE with Riccardo Julien MD   [facility] Heart Care ([facility])    [address] [place] [address]  [address] [place] [address] [phone]              Who to contact     Please call your clinic at [phone] to:    Ask questions about your health    Make or cancel appointments    Discuss your medicines    Learn about your test  results    Speak to your doctor            Additional Information About Your Visit        Rolladharmy[employer]match Information     [employer] gives you secure access to your electronic health record. If you see a primary care provider, you can also send messages to your care team and make appointments. If you have questions, please call your primary care clinic.  If you do not have a primary care provider, please call [phone] and they will assist you.      [employer] is an electronic gateway that provides easy, online access to your medical records. With [employer], you can request a clinic appointment, read your test results, renew a prescription or communicate with your care team.     To access your existing account, please contact your [facility] or call [phone] for assistance.        Care EveryWhere ID     This is your Care EveryWhere ID. This could be used by other organizations to access your [place] medical records  [id number]        Your Vitals Were     Pulse Height BMI (Body Mass Index)             102 1.829 m (6') 24.75 kg/m2          Blood Pressure from Last 3 Encounters:   03/13/18 123/90   03/08/18 (!) 140/92   02/22/18 126/88    Weight from Last 3 Encounters:   03/13/18 82.8 kg (182 lb 8 oz)   03/08/18 84.7 kg (186 lb 11.2 oz)   02/22/18 85.3 kg (188 lb)              We Performed the Following     CARDIOLOGY EVAL ADULT REFERRAL          Today's Medication Changes          These changes are accurate as of 3/13/18 12:23 PM.  If you have any questions, ask your nurse or doctor.               Start taking these medicines.        Dose/Directions    donepezil 5 MG tablet   Commonly known as:  ARICEPT   Used for:  Memory loss, Tachycardia   Started by:  James Arreola MD        Dose:  10 mg   Take 2 tablets (10 mg) by mouth At Bedtime   Quantity:  60 tablet   Refills:  3            Where to get your medicines      [facility] [address] [place] [address] -  [address]  [address]  [facility] [place] [address]     Phone:  [phone]     donepezil 5 MG tablet                Primary Care Provider Office Phone # Fax #    Rocky Rigo Messina [phone]       [address] [facility] [address]  [facility] [place] [facility] [address]        Equal Access to Services     [facility]NA [facility] : Hadii aad ku hadasho Soomaali, waaxda luqadaha, qaybta kaalmada adeegyada, waxay idiin hayaan adeeg kharash la'candicen ah. So [facility] [phone].    ATENCIÓN: Si haydeela espmatheus, tiene a persaud disposición servicios gratuitos de asistencia lingüística. [facility] [phone].    We comply with applicable federal civil rights laws and [place] laws. We do not discriminate on the basis of race, color, national origin, age, disability, sex, sexual orientation, or gender identity.            Thank you!     Thank you for choosing [facility] NEUROLOGY  for your care. Our goal is always to provide you with excellent care. Hearing back from our patients is one way we can continue to improve our services. Please take a few minutes to complete the written survey that you may receive in the mail after your visit with us. Thank you!             Your Updated Medication List - Protect others around you: Learn how to safely use, store and throw away your medicines at www.disposemymeds.org.          This list is accurate as of 3/13/18 12:23 PM.  Always use your most recent med list.                   Brand Name Dispense Instructions for use Diagnosis    amoxicillin 500 MG capsule    AMOXIL    30 capsule    Take 1 capsule (500 mg) by mouth 3 times daily    Acute sinusitis with symptoms greater than 10 days       ASPIRIN PO      Take 81 mg by mouth daily        azithromycin 250 MG tablet    ZITHROMAX    6 tablet    Two tablets first day, then one tablet daily for four days.    Hypercholesteremia       donepezil 5 MG tablet    ARICEPT    60 tablet    Take 2 tablets (10 mg) by mouth At Bedtime    Memory  loss, Tachycardia       Fish Oil 500 MG Caps      Take 1 capsule by mouth daily        mirtazapine 15 MG tablet    REMERON    90 tablet    TAKE 1 TABLET(15 MG) BY MOUTH AT BEDTIME    Mixed anxiety and depressive disorder, Insomnia       simvastatin 40 MG tablet    ZOCOR     Take 0.5 tablets by mouth daily        TURMERIC PO           VITAMIN D (CHOLECALCIFEROL) PO      Take 1,000 Units by mouth daily

## 2018-03-13 NOTE — LETTER
3/13/2018       RE: Cesar Montejo  [address]   [facility] [address]     Dear Colleague,    Thank you for referring your patient, Cesar Montejo, to the [facility] NEUROLOGY at General acute hospital. Please see a copy of my visit note below.    Service Date: 2018       RE: Cesar Montejo   MRN: [id number]   : 1965      Dear Dr. 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  His cognitive issues and concerns that he may have a postconcussive syndrome and/or a degenerative disorder involving cognition.  I repeated his neuropsych, compared them to 2012 and they were unchanged or perhaps showed slight improvement.  He is still concerned about the presence of a degenerative disorder concern and the picture does not suggest that nor does the MRI.  He has had a normal EEG before when he had 2 previous concussions, which are described in previous correspondence.      The question at this point and this may not be totally applicable is whether any further imaging study such as a PET scan or Devens substance scan would be appropriate, although I am not inclined to do that, at this point the issue has come up.      2.  Issue 2 is dysautonomia.  Patient feels his pulse sometimes races unexpectedly especially when he gets abruptly and he may have some balance issues and he said he has recorded readings.  They are going from 80 baseline to 135 without any blood pressure changes.  He did have this recently recorded in the office of his primary doctor.  Regarding the possibility of some sort of dysautonomic syndrome, we have requested Dr. Riccardo Julien from Cardiology to see him and possibly do a tilt table test.  The patient had previously been seen in May by Cardiology for dyslipidemia.      3.  REM behavior disorder.  The patient reports his concerns about synucleinopathy and he has some behaviors he would  list to me in a separate correspondence about whether he has this.  He had a trial of a sleep test in the past for an evaluation of a behavior disorder and he opted out of the procedure because of severe anxiety.      Today, he appears very much the same as before, talkative, although calm.  He has a blood pressure reading at 123/90 and the pulse is 102.      In summary above, I list the issues with him.  I cannot render a diagnosis at this point.  He has gotten to a point where he wants to know but is also concerned about knowing.  He wants to try, and I certainly thought perhaps was worth it, donepezil and we will try 5 mg at bedtime and increase to 10 for a short period of time and see if it improves his perceived cognitive problems.      It is difficult to separate how much anxiety and fears are ingrained in the whole process.  It may be difficult to tease them out.      We will see him in followup and proceed with the above as described.         D: 2018   T: 2018   MT: TANG      Name:     JONATHAN FIORE   MRN:      [id number]        Account:      [id number]   :      1965           Service Date: 2018      Document: [id number]       Again, thank you for allowing me to participate in the care of your patient.      Sincerely,    James Arreola MD    cc:   Riccardo Julein MD    Physicians   [address] [place], [place] [address]   [place]  [address]    Rocky Messina MD   [address]   [place]  [address]

## 2018-03-14 NOTE — PROGRESS NOTES
Service Date: 2018      Rocky Messina MD   [address]  [address]      RE: Cesar Montejo   MRN: [id number]   : 1965      Dear Dr. 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  His cognitive issues and concerns that he may have a postconcussive syndrome and/or a degenerative disorder involving cognition.  I repeated his neuropsych, compared them to 2012 and they were unchanged or perhaps showed slight improvement.  He is still concerned about the presence of a degenerative disorder concern and the picture does not suggest that nor does the MRI.  He has had a normal EEG before when he had 2 previous concussions, which are described in previous correspondence.      The question at this point and this may not be totally applicable is whether any further imaging study such as a PET scan or Hixson substance scan would be appropriate, although I am not inclined to do that, at this point the issue has come up.      2.  Issue 2 is dysautonomia.  Patient feels his pulse sometimes races unexpectedly especially when he gets abruptly and he may have some balance issues and he said he has recorded readings.  They are going from 80 baseline to 135 without any blood pressure changes.  He did have this recently recorded in the office of his primary doctor.  Regarding the possibility of some sort of dysautonomic syndrome, we have requested Dr. Riccardo Julien from Cardiology to see him and possibly do a tilt table test.  The patient had previously been seen in May by Cardiology for dyslipidemia.      3.  REM behavior disorder.  The patient reports his concerns about synucleinopathy and he has some behaviors he would list to me in a separate correspondence about whether he has this.  He had a trial of a sleep test in the past for an evaluation of a behavior disorder and he opted out of  the procedure because of severe anxiety.      Today, he appears very much the same as before, talkative, although calm.  He has a blood pressure reading at 123/90 and the pulse is 102.      In summary above, I list the issues with him.  I cannot render a diagnosis at this point.  He has gotten to a point where he wants to know but is also concerned about knowing.  He wants to try, and I certainly thought perhaps was worth it, donepezil and we will try 5 mg at bedtime and increase to 10 for a short period of time and see if it improves his perceived cognitive problems.      It is difficult to separate how much anxiety and fears are ingrained in the whole process.  It may be difficult to tease them out.      We will see him in followup and proceed with the above as described.      Sincerely,      cc:   Riccardo Julien MD    Physicians   [address] [place] [address] [address]   [place]  [address]         KILLIAN SUBRAMANIAN MD             D: 2018   T: 2018   MT: TANG      Name:     JONATHAN FIORE   MRN:      [id number]        Account:      [id number]   :      1965           Service Date: 2018      Document: [id number]

## 2018-03-21 ENCOUNTER — OFFICE VISIT (OUTPATIENT)
Dept: NEUROLOGY | Facility: CLINIC | Age: 53
End: 2018-03-21
Payer: COMMERCIAL

## 2018-03-21 VITALS
WEIGHT: 182.2 LBS | HEIGHT: 72 IN | DIASTOLIC BLOOD PRESSURE: 89 MMHG | SYSTOLIC BLOOD PRESSURE: 143 MMHG | BODY MASS INDEX: 24.68 KG/M2 | HEART RATE: 118 BPM

## 2018-03-21 DIAGNOSIS — R41.9 COGNITIVE COMPLAINTS: ICD-10-CM

## 2018-03-21 DIAGNOSIS — G25.0 TREMOR, ESSENTIAL: ICD-10-CM

## 2018-03-21 DIAGNOSIS — G47.52 DREAM ENACTMENT BEHAVIOR: Primary | ICD-10-CM

## 2018-03-21 ASSESSMENT — ENCOUNTER SYMPTOMS
SEIZURES: 0
SYNCOPE: 0
DIZZINESS: 1
LOSS OF CONSCIOUSNESS: 0
NUMBNESS: 0
HYPOTENSION: 0
WEAKNESS: 0
MEMORY LOSS: 0
LEG PAIN: 0
PALPITATIONS: 1
SLEEP DISTURBANCES DUE TO BREATHING: 0
DISTURBANCES IN COORDINATION: 1
TINGLING: 1
TREMORS: 1
PARALYSIS: 0
EXERCISE INTOLERANCE: 1
LIGHT-HEADEDNESS: 1
SPEECH CHANGE: 0
ORTHOPNEA: 0
HEADACHES: 0
HYPERTENSION: 0

## 2018-03-21 ASSESSMENT — PAIN SCALES - GENERAL: PAINLEVEL: NO PAIN (0)

## 2018-03-21 NOTE — MR AVS SNAPSHOT
After Visit Summary   3/21/2018    Cesar Montejo    MRN: [id number]           Patient Information     Date Of Birth          1965        Visit Information        Provider Department      3/21/2018 2:30 PM Nguyen Mcclain MD [facility] Neurology        Care Instructions    We would recommend a sleep study to evaluate causes of your acting out your dreams.     Consider adding melatonin to see if this helps your acting out of dreams. This medication be started at 3-5 mg and you can slowly go up to 9-12mg.          Follow-ups after your visit        Follow-up notes from your care team     Return in about 1 year (around 3/21/2019).      Your next 10 appointments already scheduled     Mar 22, 2018 10:30 AM CDT   CT MAXILLOFACIAL W/O CONTRAST with URCT1   [facility] [place] Radiology ([facility])    [address] [place] [address] [phone]           Please bring any scans or X-rays taken at other hospitals, if similar tests were done. Also bring a list of your medicines, including vitamins, minerals and over-the-counter drugs. It is safest to leave personal items at home.  Be sure to tell your doctor:   If you have any allergies.   If there s any chance you are pregnant.   If you are breastfeeding.  You do not need to do anything special to prepare for this exam.  Please wear loose clothing, such as a sweat suit or jogging clothes. Avoid snaps, zippers and other metal. We may ask you to undress and put on a hospital gown.            Jun 14, 2018  2:00 PM CDT   (Arrive by 1:45 PM)   Return Visit with James Arreola MD   [facility] Neurology ([facility])    [address] [place] [address] [phone]            Jul 12, 2018  5:00 PM CDT   (Arrive by 4:45 PM)   POSTURAL ORTHOSTATIC SYCOPE with Riccardo Julien MD   [facility] Heart Care ([facility])    [facility]  [address]  [address]  [facility] [address]   [phone]            Mar 13, 2019  5:00 PM CDT   (Arrive by 4:45 PM)   Return Movement Disorder with Vida Solomon MD   [facility] Neurology ([facility] [place])    [address]  3rd Floor  [facility] [address] [phone]              Future tests that were ordered for you today     Open Future Orders        Priority Expected Expires Ordered    CT Maxillofacial w/o Contrast Routine  3/20/2019 3/20/2018            Who to contact     Please call your clinic at [phone] to:    Ask questions about your health    Make or cancel appointments    Discuss your medicines    Learn about your test results    Speak to your doctor            Additional Information About Your Visit        [employer] Information     [employer] gives you secure access to your electronic health record. If you see a primary care provider, you can also send messages to your care team and make appointments. If you have questions, please call your primary care clinic.  If you do not have a primary care provider, please call [phone] and they will assist you.      [employer] is an electronic gateway that provides easy, online access to your medical records. With [employer], you can request a clinic appointment, read your test results, renew a prescription or communicate with your care team.     To access your existing account, please contact your [facility] or call [phone] for assistance.        Care EveryWhere ID     This is your Care EveryWhere ID. This could be used by other organizations to access your [place] medical records  [id number]        Your Vitals Were     Pulse Height BMI (Body Mass Index)             118 1.829 m (6') 24.71 kg/m2          Blood Pressure from Last 3 Encounters:   03/21/18 143/89   03/13/18 123/90   03/08/18 (!) 140/92    Weight from Last 3 Encounters:   03/21/18 82.6 kg (182 lb 3.2 oz)    03/13/18 82.8 kg (182 lb 8 oz)   03/08/18 84.7 kg (186 lb 11.2 oz)              Today, you had the following     No orders found for display       Primary Care Provider Office Phone # Fax #    Rocky Rigo Messina [phone]       [address]  [facility] [address]        Equal Access to Services     [facility] : Hadii aad ku hadasho Soomaali, waaxda luqadaha, qaybta kaalmada adeegyada, waxay idiin hayaan adeeg kharash la'aan ah. So [facility] [phone].    ATENCIÓN: Si habla silvana, tiene a persaud disposición servicios gratuitos de asistencia lingüística. Kajalame al [phone].    We comply with applicable federal civil rights laws and [place] laws. We do not discriminate on the basis of race, color, national origin, age, disability, sex, sexual orientation, or gender identity.            Thank you!     Thank you for choosing [facility] NEUROLOGY  for your care. Our goal is always to provide you with excellent care. Hearing back from our patients is one way we can continue to improve our services. Please take a few minutes to complete the written survey that you may receive in the mail after your visit with us. Thank you!             Your Updated Medication List - Protect others around you: Learn how to safely use, store and throw away your medicines at www.disposemymeds.org.          This list is accurate as of 3/21/18  5:20 PM.  Always use your most recent med list.                   Brand Name Dispense Instructions for use Diagnosis    ASPIRIN PO      Take 81 mg by mouth daily        donepezil 5 MG tablet    ARICEPT    60 tablet    Take 2 tablets (10 mg) by mouth At Bedtime    Memory loss, Tachycardia       KRILL OIL PO           mirtazapine 15 MG tablet    REMERON    90 tablet    TAKE 1 TABLET(15 MG) BY MOUTH AT BEDTIME    Mixed anxiety and depressive disorder, Insomnia       simvastatin 40 MG tablet    ZOCOR     Take 0.5 tablets by mouth daily        TURMERIC PO           VITAMIN D  (CHOLECALCIFEROL) PO      Take 1,000 Units by mouth daily
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
## 2018-03-21 NOTE — PATIENT INSTRUCTIONS
We would recommend a sleep study to evaluate causes of your acting out your dreams.     Consider adding melatonin to see if this helps your acting out of dreams. This medication be started at 3-5 mg and you can slowly go up to 9-12mg.
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
## 2018-03-22 ENCOUNTER — HOSPITAL ENCOUNTER (OUTPATIENT)
Dept: CT IMAGING | Facility: CLINIC | Age: 53
Discharge: HOME | End: 2018-03-22
Attending: OTOLARYNGOLOGY | Admitting: OTOLARYNGOLOGY
Payer: COMMERCIAL

## 2018-03-22 DIAGNOSIS — R51.9 CHRONIC INTRACTABLE HEADACHE, UNSPECIFIED HEADACHE TYPE: ICD-10-CM

## 2018-03-22 DIAGNOSIS — G89.29 CHRONIC INTRACTABLE HEADACHE, UNSPECIFIED HEADACHE TYPE: ICD-10-CM

## 2018-03-22 PROCEDURE — 70486 CT MAXILLOFACIAL W/O DYE: CPT

## 2018-03-22 ASSESSMENT — PATIENT HEALTH QUESTIONNAIRE - PHQ9: SUM OF ALL RESPONSES TO PHQ QUESTIONS 1-9: 2

## 2018-03-26 PROBLEM — G47.52 DREAM ENACTMENT BEHAVIOR: Status: ACTIVE | Noted: 2018-03-26

## 2018-05-06 ENCOUNTER — HOSPITAL ENCOUNTER (EMERGENCY)
Facility: CLINIC | Age: 53
Discharge: HOME | End: 2018-05-06
Attending: FAMILY MEDICINE | Admitting: FAMILY MEDICINE
Payer: COMMERCIAL

## 2018-05-06 VITALS
RESPIRATION RATE: 16 BRPM | HEART RATE: 73 BPM | DIASTOLIC BLOOD PRESSURE: 96 MMHG | TEMPERATURE: 98.5 F | SYSTOLIC BLOOD PRESSURE: 137 MMHG | OXYGEN SATURATION: 96 %

## 2018-05-06 DIAGNOSIS — R47.9 SPEECH DISTURBANCE, UNSPECIFIED TYPE: ICD-10-CM

## 2018-05-06 LAB
BASOPHILS # BLD AUTO: 0.1 10E9/L (ref 0–0.2)
BASOPHILS NFR BLD AUTO: 0.8 %
DIFFERENTIAL METHOD BLD: NORMAL
EOSINOPHIL # BLD AUTO: 0 10E9/L (ref 0–0.7)
EOSINOPHIL NFR BLD AUTO: 0.5 %
ERYTHROCYTE [DISTWIDTH] IN BLOOD BY AUTOMATED COUNT: 12.4 % (ref 10–15)
HCT VFR BLD AUTO: 48.3 % (ref 40–53)
HGB BLD-MCNC: 16.5 G/DL (ref 13.3–17.7)
IMM GRANULOCYTES # BLD: 0 10E9/L (ref 0–0.4)
IMM GRANULOCYTES NFR BLD: 0.2 %
LYMPHOCYTES # BLD AUTO: 2.3 10E9/L (ref 0.8–5.3)
LYMPHOCYTES NFR BLD AUTO: 38.9 %
MCH RBC QN AUTO: 31.4 PG (ref 26.5–33)
MCHC RBC AUTO-ENTMCNC: 34.2 G/DL (ref 31.5–36.5)
MCV RBC AUTO: 92 FL (ref 78–100)
MONOCYTES # BLD AUTO: 0.5 10E9/L (ref 0–1.3)
MONOCYTES NFR BLD AUTO: 8.7 %
NEUTROPHILS # BLD AUTO: 3 10E9/L (ref 1.6–8.3)
NEUTROPHILS NFR BLD AUTO: 50.9 %
NRBC # BLD AUTO: 0 10*3/UL
NRBC BLD AUTO-RTO: 0 /100
PLATELET # BLD AUTO: 198 10E9/L (ref 150–450)
RBC # BLD AUTO: 5.25 10E12/L (ref 4.4–5.9)
TROPONIN I SERPL-MCNC: <0.015 UG/L (ref 0–0.04)
WBC # BLD AUTO: 5.9 10E9/L (ref 4–11)

## 2018-05-06 PROCEDURE — 99284 EMERGENCY DEPT VISIT MOD MDM: CPT | Mod: 25 | Performed by: FAMILY MEDICINE

## 2018-05-06 PROCEDURE — 85025 COMPLETE CBC W/AUTO DIFF WBC: CPT | Performed by: FAMILY MEDICINE

## 2018-05-06 PROCEDURE — 84484 ASSAY OF TROPONIN QUANT: CPT | Performed by: FAMILY MEDICINE

## 2018-05-06 PROCEDURE — 93005 ELECTROCARDIOGRAM TRACING: CPT | Performed by: FAMILY MEDICINE

## 2018-05-06 PROCEDURE — 99284 EMERGENCY DEPT VISIT MOD MDM: CPT | Performed by: FAMILY MEDICINE

## 2018-05-06 PROCEDURE — 93010 ELECTROCARDIOGRAM REPORT: CPT | Mod: Z6 | Performed by: FAMILY MEDICINE

## 2018-05-06 NOTE — ED AVS SNAPSHOT
[facility], Emergency Department    [address]    [place] [place] [address]    Phone:  [phone]    Fax:  [phone]                                       Cesar Montejo   MRN: [id number]    Department:  [facility], Emergency Department   Date of Visit:  5/6/2018           After Visit Summary Signature Page     I have received my discharge instructions, and my questions have been answered. I have discussed any challenges I see with this plan with the nurse or doctor.    ..........................................................................................................................................  Patient/Patient Representative Signature      ..........................................................................................................................................  Patient Representative Print Name and Relationship to Patient    ..................................................               ................................................  Date                                            Time    ..........................................................................................................................................  Reviewed by Signature/Title    ...................................................              ..............................................  Date                                                            Time

## 2018-05-06 NOTE — ED PROVIDER NOTES
"    [facility] EMERGENCY DEPARTMENT ([facility])    5/06/18       History     Chief Complaint   Patient presents with     Dizziness     Pt reports int. dizziness, slurring of speech, bilateral jaw tightness, headache. Sx onset Friday     HPI  Cesar Montejo is a 52 year old male who presents with complaints of lightheadedness for several weeks, increasing \"imbalance sensation\", feeling as if he is not thinking as clear and not being able to \"control my tongue as well\". All of these complaint have been chronic but increasing over the last 2 to 3 weeks on questioning. He is also getting \"shooting pains in head\". These are brief,less than several seconds. He also reports feeling his jaw is tight. No cp. No soa. nopalpitations. No occurrence with walking or exercise. 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  He has been seen and evaluated by [facility] neurology for this by Dr. James Arreola and Dr. Nguyen Mcclain of the Movement disorders clinic. He has had multiple head bumps in the past - non recently. He reports that \"I am very concerned about dementia\". There is no vision problems. No real change in gait. No trauma to head lately.    In review of chart he has had normal MRA of head and neck in 2015, normal unenhanced head ct 11/2017 and normal MRI of brain 11/2017 and normal MRA of head at same time- these for essentially same symptoms. He has been seen twice in [facility] neurology. At one time aricept recommended but he has never taken. Other meds are remeron at , zoczr, and vitamin supplements.    No tobacco, street drugs or etoh.    I have reviewed the Medications, Allergies, Past Medical and Surgical History, and Social History in the Epic system.    Review of Systems   Constitutional: Negative for chills, fatigue and fever.   HENT: Negative for congestion.    Respiratory: Negative for shortness of breath.    Cardiovascular: Negative " for chest pain, palpitations and leg swelling.   Gastrointestinal: Negative for nausea and vomiting.   Musculoskeletal: Negative.    Skin: Negative.    Allergic/Immunologic: Negative for immunocompromised state.   Neurological: Positive for speech difficulty and light-headedness.        See hpi   Hematological: Negative.    All other systems reviewed and are negative.      Physical Exam   BP: (!) 143/93  Pulse: 75  Temp: 98.4  F (36.9  C)  Resp: 16  SpO2: 95 %      Physical Exam   Constitutional: He is oriented to person, place, and time. He appears well-developed and well-nourished. No distress.   flat affect, monotone speech   HENT:   Head: Normocephalic and atraumatic.   Eyes: Pupils are equal, round, and reactive to light.   Fields full to confront  EOMs full  Fundi grossly normal   Neck: Normal range of motion. Neck supple.   Cardiovascular: Normal rate, regular rhythm, normal heart sounds and intact distal pulses.    No murmur heard.  Pulmonary/Chest: Effort normal and breath sounds normal.   Abdominal: Soft. He exhibits no mass.   Musculoskeletal: He exhibits no edema.   Lymphadenopathy:     He has no cervical adenopathy.   Neurological: He is alert and oriented to person, place, and time. He has normal reflexes. He displays normal reflexes. No cranial nerve deficit. He exhibits normal muscle tone. Coordination normal.   Gait is normal  rapdi finger motion normal bilaterally  F to N and H to S normal  Mentation normal grossly  The pt is able to read a 5 sentence paragraph from the [place] quickly and easily without missing a beat.   Skin: He is not diaphoretic.   Psychiatric:   Flat with anxiety  No suicidal orhomicidal ideations  No delusions   Nursing note and vitals reviewed.      ED Course     ED Course     Procedures        EKG done. NSR with rate of 80. Intervals are normal. There are no st/ t wave abnormalities.  Troponin is normal  CBC normal        Labs Ordered and Resulted from Time of ED  Arrival Up to the Time of Departure from the ED   CBC WITH PLATELETS DIFFERENTIAL   TROPONIN I            Assessments & Plan (with Medical Decision Making)   Chronic neuro complaints- worse subjectively over several weeks. I find nothing acute and there is No indication for any imaging  I wanted to make certain the jaw complaints were no cardiac related- therefore EKG and trop- normal  He needs follow up with primary care and neuro. To discuss his concerns.    I have reviewed the nursing notes.    I have reviewed the findings, diagnosis, plan and need for follow up with the patient.    Discharge Medication List as of 5/6/2018  9:49 PM          Final diagnoses:   Speech disturbance, unspecified type   I, Dora Noguera, am serving as a trained medical scribe to document services personally performed by Devin Sewell MD based on the provider's statements to me on May 6, 2018.  This document has been checked and approved by the attending provider.    I, Devin Sewell MD, was physically present and have reviewed and verified the accuracy of this note documented by Dora Noguera, medical scribe.       5/6/2018   [facility] [place], EMERGENCY DEPARTMENT     Devin Sewell MD  05/08/18 1516

## 2018-05-06 NOTE — ED AVS SNAPSHOT
[facility], Emergency Department    [address]    [place] [address]    Phone:  [phone]    Fax:  [phone]                                       Cesar Montejo   MRN: [id number]    Department:  [facility], Emergency Department   Date of Visit:  5/6/2018           Patient Information     Date Of Birth          1965        Your diagnoses for this visit were:     Speech disturbance, unspecified type        You were seen by Devin Sewell MD.        Discharge Instructions       Suggest you see your neurologist soon to discuss the speech. There is no indication for a stroke or tia clinically  Your heart is fine. The ekg is normal and the troponin is normal. The tight jaw is not cardiac.  Keep your appointment for the tilt table this week  If shortness or air, chest pain return to the emergency room  The speaking difficulty is from the ongoing imbalance and concussion issues I believe  If you begin to have arm or leg weakness, vision loss or double vision, inability to find words return urgently  It may be a good idea to see Dr Messina this week to discuss the overall picture of your health    Your next 10 appointments already scheduled     May 14, 2018 10:00 AM CDT   (Arrive by 9:45 AM)   POSTURAL ORTHOSTATIC SYCOPE with Lee Ann Laughlin MD   [facility] ([facility])    [address] [id number] [phone]            Jun 14, 2018  2:00 PM CDT   (Arrive by 1:45 PM)   Return Visit with James Arreola MD   [facility] ([facility])    [address] [id number] [phone]            Mar 13, 2019  5:00 PM CDT   (Arrive by 4:45 PM)   Return Movement Disorder with Vida Solomon MD   [facility] ([facility])    [address] [id number] [phone]              24 Hour Appointment Hotline        To make an appointment at any [facility], call [phone] ([phone]). If you don't have a family doctor or clinic, we will help you find one. [facility] clinics are conveniently located to serve the needs of you and your family.             Review of your medicines      Our records show that you are taking the medicines listed below. If these are incorrect, please call your family doctor or clinic.        Dose / Directions Last dose taken    ASPIRIN PO   Dose:  81 mg        Take 81 mg by mouth daily   Refills:  0        donepezil 5 MG tablet   Commonly known as:  ARICEPT   Dose:  10 mg   Quantity:  60 tablet        Take 2 tablets (10 mg) by mouth At Bedtime   Refills:  3        KRILL OIL PO        Refills:  0        mirtazapine 15 MG tablet   Commonly known as:  REMERON   Quantity:  90 tablet        TAKE 1 TABLET(15 MG) BY MOUTH AT BEDTIME   Refills:  3        MULTIVITAL PO        Refills:  0        simvastatin 40 MG tablet   Commonly known as:  ZOCOR   Dose:  0.5 tablet        Take 0.5 tablets by mouth daily   Refills:  0        TURMERIC PO        Refills:  0        VITAMIN D (CHOLECALCIFEROL) PO   Dose:  1000 Units        Take 1,000 Units by mouth daily   Refills:  0                Procedures and tests performed during your visit     CBC with platelets differential    EKG 12 lead    Troponin I      Orders Needing Specimen Collection     None      Pending Results     No orders found from 5/4/2018 to 5/7/2018.            Pending Culture Results     No orders found from 5/4/2018 to 5/7/2018.            Pending Results Instructions     If you had any lab results that were not finalized at the time of your Discharge, you can call the ED Lab Result RN at [phone]. You will be contacted by this team for any positive Lab results or changes in treatment. The nurses are available 7 days a week from 10A to 6:30P.  You can leave a message 24 hours per day and they will return your call.        Thank you for  choosing [facility]       Thank you for choosing [facility] for your care. Our goal is always to provide you with excellent care. Hearing back from our patients is one way we can continue to improve our services. Please take a few minutes to complete the written survey that you may receive in the mail after you visit with us. Thank you!        [employer] Information     [employer] gives you secure access to your electronic health record. If you see a primary care provider, you can also send messages to your care team and make appointments. If you have questions, please call your primary care clinic.  If you do not have a primary care provider, please call [phone] and they will assist you.        Care EveryWhere ID     This is your Care EveryWhere ID. This could be used by other organizations to access your [facility] medical records  [id number]        Equal Access to Services     CAROLINA PLASCENCIA : Jackie Mcdaniel, omayra houston, anuj guerrero, prashant jameson . So [facility] [phone].    ATENCIÓN: Si habla español, tiene a persaud disposición servicios gratuitos de asistencia lingüística. Llame al [phone].    We comply with applicable federal civil rights laws and [place] laws. We do not discriminate on the basis of race, color, national origin, age, disability, sex, sexual orientation, or gender identity.            After Visit Summary       This is your record. Keep this with you and show to your community pharmacist(s) and doctor(s) at your next visit.

## 2018-05-07 LAB — INTERPRETATION ECG - MUSE: NORMAL

## 2018-05-07 NOTE — DISCHARGE INSTRUCTIONS
Suggest you see your neurologist soon to discuss the speech. There is no indication for a stroke or tia clinically  Your heart is fine. The ekg is normal and the troponin is normal. The tight jaw is not cardiac.  Keep your appointment for the tilt table this week  If shortness or air, chest pain return to the emergency room  The speaking difficulty is from the ongoing imbalance and concussion issues I believe  If you begin to have arm or leg weakness, vision loss or double vision, inability to find words return urgently  It may be a good idea to see Dr Messina this week to discuss the overall picture of your health

## 2018-05-08 ASSESSMENT — ENCOUNTER SYMPTOMS
HEMATOLOGIC/LYMPHATIC NEGATIVE: 1
VOMITING: 0
CHILLS: 0
SHORTNESS OF BREATH: 0
SPEECH DIFFICULTY: 1
LIGHT-HEADEDNESS: 1
FEVER: 0
PALPITATIONS: 0
FATIGUE: 0
NAUSEA: 0
MUSCULOSKELETAL NEGATIVE: 1

## 2018-05-09 ENCOUNTER — OFFICE VISIT (OUTPATIENT)
Dept: NEUROLOGY | Facility: CLINIC | Age: 53
End: 2018-05-09
Payer: COMMERCIAL

## 2018-05-09 VITALS
DIASTOLIC BLOOD PRESSURE: 87 MMHG | TEMPERATURE: 98 F | RESPIRATION RATE: 24 BRPM | HEART RATE: 114 BPM | HEIGHT: 72 IN | OXYGEN SATURATION: 95 % | WEIGHT: 189.1 LBS | BODY MASS INDEX: 25.61 KG/M2 | SYSTOLIC BLOOD PRESSURE: 123 MMHG

## 2018-05-09 DIAGNOSIS — G47.52 DREAM ENACTMENT BEHAVIOR: ICD-10-CM

## 2018-05-09 DIAGNOSIS — R26.89 LOSS OF BALANCE: Primary | ICD-10-CM

## 2018-05-09 ASSESSMENT — PAIN SCALES - GENERAL: PAINLEVEL: MODERATE PAIN (5)

## 2018-05-09 NOTE — MR AVS SNAPSHOT
"              After Visit Summary   5/9/2018    Cesar Montejo    MRN: [id number]           Patient Information     Date Of Birth          1965        Visit Information        Provider Department      5/9/2018 5:00 PM Vida Solomon MD [facility]        Today's Diagnoses     Loss of balance    -  1    Dream enactment behavior           Follow-ups after your visit        Additional Services     PT Referral ([facility])       *This therapy referral will be filtered to a centralized scheduling office at [facility] and the patient will receive a call to schedule an appointment at a [facility] location most convenient for them. *     [facility] provides Physical Therapy evaluation and treatment and many specialty services across the [facility] system.  If requesting a specialty program, please choose from the list below.    If you have not heard from the scheduling office within 2 business days, please call [phone] for all locations, with the exception of [place], please call [phone] and [facility], please call [phone]  Treatment: Evaluation & Treatment  Special Instructions/Modalities: balance and gait eval  Special Programs: Balance/Vestibular    Please be aware that coverage of these services is subject to the terms and limitations of your health insurance plan.  Call member services at your health plan with any benefit or coverage questions.      **Note to Provider:  If you are referring outside of [facility] for the therapy appointment, please list the name of the location in the \"special instructions\" above, print the referral and give to the patient to schedule the appointment.            SLEEP EVALUATION & MANAGEMENT REFERRAL - ADULT -Other (Respond in commments)       Please be aware that coverage of these services is subject to the terms and limitations of your health insurance plan.  Call member services at your health plan with any " benefit or coverage questions.       Please bring the following to your appointment:    >>   List of current medications   >>   This referral request   >>   Any documents/labs given to you for this referral                  Your next 10 appointments already scheduled     Jun 04, 2018 10:30 AM CDT   Office Visit with Rocky Messina MD   [facility] ([facility])    [address] [address]  [facility] [id number] [phone]           Bring a current list of meds and any records pertaining to this visit. For Physicals, please bring immunization records and any forms needing to be filled out. Please arrive 10 minutes early to complete paperwork.            Jun 21, 2018 11:10 AM CDT   (Arrive by 10:55 AM)   New Movement Disorder with Silverio Us MD   [facility] ([facility])    [address] [place] [id number] [phone]            Jun 25, 2018  8:00 AM CDT   New Sleep Patient with Meet Lake MD   [facility] ([facility])    [address] [address] [phone]            Mar 13, 2019  5:00 PM CDT   (Arrive by 4:45 PM)   Return Movement Disorder with Vida Solomon MD   [facility] ([facility])    [address] [place] [id number] [phone]              Who to contact     Please call your clinic at [phone] to:    Ask questions about your health    Make or cancel appointments    Discuss your medicines    Learn about your test results    Speak to your doctor            Additional Information About Your Visit        [employer] Information     [employer] gives you secure access to your electronic health record. If you see a primary care provider, you can also send messages to your care team and make appointments. If you have questions, please call  your primary care clinic.  If you do not have a primary care provider, please call [phone] and they will assist you.      [employer] is an electronic gateway that provides easy, online access to your medical records. With [employer], you can request a clinic appointment, read your test results, renew a prescription or communicate with your care team.     To access your existing account, please contact your [facility] or call [phone] for assistance.        Care EveryWhere ID     This is your Care EveryWhere ID. This could be used by other organizations to access your [place] medical records  [id number]        Your Vitals Were     Pulse Temperature Respirations Height Pulse Oximetry BMI (Body Mass Index)    114 98  F (36.7  C) (Oral) 24 1.829 m (6') 95% 25.65 kg/m2       Blood Pressure from Last 3 Encounters:   05/29/18 120/78   05/25/18 142/85   05/14/18 127/88    Weight from Last 3 Encounters:   05/29/18 85.3 kg (188 lb)   05/25/18 85.7 kg (188 lb 14.4 oz)   05/14/18 86.5 kg (190 lb 12.8 oz)              We Performed the Following     PT Referral ([place])          Today's Medication Changes          These changes are accurate as of 5/9/18 11:59 PM.  If you have any questions, ask your nurse or doctor.               Stop taking these medicines if you haven't already. Please contact your care team if you have questions.     donepezil 5 MG tablet   Commonly known as:  ARICEPT   Stopped by:  Vida Solomon MD                    Primary Care Provider Office Phone # Fax #    Rocky Rigo Messina [phone]       [address] [facility] [address]  [facility] [address]        Equal Access to Services     BRIANNA PLASCENCIA : Jackie Mcdaniel, omayra houston, anuj guerrero, prashant nelson. So [facility] [phone].    ATENCIÓN: Si habla español, tiene a persaud disposición servicios gratuitos de asistencia lingüística. Llame  al [phone].    We comply with applicable federal civil rights laws and [place] laws. We do not discriminate on the basis of race, color, national origin, age, disability, sex, sexual orientation, or gender identity.            Thank you!     Thank you for choosing [facility]UR[facility]  for your care. Our goal is always to provide you with excellent care. Hearing back from our patients is one way we can continue to improve our services. Please take a few minutes to complete the written survey that you may receive in the mail after your visit with us. Thank you!             Your Updated Medication List - Protect others around you: Learn how to safely use, store and throw away your medicines at www.disposemymeds.org.          This list is accurate as of 5/9/18 11:59 PM.  Always use your most recent med list.                   Brand Name Dispense Instructions for use Diagnosis    ASPIRIN PO      Take 81 mg by mouth daily        KRILL OIL PO      Take 1 capsule by mouth daily        mirtazapine 15 MG tablet    REMERON    90 tablet    TAKE 1 TABLET(15 MG) BY MOUTH AT BEDTIME    Mixed anxiety and depressive disorder, Insomnia       MULTIVITAL PO      Take 1 tablet by mouth daily        simvastatin 40 MG tablet    ZOCOR     Take 0.5 tablets by mouth daily        TURMERIC PO      Take 1 tablet by mouth daily        VITAMIN D (CHOLECALCIFEROL) PO      Take 2,000 Units by mouth daily
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
## 2018-05-09 NOTE — NURSING NOTE
Chief Complaint   Patient presents with     RECHECK     [facility]- MOVEMENT DISORDER F/U     Anoop Saucedo, KADIE
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
## 2018-05-14 ENCOUNTER — OFFICE VISIT (OUTPATIENT)
Dept: CARDIOLOGY | Facility: CLINIC | Age: 53
End: 2018-05-14
Attending: INTERNAL MEDICINE
Payer: COMMERCIAL

## 2018-05-14 ENCOUNTER — CARE COORDINATION (OUTPATIENT)
Dept: CARDIOLOGY | Facility: CLINIC | Age: 53
End: 2018-05-14

## 2018-05-14 VITALS
HEART RATE: 101 BPM | WEIGHT: 190.8 LBS | HEIGHT: 72 IN | SYSTOLIC BLOOD PRESSURE: 127 MMHG | DIASTOLIC BLOOD PRESSURE: 88 MMHG | BODY MASS INDEX: 25.84 KG/M2 | OXYGEN SATURATION: 96 %

## 2018-05-14 DIAGNOSIS — G90.A POSTURAL ORTHOSTATIC TACHYCARDIA SYNDROME: Primary | ICD-10-CM

## 2018-05-14 PROCEDURE — G0463 HOSPITAL OUTPT CLINIC VISIT: HCPCS | Mod: ZF

## 2018-05-14 PROCEDURE — 99214 OFFICE O/P EST MOD 30 MIN: CPT | Mod: ZP | Performed by: INTERNAL MEDICINE

## 2018-05-14 ASSESSMENT — PAIN SCALES - GENERAL: PAINLEVEL: NO PAIN (0)
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
## 2018-05-14 NOTE — NURSING NOTE
Chief Complaint   Patient presents with     Follow Up For     53 yo male h/o cherry angioma, pure hypercholesterolemia, and dyspnea and respiratory abnormality present for Consult for Autonomic Dysautonomia     Vitals were taken and medications were reconciled.   Audrey Vigil MA    9:59 AM
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
## 2018-05-14 NOTE — PATIENT INSTRUCTIONS
"You were seen today in the Cardiovascular Clinic at the [facility].     Cardiology Providers you saw during your visit: Dr. Lee Ann Laughlin    Diagnosis: sinus tachycardia    Results: discussed with patient    Orders:  none    Medication Changes: none    Recommendations:   Will schedule autonomic study with tilt table test - I will schedule with lab and call him with date/instructions    Follow-up: as needed         Please feel free to call me with any questions or concerns.       Marquise Shafer LPN     Questions and [phone].   First press #1 for the [employer] and then press #3 for \"Medical Questions\" to reach us Cardiology Nurses.      On Call Cardiologist for after hours or on weekends: [phone]   option #4 and ask to speak to the on-call Cardiologist.          If you need a medication refill please contact your pharmacy.  Please allow 3 business days for your refill to be completed.    "

## 2018-05-14 NOTE — MR AVS SNAPSHOT
"              After Visit Summary   2018    Cesar Montejo    MRN: [id number]           Patient Information     Date Of Birth          1965        Visit Information        Provider Department      2018 10:00 AM Lee Ann Laughlin MD [facility]        Care Instructions    You were seen today in the Cardiovascular Clinic at the [facility].     Cardiology Providers you saw during your visit: Dr. Lee Ann Laughlin    Diagnosis: sinus tachycardia    Results: discussed with patient    Orders:  none    Medication Changes: none    Recommendations:   Will schedule autonomic study with tilt table test - I will schedule with lab and call him with date/instructions    Follow-up: as needed         Please feel free to call me with any questions or concerns.       Marquise Shafer LPN     Questions and [phone].   First press #1 for the [employer] and then press #3 for \"Medical Questions\" to reach us Cardiology Nurses.      On Call Cardiologist for after hours or on weekends: [phone]   option #4 and ask to speak to the on-call Cardiologist.          If you need a medication refill please contact your pharmacy.  Please allow 3 business days for your refill to be completed.            Follow-ups after your visit        Your next 10 appointments already scheduled     May 29, 2018 11:00 AM CDT   (Arrive by 10:45 AM)   Neuro Eval with Leelee Aleman PT   [facility] ([facility])    [address] [place] [address] [id number] [phone]            2018  2:00 PM CDT   (Arrive by 1:45 PM)   Return Visit with James Arreola MD   [facility] ([facility])    [address] [place] [address] [id number] [phone]            2018  8:00 AM CDT   New Sleep Patient with Meet Lake MD   [facility] ([facility])    [address] [place] [address] " [facility]   [facility] [address]   [phone]            Mar 13, 2019  5:00 PM CDT   (Arrive by 4:45 PM)   Return Movement Disorder with Vida Solomon MD   [facility] ([facility])    [address] [facility]  [address] [facility] [id number] [phone]              Who to contact     If you have questions or need follow up information about today's clinic visit or your schedule please contact [facility] HEART [facility] directly at [phone].  Normal or non-critical lab and imaging results will be communicated to you by Cellabushart, letter or phone within 4 business days after the clinic has received the results. If you do not hear from us within 7 days, please contact the clinic through [employer] or phone. If you have a critical or abnormal lab result, we will notify you by phone as soon as possible.  Submit refill requests through [employer] or call your pharmacy and they will forward the refill request to us. Please allow 3 business days for your refill to be completed.          Additional Information About Your Visit        Cellabushart Information     Evoz gives you secure access to your electronic health record. If you see a primary care provider, you can also send messages to your care team and make appointments. If you have questions, please call your primary care clinic.  If you do not have a primary care provider, please call [phone] and they will assist you.        Care EveryWhere ID     This is your Care EveryWhere ID. This could be used by other organizations to access your [place] medical records  [id number]        Your Vitals Were     Pulse Height Pulse Oximetry BMI (Body Mass Index)          101 1.829 m (6') 96% 25.88 kg/m2         Blood Pressure from Last 3 Encounters:   05/14/18 127/88   05/09/18 123/87   05/06/18 (!) 137/96    Weight from Last 3 Encounters:   05/14/18 86.5 kg (190 lb 12.8 oz)   05/09/18 85.8 kg (189 lb 1.6 oz)   03/21/18 82.6 kg (182  lb 3.2 oz)              Today, you had the following     No orders found for display       Primary Care Provider Office Phone # Fax #    Rocky Rigo Messina [phone]       [address] [facility] [address]  [facility] [address]        Equal Access to Services     CLIFFORDJohn Douglas French CenterKRISTAL : Hadii aad ku hadasho Soomaali, waaxda luqadaha, qaybta kaalmada adeegyada, waxay bertramin hayaan adefidencio mckenziedeeptirajesh lairma . So [facility] [place] [facility] [phone].    ATENCIÓN: Si habla español, tiene a persaud disposición servicios gratuitos de asistencia lingüística. Kajalame al [phone].    We comply with applicable federal civil rights laws and [place] laws. We do not discriminate on the basis of race, color, national origin, age, disability, sex, sexual orientation, or gender identity.            Thank you!     Thank you for choosing [facility]  for your care. Our goal is always to provide you with excellent care. Hearing back from our patients is one way we can continue to improve our services. Please take a few minutes to complete the written survey that you may receive in the mail after your visit with us. Thank you!             Your Updated Medication List - Protect others around you: Learn how to safely use, store and throw away your medicines at www.disposemymeds.org.          This list is accurate as of 5/14/18 10:49 AM.  Always use your most recent med list.                   Brand Name Dispense Instructions for use Diagnosis    ASPIRIN PO      Take 81 mg by mouth daily        KRILL OIL PO      Take 1 capsule by mouth daily        mirtazapine 15 MG tablet    REMERON    90 tablet    TAKE 1 TABLET(15 MG) BY MOUTH AT BEDTIME    Mixed anxiety and depressive disorder, Insomnia       MULTIVITAL PO      Take 1 tablet by mouth daily        simvastatin 40 MG tablet    ZOCOR     Take 0.5 tablets by mouth daily        TURMERIC PO      Take 1 tablet by mouth daily        VITAMIN D (CHOLECALCIFEROL) PO      Take 2,000 Units by mouth daily

## 2018-05-18 ENCOUNTER — OFFICE VISIT (OUTPATIENT)
Dept: DERMATOLOGY | Facility: CLINIC | Age: 53
End: 2018-05-18
Payer: COMMERCIAL

## 2018-05-18 DIAGNOSIS — Z80.8 FAMILY HISTORY OF SKIN CANCER: ICD-10-CM

## 2018-05-18 DIAGNOSIS — Z85.828 HISTORY OF NONMELANOMA SKIN CANCER: ICD-10-CM

## 2018-05-18 DIAGNOSIS — L82.1 SEBORRHEIC KERATOSIS: Primary | ICD-10-CM

## 2018-05-18 DIAGNOSIS — Z85.828 HISTORY OF BASAL CELL CANCER: ICD-10-CM

## 2018-05-18 DIAGNOSIS — Z87.2 HISTORY OF ACTINIC KERATOSIS: ICD-10-CM

## 2018-05-18 DIAGNOSIS — D18.01 CHERRY ANGIOMA: ICD-10-CM

## 2018-05-18 DIAGNOSIS — L57.8 SOLAR ELASTOSIS: ICD-10-CM

## 2018-05-18 ASSESSMENT — PAIN SCALES - GENERAL: PAINLEVEL: MILD PAIN (3)

## 2018-05-18 NOTE — MR AVS SNAPSHOT
After Visit Summary   5/18/2018    Cesar Montejo    MRN: [id number]           Patient Information     Date Of Birth          1965        Visit Information        Provider Department      5/18/2018 11:15 AM Malena Mathis MD [facility] Dermatology        Today's Diagnoses     Seborrheic keratosis    -  1    Cherry angioma        History of nonmelanoma skin cancer        History of basal cell cancer        History of actinic keratosis        Family history of skin cancer        Solar elastosis           Follow-ups after your visit        Follow-up notes from your care team     Return in about 1 year (around 5/18/2019).      Your next 10 appointments already scheduled     May 29, 2018 11:00 AM CDT   (Arrive by 10:45 AM)   Neuro Eval with Leelee Aleman PT   [facility] ([facility])    [address] [place] [address] [id number] [phone]            Jun 14, 2018  2:00 PM CDT   (Arrive by 1:45 PM)   Return Visit with James Arreola MD   [facility] Neurology ([facility])    [address] [place] [address] [id number] [phone]            Jun 25, 2018  8:00 AM CDT   New Sleep Patient with Meet Lake MD   [facility] ([facility])    [address] [place] [id number] [phone]            Mar 13, 2019  5:00 PM CDT   (Arrive by 4:45 PM)   Return Movement Disorder with Vida Solomon MD   [facility] ([facility])    [address] [place] [address] [id number] [phone]              Who to contact     Please call your clinic at [phone] to:    Ask questions about your health    Make or cancel appointments    Discuss your medicines    Learn about your test results    Speak to your doctor            Additional Information About Your Visit        MyChart Information     MyChart  gives you secure access to your electronic health record. If you see a primary care provider, you can also send messages to your care team and make appointments. If you have questions, please call your primary care clinic.  If you do not have a primary care provider, please call [phone] and they will assist you.      [employer] is an electronic gateway that provides easy, online access to your medical records. With [employer], you can request a clinic appointment, read your test results, renew a prescription or communicate with your care team.     To access your existing account, please contact your [facility] or call [phone] for assistance.        Care EveryWhere ID     This is your Care EveryWhere ID. This could be used by other organizations to access your [place] medical records  [id number]         Blood Pressure from Last 3 Encounters:   05/14/18 127/88   05/09/18 123/87   05/06/18 (!) 137/96    Weight from Last 3 Encounters:   05/14/18 86.5 kg (190 lb 12.8 oz)   05/09/18 85.8 kg (189 lb 1.6 oz)   03/21/18 82.6 kg (182 lb 3.2 oz)              Today, you had the following     No orders found for display       Primary Care Provider Office Phone # Fax #    Rocky Rigo Messina [phone]       [address] [facility] [address]  [facility] [address]        Equal Access to Services     [facility]: Hadii desean ruano hadasho Soidris, waaxda luqadaha, qaybta kaalmada adefidencioyada, prashant jameson . So [facility] [phone].    ATENCIÓN: Si habla español, tiene a persaud disposición servicios gratuitos de asistencia lingüística. Kajalame al [phone].    We comply with applicable federal civil rights laws and [place] laws. We do not discriminate on the basis of race, color, national origin, age, disability, sex, sexual orientation, or gender identity.            Thank you!     Thank you for choosing [facility]  for your care. Our goal is always to  provide you with excellent care. Hearing back from our patients is one way we can continue to improve our services. Please take a few minutes to complete the written survey that you may receive in the mail after your visit with us. Thank you!             Your Updated Medication List - Protect others around you: Learn how to safely use, store and throw away your medicines at www.disposemymeds.org.          This list is accurate as of 5/18/18  1:08 PM.  Always use your most recent med list.                   Brand Name Dispense Instructions for use Diagnosis    ASPIRIN PO      Take 81 mg by mouth daily        KRILL OIL PO      Take 1 capsule by mouth daily        mirtazapine 15 MG tablet    REMERON    90 tablet    TAKE 1 TABLET(15 MG) BY MOUTH AT BEDTIME    Mixed anxiety and depressive disorder, Insomnia       MULTIVITAL PO      Take 1 tablet by mouth daily        simvastatin 40 MG tablet    ZOCOR     Take 0.5 tablets by mouth daily        TURMERIC PO      Take 1 tablet by mouth daily        VITAMIN D (CHOLECALCIFEROL) PO      Take 2,000 Units by mouth daily

## 2018-05-18 NOTE — NURSING NOTE
Chief Complaint   Patient presents with     Derm Problem     Cesar is here today for a total body skin exam. Left forehead/scalp patient reports scaly spot.      Mary Cortez LPN

## 2018-05-18 NOTE — PROGRESS NOTES
[facility] Dermatology Note      Dermatology Problem List:  1. NMSC  -Superficial BCC, left cheek, s/p Mohns 6/16  -SCCIS right cheek, s/p Mohs 6/16    2. Actinic keratoses    3. Actinic cheilitis  -s/p CO2 laser ablation of lower lip vermilion 10/16  -s/p lower lip vermilionectomy 8/2017  - Follows with oral surgeon      Encounter Date: May 18, 2018    CC:   Chief Complaint   Patient presents with     Derm Problem     Cesar is here today for a total body skin exam. Left forehead/scalp patient reports scaly spot.          History of Present Illness:  Mr. Cesar Montejo is a 52 year old male who presents as a follow-up for skin check.  He is continuing to avoid sun.  Hasn't had any lesion of concern.  There's a scalp area that sometimes flakes and bleeds but is not present today.  Following with oral surgeon for actinic chelitis.  Complains of soreness on the left side of throat when swallowing.  Has history of GERD but main concern is that his SCCis could be source of symptoms.    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    Family History:  Melanoma in grandfather and cousin.  SqCC in mother.     Review of Systems:  - Says some difficulty swallowing    Physical exam:  Vitals: There were no vitals taken for this visit.  GEN: This is a well developed, well-nourished male in no acute distress, in a pleasant mood.    A complete skin exam is done.  This includes inspection and where appropriate palpation of scalp, face (including eyelids, sclerae, conjunctivae, palpebral conjunctivae, lips, tongue, oral mucosa), ears (including posterior auricular creases), neck, chest (including breasts, axillae), back, abdomen, right and left upper extremities (including fingers, fingernails), perineum, genitalia, buttocks (including perirectal skin), thighs (anterior and posterior), shins, calves, feet (including toenails, soles, toe webs).  Patient's skin shows evidence of  prolonged ultraviolet light exposure.  Color is dull; there are deep and superficial furrows and wrinkles, particularly in sun-exposed areas of malar cheeks and temples, as well as the presence of increased numbers of telangiectasias, hyper- and hypopigmented macules, and as well decreased cutaneous elasticity.  SKIN: some benign nevi and cherry angiomas.  No lesion of concern.  No cervical LAD.  -No other lesions of concern on areas examined.     Impression/Plan:  Normal skin exam  - F/u in 1 year  - consider f/u with ENT or PCP for dysphagia    CC Dr. Mari on close of this encounter.  Follow-up in 1 year with Dr. Mari, earlier for new or changing lesions.     Dr. Mathis staffed the patient.    Staff Involved:  Resident(Torie Patiño)/Staff(as above)    I saw and evaluated this patient with Dr. Patiño. The above note has been read and reviewed and where appropriate amended and corrected. It accurately reflects my clinical findings, observations, diagnoses, treatment recommendations and follow-up plans. Reviewed previous path and surgical notes in order to reassure the patient that risk of metastatis from SCCis in very small.  Should not ignore sx if persist however, but see ENT.     Malena Mathis MD  Clinical Professor   Department of Dermatology  [facility]

## 2018-05-18 NOTE — LETTER
5/18/2018       RE: Cesar Montejo  [address]   [facility] [address]     Dear Colleague,    Thank you for referring your patient, Cesar Montejo, to the [facility] DERMATOLOGY at [facility]. Please see a copy of my visit note below.    [facility] Dermatology Note      Dermatology Problem List:  1. NMSC  -Superficial BCC, left cheek, s/p Mohns 6/16  -SCCIS right cheek, s/p Mohs 6/16    2. Actinic keratoses    3. Actinic cheilitis  -s/p CO2 laser ablation of lower lip vermilion 10/16  -s/p lower lip vermilionectomy 8/2017  - Follows with oral surgeon      Encounter Date: May 18, 2018    CC:   Chief Complaint   Patient presents with     Derm Problem     Cesar is here today for a total body skin exam. Left forehead/scalp patient reports scaly spot.      History of Present Illness:  Mr. Cesar Montejo is a 52 year old male who presents as a follow-up for skin check.  He is continuing to avoid sun.  Hasn't had any lesion of concern.  There's a scalp area that sometimes flakes and bleeds but is not present today.  Following with oral surgeon for actinic chelitis.  Complains of soreness on the left side of throat when swallowing.  Has history of GERD but main concern is that his SCCis could be source of symptoms.    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    Family History:  Melanoma in grandfather and cousin.  SqCC in mother.     Review of Systems:  - Says some difficulty swallowing    Physical exam:  Vitals: There were no vitals taken for this visit.  GEN: This is a well developed, well-nourished male in no acute distress, in a pleasant mood.    A complete skin exam is done.  This includes inspection and where appropriate palpation of scalp, face (including eyelids, sclerae, conjunctivae, palpebral conjunctivae, lips, tongue, oral mucosa), ears (including posterior auricular creases), neck, chest (including  breasts, axillae), back, abdomen, right and left upper extremities (including fingers, fingernails), perineum, genitalia, buttocks (including perirectal skin), thighs (anterior and posterior), shins, calves, feet (including toenails, soles, toe webs).  Patient's skin shows evidence of prolonged ultraviolet light exposure.  Color is dull; there are deep and superficial furrows and wrinkles, particularly in sun-exposed areas of malar cheeks and temples, as well as the presence of increased numbers of telangiectasias, hyper- and hypopigmented macules, and as well decreased cutaneous elasticity.  SKIN: some benign nevi and cherry angiomas.  No lesion of concern.  No cervical LAD.  -No other lesions of concern on areas examined.     Impression/Plan:  Normal skin exam  - F/u in 1 year  - consider f/u with ENT or PCP for dysphagia    CC Dr. Mari on close of this encounter.  Follow-up in 1 year with Dr. Mari, earlier for new or changing lesions.     Dr. Mathis staffed the patient.    Staff Involved:  Resident(Torie Patiño)/Staff(as above)  I saw and evaluated this patient with Dr. Patiño. The above note has been read and reviewed and where appropriate amended and corrected. It accurately reflects my clinical findings, observations, diagnoses, treatment recommendations and follow-up plans. Reviewed previous path and surgical notes in order to reassure the patient that risk of metastatis from SCCis in very small.  Should not ignore sx if persist however, but see ENT.     Malena Mathis MD  Clinical Professor   Department of Dermatology  [facility]

## 2018-05-23 NOTE — PROGRESS NOTES
SUBJECTIVE:   Cesar Montejo is a 52 year old male who presents to clinic today for the following health issues:    GERD/Heartburn  Onset: Worse over the past month     Description:     Burning in chest: no    Intensity: moderate, intermittent     Progression of Symptoms: worsening    Accompanying Signs & Symptoms:  Does it feel like food gets stuck: YES  Nausea: no  Vomiting (bloody?): no  Abdominal Pain: YES- a little bit over the past few weeks   Black-Tarry stools: no:  Bloody stools: no    History:   Previous ulcers: no    Precipitating factors:   Caffeine use: YES- coffee   Alcohol use: YES- glass of wine each night   NSAID/Aspirin use: YES- 81 mg aspirin   Tobacco use: none for the past 27 years   Worse with no particular food or drink.    Alleviating factors:  None     Therapies Tried and outcome: saw ENT normal exam    Other questions/concerns: pain when swallowing, shortness of breath    Has history of similar symptoms in the past, responded to PPI but had side effects   drinks 3+ cups coffeee a day   takes as soon as possible daily   non smoker    Problem list and histories reviewed & adjusted, as indicated.  Additional history: as documented    Labs reviewed in EPIC    Reviewed and updated as needed this visit by clinical staff       Reviewed and updated as needed this visit by Provider         ROS:  Constitutional, HEENT, cardiovascular, pulmonary, gi and gu systems are negative, except as otherwise noted.    OBJECTIVE:     /85 (BP Location: Left arm, Patient Position: Sitting, Cuff Size: Adult Regular)  Pulse 91  Temp 98.3  F (36.8  C) (Oral)  Wt 188 lb 14.4 oz (85.7 kg)  SpO2 97%  BMI 25.62 kg/m2  Body mass index is 25.62 kg/(m^2).  GENERAL: alert and fatigued  NECK: no adenopathy, no asymmetry, masses, or scars and thyroid normal to palpation  RESP: lungs clear to auscultation - no rales, rhonchi or wheezes  CV: regular rate and rhythm, normal S1 S2, no S3 or S4, no murmur, click or rub,  no peripheral edema and peripheral pulses strong  ABDOMEN: soft, nontender, no hepatosplenomegaly, no masses and bowel sounds normal  PSYCH: mentation appears normal, tangential, fatigued, speech pressured and judgement and insight intact       Results for orders placed or performed during the hospital encounter of 05/06/18   CBC with platelets differential   Result Value Ref Range    WBC 5.9 4.0 - 11.0 10e9/L    RBC Count 5.25 4.4 - 5.9 10e12/L    Hemoglobin 16.5 13.3 - 17.7 g/dL    Hematocrit 48.3 40.0 - 53.0 %    MCV 92 78 - 100 fl    MCH 31.4 26.5 - 33.0 pg    MCHC 34.2 31.5 - 36.5 g/dL    RDW 12.4 10.0 - 15.0 %    Platelet Count 198 150 - 450 10e9/L    Diff Method Automated Method     % Neutrophils 50.9 %    % Lymphocytes 38.9 %    % Monocytes 8.7 %    % Eosinophils 0.5 %    % Basophils 0.8 %    % Immature Granulocytes 0.2 %    Nucleated RBCs 0 0 /100    Absolute Neutrophil 3.0 1.6 - 8.3 10e9/L    Absolute Lymphocytes 2.3 0.8 - 5.3 10e9/L    Absolute Monocytes 0.5 0.0 - 1.3 10e9/L    Absolute Eosinophils 0.0 0.0 - 0.7 10e9/L    Absolute Basophils 0.1 0.0 - 0.2 10e9/L    Abs Immature Granulocytes 0.0 0 - 0.4 10e9/L    Absolute Nucleated RBC 0.0    Troponin I   Result Value Ref Range    Troponin I ES <0.015 0.000 - 0.045 ug/L   EKG 12 lead   Result Value Ref Range    Interpretation ECG Click View Image link to view waveform and result         ASSESSMENT/PLAN:             1. Gastroesophageal reflux disease, esophagitis presence not specified  Needs to cut back on triggers bit is resistent to doing so  - H Pylori antigen, stool; Future  - ranitidine (ZANTAC) 300 MG tablet; Take 1 tablet (300 mg) by mouth At Bedtime  Dispense: 30 tablet; Refill: 1  - GASTROENTEROLOGY ADULT REF CONSULT ONLY  - CBC with platelets  - Lipase  - TSH with free T4 reflex  Follow up with consultant as planned.   2. Chest wall pain  elaine do  - XR Chest 2 Views; Future    3. Screening for HIV (human immunodeficiency virus)  sent  - HIV  Screening    4. Elevated blood pressure reading without diagnosis of hypertension  Check at home and hesham us in 1 month  - TSH with free T4 reflex    Regular exercise  See Patient Instructions    Rocky Messina MD  [facility]

## 2018-05-25 ENCOUNTER — OFFICE VISIT (OUTPATIENT)
Dept: FAMILY MEDICINE | Facility: CLINIC | Age: 53
End: 2018-05-25
Payer: COMMERCIAL

## 2018-05-25 ENCOUNTER — HOSPITAL ENCOUNTER (OUTPATIENT)
Dept: GENERAL RADIOLOGY | Facility: CLINIC | Age: 53
Discharge: HOME | End: 2018-05-25
Attending: FAMILY MEDICINE | Admitting: FAMILY MEDICINE
Payer: COMMERCIAL

## 2018-05-25 VITALS
TEMPERATURE: 98.3 F | WEIGHT: 188.9 LBS | BODY MASS INDEX: 25.62 KG/M2 | DIASTOLIC BLOOD PRESSURE: 85 MMHG | HEART RATE: 91 BPM | OXYGEN SATURATION: 97 % | SYSTOLIC BLOOD PRESSURE: 142 MMHG

## 2018-05-25 DIAGNOSIS — K21.9 GASTROESOPHAGEAL REFLUX DISEASE, ESOPHAGITIS PRESENCE NOT SPECIFIED: ICD-10-CM

## 2018-05-25 DIAGNOSIS — Z11.4 SCREENING FOR HIV (HUMAN IMMUNODEFICIENCY VIRUS): Primary | ICD-10-CM

## 2018-05-25 DIAGNOSIS — R07.89 CHEST WALL PAIN: ICD-10-CM

## 2018-05-25 DIAGNOSIS — R03.0 ELEVATED BLOOD PRESSURE READING WITHOUT DIAGNOSIS OF HYPERTENSION: ICD-10-CM

## 2018-05-25 LAB
ERYTHROCYTE [DISTWIDTH] IN BLOOD BY AUTOMATED COUNT: 13.1 % (ref 10–15)
HCT VFR BLD AUTO: 47 % (ref 40–53)
HGB BLD-MCNC: 15.9 G/DL (ref 13.3–17.7)
LIPASE SERPL-CCNC: 196 U/L (ref 73–393)
MCH RBC QN AUTO: 31.4 PG (ref 26.5–33)
MCHC RBC AUTO-ENTMCNC: 33.8 G/DL (ref 31.5–36.5)
MCV RBC AUTO: 93 FL (ref 78–100)
PLATELET # BLD AUTO: 184 10E9/L (ref 150–450)
RBC # BLD AUTO: 5.06 10E12/L (ref 4.4–5.9)
WBC # BLD AUTO: 4.5 10E9/L (ref 4–11)

## 2018-05-25 PROCEDURE — 85027 COMPLETE CBC AUTOMATED: CPT | Performed by: FAMILY MEDICINE

## 2018-05-25 PROCEDURE — 83690 ASSAY OF LIPASE: CPT | Performed by: FAMILY MEDICINE

## 2018-05-25 PROCEDURE — 99214 OFFICE O/P EST MOD 30 MIN: CPT | Performed by: FAMILY MEDICINE

## 2018-05-25 PROCEDURE — 36415 COLL VENOUS BLD VENIPUNCTURE: CPT | Performed by: FAMILY MEDICINE

## 2018-05-25 PROCEDURE — 87389 HIV-1 AG W/HIV-1&-2 AB AG IA: CPT | Performed by: FAMILY MEDICINE

## 2018-05-25 PROCEDURE — 84443 ASSAY THYROID STIM HORMONE: CPT | Performed by: FAMILY MEDICINE

## 2018-05-25 PROCEDURE — 87338 HPYLORI STOOL AG IA: CPT | Performed by: FAMILY MEDICINE

## 2018-05-25 PROCEDURE — 71046 X-RAY EXAM CHEST 2 VIEWS: CPT

## 2018-05-25 NOTE — MR AVS SNAPSHOT
After Visit Summary   5/25/2018    Cesar Montejo    MRN: [id number]           Patient Information     Date Of Birth          1965        Visit Information        Provider Department      5/25/2018 2:00 PM Rocky Messina MD [facility]        Today's Diagnoses     Screening for HIV (human immunodeficiency virus)    -  1    Gastroesophageal reflux disease, esophagitis presence not specified        Chest wall pain        Elevated blood pressure reading without diagnosis of hypertension           Follow-ups after your visit        Additional Services     GASTROENTEROLOGY ADULT REF CONSULT ONLY       Preferred Location: [facility], [facility] [phone]      Please be aware that coverage of these services is subject to the terms and limitations of your health insurance plan.  Call member services at your health plan with any benefit or coverage questions.  Any procedures must be performed at a [facility] facility OR coordinated by your clinic's referral office.    Please bring the following with you to your appointment:    (1) Any X-Rays, CTs or MRIs which have been performed.  Contact the facility where they were done to arrange for  prior to your scheduled appointment.    (2) List of current medications   (3) This referral request   (4) Any documents/labs given to you for this referral                  Your next 10 appointments already scheduled     Jun 14, 2018  2:00 PM CDT   (Arrive by 1:45 PM)   Return Visit with James Arreola MD   [facility] Neurology ([facility])    [address] [place] [id number] [phone]            Jun 25, 2018  8:00 AM CDT   New Sleep Patient with Meet Lake MD   [facility] ([facility])    [address] [place] [id number] [phone]            Mar 13, 2019  5:00 PM CDT   (Arrive by 4:45 PM)   Return Movement  Disorder with Vida Solomon MD   [facility] ([facility])    [address]  3rd Floor  [facility] [address] [phone]              Future tests that were ordered for you today     Open Future Orders        Priority Expected Expires Ordered    H Pylori antigen, stool Routine  6/24/2018 5/25/2018    XR Chest 2 Views Routine 5/25/2018 5/25/2019 5/25/2018            Who to contact     If you have questions or need follow up information about today's clinic visit or your schedule please contact [facility] directly at [phone].  Normal or non-critical lab and imaging results will be communicated to you by MyChart, letter or phone within 4 business days after the clinic has received the results. If you do not hear from us within 7 days, please contact the clinic through [employer] or phone. If you have a critical or abnormal lab result, we will notify you by phone as soon as possible.  Submit refill requests through [employer] or call your pharmacy and they will forward the refill request to us. Please allow 3 business days for your refill to be completed.          Additional Information About Your Visit        [employer] Information     [employer] gives you secure access to your electronic health record. If you see a primary care provider, you can also send messages to your care team and make appointments. If you have questions, please call your primary care clinic.  If you do not have a primary care provider, please call [phone] and they will assist you.        Care EveryWhere ID     This is your Care EveryWhere ID. This could be used by other organizations to access your [facility] medical records  [id number]        Your Vitals Were     Pulse Temperature Pulse Oximetry BMI (Body Mass Index)          91 98.3  F (36.8  C) (Oral) 97% 25.62 kg/m2         Blood Pressure from Last 3 Encounters:   05/25/18 142/85   05/14/18 127/88   05/09/18 123/87     Weight from Last 3 Encounters:   05/25/18 188 lb 14.4 oz (85.7 kg)   05/14/18 190 lb 12.8 oz (86.5 kg)   05/09/18 189 lb 1.6 oz (85.8 kg)              We Performed the Following     CBC with platelets     DEPRESSION ACTION PLAN (DAP)     GASTROENTEROLOGY ADULT REF CONSULT ONLY     HIV Screening     Lipase     TSH with free T4 reflex          Today's Medication Changes          These changes are accurate as of 5/25/18  2:29 PM.  If you have any questions, ask your nurse or doctor.               Start taking these medicines.        Dose/Directions    ranitidine 300 MG tablet   Commonly known as:  ZANTAC   Used for:  Gastroesophageal reflux disease, esophagitis presence not specified        Dose:  300 mg   Take 1 tablet (300 mg) by mouth At Bedtime   Quantity:  30 tablet   Refills:  1            Where to get your medicines      These medications were sent to [facility] [id number] [place] [address] & [address], [place] [address]     Phone:  [phone]     ranitidine 300 MG tablet                Primary Care Provider Office Phone # Fax #    Rocky Rigo Messina [phone]       [address] [place], [place] [address] [address] [address]        Equal Access to Services     CAROLINA PLASCENCIA AH: Hadii desean ku hadasho Soomaali, waaxda luqadaha, qaybta kaalmada adeegyada, waxay bertramin haybeto nelson. So [facility] [phone].    ATENCIÓN: Si habla español, tiene a persaud disposición servicios gratuitos de asistencia lingüística. Llame al [phone].    We comply with applicable federal civil rights laws and [place] laws. We do not discriminate on the basis of race, color, national origin, age, disability, sex, sexual orientation, or gender identity.            Thank you!     Thank you for choosing [facility]  for your [place]. Our goal is always to provide you with excellent care. Hearing back from our patients is one way we can continue to  improve our services. Please take a few minutes to complete the written survey that you may receive in the mail after your visit with us. Thank you!             Your Updated Medication List - Protect others around you: Learn how to safely use, store and throw away your medicines at www.disposemymeds.org.          This list is accurate as of 5/25/18  2:29 PM.  Always use your most recent med list.                   Brand Name Dispense Instructions for use Diagnosis    ASPIRIN PO      Take 81 mg by mouth daily        COQ10 PO           KRILL OIL PO      Take 1 capsule by mouth daily        mirtazapine 15 MG tablet    REMERON    90 tablet    TAKE 1 TABLET(15 MG) BY MOUTH AT BEDTIME    Mixed anxiety and depressive disorder, Insomnia       MULTIVITAL PO      Take 1 tablet by mouth daily        ranitidine 300 MG tablet    ZANTAC    30 tablet    Take 1 tablet (300 mg) by mouth At Bedtime    Gastroesophageal reflux disease, esophagitis presence not specified       simvastatin 40 MG tablet    ZOCOR     Take 0.5 tablets by mouth daily        TURMERIC PO      Take 1 tablet by mouth daily        VITAMIN D (CHOLECALCIFEROL) PO      Take 2,000 Units by mouth daily

## 2018-05-26 LAB — TSH SERPL DL<=0.005 MIU/L-ACNC: 1 MU/L (ref 0.4–4)

## 2018-05-28 LAB
H PYLORI AG STL QL IA: NORMAL
SPECIMEN SOURCE: NORMAL

## 2018-05-29 ENCOUNTER — APPOINTMENT (OUTPATIENT)
Dept: CARDIOLOGY | Facility: CLINIC | Age: 53
End: 2018-05-29
Attending: INTERNAL MEDICINE
Payer: COMMERCIAL

## 2018-05-29 ENCOUNTER — HOSPITAL ENCOUNTER (OUTPATIENT)
Facility: CLINIC | Age: 53
Discharge: HOME | End: 2018-05-29
Attending: INTERNAL MEDICINE | Admitting: INTERNAL MEDICINE
Payer: COMMERCIAL

## 2018-05-29 ENCOUNTER — APPOINTMENT (OUTPATIENT)
Dept: MEDSURG UNIT | Facility: CLINIC | Age: 53
End: 2018-05-29
Attending: INTERNAL MEDICINE
Payer: COMMERCIAL

## 2018-05-29 VITALS
TEMPERATURE: 98.1 F | HEIGHT: 72 IN | DIASTOLIC BLOOD PRESSURE: 78 MMHG | OXYGEN SATURATION: 100 % | SYSTOLIC BLOOD PRESSURE: 120 MMHG | BODY MASS INDEX: 25.47 KG/M2 | RESPIRATION RATE: 18 BRPM | WEIGHT: 188 LBS | HEART RATE: 67 BPM

## 2018-05-29 DIAGNOSIS — G90.A POSTURAL ORTHOSTATIC TACHYCARDIA SYNDROME: ICD-10-CM

## 2018-05-29 LAB — HIV 1+2 AB+HIV1 P24 AG SERPL QL IA: NONREACTIVE

## 2018-05-29 PROCEDURE — 95921 AUTONOMIC NRV PARASYM INERVJ: CPT

## 2018-05-29 PROCEDURE — 40000166 ZZH STATISTIC PP CARE STAGE 1

## 2018-05-29 PROCEDURE — 82384 ASSAY THREE CATECHOLAMINES: CPT | Mod: 91 | Performed by: STUDENT IN AN ORGANIZED HEALTH CARE EDUCATION/TRAINING PROGRAM

## 2018-05-29 PROCEDURE — 93660 TILT TABLE EVALUATION: CPT

## 2018-05-29 PROCEDURE — 95921 AUTONOMIC NRV PARASYM INERVJ: CPT | Mod: 26 | Performed by: INTERNAL MEDICINE

## 2018-05-29 PROCEDURE — 93660 TILT TABLE EVALUATION: CPT | Mod: 26 | Performed by: INTERNAL MEDICINE

## 2018-05-29 PROCEDURE — 25000128 H RX IP 250 OP 636: Performed by: NURSE PRACTITIONER

## 2018-05-29 RX ORDER — SODIUM CHLORIDE 9 MG/ML
INJECTION, SOLUTION INTRAVENOUS CONTINUOUS
Status: DISCONTINUED | OUTPATIENT
Start: 2018-05-29 | End: 2018-05-29 | Stop reason: HOSPADM

## 2018-05-29 RX ORDER — LIDOCAINE 40 MG/G
CREAM TOPICAL
Status: DISCONTINUED | OUTPATIENT
Start: 2018-05-29 | End: 2018-05-29 | Stop reason: HOSPADM

## 2018-05-29 RX ORDER — LIDOCAINE 40 MG/G
CREAM TOPICAL
Status: DISCONTINUED | OUTPATIENT
Start: 2018-05-29 | End: 2018-05-29

## 2018-05-29 RX ORDER — SODIUM CHLORIDE 9 MG/ML
INJECTION, SOLUTION INTRAVENOUS CONTINUOUS
Status: DISCONTINUED | OUTPATIENT
Start: 2018-05-29 | End: 2018-05-29

## 2018-05-29 RX ADMIN — SODIUM CHLORIDE 50 ML: 9 INJECTION, SOLUTION INTRAVENOUS at 09:00

## 2018-05-29 RX ADMIN — SODIUM CHLORIDE: 9 INJECTION, SOLUTION INTRAVENOUS at 09:07

## 2018-05-29 RX ADMIN — SODIUM CHLORIDE 50 ML: 9 INJECTION, SOLUTION INTRAVENOUS at 09:15

## 2018-05-29 RX ADMIN — SODIUM CHLORIDE 50 ML: 9 INJECTION, SOLUTION INTRAVENOUS at 09:13

## 2018-05-29 RX ADMIN — SODIUM CHLORIDE 50 ML: 9 INJECTION, SOLUTION INTRAVENOUS at 09:14

## 2018-05-29 RX ADMIN — SODIUM CHLORIDE 50 ML: 9 INJECTION, SOLUTION INTRAVENOUS at 09:04

## 2018-05-29 RX ADMIN — SODIUM CHLORIDE 50 ML: 9 INJECTION, SOLUTION INTRAVENOUS at 09:12

## 2018-05-29 RX ADMIN — SODIUM CHLORIDE 50 ML: 9 INJECTION, SOLUTION INTRAVENOUS at 09:10

## 2018-05-29 RX ADMIN — SODIUM CHLORIDE 50 ML: 9 INJECTION, SOLUTION INTRAVENOUS at 09:05

## 2018-05-29 RX ADMIN — SODIUM CHLORIDE 50 ML: 9 INJECTION, SOLUTION INTRAVENOUS at 09:08

## 2018-05-29 RX ADMIN — SODIUM CHLORIDE 50 ML: 9 INJECTION, SOLUTION INTRAVENOUS at 09:09

## 2018-05-29 RX ADMIN — SODIUM CHLORIDE 50 ML: 9 INJECTION, SOLUTION INTRAVENOUS at 09:02

## 2018-05-29 NOTE — OP NOTE
EP PROCEDURE NOTE    *Procedures:*    1. TILT table test.  2. Autonomic function test.  3. EEG monitoring     *Cardiologist:*  Riccardo Julien    *EP Fellow:*  N/A    *Referring MD: *  Lee Ann Laughlin MD    *Pre-operative Diagnosis:*  Orthostatic Hypotension .    *Complications:*  None.     *Medications:*  NTG 0.4mg SL/None.     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. He has a history of lightheadedness with abrupt movement to upright posture. Lightheadedness will at times persist. But nonetheless is postural hypotensino clinicalll orthostati. One remote collapse. Also notes high heart rates into the 120s. Consideration for POTs but unusual in his age group.     Likely postural hypotension (immediate and possibly delayed forms).  He has some history suggesting Hypertension, which if treated could aggravate OH    The patient is here for autonomic testing including tilt testing.     Please see Dr Laughlin clinic visit note for details.      PROCEDURE    The risks and benefits of the procedure were explained to the patient in   full. Written informed consent was obtained. The patient was brought to the   EP lab in a fasting and hemodynamically stable condition. Physical   examination of the patient did not reveal any carotid bruits, and review of   the chart did not reveal the presence of stroke or TIA within the past   three months.     The following maneuvers were done sequentially:    1- Sitting for 5 minutes:   End of 5 min:  HR 70 , /81    2- Standing for 10 minutes:   30 sec: HR 85 , /77  1 min: HR 80 , /78  2 min: HR 88 , /74  3 min: HR 86 , /77  4 min: HR 93 , /79  5 min: HR 85 , /79  6 min: HR 89 , /79  7 min: HR 95 , BP 97/72  8 min:  , /83  9 min: HR 96 , /79  10 min: HR 92 , /76    2- Maneuvers in seated position:    A. Carotid sinus massage:  Right:  Baseline HR 70 , /98.   Low BP  HR 70 bpm, BP 61/49  Low HR 30 bpm, 2.1sec pause  BP recovery time 48.6s  HR recovery time 15.1s  RCSM seated was assoc with lightheaded symptoms, but NOT consistent with clinical history    Left:  Baseline HR 78 , /80.   Low BP HR 78bpm, BP 70/46  Low HR 37bpm, 1.6sec pause  BP recovery time 43.6s  HR recovery time 12.4s    LCSM Symptoms: None    B. Valsalva:   Baseline: HR 67 , /77  Phase 1: HR 73 , Max /103  Phase 2: HR 94 , Min /105  Phase 3: Present  Phase 4 (Overshoot): HR 46 , Max /84    Symptoms: None    C. Cough:   Not done    D. Swallowing test  Baseline HR 94 bpm, /86  High BP  bpm, /108, Latency time 10.85s  Low BP notning    Symptoms: None    E. Deep breathing  In 67 out 47  In 89 out 57  In 91 out 57  In 82 out 57  In 80 out 58     Delta HR = 20, 32, 34, 25, 22         3- Supine rest for 5 minutes:   HR 80 , Max BP 84/64    4- TILT at 70 degrees for 20 minutes: (1 liter fluid was given before tilt)  1 min: HR 81 , BP 96/60  2 min: HR 71 , BP 95/67  3 min: HR 91 , BP 97/68  4 min: HR 86 , BP 93/67  5 min: HR 76 , BP 95/61  6 min: HR 88 , BP 87/62  7 min: HR 93 , BP 87/54  8 min: HR 87 , BP 89/59  9 min: HR 87 , BP 92/61  10 min: HR 86 , BP 90/62  12 min: HR 87 , BP 98/63  14 min: HR 84 , BP 93/60  16 min: HR 88 , BP 88/59  18 min: HR 96 , BP 90/60  20 min: HR 98 , BP 86/56  No symptoms    5- NTG 0.4mg SL and monitoring for 5 minutes:   Not done    After 500 saline infusion  30sec; HR  80  , /64  1 min: HR 72 , /67  2 min: HR 78 , /68  3 min: HR 81 , /68  4 min: HR 69 , BP 95/57  5 min: HR 88 , /65    Symptoms: None      DISCUSSION:  History is consistent with immediate and possibly delayed OH.,  He had fluid infusion that may have blunted findings  Role of abnormal CSM is unclear  No evidence of autonomic neurological aqbnormality  Not POTs  Likely managed by focus on hydration and lower body isometrics. If possible avoid  antihypertensives during day. If necessary at night for supine hypertension, then consider short-acting antihypertensives but be ware of symptomatic OH in the middle of night if he gets up to BR/      I appreciated the opportunity to see and assess Mr Montejo  and direct the procedure described above . The above note summarizes my findings and current recommendations. Please do not hesitate to contact me if you have any questions or concerns.    Riccardo Julien MD [facility]   Pager [phone]  Office [phone]

## 2018-05-29 NOTE — IP AVS SNAPSHOT
[address] [address]                                       After Visit Summary   5/29/2018    Cesar Montejo    MRN: [id number]           After Visit Summary Signature Page     I have received my discharge instructions, and my questions have been answered. I have discussed any challenges I see with this plan with the nurse or doctor.    ..........................................................................................................................................  Patient/Patient Representative Signature      ..........................................................................................................................................  Patient Representative Print Name and Relationship to Patient    ..................................................               ................................................  Date                                            Time    ..........................................................................................................................................  Reviewed by Signature/Title    ...................................................              ..............................................  Date                                                            Time

## 2018-05-29 NOTE — PROGRESS NOTES
Dr Julien here to see patient, spoke with him about the results. Discharge instructions reviewed with the patient. Patient left the unit after eating a sandwich. PIV discontinued.

## 2018-05-29 NOTE — IP AVS SNAPSHOT
MRN:[id number]                      After Visit Summary   5/29/2018    Cesar Montejo    MRN: [id number]           Visit Information        Department      5/29/2018  8:16 AM [address] [facility]          Review of your medicines      UNREVIEWED medicines. Ask your doctor about these medicines        Dose / Directions    ASPIRIN PO        Dose:  81 mg   Take 81 mg by mouth daily   Refills:  0       COQ10 PO        Refills:  0       KRILL OIL PO        Dose:  1 capsule   Take 1 capsule by mouth daily   Refills:  0       mirtazapine 15 MG tablet   Commonly known as:  REMERON   Used for:  Mixed anxiety and depressive disorder, Insomnia        TAKE 1 TABLET(15 MG) BY MOUTH AT BEDTIME   Quantity:  90 tablet   Refills:  3       MULTIVITAL PO        Dose:  1 tablet   Take 1 tablet by mouth daily   Refills:  0       ranitidine 300 MG tablet   Commonly known as:  ZANTAC   Used for:  Gastroesophageal reflux disease, esophagitis presence not specified        Dose:  300 mg   Take 1 tablet (300 mg) by mouth At Bedtime   Quantity:  30 tablet   Refills:  1       simvastatin 40 MG tablet   Commonly known as:  ZOCOR        Dose:  0.5 tablet   Take 0.5 tablets by mouth daily   Refills:  0       TURMERIC PO        Dose:  1 tablet   Take 1 tablet by mouth daily   Refills:  0       VITAMIN D (CHOLECALCIFEROL) PO        Dose:  2000 Units   Take 2,000 Units by mouth daily   Refills:  0                Protect others around you: Learn how to safely use, store and throw away your medicines at www.disposemymeds.org.         Follow-ups after your visit        Your next 10 appointments already scheduled     Jun 14, 2018  2:00 PM CDT   (Arrive by 1:45 PM)   Return Visit with James Arreola MD   [facility] Neurology ([facility])    [address] [place] [id number] [phone]            Jun 25, 2018  8:00 AM CDT   New Sleep Patient with Meet Lake MD   [facility] ([facility])    [address]  [facility] [id number] [phone]            Mar 13, 2019  5:00 PM CDT   (Arrive by 4:45 PM)   Return Movement Disorder with Vida Solomon MD   [facility] Neurology ([facility])    [address]  3rd Floor  [facility] [address] [phone]              Future tests that were ordered for you     EP study tilt table           EP study tilt table                  Care Instructions        Further instructions from your care team           Cardiovascular Clinic:   [address] [place] [address]  Your Care Team:  EP Cardiology   Telephone Number     Marcella Julien [phone]   Cesia Sanchez RN  [phone]     For scheduling appts or procedures:    Georgie Oconnell   [phone]     As always, Thank you for trusting us with your health care needs!       Additional Information About Your Visit        [employer] Information     [employer] gives you secure access to your electronic health record. If you see a primary care provider, you can also send messages to your care team and make appointments. If you have questions, please call your primary care clinic.  If you do not have a primary care provider, please call [phone] and they will assist you.        Care EveryWhere ID     This is your Care EveryWhere ID. This could be used by other organizations to access your [facility] medical records  [id number]        Your Vitals Were     Blood Pressure Pulse Temperature Respirations Height Weight    120/78 (BP Location: Right arm) 67 98.1  F (36.7  C) (Oral) 18 1.829 m (6') 85.3 kg (188 lb)    Pulse Oximetry BMI (Body Mass Index)                98% 25.5 kg/m2           Primary Care Provider Office Phone # Fax #    Rocky Messina [phone]      Equal Access to Services     CAROLINA PLASCENCIA AH: Jackie ruano  denise Mcdaniel, watyda lucandiceadaha, qaybta kalolitada jadapril, prashant bertramin hayaaemmy streetfidencio magalyrosa maria laAriadnebeto leslie. So [facility] [phone].    ATENCIÓN: Si habla español, tiene a persaud disposición servicios gratuitos de asistencia lingüística. Angelica al [phone].    We comply with applicable federal civil rights laws and [place] laws. We do not discriminate on the basis of race, color, national origin, age, disability, sex, sexual orientation, or gender identity.            Thank you!     Thank you for choosing [facility] for your care. Our goal is always to provide you with excellent care. Hearing back from our patients is one way we can continue to improve our services. Please take a few minutes to complete the written survey that you may receive in the mail after you visit with us. Thank you!             Medication List: This is a list of all your medications and when to take them. Check marks below indicate your daily home schedule. Keep this list as a reference.      Medications           Morning Afternoon Evening Bedtime As Needed    ASPIRIN PO   Take 81 mg by mouth daily                                COQ10 PO                                KRILL OIL PO   Take 1 capsule by mouth daily                                mirtazapine 15 MG tablet   Commonly known as:  REMERON   TAKE 1 TABLET(15 MG) BY MOUTH AT BEDTIME                                MULTIVITAL PO   Take 1 tablet by mouth daily                                ranitidine 300 MG tablet   Commonly known as:  ZANTAC   Take 1 tablet (300 mg) by mouth At Bedtime                                simvastatin 40 MG tablet   Commonly known as:  ZOCOR   Take 0.5 tablets by mouth daily                                TURMERIC PO   Take 1 tablet by mouth daily                                VITAMIN D (CHOLECALCIFEROL) PO   Take 2,000 Units by mouth daily

## 2018-05-29 NOTE — PROGRESS NOTES
Returned to unit post tilt table. Denies any headache or lightheadedness. . Eating and taking po fluids. Waiting to see Dr. Julien before discharge.

## 2018-05-29 NOTE — DISCHARGE INSTRUCTIONS
Plan:  - Use compression shorts (athletic compression shorts)  - Increase hydration with goal to take in 64 oz of water/electrolyte fluids per day. Recommend drinking 8-10oz. Mix 5 oz water with 5 oz pedialyte upon waking prior to rising out of bed each morning or after long naps.   - Eat six small meals per day with focus on foods low in carbohydrates and low in gluten.  - Room humidifier during sleeping.   - Liberalize salt intake  - Change positions carefully and slowly and maintain safe environment. Take particular care when getting out of bed at night.   -Standing training and supine isometric exercises.     Cardiovascular Clinic:   [address] [place] [address]. [place] [address]  Your Care Team:  EP Cardiology   Telephone Number     Marcella Julien [phone]   Cesia Sanchez RN  [phone]     For scheduling appts or procedures:    Georgie Oconnell   [phone]     As always, Thank you for trusting us with your health care needs!

## 2018-05-31 LAB
CATECHOLS PLAS-IMP: ABNORMAL
DOPAMINE SERPL-MCNC: 22 PG/ML (ref 0–20)
DOPAMINE SERPL-MCNC: 23 PG/ML (ref 0–20)
DOPAMINE SERPL-MCNC: 31 PG/ML (ref 0–20)
DOPAMINE SERPL-MCNC: <20 PG/ML (ref 0–20)
DOPAMINE SERPL-MCNC: <20 PG/ML (ref 0–20)
EPINEPH PLAS-MCNC: 15 PG/ML (ref 10–200)
EPINEPH PLAS-MCNC: 17 PG/ML (ref 10–200)
EPINEPH PLAS-MCNC: 20 PG/ML (ref 10–200)
EPINEPH PLAS-MCNC: 32 PG/ML (ref 10–200)
EPINEPH PLAS-MCNC: 59 PG/ML (ref 10–200)
NOREPINEPH PLAS-MCNC: 1022 PG/ML (ref 80–520)
NOREPINEPH PLAS-MCNC: 1055 PG/ML (ref 80–520)
NOREPINEPH PLAS-MCNC: 1242 PG/ML (ref 80–520)
NOREPINEPH PLAS-MCNC: 696 PG/ML (ref 80–520)
NOREPINEPH PLAS-MCNC: 944 PG/ML (ref 80–520)

## 2018-06-03 ENCOUNTER — HOSPITAL ENCOUNTER (OUTPATIENT)
Facility: CLINIC | Age: 53
Setting detail: OBSERVATION
Discharge: HOME | End: 2018-06-04
Attending: EMERGENCY MEDICINE | Admitting: EMERGENCY MEDICINE
Payer: COMMERCIAL

## 2018-06-03 ENCOUNTER — APPOINTMENT (OUTPATIENT)
Dept: GENERAL RADIOLOGY | Facility: CLINIC | Age: 53
End: 2018-06-03
Attending: EMERGENCY MEDICINE
Payer: COMMERCIAL

## 2018-06-03 DIAGNOSIS — E78.5 HYPERLIPIDEMIA, UNSPECIFIED HYPERLIPIDEMIA TYPE: ICD-10-CM

## 2018-06-03 DIAGNOSIS — K21.9 GASTROESOPHAGEAL REFLUX DISEASE, ESOPHAGITIS PRESENCE NOT SPECIFIED: ICD-10-CM

## 2018-06-03 DIAGNOSIS — R06.00 PND (PAROXYSMAL NOCTURNAL DYSPNEA): ICD-10-CM

## 2018-06-03 LAB
ALBUMIN SERPL-MCNC: 4.4 G/DL (ref 3.4–5)
ALBUMIN UR-MCNC: NEGATIVE MG/DL
ALP SERPL-CCNC: 43 U/L (ref 40–150)
ALT SERPL W P-5'-P-CCNC: 21 U/L (ref 0–70)
AMPHETAMINES UR QL SCN: NEGATIVE
ANION GAP SERPL CALCULATED.3IONS-SCNC: 14 MMOL/L (ref 3–14)
APPEARANCE UR: CLEAR
AST SERPL W P-5'-P-CCNC: 14 U/L (ref 0–45)
BARBITURATES UR QL: NEGATIVE
BASOPHILS # BLD AUTO: 0 10E9/L (ref 0–0.2)
BASOPHILS NFR BLD AUTO: 0.6 %
BENZODIAZ UR QL: NEGATIVE
BILIRUB SERPL-MCNC: 0.8 MG/DL (ref 0.2–1.3)
BILIRUB UR QL STRIP: NEGATIVE
BUN SERPL-MCNC: 12 MG/DL (ref 7–30)
CALCIUM SERPL-MCNC: 9 MG/DL (ref 8.5–10.1)
CANNABINOIDS UR QL SCN: NEGATIVE
CHLORIDE SERPL-SCNC: 106 MMOL/L (ref 94–109)
CO2 SERPL-SCNC: 22 MMOL/L (ref 20–32)
COCAINE UR QL: NEGATIVE
COLOR UR AUTO: YELLOW
CREAT SERPL-MCNC: 0.92 MG/DL (ref 0.66–1.25)
D DIMER PPP FEU-MCNC: <0.3 UG/ML FEU (ref 0–0.5)
DIFFERENTIAL METHOD BLD: NORMAL
EOSINOPHIL # BLD AUTO: 0 10E9/L (ref 0–0.7)
EOSINOPHIL NFR BLD AUTO: 0.4 %
ERYTHROCYTE [DISTWIDTH] IN BLOOD BY AUTOMATED COUNT: 12.3 % (ref 10–15)
ETHANOL SERPL-MCNC: <0.01 G/DL
ETHANOL UR QL SCN: NEGATIVE
GFR SERPL CREATININE-BSD FRML MDRD: 87 ML/MIN/1.7M2
GLUCOSE SERPL-MCNC: 99 MG/DL (ref 70–99)
GLUCOSE UR STRIP-MCNC: NEGATIVE MG/DL
HCT VFR BLD AUTO: 47.3 % (ref 40–53)
HGB BLD-MCNC: 16 G/DL (ref 13.3–17.7)
HGB UR QL STRIP: NEGATIVE
IMM GRANULOCYTES # BLD: 0 10E9/L (ref 0–0.4)
IMM GRANULOCYTES NFR BLD: 0.2 %
KETONES UR STRIP-MCNC: 40 MG/DL
LEUKOCYTE ESTERASE UR QL STRIP: NEGATIVE
LYMPHOCYTES # BLD AUTO: 1.9 10E9/L (ref 0.8–5.3)
LYMPHOCYTES NFR BLD AUTO: 37.4 %
MCH RBC QN AUTO: 31.2 PG (ref 26.5–33)
MCHC RBC AUTO-ENTMCNC: 33.8 G/DL (ref 31.5–36.5)
MCV RBC AUTO: 92 FL (ref 78–100)
MONOCYTES # BLD AUTO: 0.6 10E9/L (ref 0–1.3)
MONOCYTES NFR BLD AUTO: 11.5 %
NEUTROPHILS # BLD AUTO: 2.5 10E9/L (ref 1.6–8.3)
NEUTROPHILS NFR BLD AUTO: 49.9 %
NITRATE UR QL: NEGATIVE
NRBC # BLD AUTO: 0 10*3/UL
NRBC BLD AUTO-RTO: 0 /100
OPIATES UR QL SCN: NEGATIVE
PH UR STRIP: 6 PH (ref 5–7)
PLATELET # BLD AUTO: 197 10E9/L (ref 150–450)
POTASSIUM SERPL-SCNC: 3.7 MMOL/L (ref 3.4–5.3)
PROT SERPL-MCNC: 7.4 G/DL (ref 6.8–8.8)
RBC # BLD AUTO: 5.13 10E12/L (ref 4.4–5.9)
SODIUM SERPL-SCNC: 142 MMOL/L (ref 133–144)
SOURCE: ABNORMAL
SP GR UR STRIP: 1.01 (ref 1–1.03)
TROPONIN I SERPL-MCNC: <0.015 UG/L (ref 0–0.04)
UROBILINOGEN UR STRIP-MCNC: NORMAL MG/DL (ref 0–2)
WBC # BLD AUTO: 5 10E9/L (ref 4–11)

## 2018-06-03 PROCEDURE — 93005 ELECTROCARDIOGRAM TRACING: CPT | Performed by: EMERGENCY MEDICINE

## 2018-06-03 PROCEDURE — 99284 EMERGENCY DEPT VISIT MOD MDM: CPT | Mod: 25 | Performed by: EMERGENCY MEDICINE

## 2018-06-03 PROCEDURE — 99285 EMERGENCY DEPT VISIT HI MDM: CPT | Mod: 25 | Performed by: EMERGENCY MEDICINE

## 2018-06-03 PROCEDURE — 84484 ASSAY OF TROPONIN QUANT: CPT | Performed by: PHYSICIAN ASSISTANT

## 2018-06-03 PROCEDURE — 81003 URINALYSIS AUTO W/O SCOPE: CPT | Performed by: EMERGENCY MEDICINE

## 2018-06-03 PROCEDURE — 80320 DRUG SCREEN QUANTALCOHOLS: CPT | Performed by: EMERGENCY MEDICINE

## 2018-06-03 PROCEDURE — 80053 COMPREHEN METABOLIC PANEL: CPT | Performed by: EMERGENCY MEDICINE

## 2018-06-03 PROCEDURE — 85025 COMPLETE CBC W/AUTO DIFF WBC: CPT | Performed by: EMERGENCY MEDICINE

## 2018-06-03 PROCEDURE — 36415 COLL VENOUS BLD VENIPUNCTURE: CPT | Performed by: PHYSICIAN ASSISTANT

## 2018-06-03 PROCEDURE — 80307 DRUG TEST PRSMV CHEM ANLYZR: CPT | Performed by: EMERGENCY MEDICINE

## 2018-06-03 PROCEDURE — 84484 ASSAY OF TROPONIN QUANT: CPT | Performed by: EMERGENCY MEDICINE

## 2018-06-03 PROCEDURE — 85379 FIBRIN DEGRADATION QUANT: CPT | Performed by: EMERGENCY MEDICINE

## 2018-06-03 PROCEDURE — 93010 ELECTROCARDIOGRAM REPORT: CPT | Mod: Z6 | Performed by: EMERGENCY MEDICINE

## 2018-06-03 PROCEDURE — 70360 X-RAY EXAM OF NECK: CPT

## 2018-06-03 ASSESSMENT — ENCOUNTER SYMPTOMS
DIARRHEA: 0
COUGH: 0
BLOOD IN STOOL: 0
NAUSEA: 0
STRIDOR: 1
SLEEP DISTURBANCE: 0
SHORTNESS OF BREATH: 0
ANAL BLEEDING: 0
VOMITING: 0

## 2018-06-03 NOTE — ED NOTES
Speaking in full sentences but at the end does sound short of breath; sats 95%; ; RR 20; reports tight throat

## 2018-06-03 NOTE — IP AVS SNAPSHOT
Unit 6D Observation [address] [address]    Phone:  [phone]    Fax:  [phone]                                       After Visit Summary   6/3/2018    Cesar Montejo    MRN: [id number]           After Visit Summary Signature Page     I have received my discharge instructions, and my questions have been answered. I have discussed any challenges I see with this plan with the nurse or doctor.    ..........................................................................................................................................  Patient/Patient Representative Signature      ..........................................................................................................................................  Patient Representative Print Name and Relationship to Patient    ..................................................               ................................................  Date                                            Time    ..........................................................................................................................................  Reviewed by Signature/Title    ...................................................              ..............................................  Date                                                            Time

## 2018-06-04 ENCOUNTER — APPOINTMENT (OUTPATIENT)
Dept: CARDIOLOGY | Facility: CLINIC | Age: 53
End: 2018-06-04
Attending: NURSE PRACTITIONER
Payer: COMMERCIAL

## 2018-06-04 VITALS
TEMPERATURE: 98.2 F | DIASTOLIC BLOOD PRESSURE: 77 MMHG | RESPIRATION RATE: 16 BRPM | HEART RATE: 70 BPM | BODY MASS INDEX: 24.82 KG/M2 | OXYGEN SATURATION: 96 % | SYSTOLIC BLOOD PRESSURE: 117 MMHG | WEIGHT: 183 LBS

## 2018-06-04 PROBLEM — R07.9 CHEST PAIN: Status: ACTIVE | Noted: 2018-06-04

## 2018-06-04 LAB
INTERPRETATION ECG - MUSE: NORMAL
TROPONIN I SERPL-MCNC: <0.015 UG/L (ref 0–0.04)
TROPONIN I SERPL-MCNC: <0.015 UG/L (ref 0–0.04)

## 2018-06-04 PROCEDURE — 36415 COLL VENOUS BLD VENIPUNCTURE: CPT | Performed by: PHYSICIAN ASSISTANT

## 2018-06-04 PROCEDURE — 25000132 ZZH RX MED GY IP 250 OP 250 PS 637: Performed by: PHYSICIAN ASSISTANT

## 2018-06-04 PROCEDURE — 93350 STRESS TTE ONLY: CPT | Mod: 26 | Performed by: INTERNAL MEDICINE

## 2018-06-04 PROCEDURE — 93325 DOPPLER ECHO COLOR FLOW MAPG: CPT | Mod: 26 | Performed by: INTERNAL MEDICINE

## 2018-06-04 PROCEDURE — 84484 ASSAY OF TROPONIN QUANT: CPT | Performed by: PHYSICIAN ASSISTANT

## 2018-06-04 PROCEDURE — 93321 DOPPLER ECHO F-UP/LMTD STD: CPT | Mod: 26 | Performed by: INTERNAL MEDICINE

## 2018-06-04 PROCEDURE — 99236 HOSP IP/OBS SAME DATE HI 85: CPT | Mod: Z6 | Performed by: PHYSICIAN ASSISTANT

## 2018-06-04 PROCEDURE — 25500064 ZZH RX 255 OP 636: Performed by: EMERGENCY MEDICINE

## 2018-06-04 PROCEDURE — 93018 CV STRESS TEST I&R ONLY: CPT | Mod: GC | Performed by: INTERNAL MEDICINE

## 2018-06-04 PROCEDURE — 93016 CV STRESS TEST SUPVJ ONLY: CPT | Mod: GC | Performed by: INTERNAL MEDICINE

## 2018-06-04 PROCEDURE — G0378 HOSPITAL OBSERVATION PER HR: HCPCS

## 2018-06-04 RX ORDER — LIDOCAINE 40 MG/G
CREAM TOPICAL
Status: DISCONTINUED | OUTPATIENT
Start: 2018-06-04 | End: 2018-06-04 | Stop reason: HOSPADM

## 2018-06-04 RX ORDER — ACETAMINOPHEN 325 MG/1
650 TABLET ORAL EVERY 4 HOURS PRN
Status: DISCONTINUED | OUTPATIENT
Start: 2018-06-04 | End: 2018-06-04 | Stop reason: HOSPADM

## 2018-06-04 RX ORDER — ASPIRIN 81 MG/1
162 TABLET, CHEWABLE ORAL ONCE
Status: DISCONTINUED | OUTPATIENT
Start: 2018-06-04 | End: 2018-06-04

## 2018-06-04 RX ORDER — MIRTAZAPINE 15 MG/1
15 TABLET, FILM COATED ORAL AT BEDTIME
Status: DISCONTINUED | OUTPATIENT
Start: 2018-06-04 | End: 2018-06-04 | Stop reason: HOSPADM

## 2018-06-04 RX ORDER — ASPIRIN 81 MG/1
81 TABLET ORAL DAILY
Status: DISCONTINUED | OUTPATIENT
Start: 2018-06-05 | End: 2018-06-04 | Stop reason: HOSPADM

## 2018-06-04 RX ORDER — NITROGLYCERIN 0.4 MG/1
0.4 TABLET SUBLINGUAL EVERY 5 MIN PRN
Status: DISCONTINUED | OUTPATIENT
Start: 2018-06-04 | End: 2018-06-04 | Stop reason: HOSPADM

## 2018-06-04 RX ORDER — NALOXONE HYDROCHLORIDE 0.4 MG/ML
.1-.4 INJECTION, SOLUTION INTRAMUSCULAR; INTRAVENOUS; SUBCUTANEOUS
Status: DISCONTINUED | OUTPATIENT
Start: 2018-06-04 | End: 2018-06-04 | Stop reason: HOSPADM

## 2018-06-04 RX ORDER — SIMVASTATIN 20 MG
20 TABLET ORAL AT BEDTIME
Status: DISCONTINUED | OUTPATIENT
Start: 2018-06-04 | End: 2018-06-04 | Stop reason: HOSPADM

## 2018-06-04 RX ORDER — ALUMINA, MAGNESIA, AND SIMETHICONE 2400; 2400; 240 MG/30ML; MG/30ML; MG/30ML
30 SUSPENSION ORAL EVERY 4 HOURS PRN
Status: DISCONTINUED | OUTPATIENT
Start: 2018-06-04 | End: 2018-06-04 | Stop reason: HOSPADM

## 2018-06-04 RX ADMIN — PERFLUTREN 5 ML: 6.52 INJECTION, SUSPENSION INTRAVENOUS at 09:30

## 2018-06-04 RX ADMIN — SIMVASTATIN 20 MG: 20 TABLET, FILM COATED ORAL at 01:48

## 2018-06-04 RX ADMIN — MIRTAZAPINE 15 MG: 15 TABLET, FILM COATED ORAL at 01:48

## 2018-06-04 NOTE — ED NOTES
[facility] [place]   ED Nurse to Floor Handoff     Cesar Montejo is a 52 year old male who speaks English and lives alone,  in a home  They arrived in the ED by car from home    ED Chief Complaint: Shortness of Breath (feeling short of breath, tightness in his throat)    ED Dx;   Final diagnoses:   PND (paroxysmal nocturnal dyspnea) - consider ACS, sleep apnea, Pulm HTN, GERD         Needed?: No    Allergies: No Known Allergies.  Past Medical Hx:   Past Medical History:   Diagnosis Date     Anxiety      Basal cell carcinoma      Gastro-oesophageal reflux disease      Head injury April 2012    Had a bad concussion with post concussion synd for year     High cholesterol      Hypertension early 2000    Not on meds. Usually in pre-hypertesion categ.     Insomnia      Squamous cell carcinoma       Baseline Mental status: WDL  Current Mental Status changes: at basesline    Infection present or suspected this encounter: no  Sepsis suspected: No  Isolation type: No active isolations     Activity level - Baseline/Home:  Independent  Activity Level - Current:   Independent    Bariatric equipment needed?: No    In the ED these meds were given: Medications - No data to display    Drips running?  No    Home pump  No    Current LDAs  Peripheral IV 06/03/18 Left (Active)   Number of days:0       Labs results:   Labs Ordered and Resulted from Time of ED Arrival Up to the Time of Departure from the ED   UA MACROSCOPIC WITH REFLEX TO MICRO AND CULTURE - Abnormal; Notable for the following:        Result Value    Ketones Urine 40 (*)     All other components within normal limits   CBC WITH PLATELETS DIFFERENTIAL   COMPREHENSIVE METABOLIC PANEL   ALCOHOL ETHYL   DRUG ABUSE SCREEN 6 CHEM DEP URINE ([facility])   D DIMER QUANTITATIVE   TROPONIN I       Imaging Studies:   Recent Results (from the past 24 hour(s))   Neck soft tissue XR    Narrative    NECK SOFT TISSUE  6/3/2018 7:44 PM     HISTORY:  difficulty breathing with stridor - r/o airway compromise;     COMPARISON: None.      Impression    IMPRESSION: A single lateral view utilizing soft tissue technique of  the neck is unremarkable. No radiopaque foreign body. No definite  epiglottic or prevertebral soft tissue swelling.    STEF EVANS MD       Recent vital signs:   BP (!) 134/98  Pulse 91  Temp 98.2  F (36.8  C) (Oral)  Resp 16  SpO2 97%    Cardiac Rhythm: Normal Sinus  Pt needs tele? Yes  Skin/wound Issues: None    Code Status: Full Code    Pain control: pt had none    Nausea control: pt had none    Abnormal labs/tests/findings requiring intervention:     Family present during ED course? No   Family Comments/Social Situation comments:       Tasks needing completion: None    MAX RICE RN  asc-- [phone] [facility]  [phone] [facility]

## 2018-06-04 NOTE — PROGRESS NOTES
Discharge instructions reviewed, questions answered. PIV removed. Patient ambulated off unit on his own.

## 2018-06-04 NOTE — PROGRESS NOTES
Telemetry tech called pt on tele box, continue oxygen monitoring.  Code status marked and fall risk band applied.

## 2018-06-04 NOTE — ED PROVIDER NOTES
[facility] EMERGENCY DEPARTMENT ([facility])    6/03/18     ED 3    History     Chief Complaint   Patient presents with     Shortness of Breath     feeling short of breath, tightness in his throat     The history is provided by the patient and medical records.     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  Patient states that he got a digital recorder and recorded himself at night over approximately 8 hours and then the next day listened to the whole 8 hour tape.  Patient states he can hear himself snoring on the digital recorder.  Patient states that last night he woke up 3 times short of breath and anxious that he would stop breathing because he believes he might have sleep apnea.  Patient states that on the digital recorder it sounded like he was having some stridorous moments.  Patient denies any chest pain or shortness of breath currently at rest here in the ER.  Patient denies any URI symptoms or sinus congestion.  Patient denies any vomiting, diarrhea, melena or bright blood per rectum.  Patient states that he had a recent workup last week with his primary care for some chest pain issues during which time he had a chest x-ray done which was unremarkable.  These findings were confirmed in the Epic record.  Patient at that time was placed on Zantac at night but he states the Zantac has not helped him and in fact his symptoms have been getting worse.  Patient states he is concerned that he may experience sudden death at night while alone.    This part of the document was transcribed by Dora Noguera Medical Scribe.      I have reviewed the Medications, Allergies, Past Medical and Surgical History, and Social History in the [facility] system.  Past Medical History:   Diagnosis Date     Anxiety      Basal cell carcinoma      Gastro-oesophageal reflux disease      Head injury April 2012    Had a bad  concussion with post concussion synd for year     High cholesterol      Hypertension early 2000    Not on meds. Usually in pre-hypertesion categ.     Insomnia      Squamous cell carcinoma        Past Surgical History:   Procedure Laterality Date     COLONOSCOPY N/A 4/17/2017    Procedure: COLONOSCOPY;  Surgeon: Larry Fields MD;  Location: [facility] GI     LASER CO2 LESION ORAL N/A 10/5/2016    Procedure: LASER CO2 LESION ORAL;  Surgeon: Josué Rojo DDS;  Location: [facility] OR     MOHS MICROGRAPHIC PROCEDURE         Family History   Problem Relation Age of Onset     Lipids Mother      on meds     CEREBROVASCULAR DISEASE Mother      TIA     Alzheimer Disease Mother      Skin Cancer Mother      SCC     Lipids Father      on meds     Hypertension Father      controlled with meds     Thyroid Disease Father      ?not sure but possibly     DIABETES Father      CEREBROVASCULAR DISEASE Father      Cancer - colorectal Maternal Grandmother      still alive at 99     CANCER Maternal Grandfather      lung but lifetime smoker     Melanoma Maternal Grandfather      Asthma No family hx of      C.A.D. No family hx of      Prostate Cancer No family hx of        Social History   Substance Use Topics     Smoking status: Former Smoker     Packs/day: 0.50     Years: 5.00     Types: Cigarettes     Start date: 1/1/1986     Quit date: 1/1/1991     Smokeless tobacco: Never Used      Comment: After 1991 no longer a half pack a day. Very occasional--maybe 75 cigarettes a year--91 to 98     Alcohol use 0.6 - 1.2 oz/week      Comment: 1 to 2 glasses of red wine per day      Review of Systems   HENT: Negative for congestion.    Respiratory: Positive for stridor. Negative for cough and shortness of breath.    Cardiovascular: Negative for chest pain.   Gastrointestinal: Negative for anal bleeding, blood in stool, diarrhea, nausea and vomiting.   Psychiatric/Behavioral: Negative for sleep disturbance.   All other systems reviewed and are  negative.      Physical Exam   BP: (!) 134/98  Pulse: 91  Temp: 98.2  F (36.8  C)  Resp: 16  SpO2: 97 %      Physical Exam   Constitutional: He is oriented to person, place, and time. He appears well-nourished. No distress.   HENT:   Head: Atraumatic.   Mouth/Throat: Oropharynx is clear and moist.   Mallinpatti 3   Eyes: EOM are normal. Pupils are equal, round, and reactive to light.   Neck: Neck supple. No JVD present.   Cardiovascular: Normal heart sounds and intact distal pulses.  Exam reveals no friction rub.    No murmur heard.  Pulmonary/Chest: Breath sounds normal. He has no wheezes. He has no rales.   Abdominal: Soft. There is no tenderness. There is no rebound and no guarding.   Musculoskeletal: He exhibits no edema, tenderness or deformity.   Neurological: He is alert and oriented to person, place, and time. No cranial nerve deficit.   Grossly intact and symmetric   Skin: No rash noted. He is not diaphoretic.   Psychiatric: He has a normal mood and affect.       ED Course     ED Course     Procedures        Patient was placed in a room and was placed on cardiac monitor and oximetry.    EKG revealed a normal sinus rhythm at a rate of 84 with a AK interval 0.146 and a QRS duration 0.076.  The patient had a borderline leftward axis with no acute ST or T-wave changes significant for ischemia.  Patient did have a biphasic P-wave in V1.  There was no evidence of hypertensive heart disease but pulmonary hypertension cannot be ruled out.  As read by me personally.    Results for orders placed or performed during the hospital encounter of 06/03/18   Neck soft tissue XR    Narrative    NECK SOFT TISSUE  6/3/2018 7:44 PM     HISTORY: difficulty breathing with stridor - r/o airway compromise;     COMPARISON: None.      Impression    IMPRESSION: A single lateral view utilizing soft tissue technique of  the neck is unremarkable. No radiopaque foreign body. No definite  epiglottic or prevertebral soft tissue  heron.    STEF EVANS MD   CBC with platelets differential   Result Value Ref Range    WBC 5.0 4.0 - 11.0 10e9/L    RBC Count 5.13 4.4 - 5.9 10e12/L    Hemoglobin 16.0 13.3 - 17.7 g/dL    Hematocrit 47.3 40.0 - 53.0 %    MCV 92 78 - 100 fl    MCH 31.2 26.5 - 33.0 pg    MCHC 33.8 31.5 - 36.5 g/dL    RDW 12.3 10.0 - 15.0 %    Platelet Count 197 150 - 450 10e9/L    Diff Method Automated Method     % Neutrophils 49.9 %    % Lymphocytes 37.4 %    % Monocytes 11.5 %    % Eosinophils 0.4 %    % Basophils 0.6 %    % Immature Granulocytes 0.2 %    Nucleated RBCs 0 0 /100    Absolute Neutrophil 2.5 1.6 - 8.3 10e9/L    Absolute Lymphocytes 1.9 0.8 - 5.3 10e9/L    Absolute Monocytes 0.6 0.0 - 1.3 10e9/L    Absolute Eosinophils 0.0 0.0 - 0.7 10e9/L    Absolute Basophils 0.0 0.0 - 0.2 10e9/L    Abs Immature Granulocytes 0.0 0 - 0.4 10e9/L    Absolute Nucleated RBC 0.0    Comprehensive metabolic panel   Result Value Ref Range    Sodium 142 133 - 144 mmol/L    Potassium 3.7 3.4 - 5.3 mmol/L    Chloride 106 94 - 109 mmol/L    Carbon Dioxide 22 20 - 32 mmol/L    Anion Gap 14 3 - 14 mmol/L    Glucose 99 70 - 99 mg/dL    Urea Nitrogen 12 7 - 30 mg/dL    Creatinine 0.92 0.66 - 1.25 mg/dL    GFR Estimate 87 >60 mL/min/1.7m2    GFR Estimate If Black >90 >60 mL/min/1.7m2    Calcium 9.0 8.5 - 10.1 mg/dL    Bilirubin Total 0.8 0.2 - 1.3 mg/dL    Albumin 4.4 3.4 - 5.0 g/dL    Protein Total 7.4 6.8 - 8.8 g/dL    Alkaline Phosphatase 43 40 - 150 U/L    ALT 21 0 - 70 U/L    AST 14 0 - 45 U/L   Alcohol ethyl   Result Value Ref Range    Ethanol g/dL <0.01 <0.01 g/dL   Drug abuse screen 6 urine (chem dep)   Result Value Ref Range    Amphetamine Qual Urine Negative NEG^Negative    Barbiturates Qual Urine Negative NEG^Negative    Benzodiazepine Qual Urine Negative NEG^Negative    Cannabinoids Qual Urine Negative NEG^Negative    Cocaine Qual Urine Negative NEG^Negative    Ethanol Qual Urine Negative NEG^Negative    Opiates Qualitative Urine Negative  NEG^Negative   UA reflex to Microscopic and Culture   Result Value Ref Range    Color Urine Yellow     Appearance Urine Clear     Glucose Urine Negative NEG^Negative mg/dL    Bilirubin Urine Negative NEG^Negative    Ketones Urine 40 (A) NEG^Negative mg/dL    Specific Gravity Urine 1.010 1.003 - 1.035    Blood Urine Negative NEG^Negative    pH Urine 6.0 5.0 - 7.0 pH    Protein Albumin Urine Negative NEG^Negative mg/dL    Urobilinogen mg/dL Normal 0.0 - 2.0 mg/dL    Nitrite Urine Negative NEG^Negative    Leukocyte Esterase Urine Negative NEG^Negative    Source Midstream Urine        Labs Ordered and Resulted from Time of ED Arrival Up to the Time of Departure from the ED   UA MACROSCOPIC WITH REFLEX TO MICRO AND CULTURE - Abnormal; Notable for the following:        Result Value    Ketones Urine 40 (*)     All other components within normal limits   CBC WITH PLATELETS DIFFERENTIAL   COMPREHENSIVE METABOLIC PANEL   ALCOHOL ETHYL   DRUG ABUSE SCREEN 6 CHEM DEP URINE ([facility])   D DIMER QUANTITATIVE   TROPONIN I         Assessments & Plan (with Medical Decision Making)     I have reviewed the nursing notes.    Patient remained asymptomatic while in the ER.  Because of his mallinpatti score, a soft tissue lateral neck x-ray was done to rule out some excessive soft tissue swelling.  This was a normal exam.  At this time the patient's differential diagnosis includes GERD, sleep apnea, pulmonary hypertension and possible ACS.    The patient will be placed on our observation unit under chest pain protocol for overnight cardiac monitoring and oximetry.  This will make documentation of any apneic episodes at night possible.  In addition the patient will have serial troponins and an echocardiogram in the morning and/or a stress echo.    Patient is in agreement with this plan.    I have reviewed the findings, diagnosis, and plan with the patient.    Final diagnoses:   PND (paroxysmal nocturnal dyspnea) - consider ACS, sleep apnea,  Pulm HTN, GERD     Andreas Jiménez MD      6/3/2018   [facility], [place], EMERGENCY DEPARTMENT     Andreas Jiménez MD  06/03/18 2315

## 2018-06-04 NOTE — H&P
"[facility] ED Observation Admission Note    Chief Complaint   Patient presents with     Shortness of Breath     feeling short of breath, tightness in his throat       Assessment/Plan:  Gustabo is a 51-year-old male with PMH of HLD, BCC, SCC and anxiety who presents in the ED for evaluation of dyspnea.     1. Dyspnea: Has had ongoing dyspnea for the last 3-4 weeks. States he woke up last night 3 times with SOB. Pt has a digital recorder at home and has been recording himself sleeping at night. After he reviewed the recording, pt states that he can hear himself snoring on the recorder. He is being followed by Dr. Laughlin at the cardiology clinic for orthostatic tachycardia with symptoms of dizziness.  Has had previous work up for chest pain and dyspnea. Had a stress echo in 2007 that was normal.  He smoked tobacco in the past half a packet/day and quite smoking about 27 years ago.  In the ED, T 98.2 P 91 /98 R 16 O297% RA. BMP and CBC unremarkable. Trop x 1 negative. EKG was NSR. ETOH <0.01. Urine drug screen negative. UA negative. NECK SOFT TISSUE  x-ray without findings. CXR done on 5/25 with no acute cardiopulmonary findings.  Pt admitted to ED obs for ACS r/o and Echo in the AM .   Differential diagnoses include GERD, sleep apnea, and pulmonary hypertension.  - Serial troponins q4h x 2 more  - Continuous telemetry  - Echo Test in the morning  - Nitro PRN  - ASA 81mg daily  - Clear liquid diet after midnight  - Consider Sleep study referral at discharge     2. HLD: On Simvastatin 20 mg daily.     3. GERD: Pt was recently placed on Zantac by PCP however pt admits that he has not been taking this medication as it does not provide him any relief of his symptoms.     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  Patient states that he got a digital recorder and recorded himself at night over " "approximately 8 hours and then the next day listened to the whole 8 hour tape.  Patient states he can hear himself snoring on the digital recorder.  Patient states that last night he woke up 3 times short of breath and anxious that he would stop breathing because he believes he might have sleep apnea.  Patient states that on the digital recorder it sounded like he was having some stridorous moments.  Patient denies any chest pain or shortness of breath currently at rest here in the ER.  Patient denies any URI symptoms or sinus congestion.  Patient denies any vomiting, diarrhea, melena or bright blood per rectum.  Patient states that he had a recent workup last week with his primary care for some chest pain issues during which time he had a chest x-ray done which was unremarkable.  These findings were confirmed in the Epic record.  Patient at that time was placed on Zantac at night but he states the Zantac has not helped him and in fact his symptoms have been getting worse.  Patient states he is concerned that he may experience sudden death at night while alone\".    In the ED, T 98.2 P 91 /98 R 16 O297% RA. BMP and CBC unremarkable. Trop x 1 negative. EKG was NSR. ETOH <0.01. Urine drug screen negative. UA negative. NECK SOFT TISSUE  x-ray without findings. CXR done on 5/25 with no acute cardiopulmonary findings.  Pt admitted to ED obs for cardiac monitoring and Echo in the AM.     On admission to the observation unit the patient was stable    History:    Past Medical History:   Diagnosis Date     Anxiety      Basal cell carcinoma      Gastro-oesophageal reflux disease      Head injury April 2012    Had a bad concussion with post concussion synd for year     High cholesterol      Hypertension early 2000    Not on meds. Usually in pre-hypertesion categ.     Insomnia      Squamous cell carcinoma        Past Surgical History:   Procedure Laterality Date     COLONOSCOPY N/A 4/17/2017    Procedure: COLONOSCOPY;  " Surgeon: Larry Fields MD;  Location:  GI     LASER CO2 LESION ORAL N/A 10/5/2016    Procedure: LASER CO2 LESION ORAL;  Surgeon: Josué Rojo DDS;  Location: [facility] OR     MOHS MICROGRAPHIC PROCEDURE         Family History   Problem Relation Age of Onset     Lipids Mother      on meds     CEREBROVASCULAR DISEASE Mother      TIA     Alzheimer Disease Mother      Skin Cancer Mother      SCC     Lipids Father      on meds     Hypertension Father      controlled with meds     Thyroid Disease Father      ?not sure but possibly     DIABETES Father      CEREBROVASCULAR DISEASE Father      Cancer - colorectal Maternal Grandmother      still alive at 99     CANCER Maternal Grandfather      lung but lifetime smoker     Melanoma Maternal Grandfather      Asthma No family hx of      C.A.D. No family hx of      Prostate Cancer No family hx of        Social History     Social History     Marital status: Single     Spouse name: N/A     Number of children: N/A     Years of education: N/A     Occupational History     Not on file.     Social History Main Topics     Smoking status: Former Smoker     Packs/day: 0.50     Years: 5.00     Types: Cigarettes     Start date: 1/1/1986     Quit date: 1/1/1991     Smokeless tobacco: Never Used      Comment: After 1991 no longer a half pack a day. Very occasional--maybe 75 cigarettes a year--91 to 98     Alcohol use 0.6 - 1.2 oz/week      Comment: 1 to 2 glasses of red wine per day     Drug use: No      Comment: quit more than 20 years ago     Sexual activity: Not Currently     Partners: Female     Birth control/ protection: Abstinence, Condom     Other Topics Concern     Parent/Sibling W/ Cabg, Mi Or Angioplasty Before 65f 55m? No     Mom had recommended angiopl. in late 50s. 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          No current facility-administered medications on file prior to  encounter.   Current Outpatient Prescriptions on File Prior to Encounter:  ASPIRIN PO Take 81 mg by mouth daily   Coenzyme Q10 (COQ10 PO)    KRILL OIL PO Take 1 capsule by mouth daily    mirtazapine (REMERON) 15 MG tablet TAKE 1 TABLET(15 MG) BY MOUTH AT BEDTIME   Multiple Vitamins-Minerals (MULTIVITAL PO) Take 1 tablet by mouth daily    simvastatin (ZOCOR) 40 MG tablet Take 0.5 tablets by mouth daily   TURMERIC PO Take 1 tablet by mouth daily    VITAMIN D, CHOLECALCIFEROL, PO Take 2,000 Units by mouth daily        Data:    Results for orders placed or performed during the hospital encounter of 06/03/18   Neck soft tissue XR    Narrative    NECK SOFT TISSUE  6/3/2018 7:44 PM     HISTORY: difficulty breathing with stridor - r/o airway compromise;     COMPARISON: None.      Impression    IMPRESSION: A single lateral view utilizing soft tissue technique of  the neck is unremarkable. No radiopaque foreign body. No definite  epiglottic or prevertebral soft tissue swelling.    STEF EVANS MD   CBC with platelets differential   Result Value Ref Range    WBC 5.0 4.0 - 11.0 10e9/L    RBC Count 5.13 4.4 - 5.9 10e12/L    Hemoglobin 16.0 13.3 - 17.7 g/dL    Hematocrit 47.3 40.0 - 53.0 %    MCV 92 78 - 100 fl    MCH 31.2 26.5 - 33.0 pg    MCHC 33.8 31.5 - 36.5 g/dL    RDW 12.3 10.0 - 15.0 %    Platelet Count 197 150 - 450 10e9/L    Diff Method Automated Method     % Neutrophils 49.9 %    % Lymphocytes 37.4 %    % Monocytes 11.5 %    % Eosinophils 0.4 %    % Basophils 0.6 %    % Immature Granulocytes 0.2 %    Nucleated RBCs 0 0 /100    Absolute Neutrophil 2.5 1.6 - 8.3 10e9/L    Absolute Lymphocytes 1.9 0.8 - 5.3 10e9/L    Absolute Monocytes 0.6 0.0 - 1.3 10e9/L    Absolute Eosinophils 0.0 0.0 - 0.7 10e9/L    Absolute Basophils 0.0 0.0 - 0.2 10e9/L    Abs Immature Granulocytes 0.0 0 - 0.4 10e9/L    Absolute Nucleated RBC 0.0    Comprehensive metabolic panel   Result Value Ref Range    Sodium 142 133 - 144 mmol/L    Potassium 3.7  3.4 - 5.3 mmol/L    Chloride 106 94 - 109 mmol/L    Carbon Dioxide 22 20 - 32 mmol/L    Anion Gap 14 3 - 14 mmol/L    Glucose 99 70 - 99 mg/dL    Urea Nitrogen 12 7 - 30 mg/dL    Creatinine 0.92 0.66 - 1.25 mg/dL    GFR Estimate 87 >60 mL/min/1.7m2    GFR Estimate If Black >90 >60 mL/min/1.7m2    Calcium 9.0 8.5 - 10.1 mg/dL    Bilirubin Total 0.8 0.2 - 1.3 mg/dL    Albumin 4.4 3.4 - 5.0 g/dL    Protein Total 7.4 6.8 - 8.8 g/dL    Alkaline Phosphatase 43 40 - 150 U/L    ALT 21 0 - 70 U/L    AST 14 0 - 45 U/L   Alcohol ethyl   Result Value Ref Range    Ethanol g/dL <0.01 <0.01 g/dL   Drug abuse screen 6 urine (chem dep)   Result Value Ref Range    Amphetamine Qual Urine Negative NEG^Negative    Barbiturates Qual Urine Negative NEG^Negative    Benzodiazepine Qual Urine Negative NEG^Negative    Cannabinoids Qual Urine Negative NEG^Negative    Cocaine Qual Urine Negative NEG^Negative    Ethanol Qual Urine Negative NEG^Negative    Opiates Qualitative Urine Negative NEG^Negative   UA reflex to Microscopic and Culture   Result Value Ref Range    Color Urine Yellow     Appearance Urine Clear     Glucose Urine Negative NEG^Negative mg/dL    Bilirubin Urine Negative NEG^Negative    Ketones Urine 40 (A) NEG^Negative mg/dL    Specific Gravity Urine 1.010 1.003 - 1.035    Blood Urine Negative NEG^Negative    pH Urine 6.0 5.0 - 7.0 pH    Protein Albumin Urine Negative NEG^Negative mg/dL    Urobilinogen mg/dL Normal 0.0 - 2.0 mg/dL    Nitrite Urine Negative NEG^Negative    Leukocyte Esterase Urine Negative NEG^Negative    Source Midstream Urine    D dimer quantitative   Result Value Ref Range    D Dimer <0.3 0.0 - 0.50 ug/ml FEU   Troponin I   Result Value Ref Range    Troponin I ES <0.015 0.000 - 0.045 ug/L   Troponin I   Result Value Ref Range    Troponin I ES <0.015 0.000 - 0.045 ug/L   EKG 12 lead   Result Value Ref Range    Interpretation ECG Click View Image link to view waveform and result              EKG Interpretation:     EKG: NSR    ROS:    REVIEW OF SYSTEMS:   General: fatigue   EYES: Negative for vision changes or eye problems   ENT: No problems with ears, nose or throat. No difficulty swallowing.   RESP: Positive for shortness of breath   CV: No chest pains or palpitations   GI: No nausea, vomiting, heartburn, abdominal pain, diarrhea, constipation or change in bowel habits   : No urinary frequency or dysuria, bladder or kidney problems   MUSCULOSKELETAL: No significant muscle or joint pains   NEUROLOGIC: No headaches, numbness, tingling, weakness, problems with balance or coordination   PSYCHIATRIC: No problems with anxiety, depression or mental health   HEME/IMMUNE/ALLERGY: No history of bleeding or clotting problems or anemia. No allergies or immune system problems   ENDOCRINE: No history of thyroid disease, diabetes or other endocrine disorders   SKIN: No rashes,worrisome lesions or skin problems    PCP: FELI MAURICIO  CARDIAC RISK: HLD    10 point ROS negative other than the symptoms noted above.      Exam:    Vitals:  B/P: 116/79, T: 97.7, P: 70, R: 16  Physical Exam   Constitutional: Pt is oriented to person, place, and time.Pt appears well-developed and well-nourished.   HENT:   Head: Normocephalic and atraumatic.   Eyes: Conjunctivae are normal. Pupils are equal, round, and reactive to light.   Neck: Normal range of motion. Neck supple.   Cardiovascular: Normal rate, regular rhythm, normal heart sounds and intact distal pulses.    Pulmonary/Chest: Effort normal and breath sounds normal. No respiratory distress. Pt has no wheezes. Pt has no rales  Abdominal: Soft. Bowel sounds are normal. Pt exhibits no distension and no mass. No tenderness. Pt has no rebound and no guarding.   Musculoskeletal: Normal range of motion. Pt exhibits no edema.   Neurological: Pt is alert and oriented to person, place, and time. Normal reflexes.   Skin: Skin is warm and dry. No rash noted.   Psychiatric: Pt has a normal mood and affect.  Behavior is normal. Judgment and thought content normal.     Consults: Low threshold  FEN: Clear liquid diet  DVT prophylaxis: Early ambulation  Code Status: Full  Disposition: Stable vital signs. Patient return to baseline.  All labs/images reviewed    Signed:  Raysa Springer PA-C  June 4, 2018 at 1:05 AM

## 2018-06-04 NOTE — PLAN OF CARE
Problem: Patient Care Overview  Goal: Plan of Care/Patient Progress Review  Outcome: No Change  Observation goals PRIOR TO DISCHARGE     Comments: List all goals to be met before discharge home:   - Serial troponins and stress test complete. Awaiting stress test  - Seen and cleared by consultant if applicable none ordered  - Adequate pain control on oral analgesia None needed  - Vital signs normal or at patient baseline  NSR per tele. Sats 96-98%.  - Safe disposition plan has been identified  Not done at this time  - Nurse to notify provider when observation goals have been met and patient is ready for discharge.

## 2018-06-04 NOTE — DISCHARGE SUMMARY
Discharge Summary    Cesar Montejo MRN# [id number]   YOB: 1965 Age: 52 year old     Date of Admission:  6/3/2018  Date of Discharge:  6/4/2018  Admitting Physician:  Blu James MD  Discharge Physician:  LEXA MORTON MD  Discharging Service:  Emergency Department Observation Unit     Primary Provider: Rocky Messina          Discharge Diagnosis:     Chest pain    * No resolved hospital problems. *               Discharge Disposition:   Discharged to home           Condition on Discharge:   Discharge condition: Stable   Code status on discharge: Full Code           Procedures:    Stress test          Discharge Medications:     Current Discharge Medication List      CONTINUE these medications which have NOT CHANGED    Details   ASPIRIN PO Take 81 mg by mouth daily      Coenzyme Q10 (COQ10 PO)       KRILL OIL PO Take 1 capsule by mouth daily       mirtazapine (REMERON) 15 MG tablet TAKE 1 TABLET(15 MG) BY MOUTH AT BEDTIME  Qty: 90 tablet, Refills: 3    Associated Diagnoses: Mixed anxiety and depressive disorder; Insomnia      Multiple Vitamins-Minerals (MULTIVITAL PO) Take 1 tablet by mouth daily       simvastatin (ZOCOR) 40 MG tablet Take 0.5 tablets by mouth daily      TURMERIC PO Take 1 tablet by mouth daily       VITAMIN D, CHOLECALCIFEROL, PO Take 2,000 Units by mouth daily                    Consultations:   No consultations were requested during this admission             Brief History of Illness:   Cesar Montejo is a 51-year-old male with PMH of HLD, BCC, SCC and anxiety who presents           Hospital Course:   1. Dyspnea: Has had ongoing dyspnea for the last 3-4 weeks. States he woke up 2 nights ago 3 times with SOB. Pt has a digital recorder at home and has been recording himself sleeping at night. After he reviewed the recording, pt states that he can hear himself having episodes of stridor while sleeping on the recorder. He is being followed by Dr. Laughlin at the cardiology  clinic for orthostatic tachycardia with symptoms of dizziness. He has also being workup by neurology for MSA.  Has had previous work up for chest pain and dyspnea. Had a stress echo in 2007 that was normal.  He smoked tobacco in the past half a packet/day and quite smoking about 27 years ago.  Urine drug screen negative. UA negative. NECK SOFT TISSUE  x-ray without findings. CXR done on 5/25 with no acute cardiopulmonary findings. Serial troponins negative. Patient has follow up with sleep study clinics scheduled for June 25th.        2. HLD: On Simvastatin 20 mg daily.      3. GERD: Pt was recently placed on Zantac by PCP however pt admits that he has not been taking this medication as it does not provide him any relief of his symptoms.             Final Day of Progress before Discharge:       Physical Exam:  Blood pressure 117/77, pulse 70, temperature 98.2  F (36.8  C), temperature source Oral, resp. rate 16, weight 83 kg (183 lb), SpO2 96 %.    EXAM:  Constitutional: healthy, alert and no distress   Head: Normocephalic. No masses, lesions, tenderness or abnormalities   Neck: Neck supple. No adenopathy. Thyroid symmetric, normal size,, Carotids without bruits.   ENT: ENT exam normal, no neck nodes or sinus tenderness   Cardiovascular: RRR. No murmurs, clicks gallops or rub   Respiratory: . Good diaphragmatic excursion. Lungs clear   Gastrointestinal: Abdomen soft,. BS normal. No masses, organomegaly.   : Deferred   Musculoskeletal: extremities normal- no gross deformities noted, gait normal and normal muscle tone   Skin: no suspicious lesions or rashes   Neurologic: Gait normal. Reflexes normal and symmetric. Sensation grossly WNL.   Psychiatric: mentation appears normal and affect normal/bright   Hematologic/Lymphatic/Immunologic: normal ant/post cervical, axillary, supraclavicular and inguinal        Data:  All laboratory data reviewed             Significant Results:   None  Results for orders placed or  performed during the hospital encounter of 06/03/18   Neck soft tissue XR    Narrative    NECK SOFT TISSUE  6/3/2018 7:44 PM     HISTORY: difficulty breathing with stridor - r/o airway compromise;     COMPARISON: None.      Impression    IMPRESSION: A single lateral view utilizing soft tissue technique of  the neck is unremarkable. No radiopaque foreign body. No definite  epiglottic or prevertebral soft tissue swelling.    STEF EVANS MD   CBC with platelets differential   Result Value Ref Range    WBC 5.0 4.0 - 11.0 10e9/L    RBC Count 5.13 4.4 - 5.9 10e12/L    Hemoglobin 16.0 13.3 - 17.7 g/dL    Hematocrit 47.3 40.0 - 53.0 %    MCV 92 78 - 100 fl    MCH 31.2 26.5 - 33.0 pg    MCHC 33.8 31.5 - 36.5 g/dL    RDW 12.3 10.0 - 15.0 %    Platelet Count 197 150 - 450 10e9/L    Diff Method Automated Method     % Neutrophils 49.9 %    % Lymphocytes 37.4 %    % Monocytes 11.5 %    % Eosinophils 0.4 %    % Basophils 0.6 %    % Immature Granulocytes 0.2 %    Nucleated RBCs 0 0 /100    Absolute Neutrophil 2.5 1.6 - 8.3 10e9/L    Absolute Lymphocytes 1.9 0.8 - 5.3 10e9/L    Absolute Monocytes 0.6 0.0 - 1.3 10e9/L    Absolute Eosinophils 0.0 0.0 - 0.7 10e9/L    Absolute Basophils 0.0 0.0 - 0.2 10e9/L    Abs Immature Granulocytes 0.0 0 - 0.4 10e9/L    Absolute Nucleated RBC 0.0    Comprehensive metabolic panel   Result Value Ref Range    Sodium 142 133 - 144 mmol/L    Potassium 3.7 3.4 - 5.3 mmol/L    Chloride 106 94 - 109 mmol/L    Carbon Dioxide 22 20 - 32 mmol/L    Anion Gap 14 3 - 14 mmol/L    Glucose 99 70 - 99 mg/dL    Urea Nitrogen 12 7 - 30 mg/dL    Creatinine 0.92 0.66 - 1.25 mg/dL    GFR Estimate 87 >60 mL/min/1.7m2    GFR Estimate If Black >90 >60 mL/min/1.7m2    Calcium 9.0 8.5 - 10.1 mg/dL    Bilirubin Total 0.8 0.2 - 1.3 mg/dL    Albumin 4.4 3.4 - 5.0 g/dL    Protein Total 7.4 6.8 - 8.8 g/dL    Alkaline Phosphatase 43 40 - 150 U/L    ALT 21 0 - 70 U/L    AST 14 0 - 45 U/L   Alcohol ethyl   Result Value Ref Range     Ethanol g/dL <0.01 <0.01 g/dL   Drug abuse screen 6 urine (chem dep)   Result Value Ref Range    Amphetamine Qual Urine Negative NEG^Negative    Barbiturates Qual Urine Negative NEG^Negative    Benzodiazepine Qual Urine Negative NEG^Negative    Cannabinoids Qual Urine Negative NEG^Negative    Cocaine Qual Urine Negative NEG^Negative    Ethanol Qual Urine Negative NEG^Negative    Opiates Qualitative Urine Negative NEG^Negative   UA reflex to Microscopic and Culture   Result Value Ref Range    Color Urine Yellow     Appearance Urine Clear     Glucose Urine Negative NEG^Negative mg/dL    Bilirubin Urine Negative NEG^Negative    Ketones Urine 40 (A) NEG^Negative mg/dL    Specific Gravity Urine 1.010 1.003 - 1.035    Blood Urine Negative NEG^Negative    pH Urine 6.0 5.0 - 7.0 pH    Protein Albumin Urine Negative NEG^Negative mg/dL    Urobilinogen mg/dL Normal 0.0 - 2.0 mg/dL    Nitrite Urine Negative NEG^Negative    Leukocyte Esterase Urine Negative NEG^Negative    Source Midstream Urine    D dimer quantitative   Result Value Ref Range    D Dimer <0.3 0.0 - 0.50 ug/ml FEU   Troponin I   Result Value Ref Range    Troponin I ES <0.015 0.000 - 0.045 ug/L   Troponin I   Result Value Ref Range    Troponin I ES <0.015 0.000 - 0.045 ug/L   Troponin I   Result Value Ref Range    Troponin I ES <0.015 0.000 - 0.045 ug/L   EKG 12 lead   Result Value Ref Range    Interpretation ECG Click View Image link to view waveform and result    Echo stress test with definity    Narrative    [id number]  [id number]  [id number]  [id number]^R           [facility],[place]  Echocardiography Laboratory  [address] [place] [id number]  Name: JONATHAN FIORE  MRN: [id number]  : 1965  Study Date: 2018 09:13 AM  Age: 52 yrs  Gender: Male  Patient Location: [facility]  Reason For Study: Chest Pain  Ordering Physician: BLAKE MCGREGOR  Performed By: Micheal Arnett RDCS     BSA: 2.1 m2  Height: 72  in  Weight: 183 lb  HR: 61  BP: 120/75 mmHg  _____________________________________________________________________________  __        Procedure  Stress Echo Bike with two dimensional, color and spectral Doppler performed.  Contrast Definity.  _____________________________________________________________________________  __        Interpretation Summary  Normal exercise stress echocardiogram.  The target heart rate was achieved. No wall motion abnormalities at rest or  after peak exercise.  Normal LV size and function. The LVEF is 55-60% at rest and increased to >70%  after exercise with decrease in LV cavity size.  Heart rate and blood pressure response to exercise were normal.  Low-normal functional capacity. No subjective symptoms to suggest ischemia.  Peak MVO2 29 ml/kg/min.  No significant valvular abnormalities noted on screening tomograms.  No ECG evidence of ischemia at rest or with exercise.  No new findings compared to prior study dated 03/20/2007.  _____________________________________________________________________________  __     Stress  The patient did not exhibit any symptoms during exercise.  Limiting [place]: fatigue.  Exercise was stopped due to fatigue.  Target Heart Rate was achieved.  Peak MVO2 18.3 ml/kg/min .  Percent predicted MVO2 64 %.  RPP [id number].  Maximum workload 100 resendiz.     Stress Results                                       Maximum Predicted HR:   170 bpm             Target HR: 145 bpm        % Maximum Predicted HR: 89 %                             Stage  DurationHeart Rate  BP                                 (mm:ss)   (bpm)                         Baseline            61    120/75                           Peak    4:16     151    211/97                             Stress Duration:   4:16 mm:ss                       Maximum Stress HR: 151 bpm *     Left Ventricle  The left ventricular ejection fraction is normal.     Aortic Valve  The aortic valve is trileaflet.     Mitral Valve  The  mitral valve leaflets appear normal. There is no evidence of stenosis,  fluttering, or prolapse.        Tricuspid Valve  The tricuspid valve is not well visualized, but is grossly normal.     Right Ventricle  The right ventricular systolic function is normal.     Vessels  Normal aortic arch.     Pericardium  There is no pericardial effusion.     Contrast  Definity (NDC #17007-358-70) given intravenously. Patient was given 5ml  mixture of 1.5ml Definity and 8.5ml saline. 5 ml wasted.        Attestation  I was present and supervised the stress test. I personally viewed the imaging  and agree with the interpretation and report as documented by the fellow,  Simone Raman.  _____________________________________________________________________________  __  MMode/2D Measurements & Calculations     asc Aorta Diam: 3.0 cm        Doppler Measurements & Calculations  PA acc time: 0.11 sec           _____________________________________________________________________________  __           Report approved by: Lencho Peña 06/04/2018 10:02 AM         Recent Results (from the past 48 hour(s))   Neck soft tissue XR    Narrative    NECK SOFT TISSUE  6/3/2018 7:44 PM     HISTORY: difficulty breathing with stridor - r/o airway compromise;     COMPARISON: None.      Impression    IMPRESSION: A single lateral view utilizing soft tissue technique of  the neck is unremarkable. No radiopaque foreign body. No definite  epiglottic or prevertebral soft tissue swelling.    STEF EVANS MD                Pending Results:   Unresulted Labs Ordered in the Past 30 Days of this Admission     No orders found for last 61 day(s).                  Discharge Instructions and Follow-Up:     Discharge Procedure Orders  Reason for your hospital stay   Order Comments: Dyspnea     Adult P/[facility] Follow-up and recommended labs and tests   Order Comments: Follow up with your primary doctor, neurologist, and sleep study doctor as an outpatient.    Appointments  on [facility] and/or [facility] (with [facility] or [facility] provider or service). Call [phone] if you haven't heard regarding these appointments within 7 days of discharge.     Activity   Order Comments: Your activity upon discharge: As tolerated   Order Specific Question Answer Comments   Is discharge order? Yes      When to contact your care team   Order Comments: Call 911  Shortness of breath may be a sign of a serious medical problem. For example, it may be a problem with your heart or lungs. Call 911 if you have worsening shortness of breath or trouble breathing, especially with any of the symptoms below:    You are confused or it's difficult to wake you.    You faint or lose consciousness.    You have a fast heartbeat, or your heartbeat is irregular.    You are coughing up blood.    You have pain in your chest, arm, shoulder, neck, or upper back.    You break out in a sweat.  When to seek medical advice  Call your healthcare provider right away if any of these occur:    Slight shortness of breath or wheezing    Redness, pain or swelling in your leg, arm, or other body area    Swelling in both legs or ankles    Fast weight gain    Dizziness or weakness    Fever of 100.4 F (38 C) or higher, or as directed by your healthcare provider     Discharge Instructions   Order Comments: Dyspnea can be caused by many different conditions. They include:    Acute asthma attack    Worsening of chronic lung diseases such as chronic bronchitis and emphysema    Heart failure. This is when weak heart muscle allows extra fluid to collect in the lungs.    Panic attacks or anxiety. Fear can cause rapid breathing (hyperventilation).    Pneumonia, or an infection in the lung tissue    Exposure to toxic substances, fumes, smoke, or certain medicines    Blood clot in the lung (pulmonary embolism). This is often from a piece of blood clot in a deep vein of the leg (deep vein thrombosis) that breaks off and travels to the lungs.    Heart  attack or heart-related chest pain (angina)    Anemia    Collapsed lung (pneumothorax)    Dehydration     Based on your visit today, the exact cause of your shortness of breath is not certain. Your tests don't show any of the serious causes of dyspnea. You may need other tests to find out if you have a serious problem. It's important to watch for any new symptoms or symptoms that get worse. Follow up with your healthcare provider.     Diet   Order Comments: Follow this diet upon discharge: Regular diet   Order Specific Question Answer Comments   Is discharge order? Yes             Attestation:  Kamini Gonzáles.

## 2018-06-04 NOTE — PLAN OF CARE
Problem: Patient Care Overview  Goal: Discharge Needs Assessment  Outcome: No Change  Observation goals PRIOR TO DISCHARGE     Comments: List all goals to be met before discharge home:   - Serial troponins and stress test complete.  Troponin < 0.015  - Seen and cleared by consultant if applicable none ordered  - Adequate pain control on oral analgesia None needed  - Vital signs normal or at patient baseline  VSS normal, tele NSR  - Safe disposition plan has been identified  Not done at this time  - Nurse to notify provider when observation goals have been met and patient is ready for discharge.

## 2018-06-07 ENCOUNTER — OFFICE VISIT (OUTPATIENT)
Dept: FAMILY MEDICINE | Facility: CLINIC | Age: 53
End: 2018-06-07
Payer: COMMERCIAL

## 2018-06-07 VITALS
TEMPERATURE: 98.6 F | BODY MASS INDEX: 25.02 KG/M2 | OXYGEN SATURATION: 95 % | WEIGHT: 184.5 LBS | SYSTOLIC BLOOD PRESSURE: 122 MMHG | HEART RATE: 98 BPM | DIASTOLIC BLOOD PRESSURE: 84 MMHG

## 2018-06-07 DIAGNOSIS — R06.89 DYSPNEA AND RESPIRATORY ABNORMALITY: ICD-10-CM

## 2018-06-07 DIAGNOSIS — G47.9 SLEEP DISTURBANCE: Primary | ICD-10-CM

## 2018-06-07 DIAGNOSIS — R06.00 DYSPNEA AND RESPIRATORY ABNORMALITY: ICD-10-CM

## 2018-06-07 DIAGNOSIS — G47.52 DREAM ENACTMENT BEHAVIOR: ICD-10-CM

## 2018-06-07 PROCEDURE — 99214 OFFICE O/P EST MOD 30 MIN: CPT | Performed by: FAMILY MEDICINE

## 2018-06-07 NOTE — PROGRESS NOTES
SUBJECTIVE:   Cesar Montejo is a 52 year old male who presents to clinic today for the following health issues:    Hospital Follow-up Visit:    Hospital/Nursing Home/IP Rehab Facility: [facility]  Date of Admission: 6/3/18  Date of Discharge: 6/4/18  Reason(s) for Admission: Breathing issues             Problems taking medications regularly:  None       Medication changes since discharge: None       Problems adhering to non-medication therapy:  None    Summary of hospitalization:  [facility] discharge summary reviewed  Diagnostic Tests/Treatments reviewed.  Follow up needed: needs to see ENT. neurology  Other Healthcare Providers Involved in Patient s Care:         Specialist appointment - Sleep med  Update since discharge: stable.   patient worried about Multiple system atropy due to his orthostatic hypotension sleep disturbance  ( REM) etc  Post Discharge Medication Reconciliation: discharge medications reconciled, continue medications without change.  Plan of care communicated with patient     Coding guidelines for this visit:  Type of Medical   Decision Making Face-to-Face Visit       within 7 Days of discharge Face-to-Face Visit        within 14 days of discharge   Moderate Complexity [id number]   High Complexity [id number] [phone]            Other questions/concerns: f/u cardiology visit/test    Encounter Diagnoses   Name Primary?     Sleep disturbance Yes     Dyspnea and respiratory abnormality      Dream enactment behavior      Disturbance in sleep behavior         Problem list and histories reviewed & adjusted, as indicated.  Additional history: as documented    Labs reviewed in EPIC    Reviewed and updated as needed this visit by clinical staff       Reviewed and updated as needed this visit by Provider         ROS:  Constitutional, HEENT, cardiovascular, pulmonary, gi and gu systems are negative, except as otherwise noted.    OBJECTIVE:     /84  Pulse 98  Temp 98.6  F (37   C) (Oral)  Wt 184 lb 8 oz (83.7 kg)  SpO2 95%  BMI 25.02 kg/m2  Body mass index is 25.02 kg/(m^2).  GENERAL: alert and fatigued  HENT: ear canals and TM's normal, nose and mouth without ulcers or lesions  NECK: no adenopathy, no asymmetry, masses, or scars and thyroid normal to palpation  RESP: lungs clear to auscultation - no rales, rhonchi or wheezes  CV: regular rate and rhythm, normal S1 S2, no S3 or S4, no murmur, click or rub, no peripheral edema and peripheral pulses strong  ABDOMEN: soft, nontender, no hepatosplenomegaly, no masses and bowel sounds normal  NEURO: Normal strength and tone, mentation intact and speech normal  PSYCH: tangential, anxious and judgement and insight intact    Diagnostic Test Results:  Results for orders placed or performed during the hospital encounter of 06/03/18   Neck soft tissue XR    Narrative    NECK SOFT TISSUE  6/3/2018 7:44 PM     HISTORY: difficulty breathing with stridor - r/o airway compromise;     COMPARISON: None.      Impression    IMPRESSION: A single lateral view utilizing soft tissue technique of  the neck is unremarkable. No radiopaque foreign body. No definite  epiglottic or prevertebral soft tissue swelling.    STEF EVANS MD   CBC with platelets differential   Result Value Ref Range    WBC 5.0 4.0 - 11.0 10e9/L    RBC Count 5.13 4.4 - 5.9 10e12/L    Hemoglobin 16.0 13.3 - 17.7 g/dL    Hematocrit 47.3 40.0 - 53.0 %    MCV 92 78 - 100 fl    MCH 31.2 26.5 - 33.0 pg    MCHC 33.8 31.5 - 36.5 g/dL    RDW 12.3 10.0 - 15.0 %    Platelet Count 197 150 - 450 10e9/L    Diff Method Automated Method     % Neutrophils 49.9 %    % Lymphocytes 37.4 %    % Monocytes 11.5 %    % Eosinophils 0.4 %    % Basophils 0.6 %    % Immature Granulocytes 0.2 %    Nucleated RBCs 0 0 /100    Absolute Neutrophil 2.5 1.6 - 8.3 10e9/L    Absolute Lymphocytes 1.9 0.8 - 5.3 10e9/L    Absolute Monocytes 0.6 0.0 - 1.3 10e9/L    Absolute Eosinophils 0.0 0.0 - 0.7 10e9/L    Absolute Basophils 0.0  0.0 - 0.2 10e9/L    Abs Immature Granulocytes 0.0 0 - 0.4 10e9/L    Absolute Nucleated RBC 0.0    Comprehensive metabolic panel   Result Value Ref Range    Sodium 142 133 - 144 mmol/L    Potassium 3.7 3.4 - 5.3 mmol/L    Chloride 106 94 - 109 mmol/L    Carbon Dioxide 22 20 - 32 mmol/L    Anion Gap 14 3 - 14 mmol/L    Glucose 99 70 - 99 mg/dL    Urea Nitrogen 12 7 - 30 mg/dL    Creatinine 0.92 0.66 - 1.25 mg/dL    GFR Estimate 87 >60 mL/min/1.7m2    GFR Estimate If Black >90 >60 mL/min/1.7m2    Calcium 9.0 8.5 - 10.1 mg/dL    Bilirubin Total 0.8 0.2 - 1.3 mg/dL    Albumin 4.4 3.4 - 5.0 g/dL    Protein Total 7.4 6.8 - 8.8 g/dL    Alkaline Phosphatase 43 40 - 150 U/L    ALT 21 0 - 70 U/L    AST 14 0 - 45 U/L   Alcohol ethyl   Result Value Ref Range    Ethanol g/dL <0.01 <0.01 g/dL   Drug abuse screen 6 urine (chem dep)   Result Value Ref Range    Amphetamine Qual Urine Negative NEG^Negative    Barbiturates Qual Urine Negative NEG^Negative    Benzodiazepine Qual Urine Negative NEG^Negative    Cannabinoids Qual Urine Negative NEG^Negative    Cocaine Qual Urine Negative NEG^Negative    Ethanol Qual Urine Negative NEG^Negative    Opiates Qualitative Urine Negative NEG^Negative   UA reflex to Microscopic and Culture   Result Value Ref Range    Color Urine Yellow     Appearance Urine Clear     Glucose Urine Negative NEG^Negative mg/dL    Bilirubin Urine Negative NEG^Negative    Ketones Urine 40 (A) NEG^Negative mg/dL    Specific Gravity Urine 1.010 1.003 - 1.035    Blood Urine Negative NEG^Negative    pH Urine 6.0 5.0 - 7.0 pH    Protein Albumin Urine Negative NEG^Negative mg/dL    Urobilinogen mg/dL Normal 0.0 - 2.0 mg/dL    Nitrite Urine Negative NEG^Negative    Leukocyte Esterase Urine Negative NEG^Negative    Source Midstream Urine    D dimer quantitative   Result Value Ref Range    D Dimer <0.3 0.0 - 0.50 ug/ml FEU   Troponin I   Result Value Ref Range    Troponin I ES <0.015 0.000 - 0.045 ug/L   Troponin I   Result  Value Ref Range    Troponin I ES <0.015 0.000 - 0.045 ug/L   Troponin I   Result Value Ref Range    Troponin I ES <0.015 0.000 - 0.045 ug/L   EKG 12 lead   Result Value Ref Range    Interpretation ECG Click View Image link to view waveform and result    Echo stress test with definity    Narrative    [id number]  [id number]  [id number]  [id number]^MECHE^BLAKE^KRISTAL           [facility]  Echocardiography Laboratory  [address] [place] [id number]  Name: JONATHAN FIORE  MRN: [id number]  : 1965  Study Date: 2018 09:13 AM  Age: 52 yrs  Gender: Male  Patient Location: [facility]  Reason For Study: Chest Pain  Ordering Physician: BLAKE MCGREGOR  Performed By: Micheal Arnett RDCS     BSA: 2.1 m2  Height: 72 in  Weight: 183 lb  HR: 61  BP: 120/75 mmHg  _____________________________________________________________________________  __        Procedure  Stress Echo Bike with two dimensional, color and spectral Doppler performed.  Contrast Definity.  _____________________________________________________________________________  __        Interpretation Summary  Normal exercise stress echocardiogram.  The target heart rate was achieved. No wall motion abnormalities at rest or  after peak exercise.  Normal LV size and function. The LVEF is 55-60% at rest and increased to >70%  after exercise with decrease in LV cavity size.  Heart rate and blood pressure response to exercise were normal.  Low-normal functional capacity. No subjective symptoms to suggest ischemia.  Peak MVO2 29 ml/kg/min.  No significant valvular abnormalities noted on screening tomograms.  No ECG evidence of ischemia at rest or with exercise.  No new findings compared to prior study dated 2007.  _____________________________________________________________________________  __     Stress  The patient did not exhibit any symptoms during exercise.  Limiting [place]: fatigue.  Exercise was stopped due to  fatigue.  Target Heart Rate was achieved.  Peak MVO2 18.3 ml/kg/min .  Percent predicted MVO2 64 %.  RPP 08134.  Maximum workload 100 resendiz.     Stress Results                                       Maximum Predicted HR:   170 bpm             Target HR: 145 bpm        % Maximum Predicted HR: 89 %                             Stage  DurationHeart Rate  BP                                 (mm:ss)   (bpm)                         Baseline            61    120/75                           Peak    4:16     151    211/97                             Stress Duration:   4:16 mm:ss                       Maximum Stress HR: 151 bpm *     Left Ventricle  The left ventricular ejection fraction is normal.     Aortic Valve  The aortic valve is trileaflet.     Mitral Valve  The mitral valve leaflets appear normal. There is no evidence of stenosis,  fluttering, or prolapse.        Tricuspid Valve  The tricuspid valve is not well visualized, but is grossly normal.     Right Ventricle  The right ventricular systolic function is normal.     Vessels  Normal aortic arch.     Pericardium  There is no pericardial effusion.     Contrast  Definity (NDC #81898-281-31) given intravenously. Patient was given 5ml  mixture of 1.5ml Definity and 8.5ml saline. 5 ml wasted.        Attestation  I was present and supervised the stress test. I personally viewed the imaging  and agree with the interpretation and report as documented by the fellow,  Simone Raman.  _____________________________________________________________________________  __  MMode/2D Measurements & Calculations     asc Aorta Diam: 3.0 cm        Doppler Measurements & Calculations  PA acc time: 0.11 sec           _____________________________________________________________________________  __           Report approved by: Lencho Peña 06/04/2018 10:02 AM          ASSESSMENT/PLAN:             1. Sleep disturbance  Needs scoping for possible nerve paraysis but unlikely with his exam/  symptoms today  - OTOLARYNGOLOGY REFERRAL    2. Dyspnea and respiratory abnormality  As above  - OTOLARYNGOLOGY REFERRAL  Would go to [facility] to coordinate evaluation with Neuology  3. Dream enactment behavior   Follow up with consultant as planned.    30 minutes were spent with the patient with more than 50% of the time spent in counseling and coordination of care.      See Patient Instructions    Rocky Messina MD  [facility]

## 2018-06-07 NOTE — MR AVS SNAPSHOT
After Visit Summary   6/7/2018    Cesar Montejo    MRN: [id number]           Patient Information     Date Of Birth          1965        Visit Information        Provider Department      6/7/2018 11:30 AM Rocky Messina MD [facility]        Today's Diagnoses     Sleep disturbance    -  1    Dyspnea and respiratory abnormality        Dream enactment behavior        Disturbance in sleep behavior           Follow-ups after your visit        Additional Services     OTOLARYNGOLOGY REFERRAL       Your provider has referred you to: [facility]: [facility] (Otolaryngology) - [place] [phone]  [email or web address]  FHN: [facility] - [place] [phone]   [email or web address]  FHN: [facility] [place] [phone]   [email or web address]  FHN: [facility] [place] [phone]   [email or web address]  FHN: [place] [facility] [place] [phone]   [email or web address]    Please be aware that coverage of these services is subject to the terms and limitations of your health insurance plan.  Call member services at your health plan with any benefit or coverage questions.      Please bring the following with you to your appointment:    (1) Any X-Rays, CTs or MRIs which have been performed.  Contact the facility where they were done to arrange for  prior to your scheduled appointment.   (2) List of current medications  (3) This referral request   (4) Any documents/labs given to you for this referral            PULMONARY MEDICINE REFERRAL       Your provider has referred you to: [facility]: [place] [facility] - [place] [phone]   [email or web address]    Please be aware  that coverage of these services is subject to the terms and limitations of your health insurance plan.  Call member services at your health plan with any benefit or coverage questions.      Please bring the following to your appointment:  Any x-rays, CTs or MRIs which have been performed.  Contact the facility where they were done to arrange for  prior to your scheduled appointment.  Any new CT, MRI or other procedures ordered by your specialist must be performed at a [facility] facility or coordinated by your clinic's referral office.    List of current medications   This referral request   Any documents/labs given to you for this referral                  Your next 10 appointments already scheduled     Jun 21, 2018 11:10 AM CDT   (Arrive by 10:55 AM)   New Movement Disorder with Silverio Us MD   [facility] Neurology ([facility])    [address] [place] [id number] [phone]            Jun 25, 2018  8:00 AM CDT   New Sleep Patient with Meet Lake MD   [facility] ([facility] [place])    [address] [place] [id number]   [phone]            Jul 23, 2018 10:00 AM CDT   (Arrive by 9:45 AM)   POSTURAL ORTHOSTATIC SYCOPE with Lee Ann Laughlin MD   [facility] Heart Care ([facility])    [address] [place] [phone] [phone]            Mar 13, 2019  5:00 PM CDT   (Arrive by 4:45 PM)   Return Movement Disorder with Vida Solomon MD   [facility] Neurology ([facility])    [address] [place] [id number] [phone]              Who to contact     If you have questions or need follow up information about today's clinic visit or your schedule please contact [facility] directly at [phone].  Normal or non-critical lab and imaging results will be communicated  to you by Branchhart, letter or phone within 4 business days after the clinic has received the results. If you do not hear from us within 7 days, please contact the clinic through [employer] or phone. If you have a critical or abnormal lab result, we will notify you by phone as soon as possible.  Submit refill requests through [employer] or call your pharmacy and they will forward the refill request to us. Please allow 3 business days for your refill to be completed.          Additional Information About Your Visit        BranchharDevtap Information     [employer] gives you secure access to your electronic health record. If you see a primary care provider, you can also send messages to your care team and make appointments. If you have questions, please call your primary care clinic.  If you do not have a primary care provider, please call [phone] and they will assist you.        Care EveryWhere ID     This is your Care EveryWhere ID. This could be used by other organizations to access your [facility] medical records  [id number]        Your Vitals Were     Pulse Temperature Pulse Oximetry BMI (Body Mass Index)          98 98.6  F (37  C) (Oral) 95% 25.02 kg/m2         Blood Pressure from Last 3 Encounters:   06/07/18 122/84   06/04/18 117/77   05/29/18 120/78    Weight from Last 3 Encounters:   06/07/18 184 lb 8 oz (83.7 kg)   06/03/18 183 lb (83 kg)   05/29/18 188 lb (85.3 kg)              We Performed the Following     OTOLARYNGOLOGY REFERRAL     PULMONARY MEDICINE REFERRAL        Primary Care Provider Office Phone # Fax #    Rocky Rigo Messina [phone]       [address] [address]        Equal Access to Services     [facility]RI[facility]NA [facility] : Hadii desean Mcdaniel, waaxda luqadaha, qaybta kaalprashant lind. So [facility] [phone].    ATENCIÓN: Si habla español, tiene a persaud disposición servicios gratuitos de asistencia lingüística. Llame al  [phone].    We comply with applicable federal civil rights laws and [place] laws. We do not discriminate on the basis of race, color, national origin, age, disability, sex, sexual orientation, or gender identity.            Thank you!     Thank you for choosing [facility]  for your care. Our goal is always to provide you with excellent care. Hearing back from our patients is one way we can continue to improve our services. Please take a few minutes to complete the written survey that you may receive in the mail after your visit with us. Thank you!             Your Updated Medication List - Protect others around you: Learn how to safely use, store and throw away your medicines at www.disposemymeds.org.          This list is accurate as of 6/7/18 11:57 AM.  Always use your most recent med list.                   Brand Name Dispense Instructions for use Diagnosis    ASPIRIN PO      Take 81 mg by mouth daily        COQ10 PO           KRILL OIL PO      Take 1 capsule by mouth daily        mirtazapine 15 MG tablet    REMERON    90 tablet    TAKE 1 TABLET(15 MG) BY MOUTH AT BEDTIME    Mixed anxiety and depressive disorder, Insomnia       MULTIVITAL PO      Take 1 tablet by mouth daily        simvastatin 40 MG tablet    ZOCOR     Take 0.5 tablets by mouth daily        TURMERIC PO      Take 1 tablet by mouth daily        VITAMIN D (CHOLECALCIFEROL) PO      Take 2,000 Units by mouth daily

## 2018-06-08 ENCOUNTER — TRANSFERRED RECORDS (OUTPATIENT)
Dept: HEALTH INFORMATION MANAGEMENT | Facility: CLINIC | Age: 53
End: 2018-06-08

## 2018-06-08 ENCOUNTER — HOSPITAL ENCOUNTER (EMERGENCY)
Facility: CLINIC | Age: 53
Discharge: HOME | End: 2018-06-08
Attending: EMERGENCY MEDICINE | Admitting: EMERGENCY MEDICINE
Payer: COMMERCIAL

## 2018-06-08 ENCOUNTER — APPOINTMENT (OUTPATIENT)
Dept: ULTRASOUND IMAGING | Facility: CLINIC | Age: 53
End: 2018-06-08
Attending: EMERGENCY MEDICINE
Payer: COMMERCIAL

## 2018-06-08 VITALS
DIASTOLIC BLOOD PRESSURE: 80 MMHG | RESPIRATION RATE: 15 BRPM | SYSTOLIC BLOOD PRESSURE: 119 MMHG | OXYGEN SATURATION: 95 % | TEMPERATURE: 98.5 F | HEART RATE: 73 BPM

## 2018-06-08 DIAGNOSIS — I80.8 SUPERFICIAL THROMBOPHLEBITIS OF LEFT UPPER EXTREMITY: ICD-10-CM

## 2018-06-08 LAB
BASOPHILS # BLD AUTO: 0.1 10E9/L (ref 0–0.2)
BASOPHILS NFR BLD AUTO: 1 %
CRP SERPL-MCNC: <2.9 MG/L (ref 0–8)
DIFFERENTIAL METHOD BLD: NORMAL
EOSINOPHIL # BLD AUTO: 0.1 10E9/L (ref 0–0.7)
EOSINOPHIL NFR BLD AUTO: 1 %
ERYTHROCYTE [DISTWIDTH] IN BLOOD BY AUTOMATED COUNT: 12.5 % (ref 10–15)
HCT VFR BLD AUTO: 45.2 % (ref 40–53)
HGB BLD-MCNC: 15.3 G/DL (ref 13.3–17.7)
IMM GRANULOCYTES # BLD: 0 10E9/L (ref 0–0.4)
IMM GRANULOCYTES NFR BLD: 0.2 %
LYMPHOCYTES # BLD AUTO: 1.9 10E9/L (ref 0.8–5.3)
LYMPHOCYTES NFR BLD AUTO: 37 %
MCH RBC QN AUTO: 31.1 PG (ref 26.5–33)
MCHC RBC AUTO-ENTMCNC: 33.8 G/DL (ref 31.5–36.5)
MCV RBC AUTO: 92 FL (ref 78–100)
MONOCYTES # BLD AUTO: 0.6 10E9/L (ref 0–1.3)
MONOCYTES NFR BLD AUTO: 11.7 %
NEUTROPHILS # BLD AUTO: 2.5 10E9/L (ref 1.6–8.3)
NEUTROPHILS NFR BLD AUTO: 49.1 %
NRBC # BLD AUTO: 0 10*3/UL
NRBC BLD AUTO-RTO: 0 /100
PLATELET # BLD AUTO: 203 10E9/L (ref 150–450)
RBC # BLD AUTO: 4.92 10E12/L (ref 4.4–5.9)
WBC # BLD AUTO: 5.1 10E9/L (ref 4–11)

## 2018-06-08 PROCEDURE — 99284 EMERGENCY DEPT VISIT MOD MDM: CPT | Mod: 25 | Performed by: EMERGENCY MEDICINE

## 2018-06-08 PROCEDURE — 99283 EMERGENCY DEPT VISIT LOW MDM: CPT | Mod: Z6 | Performed by: EMERGENCY MEDICINE

## 2018-06-08 PROCEDURE — 86140 C-REACTIVE PROTEIN: CPT | Performed by: EMERGENCY MEDICINE

## 2018-06-08 PROCEDURE — 93971 EXTREMITY STUDY: CPT | Mod: LT

## 2018-06-08 PROCEDURE — 85025 COMPLETE CBC W/AUTO DIFF WBC: CPT | Performed by: EMERGENCY MEDICINE

## 2018-06-08 ASSESSMENT — ENCOUNTER SYMPTOMS
NAUSEA: 0
FEVER: 0
CHILLS: 0
VOMITING: 0

## 2018-06-08 NOTE — ED AVS SNAPSHOT
[facility] [place], EMERGENCY DEPARTMENT: [phone]                                              INTERAGENCY TRANSFER FORM - LAB / IMAGING / EKG / EMG RESULTS   2018                    Hospital Admission Date: 2018  JONATHAN FIORE   : 1965  Sex: Male        Attending Provider: Korey Locke MD     Allergies:  No Known Allergies    Infection:  None   Service:  EMERGENCY ME    Ht:  1.829 m (6')   Wt:  83.7 kg (184 lb 8 oz)   Admission Wt:  --    BMI:  25.02 kg/m 2   BSA:  2.06 m 2            Patient PCP Information     Provider PCP Type    Rocky Messina MD General         Lab Results - 3 Days      CRP inflammation [[id number]]  Resulted: 18, Result status: Final result    Ordering provider: Korey Locke MD  18 Resulting lab: [facility]    Specimen Information    Type Source Collected On   Blood  18          Components       Value Reference Range Flag Lab   CRP Inflammation <2.9 0.0 - 8.0 mg/L  13            CBC with platelets differential [[id number]]  Resulted: 18, Result status: Final result    Ordering provider: Korey Locke MD  18 Resulting lab: [facility]    Specimen Information    Type Source Collected On   Blood  18          Components       Value Reference Range Flag Lab   WBC 5.1 4.0 - 11.0 10e9/L  13   RBC Count 4.92 4.4 - 5.9 10e12/L  13   Hemoglobin 15.3 13.3 - 17.7 g/dL  13   Hematocrit 45.2 40.0 - 53.0 %  13   MCV 92 78 - 100 fl  13   MCH 31.1 26.5 - 33.0 pg  13   MCHC 33.8 31.5 - 36.5 g/dL  13   RDW 12.5 10.0 - 15.0 %  13   Platelet Count 203 150 - 450 10e9/L  13   Diff Method Automated Method   13   % Neutrophils 49.1 %  13   % Lymphocytes 37.0 %  13   % Monocytes 11.7 %  13   % Eosinophils 1.0 %  13   % Basophils 1.0 %  13   % Immature Granulocytes 0.2 %  13   Nucleated RBCs 0 0 /100  13   Absolute Neutrophil 2.5 1.6 - 8.3  10e9/L  13   Absolute Lymphocytes 1.9 0.8 - 5.3 10e9/L  13   Absolute Monocytes 0.6 0.0 - 1.3 10e9/L  13   Absolute Eosinophils 0.1 0.0 - 0.7 10e9/L  13   Absolute Basophils 0.1 0.0 - 0.2 10e9/L  13   Abs Immature Granulocytes 0.0 0 - 0.4 10e9/L  13   Absolute Nucleated RBC 0.0   13            Testing Performed By     Lab - Abbreviation Name Director Address Valid Date Range    13 - Unknown [facility] Unknown [address] [address] 01/15/15 0916 - Present            Unresulted Labs     None         Imaging Results - 3 Days      US Upper Extremity Venous Duplex Left [[id number]]  Resulted: 06/08/18 2053, Result status: Final result    Ordering provider: Korey Locke MD  06/08/18 1923 Resulted by: Dhruv Coronado MD Boegel, Kevin H, MD    Performed: 06/08/18 1936 - 06/08/18 1955 Resulting lab: RADIOLOGY RESULTS    Narrative:       LEFT UPPER EXTREMITY VENOUS DUPLEX ULTRASOUND  6/8/2018 7:55 PM     HISTORY: Red, pain, swelling.    COMPARISON: None.    FINDINGS: Gray-scale, color and Doppler spectral analysis ultrasound  was performed of the left arm. Compression and augmentation imaging  was performed.    No deep venous system thrombosis identified. Superficial  noncompressible venous thrombus at the left basilic vein at the level  of the elbow. Approximate length is 3.6 cm. A precise distance from  the axillary vein, and cephalic vein is unable to be determined based  on the images. However, the superficial thrombus appears to be limited  to the level of the elbow.      Impression:       IMPRESSION: No deep venous system thrombosis. Superficial occlusive  venous thrombus at the left basilic vein at the level of the elbow.    I have personally reviewed the examination and initial interpretation  and I agree with the findings.    DHRUV CORONADO MD      Testing Performed By     Lab - Abbreviation Name Director Address Valid Date Range    104 - Rad Rslts RADIOLOGY  RESULTS Unknown Unknown 02/16/05 1553 - Present            Encounter-Level Documents:     There are no encounter-level documents.      Order-Level Documents:     There are no order-level documents.

## 2018-06-08 NOTE — ED AVS SNAPSHOT
[facility], Emergency Department    [address]    [place] [address]    Phone:  [phone]    Fax:  [phone]                                       Cesar Montejo   MRN: [id number]    Department:  [facility], Emergency Department   Date of Visit:  6/8/2018           Patient Information     Date Of Birth          1965        Your diagnoses for this visit were:     Superficial thrombophlebitis of left upper extremity        You were seen by Korey Locke MD.        Discharge Instructions       TODAY'S VISIT:  You were seen today for arm pain, diagnosed with superficial thrombophlebitis.  -     FOLLOW-UP:  Please make an appointment to follow up with:  - Your Primary Care Provider next week to arrange for follow-up ultrasound.    PRESCRIPTIONS / MEDICATIONS:  -Take 600 mg of ibuprofen every 6 hours, use cool or warm compresses to affected area 20 minutes 4 times a day, keep extremity elevated above the heart when not in use.    OTHER INSTRUCTIONS:  -     RETURN TO THE EMERGENCY DEPARTMENT  Return to the Emergency Department at any time for new/worsening symptoms.  Specifically fevers, or any signs of systemic illness.      Your next 10 appointments already scheduled     Jun 21, 2018 11:10 AM CDT   (Arrive by 10:55 AM)   New Movement Disorder with Silverio Us MD   [facility] Neurology ([facility])    [address] [place] [id number] [phone]            Jun 25, 2018  8:00 AM CDT   New Sleep Patient with Meet Lake MD   [facility] ([facility])    [address] [place] [id number] [phone]            Jul 23, 2018 10:00 AM CDT   (Arrive by 9:45 AM)   POSTURAL ORTHOSTATIC SYCOPE with Lee Ann Laughlin MD   [facility] Heart Care ([facility])    [address] [place] [address] [phone]            Jul 26,  2018  4:00 PM CDT   (Arrive by 3:45 PM)   New Patient Visit with Akbar Frank MD   [facility] Nose and Throat ([facility])    [address] [facility]  4th [facility] [id number] [phone]            Mar 13, 2019  5:00 PM CDT   (Arrive by 4:45 PM)   Return Movement Disorder with Vida Solomon MD   [facility] Neurology ([facility])    [address] [place] [address]  3rd [facility] [id number] [phone]              24 Hour Appointment Hotline       To make an appointment at any [facility], call [phone] ([phone]). If you don't have a family doctor or clinic, we will help you find one. [facility] clinics are conveniently located to serve the needs of you and your family.             Review of your medicines      Our records show that you are taking the medicines listed below. If these are incorrect, please call your family doctor or clinic.        Dose / Directions Last dose taken    ASPIRIN PO   Dose:  81 mg        Take 81 mg by mouth daily   Refills:  0        COQ10 PO        Refills:  0        KRILL OIL PO   Dose:  1 capsule        Take 1 capsule by mouth daily   Refills:  0        mirtazapine 15 MG tablet   Commonly known as:  REMERON   Quantity:  90 tablet        TAKE 1 TABLET(15 MG) BY MOUTH AT BEDTIME   Refills:  3        MULTIVITAL PO   Dose:  1 tablet        Take 1 tablet by mouth daily   Refills:  0        simvastatin 40 MG tablet   Commonly known as:  ZOCOR   Dose:  0.5 tablet        Take 0.5 tablets by mouth daily   Refills:  0        TURMERIC PO   Dose:  1 tablet        Take 1 tablet by mouth daily   Refills:  0        VITAMIN D (CHOLECALCIFEROL) PO   Dose:  2000 Units        Take 2,000 Units by mouth daily   Refills:  0                Procedures and tests performed during your visit     CBC with platelets differential    CRP inflammation    US Upper Extremity Venous Duplex Left      Orders Needing  Specimen Collection     None      Pending Results     No orders found from 6/6/2018 to 6/9/2018.            Pending Culture Results     No orders found from 6/6/2018 to 6/9/2018.            Pending Results Instructions     If you had any lab results that were not finalized at the time of your Discharge, you can call the ED Lab Result RN at [phone]. You will be contacted by this team for any positive Lab results or changes in treatment. The nurses are available 7 days a week from 10A to 6:30P.  You can leave a message 24 hours per day and they will return your call.        Thank you for choosing [facility]       Thank you for choosing [facility] for your care. Our goal is always to provide you with excellent care. Hearing back from our patients is one way we can continue to improve our services. Please take a few minutes to complete the written survey that you may receive in the mail after you visit with us. Thank you!        [facility] Information     [facility] gives you secure access to your electronic health record. If you see a primary care provider, you can also send messages to your care team and make appointments. If you have questions, please call your primary care clinic.  If you do not have a primary care provider, please call [phone] and they will assist you.        Care EveryWhere ID     This is your Care EveryWhere ID. This could be used by other organizations to access your [facility] medical records  [id number]        Equal Access to Services     CAROLINA PLASCENCIA : Jackie Mcdaniel, waaxda luqadaha, qaybta kaalprashant lind. So [facility] [phone].    ATENCIÓN: Si habla español, tiene a persaud disposición servicios gratuitos de asistencia lingüística. Llame al [phone].    We comply with applicable federal civil rights and [place] laws. We do not discriminate on the basis of race, color, national origin, age, disability, sex, sexual orientation or  gender identity.                          After Visit Summary       This is your record. Keep this with you and show to your community pharmacist(s) and doctor(s) at your next visit.

## 2018-06-08 NOTE — ED PROVIDER NOTES
History     Chief Complaint   Patient presents with     Arm Pain     tender, red lump at LUE proximal to recent PIV site     HPI  Cesar Montejo is a 52 year old male with a history of HLD, HTN, and anxiety who presents to the ED with some LUE pain. Patient states 3 days ago he noted an erythematous, painful lump on his LUE near where he had his IV placed during his ED Obs visit from 6/3-6/4/18. He denies any previous history of DVTs, fevers, chills, nausea, vomiting, or IV drug use.       Per review of his chart, patient had recent ED visit from 6/3-6/4 for chest pain.  Patient serial troponins were negative and his echo stress test had an EF of 55-60% and otherwise normal.      PAST MEDICAL HISTORY  Past Medical History:   Diagnosis Date     Anxiety      Basal cell carcinoma      Gastro-oesophageal reflux disease      Head injury April 2012    Had a bad concussion with post concussion synd for year     High cholesterol      Hypertension early 2000    Not on meds. Usually in pre-hypertesion categ.     Insomnia      Squamous cell carcinoma      PAST SURGICAL HISTORY  Past Surgical History:   Procedure Laterality Date     COLONOSCOPY N/A 4/17/2017    Procedure: COLONOSCOPY;  Surgeon: Larry Fields MD;  Location: [facility]     LASER CO2 LESION ORAL N/A 10/5/2016    Procedure: LASER CO2 LESION ORAL;  Surgeon: Josué Rojo DDS;  Location: [facility] OR     MOHS MICROGRAPHIC PROCEDURE       FAMILY HISTORY  Family History   Problem Relation Age of Onset     Lipids Mother      on meds     CEREBROVASCULAR DISEASE Mother      TIA     Alzheimer Disease Mother      Skin Cancer Mother      SCC     Lipids Father      on meds     Hypertension Father      controlled with meds     Thyroid Disease Father      ?not sure but possibly     DIABETES Father      CEREBROVASCULAR DISEASE Father      Cancer - colorectal Maternal Grandmother      still alive at 99     CANCER Maternal Grandfather      lung but lifetime smoker     Melanoma  Maternal Grandfather      Asthma No family hx of      C.A.D. No family hx of      Prostate Cancer No family hx of      SOCIAL HISTORY  Social History   Substance Use Topics     Smoking status: Former Smoker     Packs/day: 0.50     Years: 5.00     Types: Cigarettes     Start date: 1/1/1986     Quit date: 1/1/1991     Smokeless tobacco: Never Used      Comment: After 1991 no longer a half pack a day. Very occasional--maybe 75 cigarettes a year--91 to 98     Alcohol use 0.6 - 1.2 oz/week      Comment: 1 to 2 glasses of red wine per day     MEDICATIONS  No current facility-administered medications for this encounter.      Current Outpatient Prescriptions   Medication     ASPIRIN PO     Coenzyme Q10 (COQ10 PO)     KRILL OIL PO     mirtazapine (REMERON) 15 MG tablet     Multiple Vitamins-Minerals (MULTIVITAL PO)     simvastatin (ZOCOR) 40 MG tablet     TURMERIC PO     VITAMIN D, CHOLECALCIFEROL, PO     ALLERGIES  No Known Allergies    I have reviewed the Medications, Allergies, Past Medical and Surgical History, and Social History in the Epic system.    Review of Systems   Constitutional: Negative for chills and fever.   Gastrointestinal: Negative for nausea and vomiting.   Skin:        Positive for soft tissue swelling proximal to antecubital fossa.        Physical Exam   BP: 153/84  Pulse: 111  Temp: 98.2  F (36.8  C)  Resp: 16  SpO2: 95 %      Physical Exam  General: awake, alert, NAD  Head: normal cephalic  HEENT: pupils equal, conjugate gaze in tact  Neck: Supple  CV: regular rate and rhythm without murmur  Lungs: clear to ascultation  Abd: soft, non-tender, no guarding, no peritoneal signs  Neuro: awake, answers questions appropriately. No focal deficits noted   Skin: Area of erythema and soft tissue swelling proximal to antecubital fossa.  Minimally tender.    ED Course     ED Course     Procedures   6:51 PM  The patient was seen and examined by Dr. Locke in Room 20.                Assessments & Plan (with Medical  Decision Making)   Cesar is a 52 year old male who presents with focal redness, tenderness, and swelling just above the elbow at a previous IV sites.  Differential for this would be superficial thrombophlebitis, septic thrombophlebitis, versus focal cellulitis or abscess.  Patient is nontoxic-appearing and has stable vital signs. I  have low suspicion this could represent a septic thrombophlebitis. However superficial thrombophlebitis is on the differential.  Initial evaluation will include labs and ultrasound.    Labs notable for normal white count, normal CRP.  Ultrasound demonstrated a clot in the superficial basilic vein.  Measuring only 3.5 cm, and not approaching the deep venous system.  Patient is otherwise low risk. I will not anticoagulate, but  Instead we will treat with NSAIDs, hot and cool compresses, and elevation.  I discussed he will need to follow-up with his primary care provider for repeat imaging to make sure clot is propagating as anticoagulation may be a consideration in that setting.    Patient has no signs or symptoms of infection, so will not treat as such. However, he knows if he were to get fevers, body aches, or feeling generally unwell he should come back for repeat evaluation.     This part of the medical record was transcribed by Isacc Noguera Medical Scribe, from a dictation done by Korey Locke MD.       I have reviewed the nursing notes.    I have reviewed the findings, diagnosis, plan and need for follow up with the patient.    Discharge Medication List as of 6/8/2018  9:33 PM          Final diagnoses:   Superficial thrombophlebitis of left upper extremity     I, Isacc Noguera, am serving as a trained medical scribe to document services personally performed by Korey Locke MD, based on the provider's statements to me.      I, Korey Locke MD, was physically present and have reviewed and verified the accuracy of this note documented by Isacc Noguera.    6/8/2018   [facility], [place],  EMERGENCY DEPARTMENT     Korey Locke MD  06/08/18 [id number]

## 2018-06-08 NOTE — ED AVS SNAPSHOT
[facility], Emergency Department    [address]    [place] [address]    Phone:  [phone]    Fax:  [phone]                                       Cesar Montejo   MRN: [id number]    Department:  [facility], Emergency Department   Date of Visit:  6/8/2018           Patient Information     Date Of Birth          1965        Your diagnoses for this visit were:     Superficial thrombophlebitis of left upper extremity        You were seen by Korey Locke MD.        Discharge Instructions       TODAY'S VISIT:  You were seen today for arm pain, diagnosed with superficial thrombophlebitis.  -     FOLLOW-UP:  Please make an appointment to follow up with:  - Your Primary Care Provider next week to arrange for follow-up ultrasound.    PRESCRIPTIONS / MEDICATIONS:  -Take 600 mg of ibuprofen every 6 hours, use cool or warm compresses to affected area 20 minutes 4 times a day, keep extremity elevated above the heart when not in use.    OTHER INSTRUCTIONS:  -     RETURN TO THE EMERGENCY DEPARTMENT  Return to the Emergency Department at any time for new/worsening symptoms.  Specifically fevers, or any signs of systemic illness.      Your next 10 appointments already scheduled     Jun 21, 2018 11:10 AM CDT   (Arrive by 10:55 AM)   New Movement Disorder with Silverio Us MD   [facility] ([facility])    [address] [place] [id number] [phone]            Jun 25, 2018  8:00 AM CDT   New Sleep Patient with Meet Lake MD   [facility] ([facility])    [address] [place] [address] [phone]            Jul 23, 2018 10:00 AM CDT   (Arrive by 9:45 AM)   POSTURAL ORTHOSTATIC SYCOPE with Lee Ann Laughlin MD   [facility] ([facility])    [address] [place] [address] [phone]            Jul 26,  2018  4:00 PM CDT   (Arrive by 3:45 PM)   New Patient Visit with Akbar Frank MD   [facility] Nose and Throat ([facility])    [address] [facility]  4th [facility] [id number] [phone]            Mar 13, 2019  5:00 PM CDT   (Arrive by 4:45 PM)   Return Movement Disorder with Vida Solomon MD   [facility] Neurology ([facility])    [address] [place] [address]  3rd [facility] [id number] [phone]              24 Hour Appointment Hotline       To make an appointment at any [facility], call [phone] ([phone]). If you don't have a family doctor or clinic, we will help you find one. [facility] clinics are conveniently located to serve the needs of you and your family.             Review of your medicines      Our records show that you are taking the medicines listed below. If these are incorrect, please call your family doctor or clinic.        Dose / Directions Last dose taken    ASPIRIN PO   Dose:  81 mg        Take 81 mg by mouth daily   Refills:  0        COQ10 PO        Refills:  0        KRILL OIL PO   Dose:  1 capsule        Take 1 capsule by mouth daily   Refills:  0        mirtazapine 15 MG tablet   Commonly known as:  REMERON   Quantity:  90 tablet        TAKE 1 TABLET(15 MG) BY MOUTH AT BEDTIME   Refills:  3        MULTIVITAL PO   Dose:  1 tablet        Take 1 tablet by mouth daily   Refills:  0        simvastatin 40 MG tablet   Commonly known as:  ZOCOR   Dose:  0.5 tablet        Take 0.5 tablets by mouth daily   Refills:  0        TURMERIC PO   Dose:  1 tablet        Take 1 tablet by mouth daily   Refills:  0        VITAMIN D (CHOLECALCIFEROL) PO   Dose:  2000 Units        Take 2,000 Units by mouth daily   Refills:  0                Procedures and tests performed during your visit     CBC with platelets differential    CRP inflammation    US Upper Extremity Venous Duplex Left      Orders Needing  Specimen Collection     None      Pending Results     No orders found from 6/6/2018 to 6/9/2018.            Pending Culture Results     No orders found from 6/6/2018 to 6/9/2018.            Pending Results Instructions     If you had any lab results that were not finalized at the time of your Discharge, you can call the ED Lab Result RN at [phone]. You will be contacted by this team for any positive Lab results or changes in treatment. The nurses are available 7 days a week from 10A to 6:30P.  You can leave a message 24 hours per day and they will return your call.        Thank you for choosing [facility]       Thank you for choosing [facility] for your care. Our goal is always to provide you with excellent care. Hearing back from our patients is one way we can continue to improve our services. Please take a few minutes to complete the written survey that you may receive in the mail after you visit with us. Thank you!        [facility] Information     [facility] gives you secure access to your electronic health record. If you see a primary care provider, you can also send messages to your care team and make appointments. If you have questions, please call your primary care clinic.  If you do not have a primary care provider, please call [phone] and they will assist you.        Care EveryWhere ID     This is your Care EveryWhere ID. This could be used by other organizations to access your [facility] medical records  [id number]        Equal Access to Services     CAROLINA PLASCENCIA : aJckie Mcdaniel, waaxda luqadaha, qaybta kaalmaprashant de jesus. So [facility] [phone].    ATENCIÓN: Si habla español, tiene a persaud disposición servicios gratuitos de asistencia lingüística. Llame al [phone].    We comply with applicable federal civil rights laws and [place] laws. We do not discriminate on the basis of race, color, national origin, age, disability, sex, sexual  orientation, or gender identity.            After Visit Summary       This is your record. Keep this with you and show to your community pharmacist(s) and doctor(s) at your next visit.

## 2018-06-08 NOTE — ED AVS SNAPSHOT
[facility], Emergency Department    [address]    [place] [address]    Phone:  [phone]    Fax:  [phone]                                       Cesar Monetjo   MRN: [id number]    Department:  [facility], Emergency Department   Date of Visit:  6/8/2018           After Visit Summary Signature Page     I have received my discharge instructions, and my questions have been answered. I have discussed any challenges I see with this plan with the nurse or doctor.    ..........................................................................................................................................  Patient/Patient Representative Signature      ..........................................................................................................................................  Patient Representative Print Name and Relationship to Patient    ..................................................               ................................................  Date                                            Time    ..........................................................................................................................................  Reviewed by Signature/Title    ...................................................              ..............................................  Date                                                            Time

## 2018-06-09 NOTE — DISCHARGE INSTRUCTIONS
TODAY'S VISIT:  You were seen today for arm pain, diagnosed with superficial thrombophlebitis.  -     FOLLOW-UP:  Please make an appointment to follow up with:  - Your Primary Care Provider next week to arrange for follow-up ultrasound.    PRESCRIPTIONS / MEDICATIONS:  -Take 600 mg of ibuprofen every 6 hours, use cool or warm compresses to affected area 20 minutes 4 times a day, keep extremity elevated above the heart when not in use.    OTHER INSTRUCTIONS:  -     RETURN TO THE EMERGENCY DEPARTMENT  Return to the Emergency Department at any time for new/worsening symptoms.  Specifically fevers, or any signs of systemic illness.

## 2018-06-12 ENCOUNTER — TELEPHONE (OUTPATIENT)
Dept: NEUROLOGY | Facility: CLINIC | Age: 53
End: 2018-06-12

## 2018-06-12 PROBLEM — Z92.89 H/O MAGNETIC RESONANCE IMAGING OF BRAIN AND BRAIN STEM: Status: ACTIVE | Noted: 2018-06-12

## 2018-06-12 PROBLEM — Z01.89 ENCOUNTER FOR NEUROPSYCHOLOGICAL TESTING: Status: ACTIVE | Noted: 2018-06-12

## 2018-06-12 NOTE — TELEPHONE ENCOUNTER
Called patient back regarding his upcoming appointment.    I advised the patient that his appointment with Dr. Us isn't appropriate and that Dr. Us would have the same information and recommendations as far as testing that Dr. Solomon and Dr. Mcclain have given him.  I recommended that he stick with one doctor because with neurogenic illnesses, it is diagnosed clinically overtime.  So time needs to pass in order for the doctors to notice any change. Patient states his symptoms are continually getting worse as time goes on, so he feels he should have some answers.  He is very concerned with the tilt table test results he states and that's why he wants to get in sooner.     I advised the patient that while waiting for next appt with Dr. Solomon, he needs to have this testing done so maybe she can give him more answers (i.e. Sleep study, Autonomic testing at [facility], PT/OT evaluation with Dr. Lake, etc.)  Patient verbalized understanding and will make another appointment with Dr. Solomon and states he will do those tests prior to her next appointment.  Patient will call clinic.

## 2018-06-12 NOTE — TELEPHONE ENCOUNTER
Symptoms of orthostatic hypotension.    Had test done at [facility] which showed hypotension.  He has continued to have balance problems and seems to be worsening.      Dr. Solomon's schedule was months out.  Patient wants to see a neurologist for a second opinion given his symptoms.  He is also experiencing dream reenactment behavior and he has a sleep appointment scheduled which will lead to a sleep lab appointment.     During this phone call, the patient seemed to have difficulty finding his words during our conversation and while he was attempting to explain his recent doctor's visits.

## 2018-06-13 DIAGNOSIS — R06.00 DYSPNEA: Primary | ICD-10-CM

## 2018-06-13 NOTE — PROGRESS NOTES
SUBJECTIVE:   Cesar Montejo is a 52 year old male who presents to clinic today for the following health issues:    ED/UC Followup:    Facility:  [facility]  Date of visit: 6/8/18  Reason for visit: Superficial thrombophlebitis of upper extremity   Current Status: Was told to follow up with Primary to order another ultrasound         Encounter Diagnoses   Name Primary?     Superficial thrombophlebitis of left upper extremity Yes     Dysphagia, unspecified type, concerned about MSA ( positive tilt table and swallowing difficulties)  Is awaiting hesham back from neurology.  Would llike to see speech therapy  Saw ENT, normal vocal cords         Problem list and histories reviewed & adjusted, as indicated.  Additional history: as documented    Labs reviewed in EPIC    Reviewed and updated as needed this visit by clinical staff       Reviewed and updated as needed this visit by Provider         ROS:  Constitutional, HEENT, cardiovascular, pulmonary, gi and gu systems are negative, except as otherwise noted.    OBJECTIVE:     /88 (BP Location: Right arm, Patient Position: Sitting, Cuff Size: Adult Regular)  Pulse 98  Temp 98.9  F (37.2  C) (Oral)  Wt 187 lb 14.4 oz (85.2 kg)  SpO2 94%  BMI 25.48 kg/m2  Body mass index is 25.48 kg/(m^2).  GENERAL: alert and fatigued  NECK: no adenopathy, no asymmetry, masses, or scars and thyroid normal to palpation  RESP: lungs clear to auscultation - no rales, rhonchi or wheezes  CV: regular rates and rhythm, normal S1 S2, no S3 or S4 and tender left distal forarm with slight subcutaneous swelling, normal pulses, no rdness  ABDOMEN: soft, nontender, no hepatosplenomegaly, no masses and bowel sounds normal  PSYCH: mentation appears normal, anxious, speech pressured and judgement and insight intact    Diagnostic Test Results:  Results for orders placed or performed during the hospital encounter of 06/08/18   US Upper Extremity Venous Duplex Left    Narrative    LEFT UPPER EXTREMITY  VENOUS DUPLEX ULTRASOUND  6/8/2018 7:55 PM     HISTORY: Red, pain, swelling.    COMPARISON: None.    FINDINGS: Gray-scale, color and Doppler spectral analysis ultrasound  was performed of the left arm. Compression and augmentation imaging  was performed.    No deep venous system thrombosis identified. Superficial  noncompressible venous thrombus at the left basilic vein at the level  of the elbow. Approximate length is 3.6 cm. A precise distance from  the axillary vein, and cephalic vein is unable to be determined based  on the images. However, the superficial thrombus appears to be limited  to the level of the elbow.      Impression    IMPRESSION: No deep venous system thrombosis. Superficial occlusive  venous thrombus at the left basilic vein at the level of the elbow.    I have personally reviewed the examination and initial interpretation  and I agree with the findings.    DHRUV CORONADO MD   CBC with platelets differential   Result Value Ref Range    WBC 5.1 4.0 - 11.0 10e9/L    RBC Count 4.92 4.4 - 5.9 10e12/L    Hemoglobin 15.3 13.3 - 17.7 g/dL    Hematocrit 45.2 40.0 - 53.0 %    MCV 92 78 - 100 fl    MCH 31.1 26.5 - 33.0 pg    MCHC 33.8 31.5 - 36.5 g/dL    RDW 12.5 10.0 - 15.0 %    Platelet Count 203 150 - 450 10e9/L    Diff Method Automated Method     % Neutrophils 49.1 %    % Lymphocytes 37.0 %    % Monocytes 11.7 %    % Eosinophils 1.0 %    % Basophils 1.0 %    % Immature Granulocytes 0.2 %    Nucleated RBCs 0 0 /100    Absolute Neutrophil 2.5 1.6 - 8.3 10e9/L    Absolute Lymphocytes 1.9 0.8 - 5.3 10e9/L    Absolute Monocytes 0.6 0.0 - 1.3 10e9/L    Absolute Eosinophils 0.1 0.0 - 0.7 10e9/L    Absolute Basophils 0.1 0.0 - 0.2 10e9/L    Abs Immature Granulocytes 0.0 0 - 0.4 10e9/L    Absolute Nucleated RBC 0.0    CRP inflammation   Result Value Ref Range    CRP Inflammation <2.9 0.0 - 8.0 mg/L       ASSESSMENT/PLAN:   Follow up with consultant as planned.      Follow up with consultant as planned.1.  Superficial thrombophlebitis of left upper extremity  Appears pramod resolving  - US Extremity Upper Venous  lt; Future    2. Dysphagia, unspecified type   Follow up with consultant as planned.   - SPEECH THERAPY REFERRAL    See Patient Instructions    Rocky Messina MD  [facility]

## 2018-06-14 ENCOUNTER — OFFICE VISIT (OUTPATIENT)
Dept: FAMILY MEDICINE | Facility: CLINIC | Age: 53
End: 2018-06-14
Payer: COMMERCIAL

## 2018-06-14 VITALS
OXYGEN SATURATION: 94 % | TEMPERATURE: 98.9 F | WEIGHT: 187.9 LBS | SYSTOLIC BLOOD PRESSURE: 122 MMHG | DIASTOLIC BLOOD PRESSURE: 88 MMHG | BODY MASS INDEX: 25.48 KG/M2 | HEART RATE: 98 BPM

## 2018-06-14 DIAGNOSIS — I80.8 SUPERFICIAL THROMBOPHLEBITIS OF LEFT UPPER EXTREMITY: Primary | ICD-10-CM

## 2018-06-14 DIAGNOSIS — R13.10 DYSPHAGIA, UNSPECIFIED TYPE: ICD-10-CM

## 2018-06-14 PROCEDURE — 99214 OFFICE O/P EST MOD 30 MIN: CPT | Performed by: FAMILY MEDICINE

## 2018-06-14 NOTE — MR AVS SNAPSHOT
"              After Visit Summary   6/14/2018    Cesar Montejo    MRN: [id number]           Patient Information     Date Of Birth          1965        Visit Information        Provider Department      6/14/2018 10:00 AM Rocky Messina MD [facility]        Today's Diagnoses     Superficial thrombophlebitis of left upper extremity    -  1    Dysphagia, unspecified type           Follow-ups after your visit        Additional Services     SPEECH THERAPY REFERRAL       *This therapy referral will be filtered to a centralized scheduling office at [facility] and the patient will receive a call to schedule an appointment at a [facility] location most convenient for them. *     [facility] provides Speech Therapy evaluation and treatment and many specialty services across the [facility] system.  If requesting a specialty program, please choose from the list below.  If you have not heard from the scheduling office within 2 business days, please call [phone] for all locations, with the exception of [place], please call [phone] and [facility], please call [phone]      Treatment: Evaluation & Treatment  Speech Treatment Diagnosis: Dysphagia  Special Instructions: patient having work up for MSA  Special Programs: Video Swallow Study may be recomended    Please be aware that coverage of these services is subject to the terms and limitations of your health insurance plan.  Call member services at your health plan with any benefit or coverage questions.      **Note to Provider:  If you are referring outside of [facility] for the therapy appointment, please list the name of the location in the \"special instructions\" above, print the referral and give to the patient to schedule the appointment.                  Your next 10 appointments already scheduled     Jun 21, 2018  9:30 AM CDT   FULL PULMONARY FUNCTION with  PFL MEJIA CHILDERS [facility] Pulmonary " Function Testing ([facility])    [address]  3rd Floor  [place] [address]   [phone]            Jun 21, 2018 10:40 AM CDT   (Arrive by 10:25 AM)   New Patient Visit with Amilcar Parker MD   [facility] ([facility])    [address]  [address]  [place] [address]   [phone]            Jun 22, 2018 10:00 AM CDT   (Arrive by 9:45 AM)   Neuro Eval with Leelee Aleman PT    Health Rehab ([facility])    [address]  4th Floor  [place] [address] [phone]            Jun 25, 2018  8:00 AM CDT   New Sleep Patient with Meet Lake MD   [facility] ([facility])    [address] [place] [address]   [phone]            Jun 26, 2018  1:00 PM CDT   (Arrive by 12:45 PM)   RETURN ARRHYTHMIA with Lee Ann Laughlin MD   [facility] Heart Care ([facility])    [address]  [address]  [place] [address]   [phone]            Jul 26, 2018  4:00 PM CDT   (Arrive by 3:45 PM)   New Patient Visit with Akbar Frank MD   [facility] Ear Nose and Throat ([facility])    [address]  [address] [facility] [address]   [phone]            Sep 19, 2018  5:00 PM CDT   (Arrive by 4:45 PM)   Return Movement Disorder with Vida Solomon MD   [facility] Neurology ([facility])    [address]  [address] [facility] [id number] [phone]            Mar 13, 2019  5:00 PM CDT   (Arrive by 4:45 PM)   Return Movement Disorder with Vida Solomon MD   [facility] Neurology ([facility])    [address]  [address] [facility] [address]   [phone]              Future tests that were ordered for you today     Open Future Orders         Priority Expected Expires Ordered    US Extremity Upper Venous  lt Routine  6/14/2019 6/14/2018            Who to contact     If you have questions or need follow up information about today's clinic visit or your schedule please contact [facility] directly at [phone].  Normal or non-critical lab and imaging results will be communicated to you by MyChart, letter or phone within 4 business days after the clinic has received the results. If you do not hear from us within 7 days, please contact the clinic through [employer] or phone. If you have a critical or abnormal lab result, we will notify you by phone as soon as possible.  Submit refill requests through [employer] or call your pharmacy and they will forward the refill request to us. Please allow 3 business days for your refill to be completed.          Additional Information About Your Visit        MyChart Information     [employer] gives you secure access to your electronic health record. If you see a primary care provider, you can also send messages to your care team and make appointments. If you have questions, please call your primary care clinic.  If you do not have a primary care provider, please call [phone] and they will assist you.        Care EveryWhere ID     This is your Care EveryWhere ID. This could be used by other organizations to access your [place] medical records  [id number]        Your Vitals Were     Pulse Temperature Pulse Oximetry BMI (Body Mass Index)          98 98.9  F (37.2  C) (Oral) 94% 25.48 kg/m2         Blood Pressure from Last 3 Encounters:   06/14/18 122/88   06/08/18 119/80   06/07/18 122/84    Weight from Last 3 Encounters:   06/14/18 187 lb 14.4 oz (85.2 kg)   06/07/18 184 lb 8 oz (83.7 kg)   06/03/18 183 lb (83 kg)              We Performed the Following     SPEECH THERAPY REFERRAL        Primary Care Provider Office Phone # Fax #    Rocky Messina [phone] [address]  [facility] [address]  [facility] [address]        Equal Access to Services     [facility] THAIS : Hadii aad ku hadkwanmer Soidris, waaxda luqadaha, qaybta kaalmaprashant de jesus. So [facility] [phone].    ATENCIÓN: Si habla español, tiene a persaud disposición servicios gratuitos de asistencia lingüística. Kajalame al [phone].    We comply with applicable federal civil rights laws and [place] laws. We do not discriminate on the basis of race, color, national origin, age, disability, sex, sexual orientation, or gender identity.            Thank you!     Thank you for choosing [facility]  for your care. Our goal is always to provide you with excellent care. Hearing back from our patients is one way we can continue to improve our services. Please take a few minutes to complete the written survey that you may receive in the mail after your visit with us. Thank you!             Your Updated Medication List - Protect others around you: Learn how to safely use, store and throw away your medicines at www.disposemymeds.org.          This list is accurate as of 6/14/18 10:27 AM.  Always use your most recent med list.                   Brand Name Dispense Instructions for use Diagnosis    aspirin 81 MG EC tablet      Take 1 tablet (81 mg) by mouth daily        COQ10 PO           KRILL OIL PO      Take 1 capsule by mouth daily        mirtazapine 15 MG tablet    REMERON    90 tablet    TAKE 1 TABLET(15 MG) BY MOUTH AT BEDTIME    Mixed anxiety and depressive disorder, Insomnia       MULTIVITAL PO      Take 1 tablet by mouth daily        ranitidine 300 MG tablet    ZANTAC          simvastatin 40 MG tablet    ZOCOR     Take 0.5 tablets by mouth daily        TURMERIC PO      Take 1 tablet by mouth daily        VITAMIN D (CHOLECALCIFEROL) PO      Take 2,000 Units by mouth daily

## 2018-06-15 ENCOUNTER — RADIANT APPOINTMENT (OUTPATIENT)
Dept: ULTRASOUND IMAGING | Facility: CLINIC | Age: 53
End: 2018-06-15
Attending: FAMILY MEDICINE
Payer: COMMERCIAL

## 2018-06-15 ENCOUNTER — RADIANT APPOINTMENT (OUTPATIENT)
Dept: GENERAL RADIOLOGY | Facility: CLINIC | Age: 53
End: 2018-06-15
Payer: COMMERCIAL

## 2018-06-15 DIAGNOSIS — R06.00 DYSPNEA: ICD-10-CM

## 2018-06-15 DIAGNOSIS — I80.8 SUPERFICIAL THROMBOPHLEBITIS OF LEFT UPPER EXTREMITY: ICD-10-CM

## 2018-06-16 ENCOUNTER — NURSE TRIAGE (OUTPATIENT)
Dept: NURSING | Facility: CLINIC | Age: 53
End: 2018-06-16

## 2018-06-16 NOTE — TELEPHONE ENCOUNTER
Patient reports that he was diagnosed with a superficial thrombophlebitis of his left arm on 6-8-18. Reports he had a chest x-ray and ultrasound of his arm yesterday. The tech told him that the clot appeared to have grown in size. Now he is having some chest pain. Said the pain is left of the center of his chest and he feels a little pressure. Denies dyspnea. Denies feeling faint or dizzy. Has an O2 sat monitor at home and his sat is 95-96%. Nurse told the patient to call 911 for transport to the ER. He said he plans on driving himself to the ER.     Protocol and care advice reviewed.   Caller states understanding of the recommended disposition.    Leelee Teran RN/FNA      Reason for Disposition    [1] Chest pain lasts > 5 minutes AND [2] described as crushing, pressure-like, or heavy    Additional Information    Negative: Severe difficulty breathing (e.g., struggling for each breath, speaks in single words)    Negative: Difficult to awaken or acting confused (e.g., disoriented, slurred speech)    Negative: Shock suspected (e.g., cold/pale/clammy skin, too weak to stand, low BP, rapid pulse)    Negative: [1] Chest pain lasts > 5 minutes AND [2] history of heart disease  (i.e., heart attack, bypass surgery, angina, angioplasty, CHF; not just a heart murmur)    Protocols used: CHEST PAIN-ADULT-

## 2018-06-21 ENCOUNTER — OFFICE VISIT (OUTPATIENT)
Dept: PULMONOLOGY | Facility: CLINIC | Age: 53
End: 2018-06-21
Payer: COMMERCIAL

## 2018-06-21 ENCOUNTER — THERAPY VISIT (OUTPATIENT)
Dept: SPEECH THERAPY | Facility: CLINIC | Age: 53
End: 2018-06-21
Attending: FAMILY MEDICINE
Payer: COMMERCIAL

## 2018-06-21 ENCOUNTER — PRE VISIT (OUTPATIENT)
Dept: PULMONOLOGY | Facility: CLINIC | Age: 53
End: 2018-06-21

## 2018-06-21 VITALS
SYSTOLIC BLOOD PRESSURE: 134 MMHG | OXYGEN SATURATION: 96 % | HEART RATE: 86 BPM | DIASTOLIC BLOOD PRESSURE: 87 MMHG | RESPIRATION RATE: 16 BRPM

## 2018-06-21 DIAGNOSIS — R13.10 DYSPHAGIA, UNSPECIFIED TYPE: ICD-10-CM

## 2018-06-21 DIAGNOSIS — R06.00 DYSPNEA AND RESPIRATORY ABNORMALITY: Primary | ICD-10-CM

## 2018-06-21 DIAGNOSIS — R06.89 DYSPNEA AND RESPIRATORY ABNORMALITY: Primary | ICD-10-CM

## 2018-06-21 DIAGNOSIS — R06.89 DYSPNEA AND RESPIRATORY ABNORMALITY: ICD-10-CM

## 2018-06-21 DIAGNOSIS — R06.00 DYSPNEA AND RESPIRATORY ABNORMALITY: ICD-10-CM

## 2018-06-21 LAB
6 MIN WALK (FT): 2100 FT
6 MIN WALK (M): 640 M

## 2018-06-21 PROCEDURE — G0463 HOSPITAL OUTPT CLINIC VISIT: HCPCS | Mod: ZF

## 2018-06-21 ASSESSMENT — PAIN SCALES - GENERAL: PAINLEVEL: NO PAIN (0)

## 2018-06-21 NOTE — PROGRESS NOTES
SUBJECTIVE:   Cesar Montejo is a 52 year old male who presents to clinic today for the following health issues:    Here to follow up on blood clot and discuss need of future ultrasound.     Encounter Diagnosis   Name Primary?     Superficial thrombophlebitis of left upper extremity Yes    notes maybe slight improvement    Problem list and histories reviewed & adjusted, as indicated.  Additional history: as documented    Wants to review neuro issues, saw PHYSICAL THERAPY, will be doing sleep evaluation next week  Seeing neurologist in September    Reviewed and updated as needed this visit by clinical staff       Reviewed and updated as needed this visit by Provider         ROS:  Constitutional, HEENT, cardiovascular, pulmonary, gi and gu systems are negative, except as otherwise noted.    OBJECTIVE:     /88 (BP Location: Right arm, Patient Position: Sitting, Cuff Size: Adult Regular)  Pulse 88  Temp 98.6  F (37  C) (Oral)  SpO2 96%  There is no height or weight on file to calculate BMI.  GENERAL: alert and no distress  MS: peripheral pulses normal and tenderness to palpation left upper arm just above antecubital region    Diagnostic Test Results:  Results for orders placed or performed in visit on 06/21/18   General PFT Lab (Please always keep checked)   Result Value Ref Range    FIO2-Pre 21.00 %   6 minute walk test   Result Value Ref Range    6 min walk (FT) 2100 ft    6 Min Walk (M) 640 m       ASSESSMENT/PLAN:             1. Superficial thrombophlebitis of left upper extremity  reassurance   should resolve with time   reviewed Pulm evaluation  Follow up with consultant as planned.       See Patient Instructions    Rocky Messina MD  [facility]

## 2018-06-21 NOTE — PROGRESS NOTES
[facility]  Pulmonary Medicine  Visit Clinic Note  June 21, 2018         ASSESSMENT & PLAN         Dyspnea: Mr. Montejo experiences dyspnea mostly with heavy exertion such as climbing stairs.  Otherwise he does not get winded.  His main concern now is that his resting oxygen saturation is now hovering around 94-95% when it used to be 97-98%.  He is concerned that this may be a respiratory manifestation of a potential neurologic disease.  He is currently being evaluated for multiple system atrophy.    On my exam he has clear lungs and a normal sounding heart.  His pulmonary function testing is normal.  He has a normal chest x-ray.  Oxygen saturation of 94-95% is technically in the normal range, however I do see his point that his oxygen saturation has been drifting down in the setting of also having a potential systemic disease.    Currently I do not see any obvious reason for this decrease in oxygen saturation.  He has already had a lot of good testing, and his diffusion capacity and his pulmonary function test is normal.  This could imply a hypoventilation syndrome.  I would like to get a 6 minute walk test to stresses calm cardiopulmonary system and see if there might be any gas exchange abnormalities.  If there is a desaturation on his 6 minute walk test, chest CT scan would be reasonable. He is already scheduled for a sleep study and overall sleep evaluation.     He has been if there is any certain breathing exercises, or anything in general that he could do in order to try to preserve his respiratory status in the setting of potential diagnosis of multiple system atrophy.  To my knowledge, there are no specific pulmonary therapies, but I told him I would look into this further and give him a phone call in the future if I am able to find anything.    I will not set up any follow-up, but I will call him after 6 minute walk results.      Torey Parker MD             Today's visit note:     Chief  Complaint: Cesar Montejo is a 52 year old year old male who is being seen for Consult (Patient is being seen for consultaiton of breathing issues)      HISTORY OF PRESENT ILLNESS:    Is a 52-year-old male who is currently being worked up for multiple system atrophy [facility] neurology who is presenting to the pulmonary clinic today for evaluation of potential oxygen desaturations.    Regarding his potential diagnosis of multiple system atrophy, he is had symptoms such as dream enactment behavior, swallowing issues, trouble breathing at night, and orthostatic hypotension.    On top of this he has noted that his oxygen saturation now generally sits between 9495%, when he used to be 97-98%.  He is concerned that there is a similar process that is affecting his vasculature may also be hindering his breathing.    He does not have any significant shortness of breath with the exception of when he climbs 1-2 flights of stairs.  He will get winded, but it does not happen until about 1-2 minutes after she has completed climbing a flight of stairs.  Is also associated with lightheadedness.  He does note that when she was in [place] he did not have any problems breathing, but he did go on a posterior up to the mountains and was having a lot of trouble breathing at that point at a higher altitude.    He has never had any witnessed apneas while sleeping, but he does feel like there might be some inspiratory stridor.  He is already set up to see a sleep physician and is going to be in getting an overnight sleep study.             Past Medical and Surgical History:     Past Medical History:   Diagnosis Date     Anxiety      Basal cell carcinoma      Gastro-oesophageal reflux disease      H/O magnetic resonance imaging of brain and brain stem 6/12/2018    MR BRAIN WITHOUT AND WITH CONTRAST 11/29/2017 9:10 PM   HISTORY:  Left-sided numbness. Disequilibrium.   COMPARISON: MR brain 4/18/2015.   TECHNIQUE: Sagittal T1, axial T2, axial  FLAIR, axial GRE, axial diffusion scan, coronal FLAIR, axial post Gd enhanced T1 weighted images.   FINDINGS: There is no intracranial hemorrhage, mass, or recent infarct. There is a cyst in the floor of the right maxilla     Head injury 2012    Had a bad concussion with post concussion synd for year     High cholesterol      History of echocardiogram 2018    TUYET Keyes MD 2018  Narrative     [id number]^MECHE^BLAKE^KRISTAL       [facility] [address] [place] [id number] Name: JONATHAN FIORE MRN: [id number] : 1965 Study Date: 2018 09:13 AM Age: 52 yrs Gender: Male Patient Location: [facility] Reason For Study: Chest Pain Ordering Physician:      Hypertension early     Not on meds. Usually in pre-hypertesion categ.     Insomnia      Squamous cell carcinoma      Past Surgical History:   Procedure Laterality Date     COLONOSCOPY N/A 2017    Procedure: COLONOSCOPY;  Surgeon: Larry Fields MD;  Location: [facility]     LASER CO2 LESION ORAL N/A 10/5/2016    Procedure: LASER CO2 LESION ORAL;  Surgeon: Josué Rojo DDS;  Location: [facility] OR     MOHS MICROGRAPHIC PROCEDURE             Family History:     Family History   Problem Relation Age of Onset     Lipids Mother      on meds     Cerebrovascular Disease Mother      TIA     Alzheimer Disease Mother      Skin Cancer Mother      SCC     Lipids Father      on meds     Hypertension Father      controlled with meds     Thyroid Disease Father      ?not sure but possibly     Diabetes Father      Cerebrovascular Disease Father      Cancer - colorectal Maternal Grandmother      still alive at 99     Cancer Maternal Grandfather      lung but lifetime smoker     Melanoma Maternal Grandfather      Asthma No family hx of      C.A.D. No family hx of      Prostate Cancer No family hx of               Social History:     Social History     Social History      Marital status: Single     Spouse name: N/A     Number of children: N/A     Years of education: N/A     Occupational History     Not on file.     Social History Main Topics     Smoking status: Former Smoker     Packs/day: 0.50     Years: 5.00     Types: Cigarettes     Start date: 1/1/1986     Quit date: 1/1/1991     Smokeless tobacco: Never Used      Comment: After 1991 no longer a half pack a day. Very occasional--maybe 75 cigarettes a year--91 to 98     Alcohol use 0.6 - 1.2 oz/week      Comment: 1 to 2 glasses of red wine per day     Drug use: No      Comment: quit more than 20 years ago     Sexual activity: Not Currently     Partners: Female     Birth control/ protection: Abstinence, Condom     Other Topics Concern     Parent/Sibling W/ Cabg, Mi Or Angioplasty Before 65f 55m? No     Mom had recommended angiopl. in late 50s. 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          lives in [facility] [place] [facility]. single. Brother Bogdan Montejo        Allergies:  No Known Allergies         Family History:     Mother age 76, TIA, CAD    Father 81, TIA    Brother autistic, panic attacks            Medications:     Current Outpatient Prescriptions   Medication     aspirin 81 MG EC tablet     Coenzyme Q10 (COQ10 PO)     KRILL OIL PO     mirtazapine (REMERON) 15 MG tablet     Multiple Vitamins-Minerals (MULTIVITAL PO)     simvastatin (ZOCOR) 40 MG tablet     TURMERIC PO     VITAMIN D, CHOLECALCIFEROL, PO     ranitidine (ZANTAC) 300 MG tablet     No current facility-administered medications for this visit.             Review of Systems:       A complete review of systems was otherwise negative except as noted in the HPI.      PHYSICAL EXAM:  /87 (BP Location: Right arm, Patient Position: Chair, Cuff Size: Adult Regular)  Pulse 86  Resp 16  SpO2 96%     General: Well developed, No apparent distress  Eyes: Anicteric  Nose: Nasal mucosa is  congested on the right.   Ears: Hearing grossly normal  Mouth: mallapati 4  Neck: supple, no thyromegaly  Lymphatics: No cervical or supraclavicular nodes  Respiratory: Good air movement. No crackles. No rhonchi. No wheezes  Cardiac: RRR, normal S1, S2. No murmurs. No JVD  Abdomen: Soft, NT/ND  Musculoskeletal: Extremities normal. No clubbing. No cyanosis. No edema.  Skin: No rash on limited exam  Neuro: Normal mentation. Normal speech.  Psych:Normal affect           Data:   All laboratory and imaging data reviewed.      PFT:   FEV1/FVC ratio is 0.76.  His FEV1 is 4.82 L which is 115% predicted.  His FVC is 6.36 L which is 119% predicted.  His total lung capacity is 9.23 L which is 122% predicted, and his residual volume is 2.67 L which is 116% predicted.  His diffusion capacity is 116% predicted.  His flow volume loop is normal    PFT Interpretation:  Normal spirometry.  Normal lung volumes with the exception of a slightly high total lung capacity of unclear significance.  Normal diffusion capacity.  Valid Maneuver    CXR: Normal lung fields normal cardiac silhouette no pleural effusions    Stress echocardiogram had normal left ventricular and right ventricular function no signs of ischemia  Recent Results (from the past 168 hour(s))   General PFT Lab (Please always keep checked)    Collection Time: 06/21/18  9:23 AM   Result Value Ref Range    FVC-Pred 5.32 L    FVC-Pre 6.36 L    FVC-%Pred-Pre 119 %    FEV1-Pre 4.82 L    FEV1-%Pred-Pre 115 %    FEV1FVC-Pred 78 %    FEV1FVC-Pre 76 %    FEFMax-Pred 10.25 L/sec    FEFMax-Pre 9.48 L/sec    FEFMax-%Pred-Pre 92 %    FEF2575-Pred 3.71 L/sec    FEF2575-Pre 4.01 L/sec    ATO9487-%Pred-Pre 107 %    ExpTime-Pre 8.80 sec    FIFMax-Pre 7.34 L/sec    VC-Pred 5.56 L    VC-Pre 6.56 L    VC-%Pred-Pre 117 %    IC-Pred 3.94 L    IC-Pre 4.80 L    IC-%Pred-Pre 121 %    ERV-Pred 1.62 L    ERV-Pre 1.76 L    ERV-%Pred-Pre 108 %    FEV1FEV6-Pred 80 %    FEV1FEV6-Pre 76 %    FRCPleth-Pred  3.65 L    FRCPleth-Pre 4.42 L    FRCPleth-%Pred-Pre 121 %    RVPleth-Pred 2.28 L    RVPleth-Pre 2.67 L    RVPleth-%Pred-Pre 116 %    TLCPleth-Pred 7.53 L    TLCPleth-Pre 9.23 L    TLCPleth-%Pred-Pre 122 %    DLCOunc-Pred 31.41 ml/min/mmHg    DLCOunc-Pre 36.48 ml/min/mmHg    DLCOunc-%Pred-Pre 116 %    DLCOcor-Pre 35.80 ml/min/mmHg    DLCOcor-%Pred-Pre 113 %    VA-Pre 8.48 L    VA-%Pred-Pre 117 %    FEV1SVC-Pred 75 %    FEV1SVC-Pre 73 %

## 2018-06-21 NOTE — MR AVS SNAPSHOT
After Visit Summary   6/21/2018    Cesar Montejo    MRN: [id number]           Patient Information     Date Of Birth          1965        Visit Information        Provider Department      6/21/2018 1:00 PM Kamini Nicholson, RICKY [facility]        Today's Diagnoses     Dysphagia, unspecified type           Follow-ups after your visit        Your next 10 appointments already scheduled     Jun 22, 2018 10:00 AM CDT   (Arrive by 9:45 AM)   Neuro Eval with Leelee Aleman PT   [facility] Re[facility] ([facility])    [address] [facility]  [address] [facility] [place] [address] [phone]            Jun 22, 2018  3:00 PM CDT   Office Visit with Rocky Messina MD   [facility] ([facility])    [address] [place] [address] 700  [facility] [id number] [phone]           Bring a current list of meds and any records pertaining to this visit. For Physicals, please bring immunization records and any forms needing to be filled out. Please arrive 10 minutes early to complete paperwork.            Jun 25, 2018  8:00 AM CDT   New Sleep Patient with Meet Lake MD   [facility] ([facility])    [address] [facility] [address]  [facility] [address] [phone]            Jun 26, 2018  1:00 PM CDT   (Arrive by 12:45 PM)   RETURN ARRHYTHMIA with Lee Ann Laughlin MD   [facility] Heart Care ([facility])    [address] [place] [address]  [address]  [facility] [id number] [phone]            Jul 26, 2018  4:00 PM CDT   (Arrive by 3:45 PM)   New Patient Visit with Akbar Frank MD   [facility] Ear Nose and Throat ([facility])    [address] [place] [address]  [address] [facility] [place] [address] [phone]            Sep 19, 2018  5:00 PM CDT   (Arrive by 4:45 PM)   Return Movement Disorder with Vida Solomon MD   [facility] Neurology ([facility])    [address] [id number] [phone]            Mar 13, 2019  5:00 PM CDT   (Arrive by 4:45 PM)   Return Movement Disorder with Vida Solomon MD   [facility] ([facility])    [address] [id number] [phone]              Who to contact     Please call your clinic at [phone] to:    Ask questions about your health    Make or cancel appointments    Discuss your medicines    Learn about your test results    Speak to your doctor            Additional Information About Your Visit        [employer] Information     [employer] gives you secure access to your electronic health record. If you see a primary care provider, you can also send messages to your care team and make appointments. If you have questions, please call your primary care clinic.  If you do not have a primary care provider, please call [phone] and they will assist you.      [employer] is an electronic gateway that provides easy, online access to your medical records. With [employer], you can request a clinic appointment, read your test results, renew a prescription or communicate with your care team.     To access your existing account, please contact your [facility] or call [phone] for assistance.        Care EveryWhere ID     This is your Care EveryWhere ID. This could be used by other organizations to access your [place] medical records  [id number]         Blood Pressure from Last 3 Encounters:   06/21/18 134/87   06/14/18 122/88   06/08/18 119/80    Weight from Last 3 Encounters:   06/14/18 85.2 kg (187 lb 14.4 oz)   06/07/18 83.7 kg (184 lb 8 oz)   06/03/18 83 kg (183 lb)              Today, you had the following     No orders found for display       Primary Care Provider Office Phone # Fax #    Rocky Messina [phone]       [address] [place], [place] [address]  [address]  [facility] [address]        Equal Access to Services     [facility] Center[facility] : Hadii aad ku hadkwanmer Mcdaniel, watyda fabianoadaha, qajoealesia yangalmaprashant de jesus. So [facility] [phone].    ATENCIÓN: Si habla español, tiene a persaud disposición servicios gratuitos de asistencia lingüística. Kajalame al [phone].    We comply with applicable federal civil rights laws and [place] laws. We do not discriminate on the basis of race, color, national origin, age, disability, sex, sexual orientation, or gender identity.            Thank you!     Thank you for choosing [facility]  for your care. Our goal is always to provide you with excellent care. Hearing back from our patients is one way we can continue to improve our services. Please take a few minutes to complete the written survey that you may receive in the mail after your visit with us. Thank you!             Your Updated Medication List - Protect others around you: Learn how to safely use, store and throw away your medicines at www.disposemymeds.org.          This list is accurate as of 6/21/18 11:59 PM.  Always use your most recent med list.                   Brand Name Dispense Instructions for use Diagnosis    aspirin 81 MG EC tablet      Take 1 tablet (81 mg) by mouth daily        COQ10 PO           KRILL OIL PO      Take 1 capsule by mouth daily        mirtazapine 15 MG tablet    REMERON    90 tablet    TAKE 1 TABLET(15 MG) BY MOUTH AT BEDTIME    Mixed anxiety and depressive disorder, Insomnia       MULTIVITAL PO      Take 1 tablet by mouth daily        ranitidine 300 MG tablet    ZANTAC          simvastatin 40 MG tablet    ZOCOR     Take 0.5 tablets by mouth daily        TURMERIC PO      Take 1 tablet by mouth daily        VITAMIN D (CHOLECALCIFEROL) PO      Take 2,000 Units by mouth daily

## 2018-06-21 NOTE — NURSING NOTE
Chief Complaint   Patient presents with     Consult     Patient is being seen for consultaiton of breathing issues      Mary Martinez CMA at 10:14 AM on 6/21/2018

## 2018-06-21 NOTE — MR AVS SNAPSHOT
After Visit Summary   6/21/2018    Cesar Montejo    MRN: [id number]           Patient Information     Date Of Birth          1965        Visit Information        Provider Department      6/21/2018 10:40 AM Amilcar Parker MD [facility]        Today's Diagnoses     Dyspnea and respiratory abnormality           Follow-ups after your visit        Your next 10 appointments already scheduled     Jun 21, 2018  2:00 PM CDT   Six Minute Walk with UC PFL 6 MINUTE WALK 1   [facility] Pulmonary Function Testing ([facility])    [address] [facility]  3rd Floor  [facility] [id number] [phone]            Jun 22, 2018 10:00 AM CDT   (Arrive by 9:45 AM)   Neuro Eval with Leelee Aleman PT    Health Rehab ([facility])    [address] [place] [address]  4th Floor  [facility] [id number] [phone]            Jun 22, 2018  3:00 PM CDT   Office Visit with Rocky Messina MD   [facility]    [address] [place] [address] 700  [facility] [phone] [phone]           Bring a current list of meds and any records pertaining to this visit. For Physicals, please bring immunization records and any forms needing to be filled out. Please arrive 10 minutes early to complete paperwork.            Jun 25, 2018  8:00 AM CDT   New Sleep Patient with Meet Lake MD   [facility] ([facility])    [address] [facility] [address]  [facility] [address]   [phone]            Jun 26, 2018  1:00 PM CDT   (Arrive by 12:45 PM)   RETURN ARRHYTHMIA with Lee Ann Laughlin MD   [facility] Heart Care ([facility])    [address] [place] [address]  [address]  [facility] [address] [phone]            Jul 26, 2018  4:00 PM CDT   (Arrive by 3:45 PM)   New Patient Visit with Akbar Frank MD   [facility] Ear Nose and  Throat ([facility])    [address] [facility] [address]  4th Floor  [facility] [address] [phone]            Sep 19, 2018  5:00 PM CDT   (Arrive by 4:45 PM)   Return Movement Disorder with Vida Solomon MD   [facility] Neurology ([facility])    [address] [facility]  3rd [facility] [address] [phone]            Mar 13, 2019  5:00 PM CDT   (Arrive by 4:45 PM)   Return Movement Disorder with Vida Solomon MD   [facility] Neurology ([facility])    [address] [facility]  3rd [facility] [address] [phone]              Who to contact     If you have questions or need follow up information about today's clinic visit or your schedule please contact [facility] directly at [phone].  Normal or non-critical lab and imaging results will be communicated to you by [employer], letter or phone within 4 business days after the clinic has received the results. If you do not hear from us within 7 days, please contact the clinic through [facility] or phone. If you have a critical or abnormal lab result, we will notify you by phone as soon as possible.  Submit refill requests through [email or web address] or call your pharmacy and they will forward the refill request to us. Please allow 3 business days for your refill to be completed.          Additional Information About Your Visit        [employer] Information     [email or web address] gives you secure access to your electronic health record. If you see a primary care provider, you can also send messages to your care team and make appointments. If you have questions, please call your primary care clinic.  If you do not have a primary care provider, please call [phone] and they will assist you.        Care EveryWhere ID     This is your Care EveryWhere ID. This could be used by other organizations to access your [place] medical records  [id number]         Your Vitals Were     Pulse Respirations Pulse Oximetry             86 16 96%          Blood Pressure from Last 3 Encounters:   06/21/18 134/87   06/14/18 122/88   06/08/18 119/80    Weight from Last 3 Encounters:   06/14/18 85.2 kg (187 lb 14.4 oz)   06/07/18 83.7 kg (184 lb 8 oz)   06/03/18 83 kg (183 lb)              Today, you had the following     No orders found for display       Primary Care Provider Office Phone # Fax #    Rocky Rigo Messina [phone]       [address]  [facility] [address]        Equal Access to Services     [facility] : Hadii desean Mcdaniel, waaxda celso, qaybta kaalmada cesar, prashant jameson . So [facility] [phone].    ATENCIÓN: Si habla español, tiene a persaud disposición servicios gratuitos de asistencia lingüística. Llame al [phone].    We comply with applicable federal civil rights laws and [place] laws. We do not discriminate on the basis of race, color, national origin, age, disability, sex, sexual orientation, or gender identity.            Thank you!     Thank you for choosing [facility]  for your care. Our goal is always to provide you with excellent care. Hearing back from our patients is one way we can continue to improve our services. Please take a few minutes to complete the written survey that you may receive in the mail after your visit with us. Thank you!             Your Updated Medication List - Protect others around you: Learn how to safely use, store and throw away your medicines at www.disposemymeds.org.          This list is accurate as of 6/21/18  1:58 PM.  Always use your most recent med list.                   Brand Name Dispense Instructions for use Diagnosis    aspirin 81 MG EC tablet      Take 1 tablet (81 mg) by mouth daily        COQ10 PO           KRILL OIL PO      Take 1 capsule by mouth daily        mirtazapine 15 MG tablet    REMERON    90  tablet    TAKE 1 TABLET(15 MG) BY MOUTH AT BEDTIME    Mixed anxiety and depressive disorder, Insomnia       MULTIVITAL PO      Take 1 tablet by mouth daily        ranitidine 300 MG tablet    ZANTAC          simvastatin 40 MG tablet    ZOCOR     Take 0.5 tablets by mouth daily        TURMERIC PO      Take 1 tablet by mouth daily        VITAMIN D (CHOLECALCIFEROL) PO      Take 2,000 Units by mouth daily

## 2018-06-21 NOTE — LETTER
6/21/2018       RE: Cesar Montejo  [address]  [facility] [address]     Dear Colleague,    Thank you for referring your patient, Cesar Montejo, to the [facility] L[facility]G [facility]ENCE [facility] at [facility]. Please see a copy of my visit note below.    [facility]  Pulmonary Medicine  Visit Clinic Note  June 21, 2018         ASSESSMENT & PLAN         Dyspnea: Mr. Montejo experiences dyspnea mostly with heavy exertion such as climbing stairs.  Otherwise he does not get winded.  His main concern now is that his resting oxygen saturation is now hovering around 94-95% when it used to be 97-98%.  He is concerned that this may be a respiratory manifestation of a potential neurologic disease.  He is currently being evaluated for multiple system atrophy.    On my exam he has clear lungs and a normal sounding heart.  His pulmonary function testing is normal.  He has a normal chest x-ray.  Oxygen saturation of 94-95% is technically in the normal range, however I do see his point that his oxygen saturation has been drifting down in the setting of also having a potential systemic disease.    Currently I do not see any obvious reason for this decrease in oxygen saturation.  He has already had a lot of good testing, and his diffusion capacity and his pulmonary function test is normal.  This could imply a hypoventilation syndrome.  I would like to get a 6 minute walk test to stresses calm cardiopulmonary system and see if there might be any gas exchange abnormalities.  If there is a desaturation on his 6 minute walk test, chest CT scan would be reasonable. He is already scheduled for a sleep study and overall sleep evaluation.     He has been if there is any certain breathing exercises, or anything in general that he could do in order to try to preserve his respiratory status in the setting of potential diagnosis of multiple system atrophy.  To my  knowledge, there are no specific pulmonary therapies, but I told him I would look into this further and give him a phone call in the future if I am able to find anything.    I will not set up any follow-up, but I will call him after 6 minute walk results.      Torey Parker MD             Today's visit note:     Chief Complaint: Cesar Montejo is a 52 year old year old male who is being seen for Consult (Patient is being seen for consultaiton of breathing issues)      HISTORY OF PRESENT ILLNESS:    Is a 52-year-old male who is currently being worked up for multiple system atrophy [facility] neurology who is presenting to the pulmonary clinic today for evaluation of potential oxygen desaturations.    Regarding his potential diagnosis of multiple system atrophy, he is had symptoms such as dream enactment behavior, swallowing issues, trouble breathing at night, and orthostatic hypotension.    On top of this he has noted that his oxygen saturation now generally sits between 9495%, when he used to be 97-98%.  He is concerned that there is a similar process that is affecting his vasculature may also be hindering his breathing.    He does not have any significant shortness of breath with the exception of when he climbs 1-2 flights of stairs.  He will get winded, but it does not happen until about 1-2 minutes after she has completed climbing a flight of stairs.  Is also associated with lightheadedness.  He does note that when she was in [place] he did not have any problems breathing, but he did go on a posterior up to the mountains and was having a lot of trouble breathing at that point at a higher altitude.    He has never had any witnessed apneas while sleeping, but he does feel like there might be some inspiratory stridor.  He is already set up to see a sleep physician and is going to be in getting an overnight sleep study.             Past Medical and Surgical History:     Past Medical History:   Diagnosis Date     Anxiety       Basal cell carcinoma      Gastro-oesophageal reflux disease      H/O magnetic resonance imaging of brain and brain stem 2018    MR BRAIN WITHOUT AND WITH CONTRAST 2017 9:10 PM   HISTORY:  Left-sided numbness. Disequilibrium.   COMPARISON: MR brain 2015.   TECHNIQUE: Sagittal T1, axial T2, axial FLAIR, axial GRE, axial diffusion scan, coronal FLAIR, axial post Gd enhanced T1 weighted images.   FINDINGS: There is no intracranial hemorrhage, mass, or recent infarct. There is a cyst in the floor of the right maxilla     Head injury 2012    Had a bad concussion with post concussion synd for year     High cholesterol      History of echocardiogram 2018    TUYET Keyes MD 2018  Narrative     [id number]^MECHE^BLAKE^KRISTAL       [facility] [facility] [address] [place] [address] Name: JONATHAN FIORE MRN: [id number] : 1965 Study Date: 2018 09:13 AM Age: 52 yrs Gender: Male Patient Location: [facility] Reason For Study: Chest Pain Ordering Physician:      Hypertension early     Not on meds. Usually in pre-hypertesion categ.     Insomnia      Squamous cell carcinoma      Past Surgical History:   Procedure Laterality Date     COLONOSCOPY N/A 2017    Procedure: COLONOSCOPY;  Surgeon: Larry Fields MD;  Location:  GI     LASER CO2 LESION ORAL N/A 10/5/2016    Procedure: LASER CO2 LESION ORAL;  Surgeon: Josué Rojo DDS;  Location: [facility] OR     MOHS MICROGRAPHIC PROCEDURE             Family History:     Family History   Problem Relation Age of Onset     Lipids Mother      on meds     Cerebrovascular Disease Mother      TIA     Alzheimer Disease Mother      Skin Cancer Mother      SCC     Lipids Father      on meds     Hypertension Father      controlled with meds     Thyroid Disease Father      ?not sure but possibly     Diabetes Father      Cerebrovascular Disease Father       Cancer - colorectal Maternal Grandmother      still alive at 99     Cancer Maternal Grandfather      lung but lifetime smoker     Melanoma Maternal Grandfather      Asthma No family hx of      C.A.D. No family hx of      Prostate Cancer No family hx of               Social History:     Social History     Social History     Marital status: Single     Spouse name: N/A     Number of children: N/A     Years of education: N/A     Occupational History     Not on file.     Social History Main Topics     Smoking status: Former Smoker     Packs/day: 0.50     Years: 5.00     Types: Cigarettes     Start date: 1/1/1986     Quit date: 1/1/1991     Smokeless tobacco: Never Used      Comment: After 1991 no longer a half pack a day. Very occasional--maybe 75 cigarettes a year--91 to 98     Alcohol use 0.6 - 1.2 oz/week      Comment: 1 to 2 glasses of red wine per day     Drug use: No      Comment: quit more than 20 years ago     Sexual activity: Not Currently     Partners: Female     Birth control/ protection: Abstinence, Condom     Other Topics Concern     Parent/Sibling W/ Cabg, Mi Or Angioplasty Before 65f 55m? No     Mom had recommended angiopl. in late 50s. 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          lives in [facility]. single. Brother Bogdan Montejo        Allergies:  No Known Allergies         Family History:     Mother age 76, TIA, CAD    Father 81, TIA    Brother autistic, panic attacks            Medications:     Current Outpatient Prescriptions   Medication     aspirin 81 MG EC tablet     Coenzyme Q10 (COQ10 PO)     KRILL OIL PO     mirtazapine (REMERON) 15 MG tablet     Multiple Vitamins-Minerals (MULTIVITAL PO)     simvastatin (ZOCOR) 40 MG tablet     TURMERIC PO     VITAMIN D, CHOLECALCIFEROL, PO     ranitidine (ZANTAC) 300 MG tablet     No current facility-administered medications for this visit.             Review of  Systems:       A complete review of systems was otherwise negative except as noted in the HPI.      PHYSICAL EXAM:  /87 (BP Location: Right arm, Patient Position: Chair, Cuff Size: Adult Regular)  Pulse 86  Resp 16  SpO2 96%     General: Well developed, No apparent distress  Eyes: Anicteric  Nose: Nasal mucosa is congested on the right.   Ears: Hearing grossly normal  Mouth: mallapati 4  Neck: supple, no thyromegaly  Lymphatics: No cervical or supraclavicular nodes  Respiratory: Good air movement. No crackles. No rhonchi. No wheezes  Cardiac: RRR, normal S1, S2. No murmurs. No JVD  Abdomen: Soft, NT/ND  Musculoskeletal: Extremities normal. No clubbing. No cyanosis. No edema.  Skin: No rash on limited exam  Neuro: Normal mentation. Normal speech.  Psych:Normal affect           Data:   All laboratory and imaging data reviewed.      PFT:   FEV1/FVC ratio is 0.76.  His FEV1 is 4.82 L which is 115% predicted.  His FVC is 6.36 L which is 119% predicted.  His total lung capacity is 9.23 L which is 122% predicted, and his residual volume is 2.67 L which is 116% predicted.  His diffusion capacity is 116% predicted.  His flow volume loop is normal    PFT Interpretation:  Normal spirometry.  Normal lung volumes with the exception of a slightly high total lung capacity of unclear significance.  Normal diffusion capacity.  Valid Maneuver    CXR: Normal lung fields normal cardiac silhouette no pleural effusions    Stress echocardiogram had normal left ventricular and right ventricular function no signs of ischemia  Recent Results (from the past 168 hour(s))   General PFT Lab (Please always keep checked)    Collection Time: 06/21/18  9:23 AM   Result Value Ref Range    FVC-Pred 5.32 L    FVC-Pre 6.36 L    FVC-%Pred-Pre 119 %    FEV1-Pre 4.82 L    FEV1-%Pred-Pre 115 %    FEV1FVC-Pred 78 %    FEV1FVC-Pre 76 %    FEFMax-Pred 10.25 L/sec    FEFMax-Pre 9.48 L/sec    FEFMax-%Pred-Pre 92 %    FEF2575-Pred 3.71 L/sec     FEF2575-Pre 4.01 L/sec    CNS9503-%Pred-Pre 107 %    ExpTime-Pre 8.80 sec    FIFMax-Pre 7.34 L/sec    VC-Pred 5.56 L    VC-Pre 6.56 L    VC-%Pred-Pre 117 %    IC-Pred 3.94 L    IC-Pre 4.80 L    IC-%Pred-Pre 121 %    ERV-Pred 1.62 L    ERV-Pre 1.76 L    ERV-%Pred-Pre 108 %    FEV1FEV6-Pred 80 %    FEV1FEV6-Pre 76 %    FRCPleth-Pred 3.65 L    FRCPleth-Pre 4.42 L    FRCPleth-%Pred-Pre 121 %    RVPleth-Pred 2.28 L    RVPleth-Pre 2.67 L    RVPleth-%Pred-Pre 116 %    TLCPleth-Pred 7.53 L    TLCPleth-Pre 9.23 L    TLCPleth-%Pred-Pre 122 %    DLCOunc-Pred 31.41 ml/min/mmHg    DLCOunc-Pre 36.48 ml/min/mmHg    DLCOunc-%Pred-Pre 116 %    DLCOcor-Pre 35.80 ml/min/mmHg    DLCOcor-%Pred-Pre 113 %    VA-Pre 8.48 L    VA-%Pred-Pre 117 %    FEV1SVC-Pred 75 %    FEV1SVC-Pre 73 %     Sincerely,    Amilcar Parker MD

## 2018-06-22 ENCOUNTER — OFFICE VISIT (OUTPATIENT)
Dept: FAMILY MEDICINE | Facility: CLINIC | Age: 53
End: 2018-06-22
Payer: COMMERCIAL

## 2018-06-22 ENCOUNTER — THERAPY VISIT (OUTPATIENT)
Dept: PHYSICAL THERAPY | Facility: CLINIC | Age: 53
End: 2018-06-22
Payer: COMMERCIAL

## 2018-06-22 VITALS
OXYGEN SATURATION: 96 % | HEART RATE: 88 BPM | SYSTOLIC BLOOD PRESSURE: 126 MMHG | DIASTOLIC BLOOD PRESSURE: 88 MMHG | TEMPERATURE: 98.6 F

## 2018-06-22 DIAGNOSIS — R25.1 TREMOR: ICD-10-CM

## 2018-06-22 DIAGNOSIS — I80.8 SUPERFICIAL THROMBOPHLEBITIS OF LEFT UPPER EXTREMITY: Primary | ICD-10-CM

## 2018-06-22 DIAGNOSIS — R26.89 BALANCE PROBLEMS: Primary | ICD-10-CM

## 2018-06-22 PROCEDURE — 99213 OFFICE O/P EST LOW 20 MIN: CPT | Performed by: FAMILY MEDICINE

## 2018-06-22 NOTE — PROGRESS NOTES
Computerized Dynamic Posturography (CDP)  Background Information: Travis was seen today for a Computerized Dynamic Posturography (CDP) evaluation. He reports symptoms of imbalance and tremor. He has a medical history of essential tremor.  Sensory Organization Test: The patients ability to maintain upright stance was just below normal limits. Overall composite score was 67.9. Normal for his age is above 70.  Patient [facility] was slightly shifted to the right.   NO Falls were recorded on any trials.  Reduced balance was noted on condition 4 (2/3 trials) and condition 6 (3/3 trials).     Summary and Recommendations:    The patients ability to maintain upright stance under a variety of changing sensory inputs was slightly reduced. He also had minor difficulty walking with head turns during the Functional Gait Assessment.   I plan on seeing him for continued vestibular rehab to see if adaptation exercises improve his ability to walk with head motion and his performance on condition 6 of SOT.     Lauren Aleman DPT, MPT, NCS

## 2018-06-22 NOTE — MR AVS SNAPSHOT
After Visit Summary   6/22/2018    Cesar Montejo    MRN: [id number]           Patient Information     Date Of Birth          1965        Visit Information        Provider Department      6/22/2018 10:00 AM Leelee Aleman PT M [facility]        Today's Diagnoses     Balance problems    -  1    Tremor           Follow-ups after your visit        Your next 10 appointments already scheduled     Jun 25, 2018  8:00 AM CDT   New Sleep Patient with Meet Lake MD   [facility] ([facility])    [address] [place] [address]   [phone]            Jun 26, 2018  1:00 PM CDT   (Arrive by 12:45 PM)   RETURN ARRHYTHMIA with Lee Ann Laughlin MD   [facility] ([facility])    [address] [place] [id number] [phone]            Jul 26, 2018  4:00 PM CDT   (Arrive by 3:45 PM)   New Patient Visit with Akbar Frank MD   [facility] Nose [facility] Thro[facility] ([facility])    [address] [place] [address]  4th [facility] [id number] [phone]            Sep 19, 2018  5:00 PM CDT   (Arrive by 4:45 PM)   Return Movement Disorder with Vida Solomon MD   [facility] Neurology ([facility])    [address] [place] [address]  3rd [facility] [id number] [phone]            Mar 13, 2019  5:00 PM CDT   (Arrive by 4:45 PM)   Return Movement Disorder with Vida Solomon MD   [facility] ([facility])    [address] [place] [address]  3rd [facility] [id number] [phone]              Who to contact     Please call your clinic at [phone] to:    Ask questions about your health    Make or cancel appointments    Discuss your medicines    Learn about your test results    Speak to your doctor            Additional Information About Your Visit        MyChart Information      Count[employer] To Buy gives you secure access to your electronic health record. If you see a primary care provider, you can also send messages to your care team and make appointments. If you have questions, please call your primary care clinic.  If you do not have a primary care provider, please call [phone] and they will assist you.      [employer] is an electronic gateway that provides easy, online access to your medical records. With [employer], you can request a clinic appointment, read your test results, renew a prescription or communicate with your care team.     To access your existing account, please contact your [facility] or call [phone] for assistance.        Care EveryWhere ID     This is your Care EveryWhere ID. This could be used by other organizations to access your [place] medical records  [id number]         Blood Pressure from Last 3 Encounters:   06/22/18 126/88   06/21/18 134/87   06/14/18 122/88    Weight from Last 3 Encounters:   06/14/18 85.2 kg (187 lb 14.4 oz)   06/07/18 83.7 kg (184 lb 8 oz)   06/03/18 83 kg (183 lb)              Today, you had the following     No orders found for display       Primary Care Provider Office Phone # Fax #    Rocky Rigo Messina [phone]       [address]  [facility] [address]        Equal Access to Services     [place] PLASCENCIA : Hadii aad ku hadasho Sojeremiahali, waaxda luqadaha, qaybta kaalmada adeegyada, prashant jameson . So [facility] [phone].    ATENCIÓN: Si habla español, tiene a persaud disposición servicios gratuitos de asistencia lingüística. Llruben al [phone].    We comply with applicable federal civil rights laws and [place] laws. We do not discriminate on the basis of race, color, national origin, age, disability, sex, sexual orientation, or gender identity.            Thank you!     Thank you for choosing [facility]  for your care. Our goal is always to provide you  with excellent care. Hearing back from our patients is one way we can continue to improve our services. Please take a few minutes to complete the written survey that you may receive in the mail after your visit with us. Thank you!             Your Updated Medication List - Protect others around you: Learn how to safely use, store and throw away your medicines at www.disposemymeds.org.          This list is accurate as of 6/22/18 11:59 PM.  Always use your most recent med list.                   Brand Name Dispense Instructions for use Diagnosis    aspirin 81 MG EC tablet      Take 1 tablet (81 mg) by mouth daily        COQ10 PO           KRILL OIL PO      Take 1 capsule by mouth daily        mirtazapine 15 MG tablet    REMERON    90 tablet    TAKE 1 TABLET(15 MG) BY MOUTH AT BEDTIME    Mixed anxiety and depressive disorder, Insomnia       MULTIVITAL PO      Take 1 tablet by mouth daily        ranitidine 300 MG tablet    ZANTAC          simvastatin 40 MG tablet    ZOCOR     Take 0.5 tablets by mouth daily        TURMERIC PO      Take 1 tablet by mouth daily        VITAMIN D (CHOLECALCIFEROL) PO      Take 2,000 Units by mouth daily

## 2018-06-22 NOTE — MR AVS SNAPSHOT
After Visit Summary   6/22/2018    Cesar Montejo    MRN: [id number]           Patient Information     Date Of Birth          1965        Visit Information        Provider Department      6/22/2018 3:00 PM Rocky Messina MD [facility]        Today's Diagnoses     Superficial thrombophlebitis of left upper extremity    -  1       Follow-ups after your visit        Your next 10 appointments already scheduled     Jun 25, 2018  8:00 AM CDT   New Sleep Patient with Meet Lake MD   [facility] ([facility])    [address]  [facility] [address]   [phone]            Jun 26, 2018  1:00 PM CDT   (Arrive by 12:45 PM)   RETURN ARRHYTHMIA with Lee Ann Laughlin MD   [facility] ([facility])    [address] [place] [address]  [address] [place] [id number] [phone]            Jul 26, 2018  4:00 PM CDT   (Arrive by 3:45 PM)   New Patient Visit with Akbar Frank MD   [facility] Nose [facility] Th[facility] ([facility])    [address]  4th Floor  [facility] [id number] [phone]            Sep 19, 2018  5:00 PM CDT   (Arrive by 4:45 PM)   Return Movement Disorder with Vida Solomon MD   [facility] ([facility])    [address]  3rd Floor  [facility] [id number] [phone]            Mar 13, 2019  5:00 PM CDT   (Arrive by 4:45 PM)   Return Movement Disorder with Vida Solomon MD   [facility] ([facility])    [address] [place] [address]  3rd Floor  [facility] [phone] [phone]              Who to contact     If you have questions or need follow up information about today's clinic visit or your schedule please contact [facility] directly at [phone].  Normal or non-critical lab and imaging results will be communicated  to you by Jooixhart, letter or phone within 4 business days after the clinic has received the results. If you do not hear from us within 7 days, please contact the clinic through Posto7 or phone. If you have a critical or abnormal lab result, we will notify you by phone as soon as possible.  Submit refill requests through Posto7 or call your pharmacy and they will forward the refill request to us. Please allow 3 business days for your refill to be completed.          Additional Information About Your Visit        JooixharIceWEB Information     Posto7 gives you secure access to your electronic health record. If you see a primary care provider, you can also send messages to your care team and make appointments. If you have questions, please call your primary care clinic.  If you do not have a primary care provider, please call [phone] and they will assist you.        Care EveryWhere ID     This is your Care EveryWhere ID. This could be used by other organizations to access your [place] medical records  [id number]        Your Vitals Were     Pulse Temperature Pulse Oximetry             88 98.6  F (37  C) (Oral) 96%          Blood Pressure from Last 3 Encounters:   06/22/18 126/88   06/21/18 134/87   06/14/18 122/88    Weight from Last 3 Encounters:   06/14/18 187 lb 14.4 oz (85.2 kg)   06/07/18 184 lb 8 oz (83.7 kg)   06/03/18 183 lb (83 kg)              Today, you had the following     No orders found for display       Primary Care Provider Office Phone # Fax #    Rocky Rigo Messina [phone]       [address] [address]        Equal Access to Services     [facility]: Hadii aad ku hadasho Soomaali, waaxda luqadaha, qaybta kaalmada cesar, prashant nelson. So [facility] [phone].    ATENCIÓN: Si habla español, tiene a persaud disposición servicios gratuitos de asistencia lingüística. Llame al [phone].    We comply with applicable federal  civil rights laws and [place] laws. We do not discriminate on the basis of race, color, national origin, age, disability, sex, sexual orientation, or gender identity.            Thank you!     Thank you for choosing [facility]  for your care. Our goal is always to provide you with excellent care. Hearing back from our patients is one way we can continue to improve our services. Please take a few minutes to complete the written survey that you may receive in the mail after your visit with us. Thank you!             Your Updated Medication List - Protect others around you: Learn how to safely use, store and throw away your medicines at www.disposemymeds.org.          This list is accurate as of 6/22/18  4:15 PM.  Always use your most recent med list.                   Brand Name Dispense Instructions for use Diagnosis    aspirin 81 MG EC tablet      Take 1 tablet (81 mg) by mouth daily        COQ10 PO           KRILL OIL PO      Take 1 capsule by mouth daily        mirtazapine 15 MG tablet    REMERON    90 tablet    TAKE 1 TABLET(15 MG) BY MOUTH AT BEDTIME    Mixed anxiety and depressive disorder, Insomnia       MULTIVITAL PO      Take 1 tablet by mouth daily        ranitidine 300 MG tablet    ZANTAC          simvastatin 40 MG tablet    ZOCOR     Take 0.5 tablets by mouth daily        TURMERIC PO      Take 1 tablet by mouth daily        VITAMIN D (CHOLECALCIFEROL) PO      Take 2,000 Units by mouth daily

## 2018-06-22 NOTE — PROGRESS NOTES
06/21/18 1500       Present No   General Information   Type Of Visit Initial   Start Of Care Date 06/21/18   Referring Physician Rocky Messina MD   Orders Evaluate And Treat   Orders Comment Clinical swallow evaluation   Medical Diagnosis Dysphagia, unspecified   Onset Of Illness/injury Or Date Of Surgery 06/14/18  (Date of order)   Precautions/limitations No Known Precautions/limitations   Hearing WFL   Pertinent History of Current Problem/OT: Additional Occupational Profile Info Pt. stated he started experiencing neurological problems in November 2017 and started seeing doctors. In Feb/March 2018, pt. reported an increase in slurred speech and difficulty swallowing. Pt. reports that in the past 5-6 weeks his speech has gotten better. Pt. is completing several work ups for a possible diagnosis. Pt. reported an increase in drooling at night, difficulty eating breads, pastas, and wraps as they cause a globus sensation in his throat, and occasional difficulty swallowing pills (difficulty approximately 1 in every 5 nights). Pt. has a 15 year history of GERD but currently takes no medication to manage it. Pt. primarily here today to self-advocate and prepare self for any swallowing changes or worsening as time progresses.    Respiratory Status Room air   Patient Role/employment History Employed  (Research professor)   Living Environment Apartment/condo   General Observations Pt. pleasant and agreeable to evaluation.    Patient/family Goals To learn about his difficulty with swallowing, to learn how to improve the functionality of his swallows, and learn techniques to prevent the risk of aspiration.   Clinical Swallow Evaluation   Oral Musculature generally intact   Structural Abnormalities none present   Secretion Management other (see comments)  (pt. reports increase in drooling during the night)   Mucosal Quality good   Mandibular Strength and Mobility intact   Oral Labial Strength and  "Mobility WFL   Lingual Strength and Mobility WFL   Velar Elevation intact   Buccal Strength and Mobility intact   Laryngeal Function Swallow;Voicing initiated   Additional Documentation Yes   Additional evaluation(s) completed today Yes  (Speech tasks from FDA-2)   Rationale for completing additional evaluation Pt. completed speech intelligibility tasks from FDA-2 due to concern with slurred speech.   Sustained phonation time WNL at 16 seconds; intelligibility in phrases and conversation was 100%   Clinical Swallow Eval: Thin Liquid Texture Trial   Mode of Presentation, Thin Liquids cup;self-fed   Volume of Liquid or Food Presented single sip, multiple sips  (water)   Oral Phase of Swallow WFL   Pharyngeal Phase of Swallow intact   Diagnostic Statement Oropharyngeal swallow observed to be WFL from a cup on both single and multiple swallows.    Clinical Swallow Eval: Solid Food Texture Trial   Mode of Presentation, Solid self-fed   Volume of Solid Food Presented Half a maude cracker   Oral Phase, Solid WFL   Pharyngeal Phase, Solid intact   Diagnostic Statement Oropharyngeal swallow observed to be WFL with half a maude cracker. Pt. reported he felt like some was \"stuck\" in his throat, but was cleared when directed to take a sip of thin liquid.    Swallow Compensations   Swallow Compensations No compensations were used   Educational Assessment   Barriers to Learning No barriers   General Therapy Interventions   Intervention Comments Evaluation Only   Swallow Eval: Clinical Impressions   Skilled Criteria for Therapy Intervention No problems identified which require skilled intervention   Functional Assessment Scale (FAS) 6   Dysphagia Outcome Severity Scale (MATT) Level 6 - MATT   Treatment Diagnosis Functional oral motor, swallowing and speech intelligibility observed with all tasks during evaluation.   Diet texture recommendations Regular diet;Thin liquids   Risks and Benefits of Treatment have been explained. Yes " "  Patient, family and/or staff in agreement with Plan of Care Yes   Clinical Impression Comments Pts. oropharyngeal swallows with thin liquid and solid food judged to be within functional limits. No overt deficits were noted during evaluation. Pt. reported he felt some of the cracker get \"stuck\" in his throat but was able to clear it with a sip of liquid. Pt. also demonstrated normal function on all speech tasks from the FDA-2 and no overt deficits were noted by the clinician in regards to respiration, lips, palate, laryngeal, tongue, or intelligibility. Pt. educated on safe swallowing strategies (ie small bites, chew food well, alternate bites of food with sips of liquid) and vocal hygiene. Pt. expressed concern that he would like to consider a VFSS and clinician recommended pt. follow up with his doctor.  VFSS may be more appropriate if swallow changes in the future.  Additional speech therapy services are not recommended at this time.   Total Session Time   Total Session Time 45   Total Evaluation Time 45     Completed by Pricilla Torres SLP student    Therapy services provided with the co-signing licensed therapist guiding and directing the services, and providing the skilled judgement and assessment throughout the session.  Kamini Nicholson, CCC-SLP    "

## 2018-06-23 NOTE — PROGRESS NOTES
06/22/18 1000   Signing Clinician's Name / Credentials   Signing clinician's name / credentials Lauren Aleman DPT NCS   Functional Gait Assessment (MARTHA Gifford., KAREN Negron, et al. (2004))   1. GAIT LEVEL SURFACE 3   2. CHANGE IN GAIT SPEED 3   3. GAIT WITH HORIZONTAL HEAD TURNS 2  (a little off)   4. GAIT WITH VERTICAL HEAD TURNS 2  (a little off)   5. GAIT AND PIVOT TURN 3   6. STEP OVER OBSTACLE 3   7. GAIT WITH NARROW BASE OF SUPPORT 3  (hard has to concentrate hard, slower than it should be)   8. GAIT WITH EYES CLOSED 2  (weaves to left, speed 5.69)   9. AMBULATING BACKWARDS 3   10. STEPS 3   Total Functional Gait Assessment Score   TOTAL SCORE: (MAXIMUM SCORE 30) 27   Functional Gait Assessment (FGA): The FGA assesses postural stability during various walking tasks.     Patient Score: 27/30  Scores of <22/30 have been correlated with predicting falls in community-dwelling older adults according to Balta & Rufus 2010.   Scores of <18/30 have been correlated with increased risk for falls in patients with Parkinsons Disease according to Chirag Costa Zhou et al 2014.  Minimal Detectable Change for patients with acute/chronic stroke = 4.2 according to Thijoanna & Ritschel 2009  Minimal Detectable Change for patients with vestibular disorder = 8 according to Balta & Rufus 2010    Assessment (rationale for performing, application to patient s function & care plan): high level mobility  Minutes billed as physical performance test: part of evaluation    Lauren Aleman DPT, MPT, NCS

## 2018-06-23 NOTE — PROGRESS NOTES
"   06/22/18 1000   General Information   Start of Care Date 06/22/18   Referring Physician Maria Alejandra Quiles   Orders Evaluate and Treat as Indicated   Additional Orders Balance and gait eval   Order Date 05/09/18   Medical Diagnosis Loss of balance   Onset of illness/injury or Date of Surgery (2017)   Precautions/Limitations other (see comments)  (autonomic dysfunction when standing)   Special Instructions last concussion in 2012   Surgical/Medical history reviewed Yes  (essential tremor)   Pertinent history of current problem (include personal factors and/or comorbidities that impact the POC) increased balance problems in 2017 by the fall I came in. My sleep hasn't been good for some time. Sees sleep MD next week. Tilt table testing showed orthostatic hypotension (rule out POTS?)  Balance is not great, no falls. Has to step back to prevent a fall. Walking not as natural fluid or natural. Now using handrail on stairs. Worse n the dark. A lot more shaking in my legs these days.    Pertinent Visual History  glasses   Prior level of function comment unitl age 42 was a cyclist, /athlete. Now walks, [place] and recumbent ex bike indoors. Past 1.5 years stopped walking except for around campus. hand wts and pushups.  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 No   Have you fallen and had an injury in the past year? No   Is patient a fall risk? No   Pain   Patient currently in pain Yes   Pain location back of neck/shoulders \"coat  pain\" nearly everyday  (getting worse)   Pain rating 1  (worst is 7, best is a 1, worse in afternoon-more upright)   Pain description comment worse when " walking around. Haven't tried ice/heat, Tries stretches.    Vitals Signs   Vital Signs Comments I choose not to monitor vitals today. Lots of testing recently and we know its going to be tachycardia   Range of Motion (ROM)   ROM Comment CROM WNLs including retraction Shoulder AROM WNLs.   Gait   Gait Comments 25fTW at fast speed 3.63 and 7 steps, repeat 3.68 seconds and 8 steps.    Gait Special Tests 25 Foot Timed Walk   Seconds 5.44  (5.53)   Steps 10 Steps  (10)   Comments normal JACKY, symmetrical steps and arms swing   Gait Special Tests Functional Gait Assessment Score out of 30   Score out of 30 27   Balance   Balance Comments SOT also completed today: see progress note. Slightly below normal   Clinical Impression   Criteria for Skilled Therapeutic Interventions Met yes, treatment indicated   PT Diagnosis balance and upper back pain   Influenced by the following impairments upper back pain, tremors, high level balance and decreased activity tolerance secondary to orthostatic issue.   Functional limitations due to impairments affects ADL/IADLS, household/community mobility, recreational activities and WORK.   Clinical Presentation Stable/Uncomplicated   Clinical Presentation Rationale medical history/work up so far, tremors, FGA, SOT and clinical judgement   Clinical Decision Making (Complexity) Low complexity   Therapy Frequency other (see comments)  (every few weeks (time to do HEP))   Predicted Duration of Therapy Intervention (days/wks) 3 months   Risk & Benefits of therapy have been explained Yes   Patient, Family & other staff in agreement with plan of care Yes   Clinical Impression Comments Balance testing today reveals some high level deficits. We will try some adaptation exercises. He is already active: walking and riding recumbent bike daily   Goal 1   Goal Identifier amb with head motion   Goal Description he will be sharmaine to ambulate with head motion and no change in gait quality/speed   Target Date  09/23/18   Goal 2   Goal Identifier SOT/balance   Goal Description he will score WNLS for his age on condition 5 of the SOT and feel confident with outdoor ambulation for recreational activities   Target Date 09/23/18   Goal 3   Goal Identifier DVA   Goal Description WNLS on DVA testing after completion of gaze stabilzation exs for HEP   Target Date 08/23/18   Total Evaluation Time   Total Evaluation Time (Minutes) 40   Lauren Aleman DPT, MPT, NCS

## 2018-06-25 ENCOUNTER — OFFICE VISIT (OUTPATIENT)
Dept: SLEEP MEDICINE | Facility: CLINIC | Age: 53
End: 2018-06-25
Payer: COMMERCIAL

## 2018-06-25 VITALS
WEIGHT: 189.6 LBS | HEIGHT: 72 IN | OXYGEN SATURATION: 95 % | RESPIRATION RATE: 16 BRPM | BODY MASS INDEX: 25.68 KG/M2 | DIASTOLIC BLOOD PRESSURE: 86 MMHG | SYSTOLIC BLOOD PRESSURE: 121 MMHG | HEART RATE: 83 BPM

## 2018-06-25 DIAGNOSIS — G47.52 RBD (REM BEHAVIORAL DISORDER): Primary | ICD-10-CM

## 2018-06-25 DIAGNOSIS — G47.33 OSA (OBSTRUCTIVE SLEEP APNEA): ICD-10-CM

## 2018-06-25 DIAGNOSIS — G47.52 DREAM ENACTMENT BEHAVIOR: ICD-10-CM

## 2018-06-25 PROCEDURE — 99203 OFFICE O/P NEW LOW 30 MIN: CPT | Performed by: PSYCHIATRY & NEUROLOGY

## 2018-06-25 RX ORDER — TEMAZEPAM 30 MG
CAPSULE ORAL
Qty: 1 CAPSULE | Refills: 0 | Status: SHIPPED | OUTPATIENT
Start: 2018-06-25 | End: 2019-01-08

## 2018-06-25 NOTE — NURSING NOTE
Chief Complaint   Patient presents with     Sleep Problem     Signs of breathing difficulties, frequent awakening, vivid dreams       Initial /86  Pulse 83  Resp 16  Ht 1.829 m (6')  Wt 86 kg (189 lb 9.6 oz)  SpO2 95%  BMI 25.71 kg/m2 Estimated body mass index is 25.71 kg/(m^2) as calculated from the following:    Height as of this encounter: 1.829 m (6').    Weight as of this encounter: 86 kg (189 lb 9.6 oz).    Medication Reconciliation: complete     ESS 5  Neck 37cm  Stephy Guadalupe

## 2018-06-25 NOTE — MR AVS SNAPSHOT
After Visit Summary   6/25/2018    Cesar Montejo    MRN: [id number]           Patient Information     Date Of Birth          1965        Visit Information        Provider Department      6/25/2018 8:00 AM Meet [place] MD [facility] [place]        Today's Diagnoses     RBD (REM behavioral disorder)    -  1    Dream enactment behavior        PATRICIA (obstructive sleep apnea)          Care Instructions      Your BMI is Body mass index is 25.71 kg/(m^2).  Weight management is a personal decision.  If you are interested in exploring weight loss strategies, the following discussion covers the approaches that may be successful. Body mass index (BMI) is one way to tell whether you are at a healthy weight, overweight, or obese. It measures your weight in relation to your height.  A BMI of 18.5 to 24.9 is in the healthy range. A person with a BMI of 25 to 29.9 is considered overweight, and someone with a BMI of 30 or greater is considered obese. More than two-thirds of American adults are considered overweight or obese.  Being overweight or obese increases the risk for further weight gain. Excess weight may lead to heart disease and diabetes.  Creating and following plans for healthy eating and physical activity may help you improve your health.  Weight control is part of healthy lifestyle and includes exercise, emotional health, and healthy eating habits. Careful eating habits lifelong are the mainstay of weight control. Though there are significant health benefits from weight loss, long-term weight loss with diet alone may be very difficult to achieve- studies show long-term success with dietary management in less than 10% of people. Attaining a healthy weight may be especially difficult to achieve in those with severe obesity. In some cases, medications, devices and surgical management might be considered.  What can you do?  If you are overweight or obese and are interested in methods  for weight loss, you should discuss this with your provider.     Consider reducing daily calorie intake by 500 calories.     Keep a food journal.     Avoiding skipping meals, consider cutting portions instead.    Diet combined with exercise helps maintain muscle while optimizing fat loss. Strength training is particularly important for building and maintaining muscle mass. Exercise helps reduce stress, increase energy, and improves fitness. Increasing exercise without diet control, however, may not burn enough calories to loose weight.       Start walking three days a week 10-20 minutes at a time    Work towards walking thirty minutes five days a week     Eventually, increase the speed of your walking for 1-2 minutes at time    In addition, we recommend that you review healthy lifestyles and methods for weight loss available through the National Institutes of Health patient information sites:  http://win.niddk.nih.gov/publications/index.htm    And look into health and wellness programs that may be available through your health insurance provider, employer, local community center, or [employer] club.    Weight management plan: Patient was referred to their PCP to discuss a diet and exercise plan.            Follow-ups after your visit        Your next 10 appointments already scheduled     Jun 26, 2018  1:00 PM CDT   (Arrive by 12:45 PM)   RETURN ARRHYTHMIA with Lee Ann Laughlin MD   [facility] ([facility])    [address] [place] [address]  [address] [place] [id number] [phone]            Jul 26, 2018  4:00 PM CDT   (Arrive by 3:45 PM)   New Patient Visit with Akbar Frank MD   [facility] ([facility])    [address] [place] [address]  4th Floor  [facility] [id number] [phone]            Aug 09, 2018  8:30 PM CDT   PSG Split with BED 1  SLEEP   [facility] ([facility])    [address]  [place] [address]  [place] [address]   [phone]            Aug 20, 2018 10:00 AM CDT   Return Sleep Patient with Estela Jenkins   [facility] ([facility])    [address] [place] [address]   [phone]            Sep 19, 2018  5:00 PM CDT   (Arrive by 4:45 PM)   Return Movement Disorder with Vida Solomon MD   [facility] ([facility])    [address] [place] [id number] [phone]            Mar 13, 2019  5:00 PM CDT   (Arrive by 4:45 PM)   Return Movement Disorder with Vida Solomon MD   [facility] ([facility])    [address] [place] [id number] [phone]              Future tests that were ordered for you today     Open Future Orders        Priority Expected Expires Ordered    Comprehensive Sleep Study Routine  12/22/2018 6/25/2018            Who to contact     If you have questions or need follow up information about today's clinic visit or your schedule please contact [facility] directly at [phone].  Normal or non-critical lab and imaging results will be communicated to you by Geliyoohart, letter or phone within 4 business days after the clinic has received the results. If you do not hear from us within 7 days, please contact the clinic through Geliyoohart or phone. If you have a critical or abnormal lab result, we will notify you by phone as soon as possible.  Submit refill requests through FiTeq or call your pharmacy and they will forward the refill request to us. Please allow 3 business days for your refill to be completed.          Additional Information About Your Visit        FiTeq Information     FiTeq gives you secure access to your electronic health record. If you see a primary care provider, you can also send messages to your care team and make appointments. If you  have questions, please call your primary care clinic.  If you do not have a primary care provider, please call [phone] and they will assist you.        Care EveryWhere ID     This is your Care EveryWhere ID. This could be used by other organizations to access your [place] medical records  [id number]        Your Vitals Were     Pulse Respirations Height Pulse Oximetry BMI (Body Mass Index)       83 16 1.829 m (6') 95% 25.71 kg/m2        Blood Pressure from Last 3 Encounters:   06/25/18 121/86   06/22/18 126/88   06/21/18 134/87    Weight from Last 3 Encounters:   06/25/18 86 kg (189 lb 9.6 oz)   06/14/18 85.2 kg (187 lb 14.4 oz)   06/07/18 83.7 kg (184 lb 8 oz)              We Performed the Following     SLEEP EVALUATION & MANAGEMENT REFERRAL - ADULT -Other (Respond in commments)          Today's Medication Changes          These changes are accurate as of 6/25/18  9:20 AM.  If you have any questions, ask your nurse or doctor.               Start taking these medicines.        Dose/Directions    temazepam 30 MG capsule   Commonly known as:  RESTORIL        Take one tab po qhs the night of your sleep study   Quantity:  1 capsule   Refills:  0            Where to get your medicines      Some of these will need a paper prescription and others can be bought over the counter.  Ask your nurse if you have questions.     Bring a paper prescription for each of these medications     temazepam 30 MG capsule                Primary Care Provider Office Phone # Fax #    Rocky Rigo Messina [phone]       [address] [address]        Equal Access to Services     [facility]: Hadii desean Mcdaniel, waaxda luqadaha, qaybta kaalmaprashant de jesus. So [facility] [phone].    ATENCIÓN: Si habla español, tiene a persaud disposición servicios gratuitos de asistencia lingüística. Llame al [phone].    We comply with applicable federal  civil rights laws and [place] laws. We do not discriminate on the basis of race, color, national origin, age, disability, sex, sexual orientation, or gender identity.            Thank you!     Thank you for choosing [facility]  for your care. Our goal is always to provide you with excellent care. Hearing back from our patients is one way we can continue to improve our services. Please take a few minutes to complete the written survey that you may receive in the mail after your visit with us. Thank you!             Your Updated Medication List - Protect others around you: Learn how to safely use, store and throw away your medicines at www.disposemymeds.org.          This list is accurate as of 6/25/18  9:20 AM.  Always use your most recent med list.                   Brand Name Dispense Instructions for use Diagnosis    aspirin 81 MG EC tablet      Take 1 tablet (81 mg) by mouth daily        COQ10 PO           KRILL OIL PO      Take 1 capsule by mouth daily        mirtazapine 15 MG tablet    REMERON    90 tablet    TAKE 1 TABLET(15 MG) BY MOUTH AT BEDTIME    Mixed anxiety and depressive disorder, Insomnia       MULTIVITAL PO      Take 1 tablet by mouth daily        ranitidine 300 MG tablet    ZANTAC          simvastatin 40 MG tablet    ZOCOR     Take 0.5 tablets by mouth daily        temazepam 30 MG capsule    RESTORIL    1 capsule    Take one tab po qhs the night of your sleep study        TURMERIC PO      Take 1 tablet by mouth daily        VITAMIN D (CHOLECALCIFEROL) PO      Take 2,000 Units by mouth daily
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
## 2018-06-25 NOTE — PATIENT INSTRUCTIONS

## 2018-06-26 ENCOUNTER — TELEPHONE (OUTPATIENT)
Dept: SPEECH THERAPY | Facility: CLINIC | Age: 53
End: 2018-06-26

## 2018-06-26 ENCOUNTER — OFFICE VISIT (OUTPATIENT)
Dept: CARDIOLOGY | Facility: CLINIC | Age: 53
End: 2018-06-26
Attending: INTERNAL MEDICINE
Payer: COMMERCIAL

## 2018-06-26 VITALS
HEIGHT: 73 IN | BODY MASS INDEX: 24.65 KG/M2 | OXYGEN SATURATION: 99 % | DIASTOLIC BLOOD PRESSURE: 87 MMHG | WEIGHT: 186 LBS | SYSTOLIC BLOOD PRESSURE: 125 MMHG | HEART RATE: 103 BPM

## 2018-06-26 DIAGNOSIS — I95.1 ORTHOSTATIC HYPOTENSION: Primary | ICD-10-CM

## 2018-06-26 PROCEDURE — 99214 OFFICE O/P EST MOD 30 MIN: CPT | Mod: ZP | Performed by: INTERNAL MEDICINE

## 2018-06-26 PROCEDURE — G0463 HOSPITAL OUTPT CLINIC VISIT: HCPCS | Mod: ZF

## 2018-06-26 ASSESSMENT — PAIN SCALES - GENERAL: PAINLEVEL: NO PAIN (0)
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
## 2018-06-26 NOTE — NURSING NOTE
Chief Complaint   Patient presents with     Follow Up For     53 yo male h/o cherry angioma, pure hypercholesterolemia, and dyspnea and respiratory abnormality present for Consult for Autonomic Dysautonomia     Vitals were taken and medications were reconciled. Orthostatic BP was performed.    LALITO Chanel  1:11 PM
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
## 2018-06-26 NOTE — MR AVS SNAPSHOT
"              After Visit Summary   2018    Cesar Montejo    MRN: [id number]           Patient Information     Date Of Birth          1965        Visit Information        Provider Department      2018 1:00 PM Lee Ann Laughlin MD [facility] [place]        Today's Diagnoses     Orthostatic hypotension    -  1      Care Instructions    You were seen today in the Cardiovascular Clinic at the [facility].     Cardiology Providers you saw during your visit: Dr. Lee Ann Laughlin    Diagnosis: orthostatic hypotension    Results: discussed with patient    Orders:   None    Medication Changes:   None    Recommendations:   Continue aggressive fluid hydration, compression stocking    Follow-up:   As needed         Please feel free to call me with any questions or concerns.       Marquise Shafer LPN     Questions and [phone].   First press #1 for the [employer] and then press #3 for \"Medical Questions\" to reach us Cardiology Nurses.      On Call Cardiologist for after hours or on weekends: [phone]   option #4 and ask to speak to the on-call Cardiologist.          If you need a medication refill please contact your pharmacy.  Please allow 3 business days for your refill to be completed.            Follow-ups after your visit        Your next 10 appointments already scheduled     2018  4:00 PM CDT   (Arrive by 3:45 PM)   New Patient Visit with Akbar Frank MD   [facility] ([facility])    [address] [address] [phone]            Aug 09, 2018  8:30 PM CDT   PSG Split with BED 1 SH SLEEP   [place] [facility] ([facility])    [address] [address]   [phone]            Aug 20, 2018 10:00 AM CDT   Return Sleep Patient with Estela Jenkins   [facility] [facility] ([facility])    [address] [facility] " [facility] [address]  [facility] [address]   [phone]            Sep 19, 2018  5:00 PM CDT   (Arrive by 4:45 PM)   Return Movement Disorder with Vida Solomon MD   [facility] ([facility])    [address] [place] [id number] [phone]            Mar 13, 2019  5:00 PM CDT   (Arrive by 4:45 PM)   Return Movement Disorder with Vida Solomon MD   [facility] ([facility])    [address] [address] [phone]              Future tests that were ordered for you today     Open Future Orders        Priority Expected Expires Ordered    Comprehensive Sleep Study Routine  12/22/2018 6/25/2018            Who to contact     If you have questions or need follow up information about today's clinic visit or your schedule please contact [facility] directly at [phone].  Normal or non-critical lab and imaging results will be communicated to you by [employer], letter or phone within 4 business days after the clinic has received the results. If you do not hear from us within 7 days, please contact the clinic through [employer] or phone. If you have a critical or abnormal lab result, we will notify you by phone as soon as possible.  Submit refill requests through [employer] or call your pharmacy and they will forward the refill request to us. Please allow 3 business days for your refill to be completed.          Additional Information About Your Visit        [employer] Information     [employer] gives you secure access to your electronic health record. If you see a primary care provider, you can also send messages to your care team and make appointments. If you have questions, please call your primary care clinic.  If you do not have a primary care provider, please call [phone] and they will assist you.        Care EveryWhere ID     This is your Care EveryWhere ID. This could be  "used by other organizations to access your [place] medical records  [id number]        Your Vitals Were     Pulse Height Pulse Oximetry BMI (Body Mass Index)          103 1.854 m (6' 1\") 99% 24.54 kg/m2         Blood Pressure from Last 3 Encounters:   06/26/18 125/87   06/25/18 121/86   06/22/18 126/88    Weight from Last 3 Encounters:   06/26/18 84.4 kg (186 lb)   06/25/18 86 kg (189 lb 9.6 oz)   06/14/18 85.2 kg (187 lb 14.4 oz)              Today, you had the following     No orders found for display       Primary Care Provider Office Phone # Fax #    Rocky Rigo Messina [phone]       [address] [address]        Equal Access to Services     [facility]: Hadii desean ruano hadasho Soomaali, waaxda luqadaha, qaybta kaalmada adeegyada, prashant jameson . So [facility] [phone].    ATENCIÓN: Si habla español, tiene a persaud disposición servicios gratuitos de asistencia lingüística. Angelica al [phone].    We comply with applicable federal civil rights laws and [place] laws. We do not discriminate on the basis of race, color, national origin, age, disability, sex, sexual orientation, or gender identity.            Thank you!     Thank you for choosing [facility]  for your care. Our goal is always to provide you with excellent care. Hearing back from our patients is one way we can continue to improve our services. Please take a few minutes to complete the written survey that you may receive in the mail after your visit with us. Thank you!             Your Updated Medication List - Protect others around you: Learn how to safely use, store and throw away your medicines at www.disposemymeds.org.          This list is accurate as of 6/26/18  1:57 PM.  Always use your most recent med list.                   Brand Name Dispense Instructions for use Diagnosis    aspirin 81 MG EC tablet      Take 1 tablet (81 mg) by mouth daily        COQ10 PO       "     KRILL OIL PO      Take 1 capsule by mouth daily        mirtazapine 15 MG tablet    REMERON    90 tablet    TAKE 1 TABLET(15 MG) BY MOUTH AT BEDTIME    Mixed anxiety and depressive disorder, Insomnia       MULTIVITAL PO      Take 1 tablet by mouth daily        ranitidine 300 MG tablet    ZANTAC          simvastatin 40 MG tablet    ZOCOR     Take 0.5 tablets by mouth daily        temazepam 30 MG capsule    RESTORIL    1 capsule    Take one tab po qhs the night of your sleep study        TURMERIC PO      Take 1 tablet by mouth daily        VITAMIN D (CHOLECALCIFEROL) PO      Take 2,000 Units by mouth daily

## 2018-06-26 NOTE — PATIENT INSTRUCTIONS
"You were seen today in the Cardiovascular Clinic at the [facility].     Cardiology Providers you saw during your visit: Dr. Lee Ann Laughlin    Diagnosis: orthostatic hypotension    Results: discussed with patient    Orders:   None    Medication Changes:   None    Recommendations:   Continue aggressive fluid hydration, compression stocking    Follow-up:   As needed         Please feel free to call me with any questions or concerns.       Marquise Shafer LPN     Questions and [phone].   First press #1 for the [employer] and then press #3 for \"Medical Questions\" to reach us Cardiology Nurses.      On Call Cardiologist for after hours or on weekends: [phone]   option #4 and ask to speak to the on-call Cardiologist.          If you need a medication refill please contact your pharmacy.  Please allow 3 business days for your refill to be completed.    "

## 2018-06-29 ENCOUNTER — TELEPHONE (OUTPATIENT)
Dept: FAMILY MEDICINE | Facility: CLINIC | Age: 53
End: 2018-06-29

## 2018-06-29 NOTE — TELEPHONE ENCOUNTER
Pain in left arm is getting worse past 24hrs, higher up on the arm he sees a vein that he doesn't remember it being there, no redness, slight swelling but no more than usual, no tingling/numbness    Huddle with Dr. Messina, pt is to warm pack area    Use OTC antiinflammatory    If arm swells or has redness seek UC/ED     Amira Cm RN   [facility]

## 2018-07-03 ENCOUNTER — TELEPHONE (OUTPATIENT)
Dept: PULMONOLOGY | Facility: CLINIC | Age: 53
End: 2018-07-03

## 2018-07-03 NOTE — TELEPHONE ENCOUNTER
I called Mr Montejo back regarding test results.  He had a normal 6 minute walk test.  He has a normal serum bicarbonate.  He does not have evidence for hypoventilation.      He has yet to be formally diagnosed with a neurodegenerative disorder.     He had a question in clinic regarding prophylactic breathing exercises that could be done to prevent the respiratory complications of MSA.  I was not able to find anything that has been described in the literature, and I let him know that.  He is getting a sleep evaluation currently.      Torey Parker MD

## 2018-07-05 ENCOUNTER — OFFICE VISIT (OUTPATIENT)
Dept: FAMILY MEDICINE | Facility: CLINIC | Age: 53
End: 2018-07-05
Payer: COMMERCIAL

## 2018-07-05 VITALS
SYSTOLIC BLOOD PRESSURE: 110 MMHG | BODY MASS INDEX: 25.29 KG/M2 | TEMPERATURE: 98.8 F | WEIGHT: 191.7 LBS | HEART RATE: 96 BPM | DIASTOLIC BLOOD PRESSURE: 80 MMHG | OXYGEN SATURATION: 98 %

## 2018-07-05 DIAGNOSIS — R30.0 DYSURIA: Primary | ICD-10-CM

## 2018-07-05 DIAGNOSIS — S39.012A LOW BACK STRAIN, INITIAL ENCOUNTER: ICD-10-CM

## 2018-07-05 DIAGNOSIS — N41.0 PROSTATITIS, ACUTE: ICD-10-CM

## 2018-07-05 LAB
ALBUMIN UR-MCNC: NEGATIVE MG/DL
APPEARANCE UR: CLEAR
BILIRUB UR QL STRIP: NEGATIVE
COLOR UR AUTO: YELLOW
GLUCOSE UR STRIP-MCNC: NEGATIVE MG/DL
HGB UR QL STRIP: NEGATIVE
KETONES UR STRIP-MCNC: NEGATIVE MG/DL
LEUKOCYTE ESTERASE UR QL STRIP: NEGATIVE
NITRATE UR QL: NEGATIVE
PH UR STRIP: 6 PH (ref 5–7)
SOURCE: NORMAL
SP GR UR STRIP: 1.02 (ref 1–1.03)
UROBILINOGEN UR STRIP-ACNC: 0.2 EU/DL (ref 0.2–1)

## 2018-07-05 PROCEDURE — 87086 URINE CULTURE/COLONY COUNT: CPT | Performed by: FAMILY MEDICINE

## 2018-07-05 PROCEDURE — 99214 OFFICE O/P EST MOD 30 MIN: CPT | Performed by: FAMILY MEDICINE

## 2018-07-05 PROCEDURE — 81003 URINALYSIS AUTO W/O SCOPE: CPT | Performed by: FAMILY MEDICINE

## 2018-07-05 RX ORDER — CIPROFLOXACIN 500 MG/1
500 TABLET, FILM COATED ORAL 2 TIMES DAILY
Qty: 28 TABLET | Refills: 0 | Status: SHIPPED | OUTPATIENT
Start: 2018-07-05 | End: 2018-08-13

## 2018-07-05 NOTE — MR AVS SNAPSHOT
After Visit Summary   7/5/2018    Cesar Montejo    MRN: [id number]           Patient Information     Date Of Birth          1965        Visit Information        Provider Department      7/5/2018 11:45 AM Rocky Messina MD [facility]        Today's Diagnoses     Dysuria    -  1    Prostatitis, acute        Low back strain, initial encounter           Follow-ups after your visit        Your next 10 appointments already scheduled     Jul 26, 2018  4:00 PM CDT   (Arrive by 3:45 PM)   New Patient Visit with Akbar Frank MD   [facility] ([facility] [place])    [address] [facility]  [address] [facility] [id number] [phone]            Aug 09, 2018  8:30 PM CDT   PSG Split with BED 1 SH SLEEP   [facility] ([facility])    [address] [address] [phone]            Aug 20, 2018 10:00 AM CDT   Return Sleep Patient with Estela Jenkins   [facility] ([facility])    [address] [address] [phone]            Sep 19, 2018  5:00 PM CDT   (Arrive by 4:45 PM)   Return Movement Disorder with Vida Solomon MD   [facility] ([facility] [place])    [address] [facility]  [address] [facility] [address] [phone]            Mar 13, 2019  5:00 PM CDT   (Arrive by 4:45 PM)   Return Movement Disorder with Vida Solomon MD   [facility] ([facility] [place])    [address] [facility]  [address] [facility] [address] [phone]              Who to contact     If you have questions or need follow up information about today's clinic visit or your schedule please contact [facility] directly at [phone].  Normal or non-critical lab and imaging results will be communicated to you by  MyChart, letter or phone within 4 business days after the clinic has received the results. If you do not hear from us within 7 days, please contact the clinic through [employer] or phone. If you have a critical or abnormal lab result, we will notify you by phone as soon as possible.  Submit refill requests through [employer] or call your pharmacy and they will forward the refill request to us. Please allow 3 business days for your refill to be completed.          Additional Information About Your Visit        [employer] Information     [employer] gives you secure access to your electronic health record. If you see a primary care provider, you can also send messages to your care team and make appointments. If you have questions, please call your primary care clinic.  If you do not have a primary care provider, please call [phone] and they will assist you.        Care EveryWhere ID     This is your Care EveryWhere ID. This could be used by other organizations to access your [place] medical records  [id number]        Your Vitals Were     Pulse Temperature Pulse Oximetry BMI (Body Mass Index)          96 98.8  F (37.1  C) (Oral) 98% 25.29 kg/m2         Blood Pressure from Last 3 Encounters:   07/05/18 110/80   06/26/18 125/87   06/25/18 121/86    Weight from Last 3 Encounters:   07/05/18 191 lb 11.2 oz (87 kg)   06/26/18 186 lb (84.4 kg)   06/25/18 189 lb 9.6 oz (86 kg)              We Performed the Following     *UA reflex to Microscopic and Culture ([place] and [facility] (except [place] and [place])     Urine Culture Aerobic Bacterial          Today's Medication Changes          These changes are accurate as of 7/5/18 12:06 PM.  If you have any questions, ask your nurse or doctor.               Start taking these medicines.        Dose/Directions    ciprofloxacin 500 MG tablet   Commonly known as:  CIPRO   Used for:  Prostatitis, acute   Started by:  Rocky Messina MD        Dose:  500 mg   Take 1  tablet (500 mg) by mouth 2 times daily   Quantity:  28 tablet   Refills:  0            Where to get your medicines      These medications were sent to [facility] [address]  [address] [address]     Phone:  [phone]     ciprofloxacin 500 MG tablet                Primary Care Provider Office Phone # Fax #    Rocky Rigo Messina [phone]       [address] [place] [facility]   [facility] [address]        Equal Access to Services     [facility]: Hadii aad ku hadasho Soomaali, waaxda luqadaha, qaybta kaalmada adeegyada, prashant jameson . So [facility] [phone].    ATENCIÓN: Si habla español, tiene a persaud disposición servicios gratuitos de asistencia lingüística. [facility] [phone].    We comply with applicable federal civil rights laws and [place] laws. We do not discriminate on the basis of race, color, national origin, age, disability, sex, sexual orientation, or gender identity.            Thank you!     Thank you for choosing [facility]  for your care. Our goal is always to provide you with excellent care. Hearing back from our patients is one way we can continue to improve our services. Please take a few minutes to complete the written survey that you may receive in the mail after your visit with us. Thank you!             Your Updated Medication List - Protect others around you: Learn how to safely use, store and throw away your medicines at www.disposemymeds.org.          This list is accurate as of 7/5/18 12:06 PM.  Always use your most recent med list.                   Brand Name Dispense Instructions for use Diagnosis    aspirin 81 MG EC tablet      Take 1 tablet (81 mg) by mouth daily        ciprofloxacin 500 MG tablet    CIPRO    28 tablet    Take 1 tablet (500 mg) by mouth 2 times daily    Prostatitis, acute       COQ10 PO           KRILL OIL PO      Take 1  capsule by mouth daily        mirtazapine 15 MG tablet    REMERON    90 tablet    TAKE 1 TABLET(15 MG) BY MOUTH AT BEDTIME    Mixed anxiety and depressive disorder, Insomnia       MULTIVITAL PO      Take 1 tablet by mouth daily        ranitidine 300 MG tablet    ZANTAC          simvastatin 40 MG tablet    ZOCOR     Take 0.5 tablets by mouth daily        temazepam 30 MG capsule    RESTORIL    1 capsule    Take one tab po qhs the night of your sleep study        TURMERIC PO      Take 1 tablet by mouth daily        VITAMIN D (CHOLECALCIFEROL) PO      Take 2,000 Units by mouth daily

## 2018-07-05 NOTE — PROGRESS NOTES
SUBJECTIVE:   Cesar Montejo is a 52 year old male who presents to clinic today for the following health issues:    Genitourinary - Male  Onset: 7/2    Description:   Dysuria (painful urination): YES- Yesterday   Hematuria (blood in urine): no   Frequency: YES  Are you urinating at night : YES- 1x last night   Hesitancy (delay in urine): YES- a few times   Retention (unable to empty): no   Decrease in urinary flow: YES  Incontinence: no     Progression of Symptoms:  same and intermittent    Accompanying Signs & Symptoms:  Fever: no   Back/Flank pain: YES- Lower left back   Urethral discharge: no   Testicle lumps/masses/pain: no   Nausea and/or vomiting: no   Abdominal pain: no     History:   History of frequent UTI's: no   History of kidney stones: no   History of hernias: no   Personal or Family history of Prostate problems: YES- father has enlarged prostate (83)  Sexually active: no     Precipitating factors:   None     Alleviating factors:  None   No STI risk  No trauma          Problem list and histories reviewed & adjusted, as indicated.  Additional history: as documented    Labs reviewed in EPIC    Reviewed and updated as needed this visit by clinical staff       Reviewed and updated as needed this visit by Provider         ROS:  Constitutional, HEENT, cardiovascular, pulmonary, gi and gu systems are negative, except as otherwise noted.    OBJECTIVE:     /80 (BP Location: Right arm, Patient Position: Sitting, Cuff Size: Adult Regular)  Pulse 96  Temp 98.8  F (37.1  C) (Oral)  Wt 191 lb 11.2 oz (87 kg)  SpO2 98%  BMI 25.29 kg/m2  Body mass index is 25.29 kg/(m^2).  GENERAL: alert, mild distress and fatigued  NECK: no adenopathy, no asymmetry, masses, or scars and thyroid normal to palpation  RESP: lungs clear to auscultation - no rales, rhonchi or wheezes  CV: regular rate and rhythm, normal S1 S2, no S3 or S4, no murmur, click or rub, no peripheral edema and peripheral pulses strong  ABDOMEN:  soft, nontender, no hepatosplenomegaly, no masses and bowel sounds normal  RECTAL (male): normal sphincter tone, no rectal masses and prostate boggy, tenderness present  MS: normal muscle tone and tenderness to palpation left lower SI region  SKIN: no suspicious lesions or rashes  BACK: no CVA tenderness,      Diagnostic Test Results:  Results for orders placed or performed in visit on 07/05/18   *UA reflex to Microscopic and Culture ([place] and [facility] (except [place] and [place])   Result Value Ref Range    Color Urine Yellow     Appearance Urine Clear     Glucose Urine Negative NEG^Negative mg/dL    Bilirubin Urine Negative NEG^Negative    Ketones Urine Negative NEG^Negative mg/dL    Specific Gravity Urine 1.025 1.003 - 1.035    Blood Urine Negative NEG^Negative    pH Urine 6.0 5.0 - 7.0 pH    Protein Albumin Urine Negative NEG^Negative mg/dL    Urobilinogen Urine 0.2 0.2 - 1.0 EU/dL    Nitrite Urine Negative NEG^Negative    Leukocyte Esterase Urine Negative NEG^Negative    Source Midstream Urine        ASSESSMENT/PLAN:             1. Prostatitis, acute  Will treat  - Urine Culture Aerobic Bacterial  - ciprofloxacin (CIPRO) 500 MG tablet; Take 1 tablet (500 mg) by mouth 2 times daily  Dispense: 28 tablet; Refill: 0    2. Dysuria  Check UC  - *UA reflex to Microscopic and Culture ([place] and [facility] (except [place] and [place])    3. Low back strain, initial encounter  Ice, stretch prn      Follow up only if unimproved.   See Patient Instructions    Rocky Messina MD  [facility]

## 2018-07-06 LAB
BACTERIA SPEC CULT: NORMAL
SPECIMEN SOURCE: NORMAL

## 2018-07-13 DIAGNOSIS — Z13.6 CARDIOVASCULAR SCREENING; LDL GOAL LESS THAN 130: ICD-10-CM

## 2018-07-13 DIAGNOSIS — E78.00 HYPERCHOLESTEREMIA: ICD-10-CM

## 2018-07-16 RX ORDER — SIMVASTATIN 40 MG
TABLET ORAL
Qty: 90 TABLET | Refills: 0 | Status: SHIPPED | OUTPATIENT
Start: 2018-07-16 | End: 2019-01-08

## 2018-07-16 NOTE — TELEPHONE ENCOUNTER
"Requested Prescriptions   Pending Prescriptions Disp Refills     simvastatin (ZOCOR) 40 MG tablet [Pharmacy Med Name: SIMVASTATIN 40MG TABLETS] 90 tablet 0    Last Written Prescription Date:  08/23/2018  Last Fill Quantity: -,  # refills: -   Last office visit: 7/5/2018 with prescribing provider:  07/05/2018   Future Office Visit:     Sig: TAKE 1 TABLET(40 MG) BY MOUTH AT BEDTIME    Statins Protocol Passed    7/13/2018  5:11 PM       Passed - LDL on file in past 12 months    Recent Labs   Lab Test  03/08/18   1431   LDL  167*            Passed - No abnormal creatine kinase in past 12 months    No lab results found.            Passed - Recent (12 mo) or future (30 days) visit within the authorizing provider's specialty    Patient had office visit in the last 12 months or has a visit in the next 30 days with authorizing provider or within the authorizing provider's specialty.  See \"Patient Info\" tab in inbasket, or \"Choose Columns\" in Meds & Orders section of the refill encounter.           Passed - Patient is age 18 or older          "

## 2018-07-16 NOTE — TELEPHONE ENCOUNTER
Route to provider, Dr. Messina    LDL high    Did you wish for lab only appointment  Lab cued    Amira Cm RN   [facility]

## 2018-07-17 NOTE — TELEPHONE ENCOUNTER
FUTURE VISIT INFORMATION      FUTURE VISIT INFORMATION:    Date: 07/26/2018    Time: 4:00    [place] [facility]  REFERRAL INFORMATION:    Referring provider:  Dr. Rocky Messina    Referring providers clinic:  [facility]    Reason for visit/diagnosis  Difficulty swallowing, sleep disturbances    RECORDS REQUESTED FROM:       Clinic name Comments Records Status Imaging Status   [place] OFFICE VISIT: 06/07/2018, 06/03/2018, 05/25/2018, 12/07/2017, 04/27/2017  IMAGE: CT NECK 01/21/2018, CT MAXILLOFACIAL 03/22/2018, NECK  SOFT 06/03/2018 INTERNAL YES                                   RECORDS STATUS

## 2018-07-23 ENCOUNTER — TELEPHONE (OUTPATIENT)
Dept: SLEEP MEDICINE | Facility: CLINIC | Age: 53
End: 2018-07-23

## 2018-07-23 NOTE — TELEPHONE ENCOUNTER
Left voice mail for patient to call us back if he would like to take the open bed  On 07/25/18. I told him that I would hold the bed until 1:00pm.    Jenna Multani

## 2018-07-25 ENCOUNTER — THERAPY VISIT (OUTPATIENT)
Dept: SLEEP MEDICINE | Facility: CLINIC | Age: 53
End: 2018-07-25
Payer: COMMERCIAL

## 2018-07-25 DIAGNOSIS — G47.33 OSA (OBSTRUCTIVE SLEEP APNEA): ICD-10-CM

## 2018-07-25 DIAGNOSIS — G47.52 RBD (REM BEHAVIORAL DISORDER): ICD-10-CM

## 2018-07-25 PROCEDURE — 95810 POLYSOM 6/> YRS 4/> PARAM: CPT | Performed by: PSYCHIATRY & NEUROLOGY

## 2018-07-25 NOTE — MR AVS SNAPSHOT
After Visit Summary   7/25/2018    Cesar Montejo    MRN: [id number]           Patient Information     Date Of Birth          1965        Visit Information        Provider Department      7/25/2018 8:30 PM BED 1  SLEEP [facility]        Today's Diagnoses     PATRICIA (obstructive sleep apnea)        RBD (REM behavioral disorder)           Follow-ups after your visit        Your next 10 appointments already scheduled     Jul 26, 2018  4:00 PM CDT   (Arrive by 3:45 PM)   New Patient Visit with Akbar Frank MD   [facility] ([facility])    [address] [address]   [phone]            Aug 13, 2018  1:30 PM CDT   Return Sleep Patient with Meet Lake MD   [facility] ([facility])    [address] [place] [address]   [phone]            Sep 19, 2018  5:00 PM CDT   (Arrive by 4:45 PM)   Return Movement Disorder with Vida Solomon MD   [facility] ([facility])    [address] [id number] [phone]            Mar 13, 2019  5:00 PM CDT   (Arrive by 4:45 PM)   Return Movement Disorder with Vida Solomon MD   [facility] ([facility])    [address] [id number] [phone]              Who to contact     If you have questions or need follow up information about today's clinic visit or your schedule please contact [facility] directly at [phone].  Normal or non-critical lab and imaging results will be communicated to you by MyChart, letter or phone within 4 business days after the clinic has received the results. If you do not hear from us within 7 days, please contact the clinic through MyChart or phone. If you have a critical or abnormal lab  result, we will notify you by phone as soon as possible.  Submit refill requests through [employer] or call your pharmacy and they will forward the refill request to us. Please allow 3 business days for your refill to be completed.          Additional Information About Your Visit        [employer] Information     [employer] gives you secure access to your electronic health record. If you see a primary care provider, you can also send messages to your care team and make appointments. If you have questions, please call your primary care clinic.  If you do not have a primary care provider, please call [phone] and they will assist you.        Care EveryWhere ID     This is your Care EveryWhere ID. This could be used by other organizations to access your [facility] medical records  [id number]         Blood Pressure from Last 3 Encounters:   07/05/18 110/80   06/26/18 125/87   06/25/18 121/86    Weight from Last 3 Encounters:   07/05/18 87 kg (191 lb 11.2 oz)   06/26/18 84.4 kg (186 lb)   06/25/18 86 kg (189 lb 9.6 oz)              We Performed the Following     Comprehensive Sleep Study        Primary Care Provider Office Phone # Fax #    Rocky Rigo Messina [phone]       [address] [address]        Equal Access to Services     CAROLINA PLASCENCIA : Hadii desean ku hadasho Soomaali, waaxda luqadaha, qaybta kaalmada adeegyada, prashant jameson . So [facility] [phone].    ATENCIÓN: Si habla español, tiene a persaud disposición servicios gratuitos de asistencia lingüística. ame al [phone].    We comply with applicable federal civil rights laws and [place] laws. We do not discriminate on the basis of race, color, national origin, age, disability, sex, sexual orientation, or gender identity.            Thank you!     Thank you for choosing [facility]  for your care. Our goal is always to provide you with excellent care. Hearing back from our  patients is one way we can continue to improve our services. Please take a few minutes to complete the written survey that you may receive in the mail after your visit with us. Thank you!             Your Updated Medication List - Protect others around you: Learn how to safely use, store and throw away your medicines at www.disposemymeds.org.          This list is accurate as of 7/25/18 11:59 PM.  Always use your most recent med list.                   Brand Name Dispense Instructions for use Diagnosis    aspirin 81 MG EC tablet      Take 1 tablet (81 mg) by mouth daily        ciprofloxacin 500 MG tablet    CIPRO    28 tablet    Take 1 tablet (500 mg) by mouth 2 times daily    Prostatitis, acute       COQ10 PO           KRILL OIL PO      Take 1 capsule by mouth daily        mirtazapine 15 MG tablet    REMERON    90 tablet    TAKE 1 TABLET(15 MG) BY MOUTH AT BEDTIME    Mixed anxiety and depressive disorder, Insomnia       MULTIVITAL PO      Take 1 tablet by mouth daily        ranitidine 300 MG tablet    ZANTAC          * simvastatin 40 MG tablet    ZOCOR     Take 0.5 tablets by mouth daily        * simvastatin 40 MG tablet    ZOCOR    90 tablet    TAKE 1 TABLET(40 MG) BY MOUTH AT BEDTIME    Hypercholesteremia, CARDIOVASCULAR SCREENING; LDL GOAL LESS THAN 130       temazepam 30 MG capsule    RESTORIL    1 capsule    Take one tab po qhs the night of your sleep study        TURMERIC PO      Take 1 tablet by mouth daily        VITAMIN D (CHOLECALCIFEROL) PO      Take 2,000 Units by mouth daily        * Notice:  This list has 2 medication(s) that are the same as other medications prescribed for you. Read the directions carefully, and ask your doctor or other care provider to review them with you.

## 2018-07-26 ENCOUNTER — OFFICE VISIT (OUTPATIENT)
Dept: OTOLARYNGOLOGY | Facility: CLINIC | Age: 53
End: 2018-07-26
Payer: COMMERCIAL

## 2018-07-26 ENCOUNTER — PRE VISIT (OUTPATIENT)
Dept: OTOLARYNGOLOGY | Facility: CLINIC | Age: 53
End: 2018-07-26

## 2018-07-26 DIAGNOSIS — G47.52 REM SLEEP BEHAVIOR DISORDER: Primary | ICD-10-CM

## 2018-07-26 ASSESSMENT — PAIN SCALES - GENERAL: PAINLEVEL: NO PAIN (0)

## 2018-07-26 NOTE — PROGRESS NOTES
The patient presents with a history of REM Sleep Behavior Disorder and skin cancers of the face and lower lip. The patient presents for evaluation of his vocal cord function given associated condition with his sleep disorder. The patient denies sinusitis, rhinitis, facial pain, nasal obstruction or purulent nasal discharge. The patient denies chronic or recurrent tonsillitis, chronic or recurrent pharyngitis. The patient denies otalgia, otorrhea, eustachian tube dysfunction, ear infections, dizziness or tinnitus.       This patient is seen in consultation at the request of Dr. Rocky Messina.    All other systems were reviewed and they are either negative or they are not directly pertinent to this Otolaryngology examination.      Past Medical History:    Past Medical History:   Diagnosis Date     Anxiety      Basal cell carcinoma      Gastro-oesophageal reflux disease      H/O magnetic resonance imaging of brain and brain stem 2018    MR BRAIN WITHOUT AND WITH CONTRAST 2017 9:10 PM   HISTORY:  Left-sided numbness. Disequilibrium.   COMPARISON: MR brain 2015.   TECHNIQUE: Sagittal T1, axial T2, axial FLAIR, axial GRE, axial diffusion scan, coronal FLAIR, axial post Gd enhanced T1 weighted images.   FINDINGS: There is no intracranial hemorrhage, mass, or recent infarct. There is a cyst in the floor of the right maxilla     Head injury 2012    Had a bad concussion with post concussion synd for year     High cholesterol      History of echocardiogram 2018    TUYET Keyes MD 2018  Narrative     [id number] [id number]^MECHE^BLAKE^R       [facility] [address] [place] [address] Name: JONATHAN FIORE MRN: [id number] : 1965 Study Date: 2018 09:13 AM Age: 52 yrs Gender: Male Patient Location: [facility] Reason For Study: Chest Pain Ordering Physician:      Hypertension early     Not on  meds. Usually in pre-hypertesion categ.     Insomnia      Squamous cell carcinoma        Past Surgical History:    Past Surgical History:   Procedure Laterality Date     COLONOSCOPY N/A 4/17/2017    Procedure: COLONOSCOPY;  Surgeon: Larry Fields MD;  Location: [facility] GI     LASER CO2 LESION ORAL N/A 10/5/2016    Procedure: LASER CO2 LESION ORAL;  Surgeon: Josué Rojo DDS;  Location: [facility] OR     MOHS MICROGRAPHIC PROCEDURE         Medications:      Current Outpatient Prescriptions:      aspirin 81 MG EC tablet, Take 1 tablet (81 mg) by mouth daily, Disp: , Rfl:      ciprofloxacin (CIPRO) 500 MG tablet, Take 1 tablet (500 mg) by mouth 2 times daily, Disp: 28 tablet, Rfl: 0     Coenzyme Q10 (COQ10 PO), , Disp: , Rfl:      KRILL OIL PO, Take 1 capsule by mouth daily , Disp: , Rfl:      mirtazapine (REMERON) 15 MG tablet, TAKE 1 TABLET(15 MG) BY MOUTH AT BEDTIME, Disp: 90 tablet, Rfl: 3     Multiple Vitamins-Minerals (MULTIVITAL PO), Take 1 tablet by mouth daily , Disp: , Rfl:      ranitidine (ZANTAC) 300 MG tablet, , Disp: , Rfl: 0     simvastatin (ZOCOR) 40 MG tablet, TAKE 1 TABLET(40 MG) BY MOUTH AT BEDTIME, Disp: 90 tablet, Rfl: 0     simvastatin (ZOCOR) 40 MG tablet, Take 0.5 tablets by mouth daily, Disp: , Rfl:      temazepam (RESTORIL) 30 MG capsule, Take one tab po qhs the night of your sleep study (Patient not taking: Reported on 7/5/2018), Disp: 1 capsule, Rfl: 0     TURMERIC PO, Take 1 tablet by mouth daily , Disp: , Rfl:      VITAMIN D, CHOLECALCIFEROL, PO, Take 2,000 Units by mouth daily , Disp: , Rfl:     Allergies:    Review of patient's allergies indicates no known allergies.    Physical Examination:    The patient is a well developed, well nourished male in no apparent distress.  He is normocepahlic, atraumatic with pupils equally round and reactive to light.    Oral Cavity Examination: Normal Mucosa with no masses or lesions  Nasal Examination: Normal Mucosa with no masses or lesions  Ear  Examination: Ear canals clear, tympanic membranes and middle ear spaces normal  Neurological Examination: Facial nerve function intact and symmetric  Integumentary Examination: No lesions on the skin of the head or neck  Neck Examination: No masses or lesions, no lymphadenopathy  Endocrine Examination: Normal thyroid examination    Assessment and Plan:    The patient presents with a history of REM Sleep Behavior Disorder. His vocal cords are moving normal. He would like to investing performing such an examination while sleep. He will be seen referred to Dr. Rachele Weber for opinions. He will be seen again in one year for a repeat awakening examination of the vocal cords.     Surgeon: Dr. Akbar Frank     PreOp Diagnosis:  REM Sleep Behavior Disorder     PostOp Diagnosis: REM Sleep Behavior Disorder    Procedure: Flexible Fiberoptic Laryngoscopy    Estimated Blood Loss: None    Complications: None    Consent: The patient was informed of the possible complications and probable outcomes of the procedure and the patient gave informed consent.    Fluids: None    Drains: None    Disposition: to home    Findings: Normal nasopharynx, base of tongue, pyriform sinuses, epiglottis, valleculae, false vocal cords, true vocal cords, and larynx.  Normal motion of the vocal cords with no lesions, masses, nodules, or polyps bilaterally.     Description of Procedure:    The patient was positioned on the clinic room chair. The nose was decongested and anesthetized with 2 puffs in each nostrils lidocaine hcl 4% and oxymetazoline hcl 0.05% topical solution. The flexible fiberoptic scope was passed into the each nostrils and the nasal passages visualized. The scope was passed through the left nostril into the nasopharynx which was normal. The based of tongue and tonsil tissues were visualized and found to be normal. The vocal cords and larynx were visualized and the true vocal cords were visualized and found to be normal.

## 2018-07-26 NOTE — MR AVS SNAPSHOT
After Visit Summary   7/26/2018    Cesar Montejo    MRN: [id number]           Patient Information     Date Of Birth          1965        Visit Information        Provider Department      7/26/2018 4:00 PM Akbar Frank MD [facility]        Today's Diagnoses     REM sleep behavior disorder    -  1       Follow-ups after your visit        Your next 10 appointments already scheduled     Aug 13, 2018  1:30 PM CDT   Return Sleep Patient with Meet Lake MD   [facility] ([facility])    [address] [place] [address]   [phone]            Aug 16, 2018 10:00 AM CDT   (Arrive by 9:45 AM)   New Patient Visit with Rachele Weber MD   [facility] ([facility])    [address] [place] [id number] [phone]            Sep 19, 2018  5:00 PM CDT   (Arrive by 4:45 PM)   Return Movement Disorder with Vida Solomon MD   [facility] Neurology ([facility])    [address] [place] [id number] [phone]            Mar 13, 2019  5:00 PM CDT   (Arrive by 4:45 PM)   Return Movement Disorder with Vida Solomon MD   [facility] ([facility])    [address] [place] [address] [phone]              Who to contact     Please call your clinic at [phone] to:    Ask questions about your health    Make or cancel appointments    Discuss your medicines    Learn about your test results    Speak to your doctor            Additional Information About Your Visit        MyChart Information     [employer] gives you secure access to your electronic health record. If you see a primary care provider, you can also send messages to your care team and make appointments. If you have questions, please call your  primary care clinic.  If you do not have a primary care provider, please call [phone] and they will assist you.      [employer] is an electronic gateway that provides easy, online access to your medical records. With [employer], you can request a clinic appointment, read your test results, renew a prescription or communicate with your care team.     To access your existing account, please contact your [facility] or call [phone] for assistance.        Care EveryWhere ID     This is your Care EveryWhere ID. This could be used by other organizations to access your [facility] medical records  [id number]         Blood Pressure from Last 3 Encounters:   07/05/18 110/80   06/26/18 125/87   06/25/18 121/86    Weight from Last 3 Encounters:   07/05/18 87 kg (191 lb 11.2 oz)   06/26/18 84.4 kg (186 lb)   06/25/18 86 kg (189 lb 9.6 oz)              We Performed the Following     LARYNGOSCOPY FLEX FIBEROPTIC, DIAGNOSTIC        Primary Care Provider Office Phone # Fax #    Rocky Rigo Messina [phone]       [address]  [facility] [address]        Equal Access to Services     BRIANNA [facility]ST[facility] : Hadii aad ku hadasho Sojeremiahali, waaxda luqadaha, qaybta kaalmada adeegyada, prashant nelson. So [facility] [phone].    ATENCIÓN: Si habla español, tiene a persaud disposición servicios gratuitos de asistencia lingüística. Ll[facility] [phone].    We comply with applicable federal civil rights laws and [place] laws. We do not discriminate on the basis of race, color, national origin, age, disability, sex, sexual orientation, or gender identity.            Thank you!     Thank you for choosing [facility]  for your care. Our goal is always to provide you with excellent care. Hearing back from our patients is one way we can continue to improve our services. Please take a few minutes to complete the written survey that you may receive in  the mail after your visit with us. Thank you!             Your Updated Medication List - Protect others around you: Learn how to safely use, store and throw away your medicines at www.disposemymeds.org.          This list is accurate as of 7/26/18  4:08 PM.  Always use your most recent med list.                   Brand Name Dispense Instructions for use Diagnosis    aspirin 81 MG EC tablet      Take 1 tablet (81 mg) by mouth daily        ciprofloxacin 500 MG tablet    CIPRO    28 tablet    Take 1 tablet (500 mg) by mouth 2 times daily    Prostatitis, acute       COQ10 PO           KRILL OIL PO      Take 1 capsule by mouth daily        mirtazapine 15 MG tablet    REMERON    90 tablet    TAKE 1 TABLET(15 MG) BY MOUTH AT BEDTIME    Mixed anxiety and depressive disorder, Insomnia       MULTIVITAL PO      Take 1 tablet by mouth daily        ranitidine 300 MG tablet    ZANTAC          * simvastatin 40 MG tablet    ZOCOR     Take 0.5 tablets by mouth daily        * simvastatin 40 MG tablet    ZOCOR    90 tablet    TAKE 1 TABLET(40 MG) BY MOUTH AT BEDTIME    Hypercholesteremia, CARDIOVASCULAR SCREENING; LDL GOAL LESS THAN 130       temazepam 30 MG capsule    RESTORIL    1 capsule    Take one tab po qhs the night of your sleep study        TURMERIC PO      Take 1 tablet by mouth daily        VITAMIN D (CHOLECALCIFEROL) PO      Take 2,000 Units by mouth daily        * Notice:  This list has 2 medication(s) that are the same as other medications prescribed for you. Read the directions carefully, and ask your doctor or other care provider to review them with you.

## 2018-07-26 NOTE — LETTER
7/26/2018       RE: Cesar Montejo  [address]  [facility] [address]     Dear Colleague,    Thank you for referring your patient, Cesar Montejo, to the [facility] E[facility] at [facility]. Please see a copy of my visit note below.    The patient presents with a history of REM Sleep Behavior Disorder and skin cancers of the face and lower lip. The patient presents for evaluation of his vocal cord function given associated condition with his sleep disorder. The patient denies sinusitis, rhinitis, facial pain, nasal obstruction or purulent nasal discharge. The patient denies chronic or recurrent tonsillitis, chronic or recurrent pharyngitis. The patient denies otalgia, otorrhea, eustachian tube dysfunction, ear infections, dizziness or tinnitus.       This patient is seen in consultation at the request of Dr. Rocky Messina.    All other systems were reviewed and they are either negative or they are not directly pertinent to this Otolaryngology examination.      Past Medical History:    Past Medical History:   Diagnosis Date     Anxiety      Basal cell carcinoma      Gastro-oesophageal reflux disease      H/O magnetic resonance imaging of brain and brain stem 6/12/2018    MR BRAIN WITHOUT AND WITH CONTRAST 11/29/2017 9:10 PM   HISTORY:  Left-sided numbness. Disequilibrium.   COMPARISON: MR brain 4/18/2015.   TECHNIQUE: Sagittal T1, axial T2, axial FLAIR, axial GRE, axial diffusion scan, coronal FLAIR, axial post Gd enhanced T1 weighted images.   FINDINGS: There is no intracranial hemorrhage, mass, or recent infarct. There is a cyst in the floor of the right maxilla     Head injury April 2012    Had a bad concussion with post concussion synd for year     High cholesterol      History of echocardiogram 6/12/2018    TUYET Keyes MD 6/4/2018  Narrative     [id number]^MECHE^BLAKE^KRISTAL       [facility]  [facility] [address] [place] [address] Name: JONATHAN FIORE MRN: [id number] : 1965 Study Date: 2018 09:13 AM Age: 52 yrs Gender: Male Patient Location: [facility] Reason For Study: Chest Pain Ordering Physician:      Hypertension early     Not on meds. Usually in pre-hypertesion categ.     Insomnia      Squamous cell carcinoma        Past Surgical History:    Past Surgical History:   Procedure Laterality Date     COLONOSCOPY N/A 2017    Procedure: COLONOSCOPY;  Surgeon: Larry Fields MD;  Location:  GI     LASER CO2 LESION ORAL N/A 10/5/2016    Procedure: LASER CO2 LESION ORAL;  Surgeon: Josué Rojo DDS;  Location:  OR     MOHS MICROGRAPHIC PROCEDURE         Medications:      Current Outpatient Prescriptions:      aspirin 81 MG EC tablet, Take 1 tablet (81 mg) by mouth daily, Disp: , Rfl:      ciprofloxacin (CIPRO) 500 MG tablet, Take 1 tablet (500 mg) by mouth 2 times daily, Disp: 28 tablet, Rfl: 0     Coenzyme Q10 (COQ10 PO), , Disp: , Rfl:      KRILL OIL PO, Take 1 capsule by mouth daily , Disp: , Rfl:      mirtazapine (REMERON) 15 MG tablet, TAKE 1 TABLET(15 MG) BY MOUTH AT BEDTIME, Disp: 90 tablet, Rfl: 3     Multiple Vitamins-Minerals (MULTIVITAL PO), Take 1 tablet by mouth daily , Disp: , Rfl:      ranitidine (ZANTAC) 300 MG tablet, , Disp: , Rfl: 0     simvastatin (ZOCOR) 40 MG tablet, TAKE 1 TABLET(40 MG) BY MOUTH AT BEDTIME, Disp: 90 tablet, Rfl: 0     simvastatin (ZOCOR) 40 MG tablet, Take 0.5 tablets by mouth daily, Disp: , Rfl:      temazepam (RESTORIL) 30 MG capsule, Take one tab po qhs the night of your sleep study (Patient not taking: Reported on 2018), Disp: 1 capsule, Rfl: 0     TURMERIC PO, Take 1 tablet by mouth daily , Disp: , Rfl:      VITAMIN D, CHOLECALCIFEROL, PO, Take 2,000 Units by mouth daily , Disp: , Rfl:     Allergies:    Review of patient's allergies indicates no known allergies.    Physical  Examination:    The patient is a well developed, well nourished male in no apparent distress.  He is normocepahlic, atraumatic with pupils equally round and reactive to light.    Oral Cavity Examination: Normal Mucosa with no masses or lesions  Nasal Examination: Normal Mucosa with no masses or lesions  Ear Examination: Ear canals clear, tympanic membranes and middle ear spaces normal  Neurological Examination: Facial nerve function intact and symmetric  Integumentary Examination: No lesions on the skin of the head or neck  Neck Examination: No masses or lesions, no lymphadenopathy  Endocrine Examination: Normal thyroid examination    Assessment and Plan:    The patient presents with a history of REM Sleep Behavior Disorder. His vocal cords are moving normal. He would like to investing performing such an examination while sleep. He will be seen referred to Dr. Rachele Weber for opinions. He will be seen again in one year for a repeat awakening examination of the vocal cords.     Surgeon: Dr. Akbar Frank     PreOp Diagnosis:  REM Sleep Behavior Disorder     PostOp Diagnosis: REM Sleep Behavior Disorder    Procedure: Flexible Fiberoptic Laryngoscopy    Estimated Blood Loss: None    Complications: None    Consent: The patient was informed of the possible complications and probable outcomes of the procedure and the patient gave informed consent.    Fluids: None    Drains: None    Disposition: to home    Findings: Normal nasopharynx, base of tongue, pyriform sinuses, epiglottis, valleculae, false vocal cords, true vocal cords, and larynx.  Normal motion of the vocal cords with no lesions, masses, nodules, or polyps bilaterally.     Description of Procedure:    The patient was positioned on the clinic room chair. The nose was decongested and anesthetized with 2 puffs in each nostrils lidocaine hcl 4% and oxymetazoline hcl 0.05% topical solution. The flexible fiberoptic scope was passed into the each nostrils and the  nasal passages visualized. The scope was passed through the left nostril into the nasopharynx which was normal. The based of tongue and tonsil tissues were visualized and found to be normal. The vocal cords and larynx were visualized and the true vocal cords were visualized and found to be normal.         Again, thank you for allowing me to participate in the care of your patient.      Sincerely,    Akbar Frank MD

## 2018-07-30 NOTE — PROCEDURES
SLEEP STUDY INTERPRETATION  DIAGNOSTIC POLYSOMNOGRAPHY REPORT      Patient: JONATHAN FIORE  YOB: 1965  Study Date: 7/25/2018  MRN: [id number]  Referring Provider: SELF  Ordering Provider: CHARLEE THOMPSON MD    Indications for Polysomnography: The patient is a 52 y old Male who is 6' and weighs 189.0 lbs. His BMI is 25.9, [place] sleepiness scale 5.0 and neck circumference is 37.0 cm. Relevant medical history includes snoring and dream reenactment. A diagnostic polysomnogram was performed to evaluate for sleep apnea and RBD.    Polysomnogram Data: A full night polysomnogram recorded the standard physiologic parameters including EEG, EOG, EMG, ECG, nasal and oral airflow. Respiratory parameters of chest and abdominal movements were recorded with respiratory inductance plethysmography. Oxygen saturation was recorded by pulse oximetry. Hypopnea scoring rule used: 1B 4%.    Sleep Architecture: Mild sleep fragmentation  The total recording time of the polysomnogram was 474.4 minutes. The total sleep time was 439.0 minutes. Sleep latency was normal at 9.3 minutes with the use of a sleep aid (temazepam 30mg). REM latency was 112.5 minutes. Arousal index was slightly increased at 15.4 arousals per hour. Sleep efficiency was normal at 92.5%. Wake after sleep onset was 25.5 minutes. The patient spent 4.9% of total sleep time in Stage N1, 52.2% in Stage N2, 19.5% in Stage N3, and 23.5% in REM. Time in REM supine was 49.0 minutes.    Respiration: This study did not demonstrate evidence suggestive of clinically significant sleep disordered breathing.  This was a good study that included REM supine.    Events ? The polysomnogram revealed a presence of 14 obstructive, 14 central, and - mixed apneas resulting in an apnea index of 3.8 events per hour. There were 4 obstructive hypopneas and 0 central hypopneas resulting in an obstructive hypopnea index of 0.5 and central hypopnea index of - events per hour. The  combined apnea/hypopnea index was 4.4 events per hour (central apnea/hypopnea index was 1.9 events per hour). The REM AHI was 8.7 events per hour. The supine AHI was 9.2 events per hour. The RERA index was 4.2 events per hour.  The RDI was 8.6 events per hour.    Snoring - was reported as mild.    Respiratory rate and pattern - was notable for normal respiratory rate and pattern.    Sustained Sleep Associated Hypoventilation - Transcutaneous carbon dioxide monitoring was not used, however significant hypoventilation was not suggested by oximetry.    Sleep Associated Hypoxemia - (Greater than 5 minutes O2 sat at or below 88%) was not present. Baseline oxygen saturation was 94.1%. Lowest oxygen saturation was 88.5%. Time spent less than or equal to 88% was 0 minutes. Time spent less than or equal to 89% was 0.1 minutes.    Movement Activity: Mildly abnormal REM muscle activity and unclear whether dream enactment behavior were present.     Periodic Limb Activity - There were 32 PLMs during the entire study. The PLM index was 4.4 movements per hour. The PLM Arousal Index was 0.1 per hour.    REM EMG Activity - Mild excessive transient muscle activity was present (~10% of REM sleep epochs).    Nocturnal Behavior - Rare abnormal sleep related behaviors were noted during out of REM sleep.     Bruxism - None apparent.    Cardiac Summary: Predominantly narrow QRS complexes preceded by P waves suggestive of sinus rhythm.  Intermittent tachycardia.   The average pulse rate was 61.9 bpm. The minimum pulse rate was 50.9 bpm while the maximum pulse rate was 110.5 bpm.        Assessment:     Sleep Architecture: Mild sleep fragmentation    Respiration: This study did not demonstrate evidence suggestive of clinically significant sleep disordered breathing.  This was a good study that included REM supine.    Movement Activity: Mildly abnormal REM muscle activity and unclear whether dream enactment behavior were present.     Cardiac  Summary: Predominantly narrow QRS complexes preceded by P waves suggestive of sinus rhythm.  Intermittent tachycardia.     Recommendations:    Patient may be reassured that, per this study they do not have clinically significant sleep disordered breathing.  If interested in treating snoring options include dental appliance and upper airway surgery.    Mildly increased REM motor activity was noted and dream enactment was rare. Recommend close surveillance in RBD clinic.   o Consider treatment with either high dose melatonin or low dose clonazepam if symptoms warrant.   o Consider repeat 4-limb PSG if symptoms progress.     Advice regarding the risks of drowsy driving.    Suggest optimizing sleep schedule and avoiding sleep deprivation.    Diagnostic Codes:   Sleep Disorder G47.9    7/25/2018 [place] [facility] Sleep Study (189.0 lbs) - AHI 4.4, RDI 8.6, Supine AHI 9.2, REM AHI 8.7, Low O2 88.5%, Time Spent ?88% 0 minutes / Time Spent ?89% 0.1 minutes.    Lilliana Wang MD  Clinical Neurophysiology Fellow    Attending MD: PSG was personally reviewed in detail with the Sleep Medicine Fellow.  The above interpretation reflects our joint assessment and recommendations.       _____________________________________   Electronically Signed By: Meet Lake MD 7-30-18

## 2018-07-31 LAB — SLPCOMP: NORMAL

## 2018-08-08 LAB
DLCOCOR-%PRED-PRE: 113 %
DLCOCOR-PRE: 35.8 ML/MIN/MMHG
DLCOUNC-%PRED-PRE: 116 %
DLCOUNC-PRE: 36.48 ML/MIN/MMHG
DLCOUNC-PRED: 31.41 ML/MIN/MMHG
ERV-%PRED-PRE: 108 %
ERV-PRE: 1.76 L
ERV-PRED: 1.62 L
EXPTIME-PRE: 8.8 SEC
FEF2575-%PRED-PRE: 107 %
FEF2575-PRE: 4.01 L/SEC
FEF2575-PRED: 3.71 L/SEC
FEFMAX-%PRED-PRE: 92 %
FEFMAX-PRE: 9.48 L/SEC
FEFMAX-PRED: 10.25 L/SEC
FEV1-%PRED-PRE: 115 %
FEV1-PRE: 4.82 L
FEV1FEV6-PRE: 76 %
FEV1FEV6-PRED: 80 %
FEV1FVC-PRE: 76 %
FEV1FVC-PRED: 78 %
FEV1SVC-PRE: 73 %
FEV1SVC-PRED: 75 %
FIFMAX-PRE: 7.34 L/SEC
FIO2-PRE: 21 %
FRCPLETH-%PRED-PRE: 121 %
FRCPLETH-PRE: 4.42 L
FRCPLETH-PRED: 3.65 L
FVC-%PRED-PRE: 119 %
FVC-PRE: 6.36 L
FVC-PRED: 5.32 L
IC-%PRED-PRE: 121 %
IC-PRE: 4.8 L
IC-PRED: 3.94 L
RVPLETH-%PRED-PRE: 116 %
RVPLETH-PRE: 2.67 L
RVPLETH-PRED: 2.28 L
TLCPLETH-%PRED-PRE: 122 %
TLCPLETH-PRE: 9.23 L
TLCPLETH-PRED: 7.53 L
VA-%PRED-PRE: 117 %
VA-PRE: 8.48 L
VC-%PRED-PRE: 117 %
VC-PRE: 6.56 L
VC-PRED: 5.56 L

## 2018-08-08 NOTE — TELEPHONE ENCOUNTER
FUTURE VISIT INFORMATION      FUTURE VISIT INFORMATION:    Date: 08/16/2018    Time: 10:00    Location: [facility]  REFERRAL INFORMATION:    Referring provider:  DR BORREGO    Referring providers clinic:  ENT    Reason for visit/diagnosis  BREATHING AT NIGHT     RECORDS REQUESTED FROM:       Clinic name Comments Records Status Imaging Status   [facility] OFFICE VISIT: 07/26/2018 INTERNAL N/A   [place] OFFICE VISIT: 07/25/2018,06/25/2018,06/07/2018, 12/14/2017, 11/24/2017  SLEEP STUDY: 07/25/2018  IMAGE: NECK SOFT TISSUE XR 06/03/2018 INTERNAL YES                             RECORDS STATUS    SEE CARE EVERYWHERE

## 2018-08-13 ENCOUNTER — OFFICE VISIT (OUTPATIENT)
Dept: SLEEP MEDICINE | Facility: CLINIC | Age: 53
End: 2018-08-13
Payer: COMMERCIAL

## 2018-08-13 VITALS
HEIGHT: 73 IN | HEART RATE: 88 BPM | OXYGEN SATURATION: 96 % | DIASTOLIC BLOOD PRESSURE: 94 MMHG | RESPIRATION RATE: 16 BRPM | SYSTOLIC BLOOD PRESSURE: 156 MMHG | BODY MASS INDEX: 25.47 KG/M2 | WEIGHT: 192.2 LBS

## 2018-08-13 DIAGNOSIS — G90.9 AUTONOMIC DISORDER: Primary | ICD-10-CM

## 2018-08-13 PROCEDURE — 99215 OFFICE O/P EST HI 40 MIN: CPT | Performed by: PSYCHIATRY & NEUROLOGY

## 2018-08-13 NOTE — PATIENT INSTRUCTIONS

## 2018-08-13 NOTE — PROGRESS NOTES
Visit Date:   08/13/2018      Mr. Montejo is 52 years old.  He has a history of concerning dream enactment as well as some laborious breathing.  In combination with that, he has felt imbalanced, dysarthric, and has had some autonomic difficulty, in particular, orthostasis as well as abnormal sweating.  All of this has raised the concern as to whether or not he may have multiple system atrophy.  To help stratify this risk, we did arrange for him to have an in-laboratory polysomnogram.  Of note, he did have some subtle increases in REM motor tone, but I wanted to emphasize with him that this was admittedly quite subtle.  He also did not have any clear sleep-disordered breathing, and any dream enactment he did have was quite subtle.      I tried to place this into context with some of the results some of his various providers have indicated.  In particular, he has seen our colleagues in the [facility] who have not noticed any clear parkinsonism at this time; however, he is concerned that he does indeed have some possible neurodegenerative process.  We went through our various options of what to do at this point in regards to his dream enactment.  We will go ahead and have him continue on melatonin at 10 mg a night, and I will also have him visit with my colleague, Dr. Abdiel Dominguez, at [facility] who can provide some further objective evaluation of his autonomic function.      The patient understands the plan.  I asked that he return to see me annually or sooner if he has problems.  It is a great privilege being asked to participate in his care.  We discussed the importance of environmental safety and removing weapons out of the bedroom, as well as never driving if tired or sleepy.      It is a great privilege being asked to participate in his care.  Over 40 minutes were spent with the patient today, greater than 50% of the time in counseling and coordination of care.         JAY  FANNY CONTRERAS MD             D: 2018   T: 2018   MT: JAK      Name:     JONATHAN FIORE   MRN:      [id number]        Account:      [id number]   :      1965           Visit Date:   2018      Document: [id number]

## 2018-08-13 NOTE — MR AVS SNAPSHOT
After Visit Summary   8/13/2018    Cesar Montejo    MRN: [id number]           Patient Information     Date Of Birth          1965        Visit Information        Provider Department      8/13/2018 1:30 PM Meet Lake MD [facility]        Today's Diagnoses     Autonomic disorder    -  1      Care Instructions      Your BMI is Body mass index is 25.36 kg/(m^2).  Weight management is a personal decision.  If you are interested in exploring weight loss strategies, the following discussion covers the approaches that may be successful. Body mass index (BMI) is one way to tell whether you are at a healthy weight, overweight, or obese. It measures your weight in relation to your height.  A BMI of 18.5 to 24.9 is in the healthy range. A person with a BMI of 25 to 29.9 is considered overweight, and someone with a BMI of 30 or greater is considered obese. More than two-thirds of American adults are considered overweight or obese.  Being overweight or obese increases the risk for further weight gain. Excess weight may lead to heart disease and diabetes.  Creating and following plans for healthy eating and physical activity may help you improve your health.  Weight control is part of healthy lifestyle and includes exercise, emotional health, and healthy eating habits. Careful eating habits lifelong are the mainstay of weight control. Though there are significant health benefits from weight loss, long-term weight loss with diet alone may be very difficult to achieve- studies show long-term success with dietary management in less than 10% of people. Attaining a healthy weight may be especially difficult to achieve in those with severe obesity. In some cases, medications, devices and surgical management might be considered.  What can you do?  If you are overweight or obese and are interested in methods for weight loss, you should discuss this with your provider.     Consider  reducing daily calorie intake by 500 calories.     Keep a food journal.     Avoiding skipping meals, consider cutting portions instead.    Diet combined with exercise helps maintain muscle while optimizing fat loss. Strength training is particularly important for building and maintaining muscle mass. Exercise helps reduce stress, increase energy, and improves fitness. Increasing exercise without diet control, however, may not burn enough calories to loose weight.       Start walking three days a week 10-20 minutes at a time    Work towards walking thirty minutes five days a week     Eventually, increase the speed of your walking for 1-2 minutes at time    In addition, we recommend that you review healthy lifestyles and methods for weight loss available through the National Institutes of Health patient information sites:  http://win.niddk.nih.gov/publications/index.htm    And look into health and wellness programs that may be available through your health insurance provider, employer, local community center, or [employer].                Follow-ups after your visit        Additional Services     NEUROLOGY ADULT REFERRAL       Dr Abdiel Dominguez Autonomic testing and neurology consult at [facility] for concerns regarding MSA in the setting of mild REM sleep behavior disorder.      Please be aware that coverage of these services is subject to the terms and limitations of your health insurance plan.  Call member services at your health plan with any benefit or coverage questions.      Please bring the following with you to your appointment:    (1) Any X-Rays, CTs or MRIs which have been performed.  Contact the facility where they were done to arrange for  prior to your scheduled appointment.    (2) List of current medications  (3) This referral request   (4) Any documents/labs given to you for this referral                  Your next 10 appointments already scheduled     Aug 15, 2018 11:15 AM CDT   (Arrive by 11:00  AM)   Return Visit with Cherie Gillespie MD   [facility] ([facility])    [address] [place] [address]  [address] [facility] [address]   [phone]            Aug 16, 2018 10:00 AM CDT   (Arrive by 9:45 AM)   New Patient Visit with Rachele Weber MD   [facility] ([facility])    [address] [place] [address]  [address] [facility] [address]   [phone]            Sep 19, 2018  5:00 PM CDT   (Arrive by 4:45 PM)   Return Movement Disorder with Vida Solomon MD   [facility] ([facility])    [address] [place] [address]   [phone]            Mar 13, 2019  5:00 PM CDT   (Arrive by 4:45 PM)   Return Movement Disorder with Vida Solomon MD   [facility] ([facility])    [address] [place] [id number] [phone]              Who to contact     If you have questions or need follow up information about today's clinic visit or your schedule please contact [facility] directly at [phone].  Normal or non-critical lab and imaging results will be communicated to you by MyChart, letter or phone within 4 business days after the clinic has received the results. If you do not hear from us within 7 days, please contact the clinic through MyChart or phone. If you have a critical or abnormal lab result, we will notify you by phone as soon as possible.  Submit refill requests through [employer] or call your pharmacy and they will forward the refill request to us. Please allow 3 business days for your refill to be completed.          Additional Information About Your Visit        [employer] Information     [employer] gives you secure access to your electronic health record. If you see a primary care provider, you can also send messages to your care team and make appointments. If you have  "questions, please call your primary care clinic.  If you do not have a primary care provider, please call [phone] and they will assist you.        Care EveryWhere ID     This is your Care EveryWhere ID. This could be used by other organizations to access your [place] medical records  [id number]        Your Vitals Were     Pulse Respirations Height Pulse Oximetry BMI (Body Mass Index)       88 16 1.854 m (6' 1\") 96% 25.36 kg/m2        Blood Pressure from Last 3 Encounters:   08/13/18 (!) 156/94   07/05/18 110/80   06/26/18 125/87    Weight from Last 3 Encounters:   08/13/18 87.2 kg (192 lb 3.2 oz)   07/05/18 87 kg (191 lb 11.2 oz)   06/26/18 84.4 kg (186 lb)              We Performed the Following     NEUROLOGY ADULT REFERRAL        Primary Care Provider Office Phone # Fax #    Rocky Rigo Messina [phone]       [address] [facility] [address]  [facility] [address]        Equal Access to Services     [facility]: Hadii aad ku hadasho Soidris, waaxda luqadaha, qaybta kaalmada adeegyakelby, prashant jameson . So [facility] [phone].    ATENCIÓN: Si habla español, tiene a persaud disposición servicios gratuitos de asistencia lingüística. [facility] [phone].    We comply with applicable federal civil rights laws and [place] laws. We do not discriminate on the basis of race, color, national origin, age, disability, sex, sexual orientation, or gender identity.            Thank you!     Thank you for choosing [facility]  for your care. Our goal is always to provide you with excellent care. Hearing back from our patients is one way we can continue to improve our services. Please take a few minutes to complete the written survey that you may receive in the mail after your visit with us. Thank you!             Your Updated Medication List - Protect others around you: Learn how to safely use, store and throw away your medicines at www.disposemymeds.org.        "   This list is accurate as of 8/13/18  2:03 PM.  Always use your most recent med list.                   Brand Name Dispense Instructions for use Diagnosis    aspirin 81 MG EC tablet      Take 1 tablet (81 mg) by mouth daily        COQ10 PO           KRILL OIL PO      Take 1 capsule by mouth daily        mirtazapine 15 MG tablet    REMERON    90 tablet    TAKE 1 TABLET(15 MG) BY MOUTH AT BEDTIME    Mixed anxiety and depressive disorder, Insomnia       MULTIVITAL PO      Take 1 tablet by mouth daily        ranitidine 300 MG tablet    ZANTAC          * simvastatin 40 MG tablet    ZOCOR     Take 0.5 tablets by mouth daily        * simvastatin 40 MG tablet    ZOCOR    90 tablet    TAKE 1 TABLET(40 MG) BY MOUTH AT BEDTIME    Hypercholesteremia, CARDIOVASCULAR SCREENING; LDL GOAL LESS THAN 130       temazepam 30 MG capsule    RESTORIL    1 capsule    Take one tab po qhs the night of your sleep study        TURMERIC PO      Take 1 tablet by mouth daily        VITAMIN D (CHOLECALCIFEROL) PO      Take 2,000 Units by mouth daily        * Notice:  This list has 2 medication(s) that are the same as other medications prescribed for you. Read the directions carefully, and ask your doctor or other care provider to review them with you.

## 2018-08-13 NOTE — NURSING NOTE
"Chief Complaint   Patient presents with     Study Results     PSG f/u       Initial BP (!) 156/94  Pulse 88  Resp 16  Ht 1.854 m (6' 1\")  Wt 87.2 kg (192 lb 3.2 oz)  SpO2 96%  BMI 25.36 kg/m2 Estimated body mass index is 25.36 kg/(m^2) as calculated from the following:    Height as of this encounter: 1.854 m (6' 1\").    Weight as of this encounter: 87.2 kg (192 lb 3.2 oz).    Medication Reconciliation: complete     ESS 7  Stephy Guadalupe        "

## 2018-08-15 ENCOUNTER — OFFICE VISIT (OUTPATIENT)
Dept: DERMATOLOGY | Facility: CLINIC | Age: 53
End: 2018-08-15
Payer: COMMERCIAL

## 2018-08-15 DIAGNOSIS — D48.5 NEOPLASM OF UNCERTAIN BEHAVIOR OF SKIN: Primary | ICD-10-CM

## 2018-08-15 RX ORDER — LIDOCAINE HYDROCHLORIDE AND EPINEPHRINE 10; 10 MG/ML; UG/ML
3 INJECTION, SOLUTION INFILTRATION; PERINEURAL ONCE
Qty: 3 ML | Refills: 0 | OUTPATIENT
Start: 2018-08-15 | End: 2019-02-07

## 2018-08-15 ASSESSMENT — PAIN SCALES - GENERAL
PAINLEVEL: NO PAIN (1)
PAINLEVEL: NO PAIN (1)

## 2018-08-15 NOTE — PATIENT INSTRUCTIONS

## 2018-08-15 NOTE — NURSING NOTE
Dermatology Rooming Note    Cesar Montejo's goals for this visit include:   Chief Complaint   Patient presents with     Derm Problem     Cesar is here for a spot on his ear that won't heal. He's had it over a month. He is also concerned about a spot on his upper left chest.        Nadia Dc, EMT

## 2018-08-15 NOTE — LETTER
8/15/2018       RE: Cesar Montejo  [address]  [facility] [address]     Dear Colleague,    Thank you for referring your patient, Cesar Montejo, to the [facility] DERMAT[facility] at [facility]. Please see a copy of my visit note below.    [facility] Dermatology Note      Dermatology Problem List:  1. NMSC  -Superficial BCC, left cheek, s/p Mohns 6/16, NER  -SCCIS right cheek, s/p Mohs 6/16, NER    2. Actinic keratoses    3. Actinic cheilitis  - s/p CO2 laser ablation of lower lip vermilion 10/16  - s/p laser vermilionectomy 8/2017  - Follows with oral surgeon q 6 months    4. Neoplasm of uncertain behavior, L ear  - s/p shave biopsy 8/15/18, will follow-up with pathology results    5. EIC, L superior chest  - Reassurance      Encounter Date: Aug 15, 2018    CC:   Chief Complaint   Patient presents with     Derm Problem     Cesar is here for a spot on his ear that won't heal. He's had it over a month. He is also concerned about a spot on his upper left chest.        History of Present Illness:  Mr. Cesar Montejo is a 52 year old male who presents as a follow-up for a non-healing spot on his ear frozen previously in the past as well as a new red spot on his upper left chest. He was last seen by Dr. Mathis in May of 2018 for a TBSE which was entirely normal.     For last 6 weeks he has noticed a cycle of bleeding and scabbing on a spot on his left ear. He admits that he may be subconsciously picking at the spot but also thinks it bleeds spontaneously. It has been frozen once in the distant past (> 1 year ago). Cesar also presents with a red spot on the left upper chest first noticed a month ago that is mildly tender to palpation but otherwise asymptomatic (not painful, not itchy, doesn't bleed). He has not noticed any changes in size or color since he first noticed it.    Cesar denies any observed changes in the areas of his previous skin  cancers. For his cheilitis he continues to follow regularly with an oral surgeon (now q6 months).     For sun protection, Cesar continues to avoid the sun during the middle of the day and regularly wears a wide-brimmed hat. He also wears SPF 30 or 50 when he knows he is going to be out in the sun for prolonged periods of time.        His health has otherwise been good with no major changes since we last saw him in May. He denies any fevers, chills, unexpected weight loss or recent infections.    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    Family History:  Melanoma in grandfather and cousin.  SqCC in mother.     Review of Systems:  Per HPI.     Physical Exam:  Vitals: There were no vitals taken for this visit.  GEN: This is a well developed, well-nourished male in no acute distress, in a pleasant mood.    Focused skin exam performed today of L ear and L upper chest  - lill-defined, crusted, erythematous plaque on the left mid-helix  - well-circumscribed erythematous nodule on left superior chest with visible punctum on dermoscopy    PROCEDURE NOTE: Shave Biopsy  After informed written consent was obtained from the parent, the biopsy site was marked with a pen.  The area was cleansed with alcohol and injected with 0.5% lidocaine buffered with sodium bicarbonate for a total of 1 ml.  Using a Dermablade, shave removal of the left mid helix was completed.  The wound was dressed with vaseline and a bandage.  Supplies and wound care instructions were provided. The specimen is labeled, placed in formalin and sent to pathology for H&E evaluation. The procedure was well tolerated without complications.    Impression/Plan:   1. Neoplasm of uncertain behavior on the L ear, concern for NMSC particularly given personal history of BCC and SCCIS and recent behavior. Elected to perform shave biopsy today of the lesion which was well-tolerated. Will follow-up with results of biopsy over the phone in  coming 1-2 weeks.  .     2. EIC on left superior chest. Benign, reassurance provided. Can follow-up for elective resection in the future if desired.     Staff Involved:  Priya Mc, MS4 scribed on behalf of Dr. Gillespie.    .I was present with the medical student who participated in the service and in the documentation of the note. I have verified the history and personally performed the physical exam and medical decision making. I agree with the assessment and plan of care as documented in the note.  Cherie Gillespie MD            Pictures were placed in Pt's chart today for future reference.      Again, thank you for allowing me to participate in the care of your patient.      Sincerely,    Cherie Gillespie MD

## 2018-08-15 NOTE — MR AVS SNAPSHOT
After Visit Summary   8/15/2018    Cesar Montejo    MRN: [id number]           Patient Information     Date Of Birth          1965        Visit Information        Provider Department      8/15/2018 11:15 AM Cherie Gillespie MD [facility] Dermatology        Today's Diagnoses     Neoplasm of uncertain behavior of skin    -  1      Care Instructions    Wound Care After a Biopsy    What is a skin biopsy?  A skin biopsy allows the doctor to examine a very small piece of tissue under the microscope to determine the diagnosis and the best treatment for the skin condition. A local anesthetic (numbing medicine)  is injected with a very small needle into the skin area to be tested. A small piece of skin is taken from the area. Sometimes a suture (stitch) is used.     What are the risks of a skin biopsy?  I will experience scar, bleeding, swelling, pain, crusting and redness. I may experience incomplete removal or recurrence. Risks of this procedure are excessive bleeding, bruising, infection, nerve damage, numbness, thick (hypertrophic or keloidal) scar and non-diagnostic biopsy.    How should I care for my wound for the first 24 hours?    Keep the wound dry and covered for 24 hours    If it bleeds, hold direct pressure on the area for 15 minutes. If bleeding does not stop then go to the emergency room    Avoid strenuous exercise the first 1-2 days or as your doctor instructs you    How should I care for the wound after 24 hours?    After 24 hours, remove the bandage    You may bathe or shower as normal    If you had a scalp biopsy, you can shampoo as usual and can use shower water to clean the biopsy site daily    Clean the wound twice a day with gentle soap and water    Do not scrub, be gentle    Apply white petroleum/Vaseline after cleaning the wound with a cotton swab or a clean finger, and keep the site covered with a Bandaid /bandage. Bandages are not necessary with a scalp biopsy    If you are  unable to cover the site with a Bandaid /bandage, re-apply ointment 2-3 times a day to keep the site moist. Moisture will help with healing    Avoid strenuous activity for first 1-2 days    Avoid lakes, rivers, pools, and oceans until the stitches are removed or the site is healed    How do I clean my wound?    Wash hands thoroughly with soap or use hand  before all wound care    Clean the wound with gentle soap and water    Apply white petroleum/Vaseline  to wound after it is clean    Replace the Bandaid /bandage to keep the wound covered for the first few days or as instructed by your doctor    If you had a scalp biopsy, warm shower water to the area on a daily basis should suffice    What should I use to clean my wound?     Cotton-tipped applicators (Qtips )    White petroleum jelly (Vaseline ). Use a clean new container and use Q-tips to apply.    Bandaids   as needed    Gentle soap     How should I care for my wound long term?    Do not get your wound dirty    Keep up with wound care for one week or until the area is healed.    A small scab will form and fall off by itself when the area is completely healed. The area will be red and will become pink in color as it heals. Sun protection is very important for how your scar will turn out. Sunscreen with an SPF 30 or greater is recommended once the area is healed.    If you have stitches, stitches need to be removed in 14 days. You may return to our clinic for this or you may have it done locally at your doctor s office.    You should have some soreness but it should be mild and slowly go away over several days. Talk to your doctor about using tylenol for pain,    When should I call my doctor?  If you have increased:     Pain or swelling    Pus or drainage (clear or slightly yellow drainage is ok)    Temperature over 100F    Spreading redness or warmth around wound    When will I hear about my results?  The biopsy results can take 2-3 weeks to come back.  The clinic will call you with the results, send you a [employer] message, or have you schedule a follow-up clinic or phone time to discuss the results. Contact our clinics if you do not hear from us in 3 weeks.     Who should I call with questions?    [facility]: [phone]     [facility]: [phone]    For urgent needs outside of business hours call the [facility] at [phone] and ask for the dermatology resident on call              Follow-ups after your visit        Your next 10 appointments already scheduled     Aug 16, 2018 10:00 AM CDT   (Arrive by 9:45 AM)   New Patient Visit with Rachele Weber MD   [facility] Ear Nose and Throat ([facility])    [address] [place] [address] [id number] [phone]            Sep 19, 2018  5:00 PM CDT   (Arrive by 4:45 PM)   Return Movement Disorder with Vida Solomon MD   [facility] Neurology ([facility])    [address] [place] [address] [id number] [phone]            Mar 13, 2019  5:00 PM CDT   (Arrive by 4:45 PM)   Return Movement Disorder with Vida Solomon MD   [facility] Neurology ([facility])    [address] [place] [address] [id number] [phone]              Who to contact     Please call your clinic at [phone] to:    Ask questions about your health    Make or cancel appointments    Discuss your medicines    Learn about your test results    Speak to your doctor            Additional Information About Your Visit        MyChart Information     [employer] gives you secure access to your electronic health record. If you see a primary care provider, you can also send messages to your care team and make appointments. If you have questions, please call your primary care clinic.  If you do not have a primary care provider, please  call [phone] and they will assist you.      [employer] is an electronic gateway that provides easy, online access to your medical records. With [employer], you can request a clinic appointment, read your test results, renew a prescription or communicate with your care team.     To access your existing account, please contact your [facility] or call [phone] for assistance.        Care EveryWhere ID     This is your Care EveryWhere ID. This could be used by other organizations to access your [place] medical records  [id number]         Blood Pressure from Last 3 Encounters:   08/13/18 (!) 156/94   07/05/18 110/80   06/26/18 125/87    Weight from Last 3 Encounters:   08/13/18 87.2 kg (192 lb 3.2 oz)   07/05/18 87 kg (191 lb 11.2 oz)   06/26/18 84.4 kg (186 lb)              We Performed the Following     BIOPSY SKIN/SUBQ/MUC MEM, SINGLE LESION     Dermatological path order and indications          Today's Medication Changes          These changes are accurate as of 8/15/18 12:23 PM.  If you have any questions, ask your nurse or doctor.               Start taking these medicines.        Dose/Directions    lidocaine 1% with EPINEPHrine 1:100,000 1 %-1:277867 injection   Used for:  Neoplasm of uncertain behavior of skin   Started by:  Cherie Gillespie MD        Dose:  3 mL   Inject 3 mLs into the skin once for 1 dose   Quantity:  3 mL   Refills:  0            Where to get your medicines      Some of these will need a paper prescription and others can be bought over the counter.  Ask your nurse if you have questions.     You don't need a prescription for these medications     lidocaine 1% with EPINEPHrine 1:100,000 1 %-1:033849 injection                Primary Care Provider Office Phone # Fax #    Rocky Messina [phone]       [address] [place], [place] [address]E [facility] [address]  [facility] [address]        Equal Access to Services     CAROLINA PLASCENCIA AH: Jackie Mcdaniel,  waaxda luqadaha, qaybta kabarbara guerrero, prashant burnettemmy ah. So [facility] [phone].    ATENCIÓN: Si suze pina, tiene a persaud disposición servicios gratuitos de asistencia lingüística. Angelica al [phone].    We comply with applicable federal civil rights laws and [place] laws. We do not discriminate on the basis of race, color, national origin, age, disability, sex, sexual orientation, or gender identity.            Thank you!     Thank you for choosing [facility] DERMATOLOGY  for your care. Our goal is always to provide you with excellent care. Hearing back from our patients is one way we can continue to improve our services. Please take a few minutes to complete the written survey that you may receive in the mail after your visit with us. Thank you!             Your Updated Medication List - Protect others around you: Learn how to safely use, store and throw away your medicines at www.disposemymeds.org.          This list is accurate as of 8/15/18 12:23 PM.  Always use your most recent med list.                   Brand Name Dispense Instructions for use Diagnosis    aspirin 81 MG EC tablet      Take 1 tablet (81 mg) by mouth daily        COQ10 PO           KRILL OIL PO      Take 1 capsule by mouth daily        lidocaine 1% with EPINEPHrine 1:100,000 1 %-1:716620 injection     3 mL    Inject 3 mLs into the skin once for 1 dose    Neoplasm of uncertain behavior of skin       mirtazapine 15 MG tablet    REMERON    90 tablet    TAKE 1 TABLET(15 MG) BY MOUTH AT BEDTIME    Mixed anxiety and depressive disorder, Insomnia       MULTIVITAL PO      Take 1 tablet by mouth daily        ranitidine 300 MG tablet    ZANTAC          * simvastatin 40 MG tablet    ZOCOR     Take 0.5 tablets by mouth daily        * simvastatin 40 MG tablet    ZOCOR    90 tablet    TAKE 1 TABLET(40 MG) BY MOUTH AT BEDTIME    Hypercholesteremia, CARDIOVASCULAR SCREENING; LDL GOAL LESS THAN 130       temazepam 30 MG capsule     RESTORIL    1 capsule    Take one tab po qhs the night of your sleep study        TURMERIC PO      Take 1 tablet by mouth daily        VITAMIN D (CHOLECALCIFEROL) PO      Take 2,000 Units by mouth daily        * Notice:  This list has 2 medication(s) that are the same as other medications prescribed for you. Read the directions carefully, and ask your doctor or other care provider to review them with you.

## 2018-08-15 NOTE — PROGRESS NOTES
[facility] Dermatology Note      Dermatology Problem List:  1. NMSC  -Superficial BCC, left cheek, s/p Mohns 6/16, NER  -SCCIS right cheek, s/p Mohs 6/16, NER    2. Actinic keratoses    3. Actinic cheilitis  - s/p CO2 laser ablation of lower lip vermilion 10/16  - s/p laser vermilionectomy 8/2017  - Follows with oral surgeon q 6 months    4. Neoplasm of uncertain behavior, L ear  - s/p shave biopsy 8/15/18, will follow-up with pathology results    5. EIC, L superior chest  - Reassurance      Encounter Date: Aug 15, 2018    CC:   Chief Complaint   Patient presents with     Derm Problem     Cesar is here for a spot on his ear that won't heal. He's had it over a month. He is also concerned about a spot on his upper left chest.        History of Present Illness:  Mr. Cesar Montejo is a 52 year old male who presents as a follow-up for a non-healing spot on his ear frozen previously in the past as well as a new red spot on his upper left chest. He was last seen by Dr. Mathis in May of 2018 for a TBSE which was entirely normal.     For last 6 weeks he has noticed a cycle of bleeding and scabbing on a spot on his left ear. He admits that he may be subconsciously picking at the spot but also thinks it bleeds spontaneously. It has been frozen once in the distant past (> 1 year ago). Cesar also presents with a red spot on the left upper chest first noticed a month ago that is mildly tender to palpation but otherwise asymptomatic (not painful, not itchy, doesn't bleed). He has not noticed any changes in size or color since he first noticed it.    Cesar denies any observed changes in the areas of his previous skin cancers. For his cheilitis he continues to follow regularly with an oral surgeon (now q6 months).     For sun protection, Cesar continues to avoid the sun during the middle of the day and regularly wears a wide-brimmed hat. He also wears SPF 30 or 50 when he knows he is going to be out in  the sun for prolonged periods of time.        His health has otherwise been good with no major changes since we last saw him in May. He denies any fevers, chills, unexpected weight loss or recent infections.    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    Family History:  Melanoma in grandfather and cousin.  SqCC in mother.     Review of Systems:  Per HPI.     Physical Exam:  Vitals: There were no vitals taken for this visit.  GEN: This is a well developed, well-nourished male in no acute distress, in a pleasant mood.    Focused skin exam performed today of L ear and L upper chest  - lill-defined, crusted, erythematous plaque on the left mid-helix  - well-circumscribed erythematous nodule on left superior chest with visible punctum on dermoscopy    PROCEDURE NOTE: Shave Biopsy  After informed written consent was obtained from the parent, the biopsy site was marked with a pen.  The area was cleansed with alcohol and injected with 0.5% lidocaine buffered with sodium bicarbonate for a total of 1 ml.  Using a Dermablade, shave removal of the left mid helix was completed.  The wound was dressed with vaseline and a bandage.  Supplies and wound care instructions were provided. The specimen is labeled, placed in formalin and sent to pathology for H&E evaluation. The procedure was well tolerated without complications.    Impression/Plan:   1. Neoplasm of uncertain behavior on the L ear, concern for NMSC particularly given personal history of BCC and SCCIS and recent behavior. Elected to perform shave biopsy today of the lesion which was well-tolerated. Will follow-up with results of biopsy over the phone in coming 1-2 weeks.  .     2. EIC on left superior chest. Benign, reassurance provided. Can follow-up for elective resection in the future if desired.     Staff Involved:  Priya Mc, MS4 scribed on behalf of Dr. Gillespie.    .I was present with the medical student who participated in  the service and in the documentation of the note. I have verified the history and personally performed the physical exam and medical decision making. I agree with the assessment and plan of care as documented in the note.  Cherie Gillespie MD

## 2018-08-16 ENCOUNTER — OFFICE VISIT (OUTPATIENT)
Dept: OTOLARYNGOLOGY | Facility: CLINIC | Age: 53
End: 2018-08-16
Payer: COMMERCIAL

## 2018-08-16 ENCOUNTER — PRE VISIT (OUTPATIENT)
Dept: OTOLARYNGOLOGY | Facility: CLINIC | Age: 53
End: 2018-08-16

## 2018-08-16 DIAGNOSIS — R42 DIZZINESS: Primary | ICD-10-CM

## 2018-08-16 DIAGNOSIS — G47.9 SLEEP DISTURBANCE: ICD-10-CM

## 2018-08-16 NOTE — NURSING NOTE
Chief Complaint   Patient presents with     Consult     Difficulty Sleeping, Difficulty Breathing, Stridor.  10x Episodes in 6Mo.  Sleep Study done 1 Mo Ago.     Del Sinha

## 2018-08-16 NOTE — PATIENT INSTRUCTIONS
1.  You were seen in the ENT Clinic today by Dr. Weber.  If you have any questions or concerns after your appointment, please call [phone].  Press option #1 for scheduling related needs.  Press option #3 for Nurse advice.  2.  Plan is to return to clinic as needed     Kayla Mathis

## 2018-08-16 NOTE — MR AVS SNAPSHOT
After Visit Summary   8/16/2018    Cesar Montejo    MRN: [id number]           Patient Information     Date Of Birth          1965        Visit Information        Provider Department      8/16/2018 10:00 AM Rachele Weber MD [facility] Ear Nose and Throat        Today's Diagnoses     Dizziness    -  1    Sleep disturbance          Care Instructions    1.  You were seen in the ENT Clinic today by Dr. Weber.  If you have any questions or concerns after your appointment, please call [phone].  Press option #1 for scheduling related needs.  Press option #3 for Nurse advice.  2.  Plan is to return to clinic as needed     Kayla Mathis          Follow-ups after your visit        Your next 10 appointments already scheduled     Sep 19, 2018  5:00 PM CDT   (Arrive by 4:45 PM)   Return Movement Disorder with Vida Solomon MD   [facility] Neurology ([facility])    [address] [place] [address] [id number] [phone]            Nov 21, 2018 10:45 AM CST   (Arrive by 10:30 AM)   Return Visit with Cherie Gillespie MD   [facility] Dermatology ([facility])    [address] [place] [address] [id number] [phone]            Mar 13, 2019  5:00 PM CDT   (Arrive by 4:45 PM)   Return Movement Disorder with Vida Solomon MD   [facility] Neurology ([facility])    [address] [place] [id number] [phone]              Who to contact     Please call your clinic at [phone] to:    Ask questions about your health    Make or cancel appointments    Discuss your medicines    Learn about your test results    Speak to your doctor            Additional Information About Your Visit        MyChart Information     [employer] gives you secure access to your electronic health record. If you see a primary care provider, you can also send messages to your  care team and make appointments. If you have questions, please call your primary care clinic.  If you do not have a primary care provider, please call [phone] and they will assist you.      "[employer] is an electronic gateway that provides easy, online access to your medical records. With "[employer] you can request a clinic appointment, read your test results, renew a prescription or communicate with your care team.     To access your existing account, please contact your [facility] or call [phone] for assistance.        Care EveryWhere ID     This is your Care EveryWhere ID. This could be used by other organizations to access your [place] medical records  [id number]         Blood Pressure from Last 3 Encounters:   08/13/18 (!) 156/94   07/05/18 110/80   06/26/18 125/87    Weight from Last 3 Encounters:   08/13/18 87.2 kg (192 lb 3.2 oz)   07/05/18 87 kg (191 lb 11.2 oz)   06/26/18 84.4 kg (186 lb)              Today, you had the following     No orders found for display       Primary Care Provider Office Phone # Fax #    oRcky Rigo Messina [phone]       [address]  [facility] [address]        Equal Access to Services     CAROLINA PLASCENCIA : Hadii aad ku hadasho Soomaali, waaxda luqadaha, qaybta kaalmada adeegyada, waxay idiin haycandicen dread vinson lairma nelson. So [facility] [phone].    ATENCIÓN: Si habla español, tiene a persaud disposición servicios gratuitos de asistencia lingüística. Llame al [phone].    We comply with applicable federal civil rights laws and [place] laws. We do not discriminate on the basis of race, color, national origin, age, disability, sex, sexual orientation, or gender identity.            Thank you!     Thank you for choosing [facility] EAR NOSE AND THROAT  for your care. Our goal is always to provide you with excellent care. Hearing back from our patients is one way we can continue to improve our services. Please take a  few minutes to complete the written survey that you may receive in the mail after your visit with us. Thank you!             Your Updated Medication List - Protect others around you: Learn how to safely use, store and throw away your medicines at www.disposemymeds.org.          This list is accurate as of 8/16/18 11:59 PM.  Always use your most recent med list.                   Brand Name Dispense Instructions for use Diagnosis    aspirin 81 MG EC tablet      Take 1 tablet (81 mg) by mouth daily        COQ10 PO           KRILL OIL PO      Take 1 capsule by mouth daily        mirtazapine 15 MG tablet    REMERON    90 tablet    TAKE 1 TABLET(15 MG) BY MOUTH AT BEDTIME    Mixed anxiety and depressive disorder, Insomnia       MULTIVITAL PO      Take 1 tablet by mouth daily        ranitidine 300 MG tablet    ZANTAC          * simvastatin 40 MG tablet    ZOCOR     Take 0.5 tablets by mouth daily        * simvastatin 40 MG tablet    ZOCOR    90 tablet    TAKE 1 TABLET(40 MG) BY MOUTH AT BEDTIME    Hypercholesteremia, CARDIOVASCULAR SCREENING; LDL GOAL LESS THAN 130       temazepam 30 MG capsule    RESTORIL    1 capsule    Take one tab po qhs the night of your sleep study        TURMERIC PO      Take 1 tablet by mouth daily        VITAMIN D (CHOLECALCIFEROL) PO      Take 2,000 Units by mouth daily        * Notice:  This list has 2 medication(s) that are the same as other medications prescribed for you. Read the directions carefully, and ask your doctor or other care provider to review them with you.

## 2018-08-16 NOTE — LETTER
8/16/2018     RE: Cesar Montejo  [address]  [facility] [address]     Dear Colleague,    Thank you for referring your patient, Cesar Montejo, to the [facility] EAR NOSE AND THROAT at [facility]. Please see a copy of my visit note below.    CC: gasping at night    HPI:  HISTORY OF PRESENT ILLNESS:  The patient was referred to me by Dr. Frank for consideration of drug-induced sleep endoscopy for evaluation of the patient's breathing at night when he is sleeping.  The patient reports that he does not quite yet have a diagnosis, but he has been having some neurologic symptoms including orthostatic hypotension and dizziness that is being followed by Neurology.  One possible explanation is multiple system atrophy he reports, but at this time there is no firm diagnosis.  As related to this, he has been having episodes where he will wake up in the middle of the night feeling like he is gasping for air.  This does not happen every night.  He has seen a sleep specialist as well as had a sleep study.  On the sleep study, there was no evidence of abnormal breathing at night.  He did have some subtle increases in REM motor tone though it was not sure if this would qualify as truly REM behavior disorder.  He did see Dr. Kodak Frank who is one of my ENT colleagues who scoped his vocal cords which showed healthy normal vocal cords with good movement while awake.  He was referred to me by Dr. Frank to see if there would be any use in doing a sleep endoscopy.      ASSESSMENT AND PLAN:  The patient presents with unknown neurologic disturbances with episodic gasping at night.  At this time, it is unknown to me the significance of the gasping in terms of if it is related to his neurologic issues or if this is a separate issue.  In terms of a drug-induced sleep endoscopy, I explained to the patient what is involved in that.  One of the issues with a drug-induced sleep  endoscopy is that it is not a full night examination, and therefore we are only capturing about 20 minutes of sleep so he may not have any breathing episodes since they do seem to be very intermittent at this time.  Also, with propofol, we do not get into REM sleep so if this is happening during REM, we would never catch it with a sleep endoscopy.  If we did see some gasping, surgically there is not much that I would recommend at this time to intervene.  Certainly, I would not recommend tracheostomy as I do not think his symptoms would warrant such a drastic procedure.  He asks about whether or not BiPAP or CPAP would be helpful.  At this point, I would defer to his sleep doctor as well as his neurologist for that.  At this time, I think there is very little need for ENT at this point in his diagnosis.  He may follow up with us as needed.     I spent a total of 25 minutes face-to-face with Cesar Montejo during today's office visit.  Over 50% of this time was spent counseling the patient on and/or coordinating care as documented in my assessment and plan.    Again, thank you for allowing me to participate in the care of your patient.      Sincerely,    Rachele Weber MD

## 2018-08-16 NOTE — PROGRESS NOTES
CC: gasping at night    HPI:  HISTORY OF PRESENT ILLNESS:  The patient was referred to me by Dr. Frank for consideration of drug-induced sleep endoscopy for evaluation of the patient's breathing at night when he is sleeping.  The patient reports that he does not quite yet have a diagnosis, but he has been having some neurologic symptoms including orthostatic hypotension and dizziness that is being followed by Neurology.  One possible explanation is multiple system atrophy he reports, but at this time there is no firm diagnosis.  As related to this, he has been having episodes where he will wake up in the middle of the night feeling like he is gasping for air.  This does not happen every night.  He has seen a sleep specialist as well as had a sleep study.  On the sleep study, there was no evidence of abnormal breathing at night.  He did have some subtle increases in REM motor tone though it was not sure if this would qualify as truly REM behavior disorder.  He did see Dr. Kodak Frank who is one of my ENT colleagues who scoped his vocal cords which showed healthy normal vocal cords with good movement while awake.  He was referred to me by Dr. Frank to see if there would be any use in doing a sleep endoscopy.      ASSESSMENT AND PLAN:  The patient presents with unknown neurologic disturbances with episodic gasping at night.  At this time, it is unknown to me the significance of the gasping in terms of if it is related to his neurologic issues or if this is a separate issue.  In terms of a drug-induced sleep endoscopy, I explained to the patient what is involved in that.  One of the issues with a drug-induced sleep endoscopy is that it is not a full night examination, and therefore we are only capturing about 20 minutes of sleep so he may not have any breathing episodes since they do seem to be very intermittent at this time.  Also, with propofol, we do not get into REM sleep so if this is happening during  REM, we would never catch it with a sleep endoscopy.  If we did see some gasping, surgically there is not much that I would recommend at this time to intervene.  Certainly, I would not recommend tracheostomy as I do not think his symptoms would warrant such a drastic procedure.  He asks about whether or not BiPAP or CPAP would be helpful.  At this point, I would defer to his sleep doctor as well as his neurologist for that.  At this time, I think there is very little need for ENT at this point in his diagnosis.  He may follow up with us as needed.     I spent a total of 25 minutes face-to-face with Cesar Montejo during today's office visit.  Over 50% of this time was spent counseling the patient on and/or coordinating care as documented in my assessment and plan.

## 2018-08-20 LAB — COPATH REPORT: NORMAL

## 2018-09-19 ENCOUNTER — OFFICE VISIT (OUTPATIENT)
Dept: NEUROLOGY | Facility: CLINIC | Age: 53
End: 2018-09-19
Payer: COMMERCIAL

## 2018-09-19 VITALS
OXYGEN SATURATION: 96 % | WEIGHT: 195.1 LBS | DIASTOLIC BLOOD PRESSURE: 90 MMHG | HEIGHT: 74 IN | BODY MASS INDEX: 25.04 KG/M2 | SYSTOLIC BLOOD PRESSURE: 126 MMHG | HEART RATE: 108 BPM

## 2018-09-19 DIAGNOSIS — F07.81 POSTCONCUSSION SYNDROME: ICD-10-CM

## 2018-09-19 DIAGNOSIS — G25.2 POSTURAL TREMOR: Primary | ICD-10-CM

## 2018-09-19 DIAGNOSIS — G47.52 REM SLEEP BEHAVIOR DISORDER: ICD-10-CM

## 2018-09-19 ASSESSMENT — PAIN SCALES - GENERAL: PAINLEVEL: SEVERE PAIN (6)

## 2018-09-19 NOTE — NURSING NOTE
Chief Complaint   Patient presents with     RECHECK     [facility] RETURN - F/U PER PT       Jean Suarez, EMT

## 2018-09-19 NOTE — PROGRESS NOTES
Department of Neurology  Movement Disorders Division   Follow-up Note    Patient: Cesar Montejo   MRN: [id number]   : 1965   Date of Visit: 2018     Chief Complaint:  Cesar Montejo is a 52 year old male with a history of multiple concussions, dream enactment behaviors, orthostatic hypotension, and tremor     Interval History:  SInce his last visit he had a cardiology evaluation, including Stress Echo (normal) and EP tilt table testing (+orthostatic hypotension). He also did have an overnight polysomnogram and some abnormal increased tone during REM (see results below).     He has also had autonomic testing at [facility], which was normal (see details below).    He reports having the sensation of slurred speech for and difficulty articulating for the past several weeks. This is intermittent.       Review of Systems:  Other than that noted at the end of this note, the remainder of 12 systems reviewed were negative.    Current Medications:   Current Outpatient Prescriptions   Medication Sig Dispense Refill     aspirin 81 MG EC tablet Take 1 tablet (81 mg) by mouth daily       Coenzyme Q10 (COQ10 PO)        KRILL OIL PO Take 1 capsule by mouth daily        mirtazapine (REMERON) 15 MG tablet TAKE 1 TABLET(15 MG) BY MOUTH AT BEDTIME 90 tablet 3     Multiple Vitamins-Minerals (MULTIVITAL PO) Take 1 tablet by mouth daily        ranitidine (ZANTAC) 300 MG tablet   0     simvastatin (ZOCOR) 40 MG tablet TAKE 1 TABLET(40 MG) BY MOUTH AT BEDTIME 90 tablet 0     simvastatin (ZOCOR) 40 MG tablet Take 0.5 tablets by mouth daily       temazepam (RESTORIL) 30 MG capsule Take one tab po qhs the night of your sleep study 1 capsule 0     TURMERIC PO Take 1 tablet by mouth daily        VITAMIN D, CHOLECALCIFEROL, PO Take 2,000 Units by mouth daily          Allergies: has No Known Allergies.    Past Medical History:  Past Medical History:   Diagnosis Date     Anxiety      Basal cell carcinoma      Gastro-oesophageal  reflux disease      H/O magnetic resonance imaging of brain and brain stem 2018    MR BRAIN WITHOUT AND WITH CONTRAST 2017 9:10 PM   HISTORY:  Left-sided numbness. Disequilibrium.   COMPARISON: MR brain 2015.   TECHNIQUE: Sagittal T1, axial T2, axial FLAIR, axial GRE, axial diffusion scan, coronal FLAIR, axial post Gd enhanced T1 weighted images.   FINDINGS: There is no intracranial hemorrhage, mass, or recent infarct. There is a cyst in the floor of the right maxilla     Head injury 2012    Had a bad concussion with post concussion synd for year     High cholesterol      History of echocardiogram 2018    TUYET Keyes MD 2018  Narrative     [id number]^MECHE^BLAKE^R       [facility] [address] [place] [id number] Name: JONATHAN FIORE MRN: [id number] : 1965 Study Date: 2018 09:13 AM Age: 52 yrs Gender: Male Patient Location: [facility] [place] Reason For Study: Chest Pain Ordering Physician:      Hypertension early     Not on meds. Usually in pre-hypertesion categ.     Insomnia      Squamous cell carcinoma        Past Surgical History:  Past Surgical History:   Procedure Laterality Date     COLONOSCOPY N/A 2017    Procedure: COLONOSCOPY;  Surgeon: Larry Fields MD;  Location:  GI     LASER CO2 LESION ORAL N/A 10/5/2016    Procedure: LASER CO2 LESION ORAL;  Surgeon: Josué Rojo DDS;  Location: [facility]     MOHS MICROGRAPHIC PROCEDURE         Social History:  Social History     Social History     Marital status: Single     Spouse name: N/A     Number of children: N/A     Years of education: N/A     Social History Main Topics     Smoking status: Former Smoker     Packs/day: 0.50     Years: 5.00     Types: Cigarettes     Start date: 1986     Quit date: 1991     Smokeless tobacco: Never Used      Comment: After  no longer a half pack a day. Very  "occasional--maybe 75 cigarettes a year--91 to 98     Alcohol use 0.6 - 1.2 oz/week      Comment: 1 to 2 glasses of red wine per day     Drug use: No      Comment: quit more than 20 years ago     Sexual activity: Not Currently     Partners: Female     Birth control/ protection: Abstinence, Condom     Other Topics Concern     Parent/Sibling W/ Cabg, Mi Or Angioplasty Before 65f 55m? No     Mom had recommended angiopl. in late 50s. 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          lives in [facility] [place] [facility]. single. Brother Bogdan Gustabo        Allergies:  No Known Allergies         Family History:     Mother age 76, TIA, CAD    Father 81, TIA    Brother autistic, panic attacks       Family History:  Family History   Problem Relation Age of Onset     Lipids Mother      on meds     Cerebrovascular Disease Mother      TIA     Alzheimer Disease Mother      Skin Cancer Mother      SCC     Lipids Father      on meds     Hypertension Father      controlled with meds     Thyroid Disease Father      ?not sure but possibly     Diabetes Father      Cerebrovascular Disease Father      Cancer - colorectal Maternal Grandmother      still alive at 99     Cancer Maternal Grandfather      lung but lifetime smoker     Melanoma Maternal Grandfather      Asthma No family hx of      C.A.D. No family hx of      Prostate Cancer No family hx of        Physical Exam:  The patient's  height is 1.867 m (6' 1.5\") and weight is 88.5 kg (195 lb 1.6 oz). His blood pressure is 126/90 and his pulse is 108. His oxygen saturation is 96%.    Neurological Examination:   He is alert and oriented and has fluent speech without dysarthria and is able to provide an interval medical history. There are no speech abnormalities with detailed testing.  Extraocular movements are full. He has normal smooth pursuits and saccades. His face is symmetric with equal activation.   He has a " mild jerky postural and kinetic tremor in the left > right arm.   Questionable grade 1 rigidity in RUE, otherwise normal.   Gait is normal.      Data Reviewed:   Cardiology workup:    6/4/18 Stress Echo - Normal exercise stress echocardiogram.  The target heart rate was achieved. No wall motion abnormalities at rest or  after peak exercise.  Normal LV size and function. The LVEF is 55-60% at rest and increased to >70%  after exercise with decrease in LV cavity size.  Heart rate and blood pressure response to exercise were normal.  Low-normal functional capacity. No subjective symptoms to suggest ischemia.  Peak MVO2 29 ml/kg/min.  No significant valvular abnormalities noted on screening tomograms.  No ECG evidence of ischemia at rest or with exercise.  No new findings compared to prior study dated 03/20/2007.    5/29/18 EP Tilt Table Testing -  Standing 10 minutes: HR 85, /77 > 92 bpm, 112/76  Right carotid sinus massage with lightheaded, left no symptoms, no change between R/L CSM.  1L IVF given before tilt. Baseline 81 bpm, 96/60 > 98 bpm, BP 86/56 at 20 minutes of tilt. Report states patient had no symptoms but patient says he was lightheaded.  No evidence of POTs, no evidence of autonomic neurological abnl     Assessment :  Orthostatic hypotension. Again recommend aggressive hydration, compression stocking. I would not recommend florinef or midodrine to avoid possibility of precipitating HTN.     7/31/18 Sleep Study:    Sleep Architecture: Mild sleep fragmentation    Respiration: This study did not demonstrate evidence suggestive of clinically significant sleep disordered breathing. This was a good study that included REM supine.   Movement Activity: Mildly abnormal REM muscle activity and unclear whether dream enactment behavior were present.   Cardiac Summary: Predominantly narrow QRS complexes preceded by P waves suggestive of sinus rhythm. Intermittent tachycardia.   Recommendations:    Patient may be  reassured that, per this study they do not have clinically significant sleep disordered breathing. If interested in treating snoring options include dental appliance and upper airway surgery.    Mildly increased REM motor activity was noted and dream enactment was rare. Recommend close surveillance in RBD clinic.   o Consider treatment with either high dose melatonin or low dose clonazepam if symptoms warrant.   o Consider repeat 4-limb PSG if symptoms progress.      9/7/18 Autonomic testing at [facility]    Normal study. There is no evidence of autonomic failure on this study.   Although there was also no evidence of orthostatic intolerance on this   study, the somewhat generous rise in heart rate during tilt and blood   pressure responses to the Valsalva maneuver may suggest some tendency   towards orthostatic intolerance, maybe associated with   deconditioning/dehydration.                                                  COMMENTS     (1)  Heart rate responses to deep breathing were normal; Valsalva-ratio   was normal.    (2)  QSWEAT responses were normal for all sites.                                   VALSALVA AND TILT     (1)  Patient was tilted for 10 minutes. Orthostatic hypotension was not   detected. Heart rate response was generous. The patient reported no   symptoms.   (2)  Gmee-fl-apwa blood pressure responses to the Valsalva maneuver showed   an exaggerated early phase II and excessive phase IV. Late phase II and   PRT were normal.   Brain mri done in past in 2017 was normal.      Cognitive complains - seen by Dr. Park 2/7/2018 and compared to prior evaluation was cognitively stable.     Impression:  Cesar Montejo is a 52 year old male with a history of multiple concussions, dream enactment behaviors, orthostatic hypotension, and tremor. EP Tilt table testing confirmed orthostatic hypotension without postural tachycardia. Autonomic testing otherwise normal. Polysomnogram suggestive of mild REM without  atonia (although not surekha dream enactment behaviors on the night of PSG). Mr. Montejo has subjective complaints about balance difficulty and intermittent speech difficulty, but gait and speech exam is unremarkable today. 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 At this point no parkinsonism is detected. Neuropsychological testing has thus far been reassuring, although Mr. Montejo still feels there has been a decline from his baseline.   We will follow him with annual neurological exams, and as needed if new symptoms arise.     Recommendations:   Mr. Montejo was provided a handout about orthostatic hypotension, which includes conservative approaches to treatment to start (increased fluid and salt intake, thigh-high compression stockings, slow transitions from lying/sitting to standing).    Time spent with patient: Greater than 50% of this 45 minute visit was spent in counseling and coordination of care related to diagnosis, review of detailed evaluations completed thus far (see data reviewed), and answering patient questions about diagnosis, prognosis, and plan of care.      Vida Solomon MD    of Neurology

## 2018-09-19 NOTE — LETTER
2018       RE: Cesar Montejo  [address]  [facility] [address]     Dear Colleague,    Thank you for referring your patient, Cesar Montejo, to the [facility] NEUROLOGY at [facility]. Please see a copy of my visit note below.    Department of Neurology  Movement Disorders Division   Follow-up Note    Patient: Cesar Montejo   MRN: [id number]   : 1965   Date of Visit: 2018     Chief Complaint:  Cesar Montejo is a 52 year old male with a history of multiple concussions, dream enactment behaviors, orthostatic hypotension, and tremor     Interval History:  SInce his last visit he had a cardiology evaluation, including Stress Echo (normal) and EP tilt table testing (+orthostatic hypotension). He also did have an overnight polysomnogram and some abnormal increased tone during REM (see results below).     He has also had autonomic testing at [facility], which was normal (see details below).    He reports having the sensation of slurred speech for and difficulty articulating for the past several weeks. This is intermittent.       Review of Systems:  Other than that noted at the end of this note, the remainder of 12 systems reviewed were negative.    Current Medications:   Current Outpatient Prescriptions   Medication Sig Dispense Refill     aspirin 81 MG EC tablet Take 1 tablet (81 mg) by mouth daily       Coenzyme Q10 (COQ10 PO)        KRILL OIL PO Take 1 capsule by mouth daily        mirtazapine (REMERON) 15 MG tablet TAKE 1 TABLET(15 MG) BY MOUTH AT BEDTIME 90 tablet 3     Multiple Vitamins-Minerals (MULTIVITAL PO) Take 1 tablet by mouth daily        ranitidine (ZANTAC) 300 MG tablet   0     simvastatin (ZOCOR) 40 MG tablet TAKE 1 TABLET(40 MG) BY MOUTH AT BEDTIME 90 tablet 0     simvastatin (ZOCOR) 40 MG tablet Take 0.5 tablets by mouth daily       temazepam (RESTORIL) 30 MG capsule Take one tab po qhs the night of your sleep study 1 capsule 0      TURMERIC PO Take 1 tablet by mouth daily        VITAMIN D, CHOLECALCIFEROL, PO Take 2,000 Units by mouth daily          Allergies: has No Known Allergies.    Past Medical History:  Past Medical History:   Diagnosis Date     Anxiety      Basal cell carcinoma      Gastro-oesophageal reflux disease      H/O magnetic resonance imaging of brain and brain stem 2018    MR BRAIN WITHOUT AND WITH CONTRAST 2017 9:10 PM   HISTORY:  Left-sided numbness. Disequilibrium.   COMPARISON: MR brain 2015.   TECHNIQUE: Sagittal T1, axial T2, axial FLAIR, axial GRE, axial diffusion scan, coronal FLAIR, axial post Gd enhanced T1 weighted images.   FINDINGS: There is no intracranial hemorrhage, mass, or recent infarct. There is a cyst in the floor of the right maxilla     Head injury 2012    Had a bad concussion with post concussion synd for year     High cholesterol      History of echocardiogram 2018    TUYET Keyes MD 2018  Narrative     [id number]^MECHE^BLAKE^R       [facility] [address] [place], [place] [id number] Name: JONATHAN FIORE MRN: [id number] : 1965 Study Date: 2018 09:13 AM Age: 52 yrs Gender: Male Patient Location: [facility] Reason For Study: Chest Pain Ordering Physician:      Hypertension early     Not on meds. Usually in pre-hypertesion categ.     Insomnia      Squamous cell carcinoma        Past Surgical History:  Past Surgical History:   Procedure Laterality Date     COLONOSCOPY N/A 2017    Procedure: COLONOSCOPY;  Surgeon: Larry Fields MD;  [facility]     LASER CO2 LESION ORAL N/A 10/5/2016    Procedure: LASER CO2 LESION ORAL;  Surgeon: Josué Rojo DDS;  Location: [facility]     MOHS MICROGRAPHIC PROCEDURE         Social History:  Social History     Social History     Marital status: Single     Spouse name: N/A     Number of children: N/A     Years of  "education: N/A     Social History Main Topics     Smoking status: Former Smoker     Packs/day: 0.50     Years: 5.00     Types: Cigarettes     Start date: 1/1/1986     Quit date: 1/1/1991     Smokeless tobacco: Never Used      Comment: After 1991 no longer a half pack a day. Very occasional--maybe 75 cigarettes a year--91 to 98     Alcohol use 0.6 - 1.2 oz/week      Comment: 1 to 2 glasses of red wine per day     Drug use: No      Comment: quit more than 20 years ago     Sexual activity: Not Currently     Partners: Female     Birth control/ protection: Abstinence, Condom     Other Topics Concern     Parent/Sibling W/ Cabg, Mi Or Angioplasty Before 65f 55m? No     Mom had recommended angiopl. in late 50s. 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          lives in [place] [facility]. single. Brother Bogdan Montejo        Allergies:  No Known Allergies         Family History:     Mother age 76, TIA, CAD    Father 81, TIA    Brother autistic, panic attacks       Family History:  Family History   Problem Relation Age of Onset     Lipids Mother      on meds     Cerebrovascular Disease Mother      TIA     Alzheimer Disease Mother      Skin Cancer Mother      SCC     Lipids Father      on meds     Hypertension Father      controlled with meds     Thyroid Disease Father      ?not sure but possibly     Diabetes Father      Cerebrovascular Disease Father      Cancer - colorectal Maternal Grandmother      still alive at 99     Cancer Maternal Grandfather      lung but lifetime smoker     Melanoma Maternal Grandfather      Asthma No family hx of      C.A.D. No family hx of      Prostate Cancer No family hx of        Physical Exam:  The patient's  height is 1.867 m (6' 1.5\") and weight is 88.5 kg (195 lb 1.6 oz). His blood pressure is 126/90 and his pulse is 108. His oxygen saturation is 96%.    Neurological Examination:   He is alert and oriented and " has fluent speech without dysarthria and is able to provide an interval medical history. There are no speech abnormalities with detailed testing.  Extraocular movements are full. He has normal smooth pursuits and saccades. His face is symmetric with equal activation.   He has a mild jerky postural and kinetic tremor in the left > right arm.   Questionable grade 1 rigidity in RUE, otherwise normal.   Gait is normal.      Data Reviewed:   Cardiology workup:    6/4/18 Stress Echo - Normal exercise stress echocardiogram.  The target heart rate was achieved. No wall motion abnormalities at rest or  after peak exercise.  Normal LV size and function. The LVEF is 55-60% at rest and increased to >70%  after exercise with decrease in LV cavity size.  Heart rate and blood pressure response to exercise were normal.  Low-normal functional capacity. No subjective symptoms to suggest ischemia.  Peak MVO2 29 ml/kg/min.  No significant valvular abnormalities noted on screening tomograms.  No ECG evidence of ischemia at rest or with exercise.  No new findings compared to prior study dated 03/20/2007.    5/29/18 EP Tilt Table Testing -  Standing 10 minutes: HR 85, /77 > 92 bpm, 112/76  Right carotid sinus massage with lightheaded, left no symptoms, no change between R/L CSM.  1L IVF given before tilt. Baseline 81 bpm, 96/60 > 98 bpm, BP 86/56 at 20 minutes of tilt. Report states patient had no symptoms but patient says he was lightheaded.  No evidence of POTs, no evidence of autonomic neurological abnl     Assessment :  Orthostatic hypotension. Again recommend aggressive hydration, compression stocking. I would not recommend florinef or midodrine to avoid possibility of precipitating HTN.     7/31/18 Sleep Study:    Sleep Architecture: Mild sleep fragmentation    Respiration: This study did not demonstrate evidence suggestive of clinically significant sleep disordered breathing. This was a good study that included REM supine.    Movement Activity: Mildly abnormal REM muscle activity and unclear whether dream enactment behavior were present.   Cardiac Summary: Predominantly narrow QRS complexes preceded by P waves suggestive of sinus rhythm. Intermittent tachycardia.   Recommendations:    Patient may be reassured that, per this study they do not have clinically significant sleep disordered breathing. If interested in treating snoring options include dental appliance and upper airway surgery.    Mildly increased REM motor activity was noted and dream enactment was rare. Recommend close surveillance in RBD clinic.   o Consider treatment with either high dose melatonin or low dose clonazepam if symptoms warrant.   o Consider repeat 4-limb PSG if symptoms progress.      9/7/18 Autonomic testing at [facility]    Normal study. There is no evidence of autonomic failure on this study.   Although there was also no evidence of orthostatic intolerance on this   study, the somewhat generous rise in heart rate during tilt and blood   pressure responses to the Valsalva maneuver may suggest some tendency   towards orthostatic intolerance, maybe associated with   deconditioning/dehydration.                                                  COMMENTS     (1)  Heart rate responses to deep breathing were normal; Valsalva-ratio   was normal.    (2)  QSWEAT responses were normal for all sites.                                   VALSALVA AND TILT     (1)  Patient was tilted for 10 minutes. Orthostatic hypotension was not   detected. Heart rate response was generous. The patient reported no   symptoms.   (2)  Xoeq-yc-cjky blood pressure responses to the Valsalva maneuver showed   an exaggerated early phase II and excessive phase IV. Late phase II and   PRT were normal.   Brain mri done in past in 2017 was normal.      Cognitive complains - seen by Dr. Park 2/7/2018 and compared to prior evaluation was cognitively stable.     Impression:  Cesar Montejo is a 52 year old  male with a history of multiple concussions, dream enactment behaviors, orthostatic hypotension, and tremor. EP Tilt table testing confirmed orthostatic hypotension without postural tachycardia. Autonomic testing otherwise normal. Polysomnogram suggestive of mild REM without atonia (although not surekha dream enactment behaviors on the night of PSG). Mr. Montejo has subjective complaints about balance difficulty and intermittent speech difficulty, but gait and speech exam is unremarkable today. 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 At this point no parkinsonism is detected. Neuropsychological testing has thus far been reassuring, although Mr. Montejo still feels there has been a decline from his baseline.   We will follow him with annual neurological exams, and as needed if new symptoms arise.     Recommendations:   Mr. Montejo was provided a handout about orthostatic hypotension, which includes conservative approaches to treatment to start (increased fluid and salt intake, thigh-high compression stockings, slow transitions from lying/sitting to standing).    Time spent with patient: Greater than 50% of this 45 minute visit was spent in counseling and coordination of care related to diagnosis, review of detailed evaluations completed thus far (see data reviewed), and answering patient questions about diagnosis, prognosis, and plan of care.      Again, thank you for allowing me to participate in the care of your patient.      Sincerely,    Vida Solomon MD

## 2018-09-19 NOTE — MR AVS SNAPSHOT
After Visit Summary   9/19/2018    Cesar Montejo    MRN: [id number]           Patient Information     Date Of Birth          1965        Visit Information        Provider Department      9/19/2018 5:00 PM Vida Solomon MD [facility]        Today's Diagnoses     Postural tremor    -  1    Postconcussion syndrome        REM sleep behavior disorder           Follow-ups after your visit        Your next 10 appointments already scheduled     Nov 21, 2018 10:45 AM CST   (Arrive by 10:30 AM)   Return Visit with Cherie Gillespei MD   [facility] ([facility])    [address] [place] [id number] [phone]            Mar 13, 2019  5:00 PM CDT   (Arrive by 4:45 PM)   Return Movement Disorder with Vida Solomon MD   [facility] ([facility])    [address] [place] [id number] [phone]              Who to contact     Please call your clinic at [phone] to:    Ask questions about your health    Make or cancel appointments    Discuss your medicines    Learn about your test results    Speak to your doctor            Additional Information About Your Visit        [employer] Information     [employer] gives you secure access to your electronic health record. If you see a primary care provider, you can also send messages to your care team and make appointments. If you have questions, please call your primary care clinic.  If you do not have a primary care provider, please call [phone] and they will assist you.      [employer] is an electronic gateway that provides easy, online access to your medical records. With [employer], you can request a clinic appointment, read your test results, renew a prescription or communicate with your care team.     To access your existing account, please contact your [facility] or call  "[phone] for assistance.        Care EveryWhere ID     This is your Care EveryWhere ID. This could be used by other organizations to access your [place] medical records  [id number]        Your Vitals Were     Pulse Height Pulse Oximetry BMI (Body Mass Index)          108 1.867 m (6' 1.5\") 96% 25.39 kg/m2         Blood Pressure from Last 3 Encounters:   09/19/18 126/90   08/13/18 (!) 156/94   07/05/18 110/80    Weight from Last 3 Encounters:   09/19/18 88.5 kg (195 lb 1.6 oz)   08/13/18 87.2 kg (192 lb 3.2 oz)   07/05/18 87 kg (191 lb 11.2 oz)              We Performed the Following     NEUROLOGY ADULT REFERRAL        Primary Care Provider Office Phone # Fax #    Rocky Rigo Messina [phone]       [address] [facility] [address]  [facility] [address]        Equal Access to Services     BRIANNA [facility] Center[facility]RISTAL : Hadii aad ku hadasho Soomaali, waaxda luqadaha, qaybta kaalmada adeegyada, waxay idiin hayaan adeeg khararajesh lairma . So [facility] [phone].    ATENCIÓN: Si habla español, tiene a persaud disposición servicios gratuitos de asistencia lingüística. Kajalame al [phone].    We comply with applicable federal civil rights laws and [place] laws. We do not discriminate on the basis of race, color, national origin, age, disability, sex, sexual orientation, or gender identity.            Thank you!     Thank you for choosing [facility] NEUROLOGY  for your care. Our goal is always to provide you with excellent care. Hearing back from our patients is one way we can continue to improve our services. Please take a few minutes to complete the written survey that you may receive in the mail after your visit with us. Thank you!             Your Updated Medication List - Protect others around you: Learn how to safely use, store and throw away your medicines at www.disposemymeds.org.          This list is accurate as of 9/19/18 11:59 PM.  Always use your most recent med list.                   Brand Name Dispense " Instructions for use Diagnosis    aspirin 81 MG EC tablet      Take 1 tablet (81 mg) by mouth daily        COQ10 PO           KRILL OIL PO      Take 1 capsule by mouth daily        mirtazapine 15 MG tablet    REMERON    90 tablet    TAKE 1 TABLET(15 MG) BY MOUTH AT BEDTIME    Mixed anxiety and depressive disorder, Insomnia       MULTIVITAL PO      Take 1 tablet by mouth daily        ranitidine 300 MG tablet    ZANTAC          * simvastatin 40 MG tablet    ZOCOR     Take 0.5 tablets by mouth daily        * simvastatin 40 MG tablet    ZOCOR    90 tablet    TAKE 1 TABLET(40 MG) BY MOUTH AT BEDTIME    Hypercholesteremia, CARDIOVASCULAR SCREENING; LDL GOAL LESS THAN 130       temazepam 30 MG capsule    RESTORIL    1 capsule    Take one tab po qhs the night of your sleep study        TURMERIC PO      Take 1 tablet by mouth daily        VITAMIN D (CHOLECALCIFEROL) PO      Take 2,000 Units by mouth daily        * Notice:  This list has 2 medication(s) that are the same as other medications prescribed for you. Read the directions carefully, and ask your doctor or other care provider to review them with you.

## 2018-11-21 ENCOUNTER — OFFICE VISIT (OUTPATIENT)
Dept: DERMATOLOGY | Facility: CLINIC | Age: 53
End: 2018-11-21
Payer: COMMERCIAL

## 2018-11-21 DIAGNOSIS — L57.0 BENIGN KERATOSIS OF SKIN: ICD-10-CM

## 2018-11-21 DIAGNOSIS — L73.9 FOLLICULITIS: ICD-10-CM

## 2018-11-21 DIAGNOSIS — D22.39 FIBROUS PAPULE OF NOSE: Primary | ICD-10-CM

## 2018-11-21 ASSESSMENT — PAIN SCALES - GENERAL: PAINLEVEL: NO PAIN (0)

## 2018-11-21 NOTE — PROGRESS NOTES
[facility] Dermatology Note      Dermatology Problem List:  1. NMSC  -Superficial BCC, left cheek, s/p Mohns 6/16, NER  -SCCIS right cheek, s/p Mohs 6/16, NER    2. Actinic keratoses    3. Actinic cheilitis  - s/p CO2 laser ablation of lower lip vermilion 10/16  - s/p laser vermilionectomy 8/2017  - Follows with oral surgeon q 6 months    4. Neoplasm of uncertain behavior, L ear  - s/p shave biopsy 8/15/18  Irregular epidermal hyperplasia with hypergranulosis and dermal fibrosis    5. EIC, L superior chest  - Reassurance      Encounter Date: Nov 21, 2018    CC:   Chief Complaint   Patient presents with     Derm Problem     Cesar is here to have his ear rechecked where he had a biopsy done at.       History of Present Illness:  Mr. Cesar Montejo is a 53 year old male who presents as a follow-up for a biopsy of L mid-helix NUB.    Patient last seen Dr. Gillespie on 8/15/18 when he underwent a biopsy of a bleeding/scabbing spot on his L ear. Dermpath report showed irregular epidermal hyperplasia with hypergranulosis and dermal fibrosis, favoring PN vs traumatized benign keratosis. Low suspicion for malignancy.    He reports the site has healed well without any issues. He denies any new growths in the area. He would like his face checked for pre-cancers has he has a hx of AKs and NMSC on the face. Otherwise, the patient reports no painful, bleeding, nonhealing, or pruritic lesions, and denies new or changing moles.    He wears sunscreen SPF 50+ when he is outside, practices sun avoidance and wears sun protective clothing regularly.      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    Family History:  Melanoma in grandfather and cousin.  SqCC in mother.     Review of Systems:  Per HPI.     Physical Exam:  Vitals: There were no vitals taken for this visit.  GEN: This is a well developed, well-nourished male in no acute distress, in a pleasant mood.    Focused skin exam  performed today of face and ears  - Small perifollicular pink papule on the L upper forehead, appears excoriated  - small firm flesh colored papule on the L nasal tip  - well healed surgical scars on the bilateral cheeks, No evidence of recurrent disease  - well healed biopsy scar on the L mid helix with small hyperkeratotic papule overlying. No evidence of recurrence      Impression/Plan:   1.  Likely traumatized keratosis vs PN on the L helix s/p shave removal. Biopsy site healed without issue, no evidence of recurrence. Low concern for malignancy    Reassurance provided    2.  Fibrous papule on the L nasal tip    Discussed benign etiology, reassurance provided. No further treatment indicated    3.  Folliculitis of the L frontal scalp    Appears follicular in nature, reassurance provided, will likely resolve without intervention  .  Dr. Gillespie staffed this patient.    RTC in 3 months for FBSE or sooner for new or changing lesions.    Dr. Gillespie staffed this patient.      Staff Involved:  Resident (Shoshana Gupta)/ Staff as above    .I, Cherie Gillespie MD, saw this patient with the resident and agree with the resident s findings and plan of care as documented in the resident s note.

## 2018-11-21 NOTE — LETTER
11/21/2018       RE: Cesar Montejo  [address]  [facility] [facility] [address]     Dear Colleague,    Thank you for referring your patient, Cesar Montejo, to the [facility]ERMATOLOG[facility] at [facility]. Please see a copy of my visit note below.    [facility] Dermatology Note      Dermatology Problem List:  1. NMSC  -Superficial BCC, left cheek, s/p Mohns 6/16, NER  -SCCIS right cheek, s/p Mohs 6/16, NER    2. Actinic keratoses    3. Actinic cheilitis  - s/p CO2 laser ablation of lower lip vermilion 10/16  - s/p laser vermilionectomy 8/2017  - Follows with oral surgeon q 6 months    4. Neoplasm of uncertain behavior, L ear  - s/p shave biopsy 8/15/18  Irregular epidermal hyperplasia with hypergranulosis and dermal fibrosis    5. EIC, L superior chest  - Reassurance      Encounter Date: Nov 21, 2018    CC:   Chief Complaint   Patient presents with     Derm Problem     Cesar is here to have his ear rechecked where he had a biopsy done at.       History of Present Illness:  Mr. Cesar Montejo is a 53 year old male who presents as a follow-up for a biopsy of L mid-helix NUB.    Patient last seen Dr. Gillespie on 8/15/18 when he underwent a biopsy of a bleeding/scabbing spot on his L ear. Dermpath report showed irregular epidermal hyperplasia with hypergranulosis and dermal fibrosis, favoring PN vs traumatized benign keratosis. Low suspicion for malignancy.    He reports the site has healed well without any issues. He denies any new growths in the area. He would like his face checked for pre-cancers has he has a hx of AKs and NMSC on the face. Otherwise, the patient reports no painful, bleeding, nonhealing, or pruritic lesions, and denies new or changing moles.    He wears sunscreen SPF 50+ when he is outside, practices sun avoidance and wears sun protective clothing regularly.      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    Family History:  Melanoma in grandfather and cousin.  SqCC in mother.     Review of Systems:  Per HPI.     Physical Exam:  Vitals: There were no vitals taken for this visit.  GEN: This is a well developed, well-nourished male in no acute distress, in a pleasant mood.    Focused skin exam performed today of face and ears  - Small perifollicular pink papule on the L upper forehead, appears excoriated  - small firm flesh colored papule on the L nasal tip  - well healed surgical scars on the bilateral cheeks, No evidence of recurrent disease  - well healed biopsy scar on the L mid helix with small hyperkeratotic papule overlying. No evidence of recurrence      Impression/Plan:   1.  Likely traumatized keratosis vs PN on the L helix s/p shave removal. Biopsy site healed without issue, no evidence of recurrence. Low concern for malignancy    Reassurance provided    2.  Fibrous papule on the L nasal tip    Discussed benign etiology, reassurance provided. No further treatment indicated    3.  Folliculitis of the L frontal scalp    Appears follicular in nature, reassurance provided, will likely resolve without intervention  .  Dr. Gillespie staffed this patient.    RTC in 3 months for FBSE or sooner for new or changing lesions.    Dr. Gillespie staffed this patient.      Staff Involved:  Resident (Shoshana Gupta)/ Staff as above    .I, Cherie Gillespie MD, saw this patient with the resident and agree with the resident s findings and plan of care as documented in the resident s note.      Again, thank you for allowing me to participate in the care of your patient.      Sincerely,    Cherie Gillespie MD

## 2018-11-21 NOTE — MR AVS SNAPSHOT
After Visit Summary   11/21/2018    Cesar Montejo    MRN: [id number]           Patient Information     Date Of Birth          1965        Visit Information        Provider Department      11/21/2018 10:45 AM Cherie Gillespie MD [facility] Dermatology        Today's Diagnoses     Fibrous papule of nose    -  1    Benign keratosis of skin        Folliculitis           Follow-ups after your visit        Follow-up notes from your care team     Return in about 3 months (around 2/21/2019).      Your next 10 appointments already scheduled     Feb 26, 2019 10:45 AM CST   (Arrive by 10:30 AM)   Return Visit with Cherie Gillespie MD   [facility] Dermatology ([facility])    [address] [place] [address] [phone]            Mar 13, 2019  5:00 PM CDT   (Arrive by 4:45 PM)   Return Movement Disorder with Vida Solomon MD   [facility] Neurology ([facility])    [address] [place] [address] [phone]              Who to contact     Please call your clinic at [phone] to:    Ask questions about your health    Make or cancel appointments    Discuss your medicines    Learn about your test results    Speak to your doctor            Additional Information About Your Visit        [employer] Information     [employer] gives you secure access to your electronic health record. If you see a primary care provider, you can also send messages to your care team and make appointments. If you have questions, please call your primary care clinic.  If you do not have a primary care provider, please call [phone] and they will assist you.      [employer] is an electronic gateway that provides easy, online access to your medical records. With [employer], you can request a clinic appointment, read your test results, renew a prescription or communicate with your care team.     To access your existing  account, please contact your [facility] or call [phone] for assistance.        Care EveryWhere ID     This is your Care EveryWhere ID. This could be used by other organizations to access your [facility] medical records  [id number]         Blood Pressure from Last 3 Encounters:   09/19/18 126/90   08/13/18 (!) 156/94   07/05/18 110/80    Weight from Last 3 Encounters:   09/19/18 88.5 kg (195 lb 1.6 oz)   08/13/18 87.2 kg (192 lb 3.2 oz)   07/05/18 87 kg (191 lb 11.2 oz)              Today, you had the following     No orders found for display       Primary Care Provider Office Phone # Fax #    Rocky Rigo Messina [phone]       [address] [address]        Equal Access to Services     [facility]: Hadii aad ku hadasho Soomaali, waaxda luqadaha, qaybta kaalmada adeegyada, waxay nicole haybeto jameson . So [facility] [phone].    ATENCIÓN: Si habla español, tiene a persaud disposición servicios gratuitos de asistencia lingüística. Kajalruben al [phone].    We comply with applicable federal civil rights laws and [place] laws. We do not discriminate on the basis of race, color, national origin, age, disability, sex, sexual orientation, or gender identity.            Thank you!     Thank you for choosing [facility] DERMATOLOGY  for your care. Our goal is always to provide you with excellent care. Hearing back from our patients is one way we can continue to improve our services. Please take a few minutes to complete the written survey that you may receive in the mail after your visit with us. Thank you!             Your Updated Medication List - Protect others around you: Learn how to safely use, store and throw away your medicines at www.disposemymeds.org.          This list is accurate as of 11/21/18 11:40 AM.  Always use your most recent med list.                   Brand Name Dispense Instructions for use Diagnosis    aspirin 81  MG EC tablet      Take 1 tablet (81 mg) by mouth daily        COQ10 PO           KRILL OIL PO      Take 1 capsule by mouth daily        mirtazapine 15 MG tablet    REMERON    90 tablet    TAKE 1 TABLET(15 MG) BY MOUTH AT BEDTIME    Mixed anxiety and depressive disorder, Insomnia       MULTIVITAL PO      Take 1 tablet by mouth daily        ranitidine 300 MG tablet    ZANTAC          * simvastatin 40 MG tablet    ZOCOR     Take 0.5 tablets by mouth daily        * simvastatin 40 MG tablet    ZOCOR    90 tablet    TAKE 1 TABLET(40 MG) BY MOUTH AT BEDTIME    Hypercholesteremia, CARDIOVASCULAR SCREENING; LDL GOAL LESS THAN 130       temazepam 30 MG capsule    RESTORIL    1 capsule    Take one tab po qhs the night of your sleep study        TURMERIC PO      Take 1 tablet by mouth daily        VITAMIN D (CHOLECALCIFEROL) PO      Take 2,000 Units by mouth daily        * Notice:  This list has 2 medication(s) that are the same as other medications prescribed for you. Read the directions carefully, and ask your doctor or other care provider to review them with you.

## 2018-11-21 NOTE — NURSING NOTE
Dermatology Rooming Note    Cesar Montejo's goals for this visit include:   Chief Complaint   Patient presents with     Derm Problem     Cesar is here to have his ear rechecked where he had a biopsy done at.       Alyssa Trent LPN

## 2018-12-11 ENCOUNTER — OFFICE VISIT (OUTPATIENT)
Dept: DERMATOLOGY | Facility: CLINIC | Age: 53
End: 2018-12-11
Payer: COMMERCIAL

## 2018-12-11 DIAGNOSIS — D23.61 BENIGN NEOPLASM OF SKIN OF RIGHT UPPER EXTREMITY: ICD-10-CM

## 2018-12-11 DIAGNOSIS — Z85.828 HISTORY OF NONMELANOMA SKIN CANCER: ICD-10-CM

## 2018-12-11 DIAGNOSIS — D18.01 CHERRY ANGIOMA: Primary | ICD-10-CM

## 2018-12-11 ASSESSMENT — PAIN SCALES - GENERAL: PAINLEVEL: NO PAIN (0)

## 2018-12-11 NOTE — PATIENT INSTRUCTIONS
The spot closer to your hand looks like an irritated hair follicle. The spot closer to your finger nail is called cherry hemangioma.

## 2018-12-11 NOTE — NURSING NOTE
"Chief Complaint   Patient presents with     Derm Problem     Travis is here today for a spot on right middle finger. He states \" the area will bleed and scab x1 month\"     Cesia Kenny, RMA  "

## 2018-12-11 NOTE — LETTER
"12/11/2018       RE: Cesar Montejo  [address]  [facility] [place] [address]     Dear Colleague,    Thank you for referring your patient, Cesar Montejo, to the [facility] DERMATOLOG[facility] at [facility]. Please see a copy of my visit note below.    [facility] Dermatology Note    Dermatology Problem List:  1. NMSC  -Superficial BCC, left cheek, s/p Mohns 6/16, NER  -SCCIS right cheek, s/p Mohs 6/16, NER  2. Actinic keratoses  3. Actinic cheilitis  - s/p CO2 laser ablation of lower lip vermilion 10/16  - s/p laser vermilionectomy 8/2017  - Follows with oral surgeon q 6 months  4. Neoplasm of uncertain behavior, L ear - s/p shave biopsy 8/15/18  Irregular epidermal hyperplasia with hypergranulosis and dermal fibrosis    Encounter Date: Dec 11, 2018    CC:   Chief Complaint   Patient presents with     Derm Problem     Travis is here today for a spot on right middle finger. He states \" the area will bleed and scab x1 month\"       History of Present Illness:  Mr. Cesar Montejo is a 53 year old male who presents for evaluation of two new spots on his right 3rd finger. He was last seen on 11/21/18 for evaluation of multiple lesions, at which time no concerning spots were found. Today, he reports two spots on his right 3rd finger that have been present for less than 1 month. The more proximal lesion has bled twice since it appeared. He denies tenderness or itching. He does not think these lesions have changed in size since appearing. He is otherwise feeling well and in his usual state of health.    Past Medical History:   Patient Active Problem List   Diagnosis     Insomnia     Adjustment reaction     CARDIOVASCULAR SCREENING; LDL GOAL LESS THAN 130     Pure hypercholesterolemia     GERD (gastroesophageal reflux disease)     Right shoulder pain     Calcific tendonitis     Solar lentiginosis     Skin cancer screening     Dermatitis, seborrheic     Keratosis, actinic "     Family history of skin cancer     AK (actinic keratosis)     History of actinic keratosis     History of basal cell cancer     Seborrheic keratosis     Cherry angioma     Actinic cheilitis     History of nonmelanoma skin cancer     History of multiple concussions     Elevated blood pressure reading without diagnosis of hypertension     Dream enactment behavior     Disturbance in sleep behavior     Dyspnea and respiratory abnormality     Chest pain     H/O magnetic resonance imaging of brain and brain stem     History of echocardiogram     Encounter for neuropsychological testing  with Dr. Park     Past Medical History:   Diagnosis Date     Anxiety      Basal cell carcinoma      Gastro-oesophageal reflux disease      H/O magnetic resonance imaging of brain and brain stem 2018    MR BRAIN WITHOUT AND WITH CONTRAST 2017 9:10 PM   HISTORY:  Left-sided numbness. Disequilibrium.   COMPARISON: MR brain 2015.   TECHNIQUE: Sagittal T1, axial T2, axial FLAIR, axial GRE, axial diffusion scan, coronal FLAIR, axial post Gd enhanced T1 weighted images.   FINDINGS: There is no intracranial hemorrhage, mass, or recent infarct. There is a cyst in the floor of the right maxilla     Head injury 2012    Had a bad concussion with post concussion synd for year     High cholesterol      History of echocardiogram 2018    TUYET Keyes MD 2018  Narrative     [id number]^MECHE^BLAKE^KRISTAL       [facility] [place] [facility] [address] [place] [address] Name: JONATHAN FIORE MRN: [id number] : 1965 Study Date: 2018 09:13 AM Age: 52 yrs Gender: Male Patient Location: [facility] Reason For Study: Chest Pain Ordering Physician:      Hypertension early     Not on meds. Usually in pre-hypertesion categ.     Insomnia      Squamous cell carcinoma      Past Surgical History:   Procedure Laterality Date     COLONOSCOPY  N/A 4/17/2017    Procedure: COLONOSCOPY;  Surgeon: Larry Fields MD;  Location: [facility] GI     LASER CO2 LESION ORAL N/A 10/5/2016    Procedure: LASER CO2 LESION ORAL;  Surgeon: Josué Rojo DDS;  Location: [facility] OR     MOHS MICROGRAPHIC PROCEDURE         Social History:   reports that he quit smoking about 27 years ago. His smoking use included cigarettes. He started smoking about 32 years ago. He has a 2.50 pack-year smoking history. he has never used smokeless tobacco. He reports that he drinks about 0.6 - 1.2 oz of alcohol per week. He reports that he does not use drugs.    Family History:  Family History   Problem Relation Age of Onset     Lipids Mother         on meds     Cerebrovascular Disease Mother         TIA     Alzheimer Disease Mother      Skin Cancer Mother         SCC     Lipids Father         on meds     Hypertension Father         controlled with meds     Thyroid Disease Father         ?not sure but possibly     Diabetes Father      Cerebrovascular Disease Father      Cancer - colorectal Maternal Grandmother         still alive at 99     Cancer Maternal Grandfather         lung but lifetime smoker     Melanoma Maternal Grandfather      Asthma No family hx of      C.A.D. No family hx of      Prostate Cancer No family hx of        Medications:  Current Outpatient Medications   Medication Sig Dispense Refill     aspirin 81 MG EC tablet Take 1 tablet (81 mg) by mouth daily       Coenzyme Q10 (COQ10 PO)        KRILL OIL PO Take 1 capsule by mouth daily        mirtazapine (REMERON) 15 MG tablet TAKE 1 TABLET(15 MG) BY MOUTH AT BEDTIME 90 tablet 3     Multiple Vitamins-Minerals (MULTIVITAL PO) Take 1 tablet by mouth daily        simvastatin (ZOCOR) 40 MG tablet TAKE 1 TABLET(40 MG) BY MOUTH AT BEDTIME 90 tablet 0     simvastatin (ZOCOR) 40 MG tablet Take 0.5 tablets by mouth daily       TURMERIC PO Take 1 tablet by mouth daily        VITAMIN D, CHOLECALCIFEROL, PO Take 2,000 Units by mouth daily         ranitidine (ZANTAC) 300 MG tablet   0     temazepam (RESTORIL) 30 MG capsule Take one tab po qhs the night of your sleep study (Patient not taking: Reported on 12/11/2018) 1 capsule 0        No Known Allergies  Physical exam:  Vitals: There were no vitals taken for this visit.  GEN: This is a well developed, well-nourished female in no acute distress, in a pleasant mood.    SKIN: Focused examination of the right hand was performed.  -2mm red papule on dorsal right 3rd finger, distal consistent with cherry angioma.  -3mm crusted follicular papule on dorsal right 3rd finger, proximal consistent with benign healing lesion.  -No other lesions of concern on areas examined.     Impression/Plan:  1. Cherry angioma - right distal 3rd finger    Reviewed benign etiology and natural course.    No treatment indicated.    Reassurance    2. Benign crusted follicular papule - right proximal 3rd finger - favor mild/resolving folliculitis.  No concerning features on gross exam or dermoscopy.    Reviewed benign etiology and natural course.    No treatment indicated.    3. History of nonmelanoma skin cancer x2.  - Follow-up in 2 months for scheduled full body skin examination with Dr. Gillespie.      Dr. Rober Mari staffed the patient.    Staff Involved:  Resident(Grecia Paz)/Staff(as above)    I have seen and examined this patient and agree with the assessment and plan as documented in the resident's note    Rober Mari MD  Dermatology Attending

## 2018-12-12 NOTE — PROGRESS NOTES
"[facility] Dermatology Note    Dermatology Problem List:  1. NMSC  -Superficial BCC, left cheek, s/p Mohns 6/16, NER  -SCCIS right cheek, s/p Mohs 6/16, NER  2. Actinic keratoses  3. Actinic cheilitis  - s/p CO2 laser ablation of lower lip vermilion 10/16  - s/p laser vermilionectomy 8/2017  - Follows with oral surgeon q 6 months  4. Neoplasm of uncertain behavior, L ear - s/p shave biopsy 8/15/18  Irregular epidermal hyperplasia with hypergranulosis and dermal fibrosis    Encounter Date: Dec 11, 2018    CC:   Chief Complaint   Patient presents with     Derm Problem     Travis is here today for a spot on right middle finger. He states \" the area will bleed and scab x1 month\"       History of Present Illness:  Mr. Cesar Montejo is a 53 year old male who presents for evaluation of two new spots on his right 3rd finger. He was last seen on 11/21/18 for evaluation of multiple lesions, at which time no concerning spots were found. Today, he reports two spots on his right 3rd finger that have been present for less than 1 month. The more proximal lesion has bled twice since it appeared. He denies tenderness or itching. He does not think these lesions have changed in size since appearing. He is otherwise feeling well and in his usual state of health.    Past Medical History:   Patient Active Problem List   Diagnosis     Insomnia     Adjustment reaction     CARDIOVASCULAR SCREENING; LDL GOAL LESS THAN 130     Pure hypercholesterolemia     GERD (gastroesophageal reflux disease)     Right shoulder pain     Calcific tendonitis     Solar lentiginosis     Skin cancer screening     Dermatitis, seborrheic     Keratosis, actinic     Family history of skin cancer     AK (actinic keratosis)     History of actinic keratosis     History of basal cell cancer     Seborrheic keratosis     Cherry angioma     Actinic cheilitis     History of nonmelanoma skin cancer     History of multiple concussions     Elevated blood " pressure reading without diagnosis of hypertension     Dream enactment behavior     Disturbance in sleep behavior     Dyspnea and respiratory abnormality     Chest pain     H/O magnetic resonance imaging of brain and brain stem     History of echocardiogram     Encounter for neuropsychological testing  with Dr. Park     Past Medical History:   Diagnosis Date     Anxiety      Basal cell carcinoma      Gastro-oesophageal reflux disease      H/O magnetic resonance imaging of brain and brain stem 2018    MR BRAIN WITHOUT AND WITH CONTRAST 2017 9:10 PM   HISTORY:  Left-sided numbness. Disequilibrium.   COMPARISON: MR brain 2015.   TECHNIQUE: Sagittal T1, axial T2, axial FLAIR, axial GRE, axial diffusion scan, coronal FLAIR, axial post Gd enhanced T1 weighted images.   FINDINGS: There is no intracranial hemorrhage, mass, or recent infarct. There is a cyst in the floor of the right maxilla     Head injury 2012    Had a bad concussion with post concussion synd for year     High cholesterol      History of echocardiogram 2018    TUYET Keyes MD 2018  Narrative     [id number]^MECHE^BLAKE^KRISTAL       [facility] [facility] [address] [place] [address] Name: JONATHAN FIORE MRN: [id number] : 1965 Study Date: 2018 09:13 AM Age: 52 yrs Gender: Male Patient Location: [facility] Reason For Study: Chest Pain Ordering Physician:      Hypertension early     Not on meds. Usually in pre-hypertesion categ.     Insomnia      Squamous cell carcinoma      Past Surgical History:   Procedure Laterality Date     COLONOSCOPY N/A 2017    Procedure: COLONOSCOPY;  Surgeon: Larry Fields MD;  Location:  GI     LASER CO2 LESION ORAL N/A 10/5/2016    Procedure: LASER CO2 LESION ORAL;  Surgeon: Josué Rojo DDS;  Location: [facility] OR     MOHS MICROGRAPHIC PROCEDURE         Social History:   reports that  he quit smoking about 27 years ago. His smoking use included cigarettes. He started smoking about 32 years ago. He has a 2.50 pack-year smoking history. he has never used smokeless tobacco. He reports that he drinks about 0.6 - 1.2 oz of alcohol per week. He reports that he does not use drugs.    Family History:  Family History   Problem Relation Age of Onset     Lipids Mother         on meds     Cerebrovascular Disease Mother         TIA     Alzheimer Disease Mother      Skin Cancer Mother         SCC     Lipids Father         on meds     Hypertension Father         controlled with meds     Thyroid Disease Father         ?not sure but possibly     Diabetes Father      Cerebrovascular Disease Father      Cancer - colorectal Maternal Grandmother         still alive at 99     Cancer Maternal Grandfather         lung but lifetime smoker     Melanoma Maternal Grandfather      Asthma No family hx of      C.A.D. No family hx of      Prostate Cancer No family hx of        Medications:  Current Outpatient Medications   Medication Sig Dispense Refill     aspirin 81 MG EC tablet Take 1 tablet (81 mg) by mouth daily       Coenzyme Q10 (COQ10 PO)        KRILL OIL PO Take 1 capsule by mouth daily        mirtazapine (REMERON) 15 MG tablet TAKE 1 TABLET(15 MG) BY MOUTH AT BEDTIME 90 tablet 3     Multiple Vitamins-Minerals (MULTIVITAL PO) Take 1 tablet by mouth daily        simvastatin (ZOCOR) 40 MG tablet TAKE 1 TABLET(40 MG) BY MOUTH AT BEDTIME 90 tablet 0     simvastatin (ZOCOR) 40 MG tablet Take 0.5 tablets by mouth daily       TURMERIC PO Take 1 tablet by mouth daily        VITAMIN D, CHOLECALCIFEROL, PO Take 2,000 Units by mouth daily        ranitidine (ZANTAC) 300 MG tablet   0     temazepam (RESTORIL) 30 MG capsule Take one tab po qhs the night of your sleep study (Patient not taking: Reported on 12/11/2018) 1 capsule 0        No Known Allergies      Review of Systems:  -Constitutional: The patient denies fatigue, fevers,  chills, unintended weight loss, and night sweats.  -HEENT: Patient denies nonhealing oral sores.  -Skin: As above in HPI. No additional skin concerns.    Physical exam:  Vitals: There were no vitals taken for this visit.  GEN: This is a well developed, well-nourished female in no acute distress, in a pleasant mood.    SKIN: Focused examination of the right hand was performed.  -2mm red papule on dorsal right 3rd finger, distal consistent with cherry angioma.  -3mm crusted follicular papule on dorsal right 3rd finger, proximal consistent with benign healing lesion.  -No other lesions of concern on areas examined.     Impression/Plan:  1. Cherry angioma - right distal 3rd finger    Reviewed benign etiology and natural course.    No treatment indicated.    Reassurance    2. Benign crusted follicular papule - right proximal 3rd finger - favor mild/resolving folliculitis.  No concerning features on gross exam or dermoscopy.    Reviewed benign etiology and natural course.    No treatment indicated.    3. History of nonmelanoma skin cancer x2.  - Follow-up in 2 months for scheduled full body skin examination with Dr. Gillespie.      Dr. Rober Mari staffed the patient.    Staff Involved:  Resident(Grecia Paz)/Staff(as above)    I have seen and examined this patient and agree with the assessment and plan as documented in the resident's note    Rober Mari MD  Dermatology Attending

## 2019-01-01 PROBLEM — D23.61: Status: ACTIVE | Noted: 2019-01-01

## 2019-01-06 ENCOUNTER — APPOINTMENT (OUTPATIENT)
Dept: CT IMAGING | Facility: CLINIC | Age: 54
End: 2019-01-06
Payer: COMMERCIAL

## 2019-01-06 ENCOUNTER — HOSPITAL ENCOUNTER (EMERGENCY)
Facility: CLINIC | Age: 54
Discharge: HOME | End: 2019-01-06
Attending: EMERGENCY MEDICINE | Admitting: EMERGENCY MEDICINE
Payer: COMMERCIAL

## 2019-01-06 VITALS
SYSTOLIC BLOOD PRESSURE: 148 MMHG | RESPIRATION RATE: 16 BRPM | BODY MASS INDEX: 24.73 KG/M2 | OXYGEN SATURATION: 95 % | TEMPERATURE: 98.6 F | DIASTOLIC BLOOD PRESSURE: 99 MMHG | WEIGHT: 190 LBS

## 2019-01-06 DIAGNOSIS — G44.209 TENSION HEADACHE: ICD-10-CM

## 2019-01-06 LAB
ALBUMIN SERPL-MCNC: 4 G/DL (ref 3.4–5)
ALP SERPL-CCNC: 40 U/L (ref 40–150)
ALT SERPL W P-5'-P-CCNC: 27 U/L (ref 0–70)
ANION GAP SERPL CALCULATED.3IONS-SCNC: 13 MMOL/L (ref 3–14)
AST SERPL W P-5'-P-CCNC: 18 U/L (ref 0–45)
BASOPHILS # BLD AUTO: 0 10E9/L (ref 0–0.2)
BASOPHILS NFR BLD AUTO: 0.5 %
BILIRUB SERPL-MCNC: 0.5 MG/DL (ref 0.2–1.3)
BUN SERPL-MCNC: 11 MG/DL (ref 7–30)
CALCIUM SERPL-MCNC: 8.7 MG/DL (ref 8.5–10.1)
CHLORIDE SERPL-SCNC: 106 MMOL/L (ref 94–109)
CK SERPL-CCNC: 104 U/L (ref 30–300)
CO2 SERPL-SCNC: 22 MMOL/L (ref 20–32)
CREAT SERPL-MCNC: 0.77 MG/DL (ref 0.66–1.25)
DIFFERENTIAL METHOD BLD: NORMAL
EOSINOPHIL # BLD AUTO: 0.1 10E9/L (ref 0–0.7)
EOSINOPHIL NFR BLD AUTO: 0.9 %
ERYTHROCYTE [DISTWIDTH] IN BLOOD BY AUTOMATED COUNT: 12.4 % (ref 10–15)
GFR SERPL CREATININE-BSD FRML MDRD: >90 ML/MIN/{1.73_M2}
GLUCOSE SERPL-MCNC: 87 MG/DL (ref 70–99)
HCT VFR BLD AUTO: 47 % (ref 40–53)
HGB BLD-MCNC: 16 G/DL (ref 13.3–17.7)
IMM GRANULOCYTES # BLD: 0 10E9/L (ref 0–0.4)
IMM GRANULOCYTES NFR BLD: 0.3 %
LYMPHOCYTES # BLD AUTO: 2.6 10E9/L (ref 0.8–5.3)
LYMPHOCYTES NFR BLD AUTO: 32.4 %
MAGNESIUM SERPL-MCNC: 2.5 MG/DL (ref 1.6–2.3)
MCH RBC QN AUTO: 31.1 PG (ref 26.5–33)
MCHC RBC AUTO-ENTMCNC: 34 G/DL (ref 31.5–36.5)
MCV RBC AUTO: 91 FL (ref 78–100)
MONOCYTES # BLD AUTO: 0.6 10E9/L (ref 0–1.3)
MONOCYTES NFR BLD AUTO: 7.8 %
NEUTROPHILS # BLD AUTO: 4.6 10E9/L (ref 1.6–8.3)
NEUTROPHILS NFR BLD AUTO: 58.1 %
NRBC # BLD AUTO: 0 10*3/UL
NRBC BLD AUTO-RTO: 0 /100
PLATELET # BLD AUTO: 214 10E9/L (ref 150–450)
POTASSIUM SERPL-SCNC: 3.6 MMOL/L (ref 3.4–5.3)
PROT SERPL-MCNC: 7.3 G/DL (ref 6.8–8.8)
RBC # BLD AUTO: 5.14 10E12/L (ref 4.4–5.9)
SODIUM SERPL-SCNC: 141 MMOL/L (ref 133–144)
WBC # BLD AUTO: 7.9 10E9/L (ref 4–11)

## 2019-01-06 PROCEDURE — 80053 COMPREHEN METABOLIC PANEL: CPT | Performed by: EMERGENCY MEDICINE

## 2019-01-06 PROCEDURE — 70450 CT HEAD/BRAIN W/O DYE: CPT

## 2019-01-06 PROCEDURE — 93010 ELECTROCARDIOGRAM REPORT: CPT | Mod: Z6 | Performed by: EMERGENCY MEDICINE

## 2019-01-06 PROCEDURE — 99285 EMERGENCY DEPT VISIT HI MDM: CPT | Mod: 25

## 2019-01-06 PROCEDURE — 99284 EMERGENCY DEPT VISIT MOD MDM: CPT | Mod: 25 | Performed by: EMERGENCY MEDICINE

## 2019-01-06 PROCEDURE — 25000128 H RX IP 250 OP 636: Performed by: EMERGENCY MEDICINE

## 2019-01-06 PROCEDURE — 96360 HYDRATION IV INFUSION INIT: CPT

## 2019-01-06 PROCEDURE — 85025 COMPLETE CBC W/AUTO DIFF WBC: CPT | Performed by: EMERGENCY MEDICINE

## 2019-01-06 PROCEDURE — 82550 ASSAY OF CK (CPK): CPT | Performed by: EMERGENCY MEDICINE

## 2019-01-06 PROCEDURE — 83735 ASSAY OF MAGNESIUM: CPT | Performed by: EMERGENCY MEDICINE

## 2019-01-06 PROCEDURE — 93005 ELECTROCARDIOGRAM TRACING: CPT

## 2019-01-06 RX ADMIN — PROCHLORPERAZINE EDISYLATE 10 MG: 5 INJECTION INTRAMUSCULAR; INTRAVENOUS at 04:31

## 2019-01-06 RX ADMIN — SODIUM CHLORIDE, POTASSIUM CHLORIDE, SODIUM LACTATE AND CALCIUM CHLORIDE 1000 ML: 600; 310; 30; 20 INJECTION, SOLUTION INTRAVENOUS at 04:31

## 2019-01-06 ASSESSMENT — ENCOUNTER SYMPTOMS
SHORTNESS OF BREATH: 0
ACTIVITY CHANGE: 1
TREMORS: 1
FEVER: 0
NECK PAIN: 1
NERVOUS/ANXIOUS: 1
COUGH: 0
DIARRHEA: 1
DECREASED CONCENTRATION: 1

## 2019-01-06 NOTE — ED PROVIDER NOTES
"  History     Chief Complaint   Patient presents with     Headache     headache for 4 days, pt also reports \"involuntary movements\" for over a year that is getting worse.     HPI  Cesar Montejo is a 53 year old male who presents with headache.   He has history of skin cancer.  Over the past 2 years he has been undergoing an outpatient evaluation for myriad of symptoms including sleep disturbance, gait unsteadiness, autonomic symptoms, tremor through evaluation with his primary care, neurology, sleep clinic and cardiology clinics.  He states it is not yet a clear diagnosis.    He is here tonight because for the past 3 days he has had a headache described as a pressure in the temples bilaterally.  Been associated with a feeling of nausea as well as tightness in the muscles of his neck.  Today at 7:30 PM he noticed a sensation of blurry vision out of his right eye that was transient and has resolved.  No flashes or floaters.  No eye pain.  He is not particularly photophobic.  No trauma to the head or eyes.  He does have a history of past concussions.  No recent fevers, chills, sore throat, cough, or abdominal pain.  He did have one episode of diarrhea couple days ago.  He does not feel weak in extremity and there is not been any facial droop.  He does feel like there has been a chronic slurring of speech in the past few months he has been seen in neurology clinic for this.    Is able to review his past MRI from 2007 of the brain which was normal.  So had a CT CTA in January 2018 and was without aneurysms or other vascular abnormalities.        I have reviewed the Medications, Allergies, Past Medical and Surgical History, and Social History in the [facility] system.    Past Medical History:   Diagnosis Date     Anxiety      Basal cell carcinoma      Gastro-oesophageal reflux disease      H/O magnetic resonance imaging of brain and brain stem 6/12/2018    MR BRAIN WITHOUT AND WITH CONTRAST 11/29/2017 9:10 PM   HISTORY:  " Left-sided numbness. Disequilibrium.   COMPARISON: MR brain 2015.   TECHNIQUE: Sagittal T1, axial T2, axial FLAIR, axial GRE, axial diffusion scan, coronal FLAIR, axial post Gd enhanced T1 weighted images.   FINDINGS: There is no intracranial hemorrhage, mass, or recent infarct. There is a cyst in the floor of the right maxilla     Head injury 2012    Had a bad concussion with post concussion synd for year     High cholesterol      History of echocardiogram 2018    TUYET Keyes MD 2018  Narrative     [id number]^MECHE^BLAKE^KRISTAL       [facility] [address] [place] [address] Name: JONATHAN FIORE MRN: [id number] : 1965 Study Date: 2018 09:13 AM Age: 52 yrs Gender: Male Patient Location: [facility] Reason For Study: Chest Pain Ordering Physician:      Hypertension early     Not on meds. Usually in pre-hypertesion categ.     Insomnia      Squamous cell carcinoma        Past Surgical History:   Procedure Laterality Date     COLONOSCOPY N/A 2017    Procedure: COLONOSCOPY;  Surgeon: Larry Fields MD;  Location: [facility]     LASER CO2 LESION ORAL N/A 10/5/2016    Procedure: LASER CO2 LESION ORAL;  Surgeon: Josué Rojo DDS;  Location: [facility] OR     MOHS MICROGRAPHIC PROCEDURE         Family History   Problem Relation Age of Onset     Lipids Mother         on meds     Cerebrovascular Disease Mother         TIA     Alzheimer Disease Mother      Skin Cancer Mother         SCC     Lipids Father         on meds     Hypertension Father         controlled with meds     Thyroid Disease Father         ?not sure but possibly     Diabetes Father      Cerebrovascular Disease Father      Cancer - colorectal Maternal Grandmother         still alive at 99     Cancer Maternal Grandfather         lung but lifetime smoker     Melanoma Maternal Grandfather      Asthma No family hx of      C.A.D. No  family hx of      Prostate Cancer No family hx of        Social History     Tobacco Use     Smoking status: Former Smoker     Packs/day: 0.50     Years: 5.00     Pack years: 2.50     Types: Cigarettes     Start date: 1986     Last attempt to quit: 1991     Years since quittin.0     Smokeless tobacco: Never Used     Tobacco comment: After  no longer a half pack a day. Very occasional--maybe 75 cigarettes a year--91 to 98   Substance Use Topics     Alcohol use: Yes     Alcohol/week: 0.6 - 1.2 oz     Comment: 1 to 2 glasses of red wine per day         Review of Systems   Constitutional: Positive for activity change. Negative for fever.   HENT: Negative.    Respiratory: Negative for cough and shortness of breath.    Gastrointestinal: Positive for diarrhea.   Genitourinary: Negative.    Musculoskeletal: Positive for neck pain.   Skin: Negative.    Neurological: Positive for tremors.   Psychiatric/Behavioral: Positive for decreased concentration. The patient is nervous/anxious.           Physical Exam   BP: (!) 161/101  Heart Rate: 79  Temp: 98.6  F (37  C)  Resp: 16  Weight: 86.2 kg (190 lb)  SpO2: 95 %      Physical Exam   Gen:A&Ox3, no acute distress, affect flat  HEENT:PERRL, no facial tenderness or wounds, head atraumatic, oropharynx clear, mucous membranes moist, TMs clear bilaterally  Neck:no bony tenderness or step offs, no JVD, trachea midline  Back: no CVA tenderness, no midline bony tenderness  CV:RRR without murmurs  PULM:Clear to auscultation bilaterally  Abd:soft, nontender, nondistended. Bowel sounds present and normal  UE:No traumatic injuries, skin normal  LE:no traumatic injuries, skin normal, no LE edema.   Neuro:CN II-XII intact, strength 5/5 of flexion and extension of the toes, ankles, knees, hips, hands, wrists, elbows and shoulders.  Sensation intact to touch throughout. Coordination normal on finger to nose testing. Reflexes 3/6 of arms and right leg, 4/6 of left leg. No clonus.   Gait normal. Able to walk on heels and toes, and tandem. Negative Romberg sign. No pronator drift. Tremulousness of left arm and leg with activity. No rigidity.   Skin: no rashes or ecchymoses      ED Course        Procedures             EKG Interpretation:      Interpreted by Isabella De La Cruz  Time reviewed: 5:07AM  Symptoms at time of EKG: headache  Rhythm: normal sinus   Rate: 71  Axis: normal  Ectopy: none  Conduction: normal  ST Segments/ T Waves: No ST-T wave changes  Q Waves: none  Comparison to prior: No old EKG available    Clinical Impression: normal EKG        Critical Care time:  none             Labs Ordered and Resulted from Time of ED Arrival Up to the Time of Departure from the ED   MAGNESIUM - Abnormal; Notable for the following components:       Result Value    Magnesium 2.5 (*)     All other components within normal limits   CBC WITH PLATELETS DIFFERENTIAL   COMPREHENSIVE METABOLIC PANEL   CK TOTAL   PERIPHERAL IV CATHETER   VISION CHECKS            Assessments & Plan (with Medical Decision Making)   52 yo M presenting with 3 days of headache. No recent trauma or infectious symptoms.   Pt is most worried about the possibility of an acute stroke, through on exam there are no focal neurologic deficits. He does have left sided tremor and slight asymmetry of reflexes, though this was present on a previous outpatient neurology visit.   DDx included migraine HA vs. Tension HA. Lower suspicion for acute intracerebral hemorrhage or TIA.   Vitals stable.   EKG unremarkable.   IV access obtained and lab testing done.  CBC and CMP wnl. Mg minimally increased at 2.5. Pt's symptoms not suggestive of hypermagnesemia and this level is unlikely to be clinically significant.   CK done to assess tremulousness given his use of statin. Not elevated at 104.     Treated HA with 1L of IV fluid and 10mg IV compazine.     CT brain without abnormality.     Pt reassessed at 5AM. Headache improved. No change in exam.    Will discharge home with follow up with primary care.  Symptoms tonight most consistent with tension HA. Recommended NSAIDs, warm packs to tight muscles, stretching and activity.     I have reviewed the nursing notes.    I have reviewed the findings, diagnosis, plan and need for follow up with the patient.       Medication List      There are no discharge medications for this visit.         Final diagnoses:   Tension headache       1/6/2019   [facility], [place], EMERGENCY DEPARTMENT    MD NEAL Posada Katrina Anne, MD  01/06/19 0702

## 2019-01-06 NOTE — ED AVS SNAPSHOT
[facility], Emergency Department  [address]  [facility] [place] [address]  Phone:  [phone]  Fax:  [phone]                                    Cesar Montejo   MRN: [id number]    Department:  [facility], Emergency Department   Date of Visit:  1/6/2019           After Visit Summary Signature Page    I have received my discharge instructions, and my questions have been answered. I have discussed any challenges I see with this plan with the nurse or doctor.    ..........................................................................................................................................  Patient/Patient Representative Signature      ..........................................................................................................................................  Patient Representative Print Name and Relationship to Patient    ..................................................               ................................................  Date                                   Time    ..........................................................................................................................................  Reviewed by Signature/Title    ...................................................              ..............................................  Date                                               Time          22EPIC Rev 08/18

## 2019-01-07 LAB — INTERPRETATION ECG - MUSE: NORMAL

## 2019-01-07 NOTE — PROGRESS NOTES
SUBJECTIVE:   Cesar Montejo is a 53 year old male who presents to clinic today for the following health issues:    Neck Pain  Onset: Ongoing    Description:   Location: Both sides  Radiation: into the left shoulder    Intensity: moderate to severe    Progression of Symptoms:  Worsening over time, same over the last few months     Accompanying Signs & Symptoms:  Burning, prickly sensation (paresthesias) in arm(s): no   Numbness in arm(s): YES- left hand   Weakness in arm(s):  YES- weakness and lack of coordination of hands   Fever: no   Headache: YES- had a bad headache x4 days- went to ER  Nausea and/or vomiting: YES- nausea with the headache     History:   Trauma: Possibly  Previous neck pain: YES  Previous surgery or injections: no   Previous Imaging (MRI,X ray): YES- MRI done at [place] 9/2018    Precipitating factors:   Does movement increase the pain:  YES- decreased range of motion     Alleviating factors:  Movement     Therapies Tried and outcome:  Nothing tried     Had normal cervical MRI at [place] last year  Seeing neuro for ongoing eval    Problem list and histories reviewed & adjusted, as indicated.  Additional history: as documented    Labs reviewed in EPIC    Reviewed and updated as needed this visit by clinical staff       Reviewed and updated as needed this visit by Provider         ROS:  Constitutional, HEENT, cardiovascular, pulmonary, gi and gu systems are negative, except as otherwise noted.    OBJECTIVE:     /90   Pulse 84   Temp 97.7  F (36.5  C) (Oral)   Wt 88.6 kg (195 lb 4.8 oz)   SpO2 95%   BMI 25.42 kg/m    Body mass index is 25.42 kg/m .  GENERAL: alert and fatigued  NECK: no adenopathy, no asymmetry, masses, or scars and thyroid normal to palpation  NECK:  . Patient appears to be in mild to moderate pain.   Neck exam: tenderness over lower cervical spine and nuchal area, tenderness over trapezial muscles, normal neurological exam of arms; normal DTR's, motor, sensory  exam.  Shoulder exam: soft tissue tenderness  at the posterior rotator cuff, reduced range of motion of arm to flexion at 100 , bicipital groove tenderness.    RESP: lungs clear to auscultation - no rales, rhonchi or wheezes  CV: regular rate and rhythm, normal S1 S2, no S3 or S4, no murmur, click or rub, no peripheral edema and peripheral pulses strong  SKIN: no suspicious lesions or rashes  PSYCH: mentation appears normal, affect flat, tearful, anxious and fatigued    Diagnostic Test Results:  none     ASSESSMENT/PLAN:             1. Strain of neck muscle, initial encounter  worsening  - [facility]; Future    2. Impingement syndrome, shoulder, left  As above  - [facility]; Future     activity modification  referral to physical therapy  see primary care physician in follow up  See orders in [facility].     Rocky Messina MD  [facility] [place]

## 2019-01-08 ENCOUNTER — OFFICE VISIT (OUTPATIENT)
Dept: FAMILY MEDICINE | Facility: CLINIC | Age: 54
End: 2019-01-08
Payer: COMMERCIAL

## 2019-01-08 VITALS
TEMPERATURE: 97.7 F | HEART RATE: 84 BPM | OXYGEN SATURATION: 95 % | WEIGHT: 195.3 LBS | SYSTOLIC BLOOD PRESSURE: 126 MMHG | DIASTOLIC BLOOD PRESSURE: 90 MMHG | BODY MASS INDEX: 25.42 KG/M2

## 2019-01-08 DIAGNOSIS — M75.42 IMPINGEMENT SYNDROME, SHOULDER, LEFT: ICD-10-CM

## 2019-01-08 DIAGNOSIS — S16.1XXA STRAIN OF NECK MUSCLE, INITIAL ENCOUNTER: Primary | ICD-10-CM

## 2019-01-08 PROCEDURE — 99214 OFFICE O/P EST MOD 30 MIN: CPT | Performed by: FAMILY MEDICINE

## 2019-01-08 ASSESSMENT — ANXIETY QUESTIONNAIRES
3. WORRYING TOO MUCH ABOUT DIFFERENT THINGS: MORE THAN HALF THE DAYS
2. NOT BEING ABLE TO STOP OR CONTROL WORRYING: MORE THAN HALF THE DAYS
5. BEING SO RESTLESS THAT IT IS HARD TO SIT STILL: SEVERAL DAYS
6. BECOMING EASILY ANNOYED OR IRRITABLE: NOT AT ALL
1. FEELING NERVOUS, ANXIOUS, OR ON EDGE: MORE THAN HALF THE DAYS

## 2019-01-08 ASSESSMENT — PATIENT HEALTH QUESTIONNAIRE - PHQ9: 5. POOR APPETITE OR OVEREATING: MORE THAN HALF THE DAYS

## 2019-01-15 ENCOUNTER — MYC MEDICAL ADVICE (OUTPATIENT)
Dept: NEUROLOGY | Facility: CLINIC | Age: 54
End: 2019-01-15

## 2019-01-16 NOTE — TELEPHONE ENCOUNTER
"Called Cesar regarding mychart message with worsening symptoms    Symptoms: Acting out dreams (non violent, acts out in bed waving, reaching, acts out driving) unsteady gait/balance, left leg tremor with rest (new), myoclonic jerks during the day (new) in all limbs and neck (some days its every few minute and some days it happens a few times), difficulty concentrating, stiffness in neck/shoulder, walking has become more \"shakey\", feet \"drags\" on the floor at times more so with the right foot over left foot, pain in right foot all the time - tingling sensation with sharp pain on top of the foot - more sensitive in the cold    How long have you had the increase in symptoms?    Over the past 2 months, seems to get worse as time is  going on    Do you notice any pattern?    No, tremors seem to get worse as the day goes on    Patient is taking:  aspirin 81 MG EC tablet   6/12/2018  --   Sig - Route: Take 1 tablet (81 mg) by mouth daily - Oral     simvastatin (ZOCOR) 40 MG tablet   8/23/2011  --   Sig - Route: Take 0.5 tablets by mouth daily - Oral   20mg once a day    mirtazapine (REMERON) 15 MG tablet 90 tablet 3 1/12/2018  No   Sig: TAKE 1 TABLET(15 MG) BY MOUTH AT BEDTIME     ranitidine (ZANTAC) 300 MG tablet  0 5/25/2018  --   Reports he has never taken ranitidine    Taking multivitamin, Krill oil, tumeric, Coenzyme Q 10 (all supplements taken daily    New Medications: None    Are you taking your medications as directed? Yes   No falls since last seen    Are you having any new symptoms such as,   Hit your head  No   Mental stress  Yes - concerned over changed  health status   Emotional stress Yes - increased anxiety   Confusion  Yes    Hallucinations No    Cesar stated he would like to be seen by Dr. Us to get his perspective on what is going on and for a second opinion. He will be scheduling an appointment with Dr. Us. He would like to keep his appointment with Dr. Solomon in March for now until he is seen " with Dr. Us.

## 2019-01-21 ENCOUNTER — OFFICE VISIT (OUTPATIENT)
Dept: FAMILY MEDICINE | Facility: CLINIC | Age: 54
End: 2019-01-21
Payer: COMMERCIAL

## 2019-01-21 VITALS
BODY MASS INDEX: 25.2 KG/M2 | DIASTOLIC BLOOD PRESSURE: 92 MMHG | WEIGHT: 193.6 LBS | OXYGEN SATURATION: 97 % | HEART RATE: 111 BPM | TEMPERATURE: 98.4 F | SYSTOLIC BLOOD PRESSURE: 126 MMHG

## 2019-01-21 DIAGNOSIS — F41.1 GAD (GENERALIZED ANXIETY DISORDER): Primary | ICD-10-CM

## 2019-01-21 DIAGNOSIS — G47.00 INSOMNIA, UNSPECIFIED TYPE: Primary | ICD-10-CM

## 2019-01-21 DIAGNOSIS — F41.8 MIXED ANXIETY AND DEPRESSIVE DISORDER: ICD-10-CM

## 2019-01-21 PROCEDURE — 99214 OFFICE O/P EST MOD 30 MIN: CPT | Performed by: FAMILY MEDICINE

## 2019-01-21 RX ORDER — VENLAFAXINE HYDROCHLORIDE 75 MG/1
TABLET, EXTENDED RELEASE ORAL
Qty: 180 TABLET | Refills: 1 | Status: SHIPPED | OUTPATIENT
Start: 2019-01-21 | End: 2019-02-01

## 2019-01-21 ASSESSMENT — PATIENT HEALTH QUESTIONNAIRE - PHQ9
SUM OF ALL RESPONSES TO PHQ QUESTIONS 1-9: 16
5. POOR APPETITE OR OVEREATING: NEARLY EVERY DAY

## 2019-01-21 ASSESSMENT — ANXIETY QUESTIONNAIRES
1. FEELING NERVOUS, ANXIOUS, OR ON EDGE: NEARLY EVERY DAY
2. NOT BEING ABLE TO STOP OR CONTROL WORRYING: NEARLY EVERY DAY
5. BEING SO RESTLESS THAT IT IS HARD TO SIT STILL: SEVERAL DAYS
3. WORRYING TOO MUCH ABOUT DIFFERENT THINGS: NEARLY EVERY DAY
6. BECOMING EASILY ANNOYED OR IRRITABLE: SEVERAL DAYS

## 2019-01-21 NOTE — PROGRESS NOTES
SUBJECTIVE:   Cesar Montejo is a 53 year old male who presents to clinic today for the following health issues:    Depression and Anxiety Follow-Up    Status since last visit: Worsened due to health issues     Other associated symptoms: Concerned about memory/congitive problems     Complicating factors:     Significant life event: Yes- Health issues      Current substance abuse: None    PHQ 12/16/2016 3/21/2018 1/8/2019   PHQ-9 Total Score 0 2 -   Q9: Suicide Ideation Not at all Not at all Not at all     PREMA-7 SCORE 12/16/2016   Total Score 1     taking remoran to help sleep  PHQ-9  English  PHQ-9   Any Language  PREMA-7  Suicide Assessment Five-step Evaluation and Treatment (SAFE-T)    Amount of exercise or physical activity: 6-7 days/week for an average of 15-30 minutes    Problems taking medications regularly: No    Medication side effects: none    Diet: regular (no restrictions)        Reviewed need for exercise    Problem list and histories reviewed & adjusted, as indicated.  Additional history: as documented    Labs reviewed in EPIC    Reviewed and updated as needed this visit by clinical staff       Reviewed and updated as needed this visit by Provider         ROS:  Constitutional, HEENT, cardiovascular, pulmonary, gi and gu systems are negative, except as otherwise noted.    OBJECTIVE:     BP (!) 126/92   Pulse 111   Temp 98.4  F (36.9  C) (Oral)   Wt 87.8 kg (193 lb 9.6 oz)   SpO2 97%   BMI 25.20 kg/m    Body mass index is 25.2 kg/m .  GENERAL: alert and fatigued  CV: regular rate and rhythm, normal S1 S2, no S3 or S4, no murmur, click or rub, no peripheral edema and peripheral pulses strong  PSYCH: mentation appears normal, anxious, fatigued and judgement and insight intact    Diagnostic Test Results:  Results for orders placed or performed during the hospital encounter of 01/06/19   Head CT w/o contrast    Narrative    CT SCAN OF THE HEAD WITHOUT CONTRAST   1/6/2019 4:48 AM     HISTORY: See the  Clinical Information for Interpreting Provider.  Persistent headache for 3 days.    TECHNIQUE:  Axial images of the head and coronal reformations without  IV contrast material. Radiation dose for this scan was reduced using  automated exposure control, adjustment of the mA and/or kV according  to patient size, or iterative reconstruction technique.    COMPARISON: 1/21/2018.    FINDINGS:  The ventricles are normal in size, shape and configuration.   The brain parenchyma and subarachnoid spaces are normal. There is no  evidence of intracranial hemorrhage, mass, acute infarct or anomaly.     Retention cyst noted in the inferior right maxillary sinus. The  visualized portions of the sinuses and mastoids are otherwise normal.  There is no evidence of trauma.      Impression    IMPRESSION: Negative noncontrast CT scan of the head.      MAEVE BRADLEY MD   CBC with platelets differential   Result Value Ref Range    WBC 7.9 4.0 - 11.0 10e9/L    RBC Count 5.14 4.4 - 5.9 10e12/L    Hemoglobin 16.0 13.3 - 17.7 g/dL    Hematocrit 47.0 40.0 - 53.0 %    MCV 91 78 - 100 fl    MCH 31.1 26.5 - 33.0 pg    MCHC 34.0 31.5 - 36.5 g/dL    RDW 12.4 10.0 - 15.0 %    Platelet Count 214 150 - 450 10e9/L    Diff Method Automated Method     % Neutrophils 58.1 %    % Lymphocytes 32.4 %    % Monocytes 7.8 %    % Eosinophils 0.9 %    % Basophils 0.5 %    % Immature Granulocytes 0.3 %    Nucleated RBCs 0 0 /100    Absolute Neutrophil 4.6 1.6 - 8.3 10e9/L    Absolute Lymphocytes 2.6 0.8 - 5.3 10e9/L    Absolute Monocytes 0.6 0.0 - 1.3 10e9/L    Absolute Eosinophils 0.1 0.0 - 0.7 10e9/L    Absolute Basophils 0.0 0.0 - 0.2 10e9/L    Abs Immature Granulocytes 0.0 0 - 0.4 10e9/L    Absolute Nucleated RBC 0.0    Comprehensive metabolic panel   Result Value Ref Range    Sodium 141 133 - 144 mmol/L    Potassium 3.6 3.4 - 5.3 mmol/L    Chloride 106 94 - 109 mmol/L    Carbon Dioxide 22 20 - 32 mmol/L    Anion Gap 13 3 - 14 mmol/L    Glucose 87 70 - 99  mg/dL    Urea Nitrogen 11 7 - 30 mg/dL    Creatinine 0.77 0.66 - 1.25 mg/dL    GFR Estimate >90 >60 mL/min/[1.73_m2]    GFR Estimate If Black >90 >60 mL/min/[1.73_m2]    Calcium 8.7 8.5 - 10.1 mg/dL    Bilirubin Total 0.5 0.2 - 1.3 mg/dL    Albumin 4.0 3.4 - 5.0 g/dL    Protein Total 7.3 6.8 - 8.8 g/dL    Alkaline Phosphatase 40 40 - 150 U/L    ALT 27 0 - 70 U/L    AST 18 0 - 45 U/L   CK total   Result Value Ref Range    CK Total 104 30 - 300 U/L   Magnesium   Result Value Ref Range    Magnesium 2.5 (H) 1.6 - 2.3 mg/dL   EKG 12 lead   Result Value Ref Range    Interpretation ECG Click View Image link to view waveform and result        ASSESSMENT/PLAN:             1. PREMA (generalized anxiety disorder)  add  - venlafaxine (EFFEXOR-ER) 75 MG 24 hr tablet; Take 1 tablet daily times 2 weeks then 2 tablets daily  Dispense: 180 tablet; Refill: 1  - MENTAL HEALTH REFERRAL  - Adult; Psychiatry and Medication Management; Psychiatry; [facility] [place]: [facility] [phone].  Medication management & future refills will be returned to G PCP upon completion of evaluation; We elaine...    Regular exercise  Follow up in 1 month.     Rocky Messina MD  [facility] [place]

## 2019-01-21 NOTE — TELEPHONE ENCOUNTER
"Requested Prescriptions   Pending Prescriptions Disp Refills     mirtazapine (REMERON) 15 MG tablet [Pharmacy Med Name: MIRTAZAPINE 15MG TABLETS] 90 tablet 0    Last Written Prescription Date:  01/12/2018  Last Fill Quantity: 90,  # refills: 3   Last office visit: No previous visit found with prescribing provider:  01/21/2019   Future Office Visit:   Sig: TAKE 1 TABLET(15 MG) BY MOUTH AT BEDTIME    Atypical Antidepressants Protocol Failed - 1/21/2019  3:50 PM       Failed - Patient has PHQ-9 score less than 5 in past 6 months.    Please review last PHQ-9 score.          Passed - Medication active on med list       Passed - Patient is age 18 or older       Passed - Recent (6 mo) or future (30 days) visit within the authorizing provider's specialty    Patient had office visit in the last 6 months or has a visit in the next 30 days with authorizing provider or within the authorizing provider's specialty.  See \"Patient Info\" tab in inbasket, or \"Choose Columns\" in Meds & Orders section of the refill encounter.            "

## 2019-01-22 RX ORDER — MIRTAZAPINE 15 MG/1
TABLET, FILM COATED ORAL
Qty: 90 TABLET | Refills: 0 | Status: SHIPPED | OUTPATIENT
Start: 2019-01-22 | End: 2019-03-26

## 2019-01-22 NOTE — TELEPHONE ENCOUNTER
Dr. Messina,  Please review/sign or advise for refill request of: mirtazapine (REMERON) 15 MG tablet     Routing refill request to provider for review/approval because:  Last PHQ-9 score 15 (01/21/2019)  LOV: 01/21/2019    Thank You!  Gita Up, RN  Triage Nurse

## 2019-01-25 NOTE — PROGRESS NOTES
Summary and Recommendations:     Orthostatic intolerance    Stable cognitive state.     AT this point he does not have dementia of any type or mild cognitive impairment.     He is interested in neuroprotective therapy but there is no data proven one therapy at this time, ie there is no data to confirm that nilotinib is effective, and it is costly, has risks and unlikely to be covered by insurance as well as not enough data to support its use.     He is taking a variety of products that were discussed.   It could be beneficial to review products further that are inexpensive and have a low risk.   At this time isradipine is not proven to be neuroprotective; however the results are pending in 6 weeks time for the 5mg twice daily trial in [place].     I encouraged him to go to the  disability office if he is felling unable to perform at work for whatever issue    He was also encouraged to see health psychology to help cope with his concerns as it is possible he has a neurodegenerative in the very earliest of stages and that he may need some help adjusting to this issue.        Mood disorder    Sleep disorder.    AT this point we do not recommend symptomatic medications for his condition. We discussed his venlafaxine and remeron and he may wish to explore this further with his pcp or/and psychiatrist and psychologist.     He has followup appointments with Dr. Lake and Juanito.       Silverio Us MD  _____________________________________________________________________  PATIENT: Cesar GIRALDO Gustabo  53 year old male   : 1965  ISAIAH: 2019    Consult requested by pcp/other    Outside records reviewed and revealed - inserted.       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 Dominguez 2018  Impression:  Orthostatic intolerance  Subjective balance alteration  Normal neurologic exam  REM behavior disorder  Normal autonomic testing at  "[place]  Plan:  Repeat autonomic testing after one year, or sooner if symptoms worsen.    neuropsych  Performance falls within normal limits across cognitive domains. Motor functioning is somewhat variable, but impaired overall. He has a relative weakness in word retrieval. Learning and memory, language, visual processing, executive functioning, and complex attentional processing all fall in the average to above average range. His performance overall is probably a little lower than expected for someone with a Ph.D., but what is more notable is that he's had improvements since last year in verbal fluency, learning, memory, and attention. He's had declines in finger tapping speed bilaterally, and on a visual search and scanning task. Otherwise everything is stable.     Vision  Wears glasses  Visual processing - things look different  Has had \"concussion\" in the past but different  States there is light sensitivity and things are too confusing to him  Has problems with reading subtitles.   He has some problems with tremor that is affecting his use of his computer  Reviewed.   He is taking melatonin.   He is having problems with sleep that has been helped by remeron  He has been on ambien and lunesta in the past and trazodone  He has not been on seroquel  He has not been on benadryl  He is taking 10mg of melatonin and has not been on a higher dose.   His blood pressure - it was described as orthostatic intolerance.   Lungs - he has problems h e has problems at night.   He has shortness of breath  He has seen pulmonary during the day.   He has not had thyroid or diabetes  He has a normal a1c  He has cholesterol levels that were up last year.   Medication allergies - denies  No anemia  Denies infections.   He has had problems with function - he has had problems doing his job.    Jaclyn Vallejo, Ph.D., L.P. [phone]                           Bernard Dominguez, Ph.D.,  L.P. [phone]   Varun Alford, Ph.D., A.B.P.P., " PRAMOD [phone]   Ila Shafer, Ph.D., L.P. [phone]     karrie - has neck pain that affects him with standing and left shoulder  He states he has a few new symptoms.   He was scheduled to see Dr. Solomon  He has had involuntary jerks during the day.   He has had problems every few nights in the past when falling asleep  He has these at night and has had jerks in wrist, knees and torso  These have been present since September and has worsened in December/january  Started on effexor 10 days ago.   He has an appointment because of his breathing to see dr. Lake.   He is able to fall asleep at night and wakes up during the night.   He has a feeling in his arms and legs - he feels sensations in his arms and legs and the tremor is present and feels like he is in a vibrating bed in his lower back and torso and legs. When he feels these symptoms - he does not describe an urge to move and does not get relief with movement.   He denies creepy crawling feeling that affects his ability to sit or fall asleep.  His anxiety is there all the time.   He states it is a 6/10.   Skin cancer in the past.   Denies other skin problems  Hearing has been okay  His taste perception is different.   He thinks his sense of smell is less.   States that he has problems smelling foods.   States pizza or boiling tomato sauce.   Bladder control - has problems getting started and he is wondering if he is not emptying. He is urinating less frequently and is dehydrated and has no nocturia.  Has constipation since age 51 yrs.   He states it is difficult to exercise up right due to his Orthostatic symptoms.   He does recumbent exercise bike 30 minutes 6 times per week.     He states he has problems moving his hand a centimeter h e moves it further.           Medications     7am 11pm   Aspirin 81mg   1   Cholecalciferol vitamin D liquid or tablet 2000 units  1   Coenzyme Q10 dose ?mg  4   Donepezil aricept 5mg Not taking    KRill oil   4    Mirtazapine remeron 15mg   1   MVI Not taking    Ranitidine zantac 300mg Not taking    Simvastatin zocor 20mg   1   Simvastatin zocor 40mg  Not taking    Turmeric  Not taking    Venlafaxine effexor ER 75mg 24 hr tablet  1                                                             IMPRESSIONS 2018 neuropsych by Dr. Park     The neuropsychological results are subtly abnormal. As was the case in 2012, the variable and broadly average-range data are a bit unexpected for an individual with such high academic and vocational achievement. However, there are no indications of deleterious change from 2012 to now. On the whole, his neuropsychological profile is stable to slightly improved. Stability/slight improvement over a five-year period contraindicates the presence of the degenerative syndrome.     In the present data set, there is a consistent finding of relative weakness in fine motor dexterity for the nondominant left hand compared to the dominant right hand. Presuming typical cortical organization, this may suggest relative weakness in the functioning of posterior frontal cortex in the right hemisphere or associated subcortical systems.     His descriptions of the events that led to concerns about post-concussive syndrome do not provide plausible mechanisms for significant brain damage. I think that these events are  red herrings.  Even when head strike events produce brief loss of consciousness or post-traumatic amnesia, the expected course for cognitive symptoms is resolution within two weeks. The neuropsychological literature is clear that, for minor head strike events in individuals with anxious-depressive histories, protracted post-concussive symptoms are best seen as nonorganic phenomena and should not be conflated with residual brain injury. It is worth noting that the symptoms of post-concussive syndrome are not specific to mild brain injury, and they are seen in non-concussed individuals with various  psychological/non-neurologic conditions.     On self-report questionnaires of emotional and behavioral functioning, Dr. Lovest does not endorse significant psychopathology. This does not mean such issues are definitively absent; it means they are not endorsed. I think it is significant that anxiety and tension are readily observable and that he reports them conversationally, but that they do not get endorsed  on the record.  I believe that some degree of suppression and indirect expression of negative affect is present, and this could explain at least some of his presenting concerns. This also aligns with his own description of seeing his more recent cognitive issues as rooted in anxiety over his physical functioning.      In summary, cognitive data that are stable/slightly improved after 5-1/2 years contraindicate the presence of a neurodegenerative syndrome. The descriptions of the events with head strikes and the contemporaneous clinical findings do not make me concerned about brain damage. There are no indications in the psychometric data of flagrant psychopathology, but my interactions with Dr. Montejo lead me to believe that anxious-depressive traits are under-examined parts of his presenting issues.      RECOMMENDATIONS     By no means do I want this report to cause other providers to dismiss Dr. Montejo s concerns about physical functioning as  all in his head.  Workups for plausible physical etiologies should always be pursued. He does have an observable tremor and seemingly reliable relative weakness in dexterity of the left hand. I defer to Pat Camarena and Elba on what this means in terms of neurologic monitoring or treatment.      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 The descriptions of sleep behaviors that were given to me do not necessarily bring to mind REM sleep behavior disorder. It is plausible  that they could be rooted in anxiety. Following through on the sleep study could provide important clinical information. In any event, a thorough in-office evaluation with a specialist in the Sleep Health clinic seems reasonable.      Additionally, it is reasonable to follow through on the prior recommendation to explore psychotherapy. He could be a good fit for services through the Health Psychology clinic housed at the [facility] (i.e., Dr. Alford and colleagues).      I do not suspect alcohol abuse/dependence at this time, but given that alcohol interferes with normal sleep patterns and that he has concerns about chronically disrupted sleep, it is reasonable to rethink his longstanding habit of 1-2 drinks per night.      An up-to-date neuropsychological baseline has been established. I do not foresee a need to plan on reevaluation along a prespecified timeline, but I would be happy to see Dr. Montejo any time it is clinically indicated.      Wallace Park, PhD,   Clinical Neuropsychologist        Time spent:  Four hours professional time, including interview, testing, records review, data integration, and report writing (CPT 57430); an additional three hours, including testing administered by a psychometrist and interpreted by a neuropsychologist (CPT 00793). ICD-10 diagnosis: R41.3       14 Review of systems  are negative except for   Patient Active Problem List   Diagnosis     Insomnia     Adjustment reaction     CARDIOVASCULAR SCREENING; LDL GOAL LESS THAN 130     Pure hypercholesterolemia     GERD (gastroesophageal reflux disease)     Right shoulder pain     Calcific tendonitis     Solar lentiginosis     Skin cancer screening     Dermatitis, seborrheic     Keratosis, actinic     Family history of skin cancer     AK (actinic keratosis)     History of actinic keratosis     History of basal cell cancer     Seborrheic keratosis     Cherry angioma     Actinic cheilitis     History of nonmelanoma skin cancer     History  of multiple concussions     Elevated blood pressure reading without diagnosis of hypertension     Dream enactment behavior     Disturbance in sleep behavior     Dyspnea and respiratory abnormality     Chest pain     H/O magnetic resonance imaging of brain and brain stem     History of echocardiogram     Encounter for neuropsychological testing  with Dr. Park     Benign neoplasm of skin of right upper extremity      No Known Allergies  Past Surgical History:   Procedure Laterality Date     COLONOSCOPY N/A 2017    Procedure: COLONOSCOPY;  Surgeon: Larry Fields MD;  Location: [facility]     LASER CO2 LESION ORAL N/A 10/5/2016    Procedure: LASER CO2 LESION ORAL;  Surgeon: Josué Rojo DDS;  Location: [facility] OR     MOHS MICROGRAPHIC PROCEDURE       Past Medical History:   Diagnosis Date     Anxiety      Basal cell carcinoma      Gastro-oesophageal reflux disease      H/O magnetic resonance imaging of brain and brain stem 2018    MR BRAIN WITHOUT AND WITH CONTRAST 2017 9:10 PM   HISTORY:  Left-sided numbness. Disequilibrium.   COMPARISON: MR brain 2015.   TECHNIQUE: Sagittal T1, axial T2, axial FLAIR, axial GRE, axial diffusion scan, coronal FLAIR, axial post Gd enhanced T1 weighted images.   FINDINGS: There is no intracranial hemorrhage, mass, or recent infarct. There is a cyst in the floor of the right maxilla     Head injury 2012    Had a bad concussion with post concussion synd for year     High cholesterol      History of echocardiogram 2018    TUYET Keyes MD 2018  Narrative     [id number]^MECHE^BLAKE^KRISTAL       [facility] [address] [place] [id number] Name: JONATHAN FIORE MRN: [id number] : 1965 Study Date: 2018 09:13 AM Age: 52 yrs Gender: Male Patient Location: [facility] Reason For Study: Chest Pain Ordering Physician:      Hypertension early     Not on  meds. Usually in pre-hypertesion categ.     Insomnia      Squamous cell carcinoma      Social History     Socioeconomic History     Marital status: Single     Spouse name: Not on file     Number of children: Not on file     Years of education: Not on file     Highest education level: Not on file   Social Needs     Financial resource strain: Not on file     Food insecurity - worry: Not on file     Food insecurity - inability: Not on file     Transportation needs - medical: Not on file     Transportation needs - non-medical: Not on file   Occupational History     Not on file   Tobacco Use     Smoking status: Former Smoker     Packs/day: 0.50     Years: 5.00     Pack years: 2.50     Types: Cigarettes     Start date: 1986     Last attempt to quit: 1991     Years since quittin.0     Smokeless tobacco: Never Used     Tobacco comment: After  no longer a half pack a day. Very occasional--maybe 75 cigarettes a year--91 to 98   Substance and Sexual Activity     Alcohol use: Yes     Alcohol/week: 0.6 - 1.2 oz     Comment: 1 to 2 glasses of red wine per day     Drug use: No     Comment: quit more than 20 years ago     Sexual activity: Not Currently     Partners: Female     Birth control/protection: Abstinence, Condom   Other Topics Concern     Parent/sibling w/ CABG, MI or angioplasty before 65F 55M? No     Comment: Mom had recommended angiopl. in late 50s. 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          lives in [facility]. single. Brother Bogdan Gustabo        Allergies:  No Known Allergies         Family History:     Mother age 76, TIA, CAD    Father 81, TIA    Brother autistic, panic attacks     Family History   Problem Relation Age of Onset     Lipids Mother         on meds     Cerebrovascular Disease Mother         TIA     Alzheimer Disease Mother      Skin Cancer Mother         SCC     Lipids Father         on meds      Hypertension Father         controlled with meds     Thyroid Disease Father         ?not sure but possibly     Diabetes Father      Cerebrovascular Disease Father      Cancer - colorectal Maternal Grandmother         still alive at 99     Cancer Maternal Grandfather         lung but lifetime smoker     Melanoma Maternal Grandfather      Asthma No family hx of      C.A.D. No family hx of      Prostate Cancer No family hx of      Current Outpatient Medications   Medication Sig Dispense Refill     amoxicillin (AMOXIL) 500 MG capsule   0     aspirin 81 MG EC tablet Take 1 tablet (81 mg) by mouth daily       azithromycin (ZITHROMAX) 250 MG tablet   0     Cholecalciferol 1000 UNT/0.03ML LIQD Take 2,000 Units by mouth       ciprofloxacin (CIPRO) 500 MG tablet   0     Coenzyme Q10 (COQ10 PO)        donepezil (ARICEPT) 5 MG tablet   2     FISH OIL-KRILL OIL PO Take 1 capsule by mouth       KRILL OIL PO Take 1 capsule by mouth daily        mirtazapine (REMERON) 15 MG tablet TAKE 1 TABLET(15 MG) BY MOUTH AT BEDTIME 90 tablet 0     Multiple Vitamins-Minerals (MULTIVITAL PO) Take 1 tablet by mouth daily        Multiple Vitamins-Minerals (MULTIVITAMIN ADULT PO) Take 1 tablet by mouth       ranitidine (ZANTAC) 300 MG tablet   0     simvastatin (ZOCOR) 20 MG tablet Take 20 mg by mouth       simvastatin (ZOCOR) 40 MG tablet Take 0.5 tablets by mouth daily       temazepam (RESTORIL) 30 MG capsule TK 1 C PO THE NIGHT OF SLEEP STUDY  0     TURMERIC PO Take 1 tablet by mouth daily        Turmeric POWD Take 1 tablet by mouth       venlafaxine (EFFEXOR-ER) 75 MG 24 hr tablet Take 1 tablet daily times 2 weeks then 2 tablets daily 180 tablet 1     VITAMIN D, CHOLECALCIFEROL, PO Take 2,000 Units by mouth daily        Examination  B/P: Data Unavailable, T: Data Unavailable, P: Data Unavailable, R: Data Unavailable 0 lbs 0 oz  There were no vitals taken for this visit., There is no height or weight on file to calculate BMI.    Vitals signs  were added and reviewed if not above. Please refer to the chart from this visit.    General examination: well developed, nourished and normal affect  Carotid: No bruits. Chest CTA, Heart regular without gallops or murmurs. Abdomen soft nontender, no masses, bowel sounds intact. Periphery: normal pulses without edema. No skin lesions. MENTAL STATUS:  Alert, oriented x3.  Speech fluent with normal naming, repetition, comprehension.  Good right-left orientation, Can remember 3/3 objects.  5/5 on spelling world backwards.  CRANIAL NERVES:  Disks flat. Pupils are equal, round, reactive to light.  Normal vascularity and fields. Extraocular movements full.  Facial sensation and movement normal.  Hearing intact. Palate moves symmetrically.  Tongue midline.  Sternocleidomastoid and trapezius strength intact.  Neck strength was normal.  NEUROLOGIC:  Tone: normal. Motor in upper and lower extremities. 5/5.  Reflexes 2/4.  Toe signs downgoing.  Good finger-nose-finger, fine finger movement, heel-shin maneuver, sensation to light touch, position sense and vibration and temperature was normal. Gait normal. Romberg and postural stability intact Tremor  - variable postural tremor.       Summary and Recommendations:   Patient cancelled/no show       Cardiology workup including stress echo, ecg and EP tilt table         Sleep consultation pending with Dr. Lake.         Brain mri done in past in  was normal.          Cognitive complains - seen by Dr. Park 2018 and compared to prior evaluation was cognitively stable.          Silverio Us MD    _____________________________________________________________________    PATIENT: Cesar Montejo    52 year old male     : 1965    ISAIAH: 2018         Consult requested by patient/other                   Outside records reviewed and revealed  - inserted.               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 Lake in the future.          MR BRAIN WITHOUT AND WITH CONTRAST 2017 9:10 PM         HISTORY:  Left-sided numbness. Disequilibrium.         COMPARISON: MR brain 2015.         TECHNIQUE: Sagittal T1, axial T2, axial FLAIR, axial GRE, axial    diffusion scan, coronal FLAIR, axial post Gd enhanced T1 weighted    images.         FINDINGS: There is no intracranial hemorrhage, mass, or recent    infarct. There is a cyst in the floor of the right maxillary antrum.    No discernible change since 2015.               IMPRESSION: Normal MR brain.         ANDRY LAGUNA MD       Department of Neurology    Movement Disorders Division     Follow-up Note         Patient: Cesar Montejo     MRN: [id number]     : 1965     Date of Visit: 2018          Chief Complaint:    Cesar Montejo is a 52 year old male with a history of multiple concussions who returns to clinic for follow up of tremors, balance issues, dream enactment and cognitive complaints. He was initially seen in this clinic on 3/21/18 and his tremor was felt to be consistent with essential tremor. A sleep study was recommended to evaluate his concerns of dream enactment behavior and possible RBD. Today he presents with a new complaint of slurred speech.         Interval History:    Today he reports for the past three weeks he has been intermittently slurring his speech and feels like he has decreased tongue control. The slurring is intermittent, and he has not noticed any provoking or alleviating factors. He reports his difficulty is articulation, not language. He has noted some word-finding difficulty more chronically, however and is making more errors in writing (misspelling and leaving out words). He also feels like his left hand dexterity is decreasing.         No vision changes, diplopia. Imbalance is stable. Feels like he has to think more about where he places his feet. No  falls.         He reports he has become concerned that his dysarthria could be a first sign of MSA or bulbar-onset ALS. He has a workup scheduled for dysautonomia. Denies bladder problems but does have constipation.         We discussed the workup for RBD. He states that he is almost certain he has RBD and thus does not feel like a sleep study will help as much as RBD is not always captured.       Impression:  Cesar Montejo is a 52 year old male with multiple concussions and dream enactment behaviors, postural HR changes, and essential tremor who presents in follow-up with a new complaint of intermittent dysarthria. On exam there is no evidence of neurologic abnormalities aside from an action tremor consistent with essential tremor. We discussed today that his symptoms could be due to hypervigilance, or due to the very subtle beginning stages of a neurologic condition that is not definable at this time.      We recommended having his dream enactment behavior more fully worked up. As he is convinced he has RBD, he could only benefit by undergoing further testing.         Recommendations:     -referral to Dr. Meet Lake for a second opinion regarding possible RBD    -PT gait and balance assessment         Follow-up after above         Manuel Quiles MD    Movement Disorders Fellow         Neurology Attending Attestation:          I, Vida Solomon, personally saw this patient with our Movement Disorders Fellow and agree with the fellow's findings and plan of care as documented in the movement disorder fellow's note, with my personal summary below. I personally performed salient aspects of the history and neurological examination.          I personally reviewed the vital signs, medications, and labs. I personally viewed the imaging, and agree with the interpretation documented by the fellow.              Time spent with patient: I personally spent Greater than 50% of this 50 minute visit was spent in  counseling and coordination of care related to diagnosis, recommended evaluation, and plan of care         Vida Solomon MD      of Neurology        -----------------------------------------------------------------------------------------------------------------------------------------------------------         Movement disorders clinic         Chief Complaint:  imbalance, cognitive complaints, tremor, dream enactment         History of present illness:    Mr. Monteoj is a 52-year-old gentleman who is self-referred for imbalance, cognitive changes, and tremor. He reports he has had tremor in his bilateral upper extremities, left> right, that has been present since childhood. He thinks it is more prominent over the last couple of years. Overall, he does not notice any difficulty in his daily activities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          RECOMMENDATIONS         By no means do I want this report to cause other providers to  dismiss Dr. Montejo s concerns about physical functioning as  all in his head.  Workups for plausible physical etiologies should always be pursued. He does have an observable tremor and seemingly reliable relative weakness in dexterity of the left hand. I defer to Pat Camarena and [place] on what this means in terms of neurologic monitoring or treatment.          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 The descriptions of sleep behaviors that were given to me do not necessarily bring to mind REM sleep behavior disorder. It is plausible that they could be rooted in anxiety. Following through on the sleep study could provide important clinical information. In any event, a thorough in-office evaluation with a specialist in the Sleep Health clinic seems reasonable.           Additionally, it is reasonable to follow through on the prior recommendation to explore psychotherapy. He could be a good fit for services through the [facility] (i[place]., Dr. Alford and colleagues).          I do not suspect alcohol abuse/dependence at this time, but given that alcohol interferes with normal sleep patterns and that he has concerns about chronically disrupted sleep, it is reasonable to rethink his longstanding habit of 1-2 drinks per night.          An up-to-date neuropsychological baseline has been established. I do not foresee a need to plan on reevaluation along a prespecified timeline, but I would be happy to see Dr. Montejo any time it is clinically indicated.          Wallace Park, PhD,     Clinical Neuropsychologist              Time spent:  Four hours professional time, including interview, testing, records review, data integration, and report writing (CPT 09747); an additional three hours, including testing administered by a psychometrist and interpreted by a neuropsychologist (CPT 01147).  ICD-10 diagnosis: R41.3         Department of Neurology  Movement Disorders Division   Follow-up Note     Patient: Cesar Montejo   MRN: [id number]   : 1965   Date of Visit: 2018      Chief Complaint:  Cesar Montejo is a 52 year old male with a history of multiple concussions, dream enactment behaviors, orthostatic hypotension, and tremor      Interval History:  SInce his last visit he had a cardiology evaluation, including Stress Echo (normal) and EP tilt table testing (+orthostatic hypotension). He also did have an overnight polysomnogram and some abnormal increased tone during REM (see results below).      He has also had autonomic testing at [facility], which was normal (see details below).     He reports having the sensation of slurred speech for and difficulty articulating for the past several weeks. This is intermittent.         Review of Systems:  Other than that noted at the end of this note, the remainder of 12 systems reviewed were negative.     Current Medications:   Current Outpatient Prescriptions          Current Outpatient Prescriptions   Medication Sig Dispense Refill     aspirin 81 MG EC tablet Take 1 tablet (81 mg) by mouth daily         Coenzyme Q10 (COQ10 PO)           KRILL OIL PO Take 1 capsule by mouth daily          mirtazapine (REMERON) 15 MG tablet TAKE 1 TABLET(15 MG) BY MOUTH AT BEDTIME 90 tablet 3     Multiple Vitamins-Minerals (MULTIVITAL PO) Take 1 tablet by mouth daily          ranitidine (ZANTAC) 300 MG tablet     0     simvastatin (ZOCOR) 40 MG tablet TAKE 1 TABLET(40 MG) BY MOUTH AT BEDTIME 90 tablet 0     simvastatin (ZOCOR) 40 MG tablet Take 0.5 tablets by mouth daily         temazepam (RESTORIL) 30 MG capsule Take one tab po qhs the night of your sleep study 1 capsule 0     TURMERIC PO Take 1 tablet by mouth daily          VITAMIN D, CHOLECALCIFEROL, PO Take 2,000 Units by mouth daily                 Allergies: has No Known Allergies.     Past Medical  History:  Past Medical History        Past Medical History:   Diagnosis Date     Anxiety       Basal cell carcinoma       Gastro-oesophageal reflux disease       H/O magnetic resonance imaging of brain and brain stem 2018     MR BRAIN WITHOUT AND WITH CONTRAST 2017 9:10 PM   HISTORY:  Left-sided numbness. Disequilibrium.   COMPARISON: MR brain 2015.   TECHNIQUE: Sagittal T1, axial T2, axial FLAIR, axial GRE, axial diffusion scan, coronal FLAIR, axial post Gd enhanced T1 weighted images.   FINDINGS: There is no intracranial hemorrhage, mass, or recent infarct. There is a cyst in the floor of the right maxilla     Head injury 2012     Had a bad concussion with post concussion synd for year     High cholesterol       History of echocardiogram 2018     TUYET Keyes MD          2018             Narrative                      [id number]^SHUNTHEODORA^BLAKE^KRISTAL       [facility] [address] [place] [address] Name: JONATHAN FIORE MRN: [id number] : 1965 Study Date: 2018 09:13 AM Age: 52 yrs Gender: Male Patient Location: [facility] Reason For Study: Chest Pain Ordering Physician:      Hypertension early      Not on meds. Usually in pre-hypertesion categ.     Insomnia       Squamous cell carcinoma              Past Surgical History:  Past Surgical History         Past Surgical History:   Procedure Laterality Date     COLONOSCOPY N/A 2017     Procedure: COLONOSCOPY;  Surgeon: Larry Fields MD;  Location: [facility] GI     LASER CO2 LESION ORAL N/A 10/5/2016     Procedure: LASER CO2 LESION ORAL;  Surgeon: Josué Rojo DDS;  Location: [facility] OR     MOHS MICROGRAPHIC PROCEDURE                Social History:  Social History            Social History     Marital status: Single       Spouse name: N/A     Number of children: N/A     Years of education: N/A              Social History Main Topics  "    Smoking status: Former Smoker       Packs/day: 0.50       Years: 5.00       Types: Cigarettes       Start date: 1/1/1986       Quit date: 1/1/1991     Smokeless tobacco: Never Used         Comment: After 1991 no longer a half pack a day. Very occasional--maybe 75 cigarettes a year--91 to 98     Alcohol use 0.6 - 1.2 oz/week          Comment: 1 to 2 glasses of red wine per day     Drug use: No         Comment: quit more than 20 years ago     Sexual activity: Not Currently       Partners: Female       Birth control/ protection: Abstinence, Condom            Other Topics Concern     Parent/Sibling W/ Cabg, Mi Or Angioplasty Before 65f 55m? No       Mom had recommended angiopl. in late 50s. 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             lives in [facility]. single. Brother Bogdan Montejo           Allergies:  No Known Allergies           Family History:      Mother age 76, TIA, CAD     Father 81, TIA     Brother autistic, panic attacks         Family History:  Family History          Family History   Problem Relation Age of Onset     Lipids Mother         on meds     Cerebrovascular Disease Mother         TIA     Alzheimer Disease Mother       Skin Cancer Mother         SCC     Lipids Father         on meds     Hypertension Father         controlled with meds     Thyroid Disease Father         ?not sure but possibly     Diabetes Father       Cerebrovascular Disease Father       Cancer - colorectal Maternal Grandmother         still alive at 99     Cancer Maternal Grandfather         lung but lifetime smoker     Melanoma Maternal Grandfather       Asthma No family hx of       C.A.D. No family hx of       Prostate Cancer No family hx of              Physical Exam:  The patient's  height is 1.867 m (6' 1.5\") and weight is 88.5 kg (195 lb 1.6 oz). His blood pressure is 126/90 and his pulse is 108. His oxygen saturation is " 96%.    Neurological Examination:   He is alert and oriented and has fluent speech without dysarthria and is able to provide an interval medical history. There are no speech abnormalities with detailed testing.  Extraocular movements are full. He has normal smooth pursuits and saccades. His face is symmetric with equal activation.   He has a mild jerky postural and kinetic tremor in the left > right arm.   Questionable grade 1 rigidity in RUE, otherwise normal.   Gait is normal.        Data Reviewed:   Cardiology workup:     6/4/18 Stress Echo - Normal exercise stress echocardiogram.  The target heart rate was achieved. No wall motion abnormalities at rest or  after peak exercise.  Normal LV size and function. The LVEF is 55-60% at rest and increased to >70%  after exercise with decrease in LV cavity size.  Heart rate and blood pressure response to exercise were normal.  Low-normal functional capacity. No subjective symptoms to suggest ischemia.  Peak MVO2 29 ml/kg/min.  No significant valvular abnormalities noted on screening tomograms.  No ECG evidence of ischemia at rest or with exercise.  No new findings compared to prior study dated 03/20/2007.     5/29/18 EP Tilt Table Testing -  Standing 10 minutes: HR 85, /77 > 92 bpm, 112/76  Right carotid sinus massage with lightheaded, left no symptoms, no change between R/L CSM.  1L IVF given before tilt. Baseline 81 bpm, 96/60 > 98 bpm, BP 86/56 at 20 minutes of tilt. Report states patient had no symptoms but patient says he was lightheaded.  No evidence of POTs, no evidence of autonomic neurological abnl     Assessment :  Orthostatic hypotension. Again recommend aggressive hydration, compression stocking. I would not recommend florinef or midodrine to avoid possibility of precipitating HTN.      7/31/18 Sleep Study:     Sleep Architecture: Mild sleep fragmentation    Respiration: This study did not demonstrate evidence suggestive of clinically significant sleep  disordered breathing. This was a good study that included REM supine.   Movement Activity: Mildly abnormal REM muscle activity and unclear whether dream enactment behavior were present.   Cardiac Summary: Predominantly narrow QRS complexes preceded by P waves suggestive of sinus rhythm. Intermittent tachycardia.   Recommendations:    Patient may be reassured that, per this study they do not have clinically significant sleep disordered breathing. If interested in treating snoring options include dental appliance and upper airway surgery.    Mildly increased REM motor activity was noted and dream enactment was rare. Recommend close surveillance in RBD clinic.   o Consider treatment with either high dose melatonin or low dose clonazepam if symptoms warrant.   o Consider repeat 4-limb PSG if symptoms progress.       9/7/18 Autonomic testing at [facility]    Normal study. There is no evidence of autonomic failure on this study.   Although there was also no evidence of orthostatic intolerance on this   study, the somewhat generous rise in heart rate during tilt and blood   pressure responses to the Valsalva maneuver may suggest some tendency   towards orthostatic intolerance, maybe associated with   deconditioning/dehydration.                                                  COMMENTS     (1)  Heart rate responses to deep breathing were normal; Valsalva-ratio   was normal.    (2)  QSWEAT responses were normal for all sites.                                   VALSALVA AND TILT     (1)  Patient was tilted for 10 minutes. Orthostatic hypotension was not   detected. Heart rate response was generous. The patient reported no   symptoms.   (2)  Ezuf-oq-zrka blood pressure responses to the Valsalva maneuver showed   an exaggerated early phase II and excessive phase IV. Late phase II and   PRT were normal.   Brain mri done in past in 2017 was normal.      Cognitive complains - seen by Dr. Park 2/7/2018 and compared to prior evaluation  was cognitively stable.      Impression:  Cesar Montejo is a 52 year old male with a history of multiple concussions, dream enactment behaviors, orthostatic hypotension, and tremor. EP Tilt table testing confirmed orthostatic hypotension without postural tachycardia. Autonomic testing otherwise normal. Polysomnogram suggestive of mild REM without atonia (although not surekha dream enactment behaviors on the night of PSG). Mr. Montejo has subjective complaints about balance difficulty and intermittent speech difficulty, but gait and speech exam is unremarkable today. 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 At this point no parkinsonism is detected. Neuropsychological testing has thus far been reassuring, although Mr. Montejo still feels there has been a decline from his baseline.   We will follow him with annual neurological exams, and as needed if new symptoms arise.      Recommendations:   Mr. Montejo was provided a handout about orthostatic hypotension, which includes conservative approaches to treatment to start (increased fluid and salt intake, thigh-high compression stockings, slow transitions from lying/sitting to standing).     Time spent with patient: Greater than 50% of this 45 minute visit was spent in counseling and coordination of care related to diagnosis, review of detailed evaluations completed thus far (see data reviewed), and answering patient questions about diagnosis, prognosis, and plan of care.        Vida Solomon MD    of Neurology       [facility], Abdiel MANN MD - 09/11/2018 4:00 PM CDT  Formatting of this note might be different from the original.  [facility]  DEPARTMENT OF NEUROLOGY  [address] [place] [address].[address] [place] [address] [phone] (fax)    Impression:  Orthostatic intolerance  Subjective balance alteration  Normal neurologic exam  REM behavior disorder  Normal autonomic  testing at [facility]  Plan:  Repeat autonomic testing after one year, or sooner if symptoms worsen.    The patient is especially concerned about recent retrospective research studies which suggested that patients with RBD have a very high likelihood of developing Multiple System Atrophy or other synucleinopathy syndromes. I explained that I believe those studies overestimate this likelihood greatly due to selection bias - they draw from [facility] referral centers, they are more selective about which RBD cases to include. A true population based study would find far more cases of reported RBD than could be accounted for by incidence of synucleinopathy cases.     I am also reassured that there was not even any subtle abnormality on his autonomic testing at [facility] - normal Qsweat, normal Valsalva BP responses. Today I do not see even subtle signs of cerebellar or extrapyramidal dysfunction on exam.     RTC in 12 months. The patient knows to call if there are any issues in the interim.    Seen for 65 minutes, of which >50% was counseling and coordination of care.  Abdiel Dominguez MD  Staff Physician  Neurology Service    HPI:  I had the pleasure of seeing Cesar Montejo, a 52 y.o. male referred by his sleep neurologist Dr. Meet Lake.     He had numbness of one side in 11/17 as well as balance difficulty. A stroke evaluation in the ED was normal. He was noted to have a tremor of the left hand and arm and neurologic follow up ordered. He wondered if this was present for a while. His father has a mild essential tremor which has remained mild into his 80s.     He has more REM behaviors. He had taken mirtazapine for a long time. He saw Meet Lake. He has REM behaviors such as fighting or driving. Usually stressful scenarios.     He notices more light sensitivity. He has had a prediabetes measurement. He had 5.4 HgbA1C.     He has had orthostatic intolerance many times since 7 years ago, more frequently in the past 3 years. He is  "able to preempt this by squatting. Recently it happens at least once per week. He fainted once in 7 years. With episodes recently he starts to feel the visual brown out, and can avoid having it worsen. He knows how to flex his muscles, take a deep breath, and bend over to keep BP up now.     He had a tilt table at the  and they found a BP drop but only with carotid massages (both sides). He had a normal HR increase, not excessive.     He tends to sweat a lot. He exercises every day, and feels he sweats normally.    He was referred to [place]. Autonomic testing there was normal. HR response to tilt and Valsalva was robust but not excessive.     He has normal bladder control. He notices rarely he has excessive urgency in a day. A day here or there in the past 3 years. There was no UTI. It has resolved after 2 days and only once per year. H has had a colonoscopy after 3 months of constipation. He stays well hydrated.   His testing with Dr. Julien was as follows:  \"The following maneuvers were done sequentially:  1- Sitting for 5 minutes:   End of 5 min: HR 70 , /81  2- Standing for 10 minutes:   30 sec: HR 85 , /77  1 min: HR 80 , /78  2 min: HR 88 , /74  3 min: HR 86 , /77  4 min: HR 93 , /79  5 min: HR 85 , /79  6 min: HR 89 , /79  7 min: HR 95 , BP 97/72  8 min:  , /83  9 min: HR 96 , /79  10 min: HR 92 , /76  2- Maneuvers in seated position:  A. Carotid sinus massage:  Right:  Baseline HR 70 , /98.   Low BP HR 70 bpm, BP 61/49  Low HR 30 bpm, 2.1sec pause  BP recovery time 48.6s  HR recovery time 15.1s  RCSM seated was assoc with lightheaded symptoms, but NOT consistent with clinical history  Left:  Baseline HR 78 , /80.   Low BP HR 78bpm, BP 70/46  Low HR 37bpm, 1.6sec pause  BP recovery time 43.6s  HR recovery time 12.4s  LCSM Symptoms: None  B. Valsalva:   Baseline: HR 67 , /77  Phase 1: HR 73 , Max /103  Phase 2: HR " "94 , Min /105  Phase 3: Present  Phase 4 (Overshoot): HR 46 , Max /84  Symptoms: None  C. Cough:   Not done  D. Swallowing test  Baseline HR 94 bpm, /86  High BP  bpm, /108, Latency time 10.85s  Low BP notning  Symptoms: None  E. Deep breathing  In 67 out 47  In 89 out 57  In 91 out 57  In 82 out 57  In 80 out 58  Delta HR = 20, 32, 34, 25, 22   3- Supine rest for 5 minutes:   HR 80 , Max BP 84/64  4- TILT at 70 degrees for 20 minutes: (1 liter fluid was given before tilt)  1 min: HR 81 , BP 96/60  2 min: HR 71 , BP 95/67  3 min: HR 91 , BP 97/68  4 min: HR 86 , BP 93/67  5 min: HR 76 , BP 95/61  6 min: HR 88 , BP 87/62  7 min: HR 93 , BP 87/54  8 min: HR 87 , BP 89/59  9 min: HR 87 , BP 92/61  10 min: HR 86 , BP 90/62  12 min: HR 87 , BP 98/63  14 min: HR 84 , BP 93/60  16 min: HR 88 , BP 88/59  18 min: HR 96 , BP 90/60  20 min: HR 98 , BP 86/56  No symptoms  5- NTG 0.4mg SL and monitoring for 5 minutes:   Not done  After 500 saline infusion  30sec; HR 80 , /64  1 min: HR 72 , /67  2 min: HR 78 , /68  3 min: HR 81 , /68  4 min: HR 69 , BP 95/57  5 min: HR 88 , /65  Symptoms: None  DISCUSSION:  History is consistent with immediate and possibly delayed OH., He had fluid infusion that may have blunted findings  Role of abnormal CSM is unclear  No evidence of autonomic neurological aqbnormality  Not POTs  Likely managed by focus on hydration and lower body isometrics. If possible avoid antihypertensives during day. If necessary at night for supine hypertension, then consider short-acting antihypertensives but be ware of symptomatic OH in the middle of night if he gets up to BR/\"    ROS:  A full 10 point review of systems was done.  Constitutional: The patient denies fever, chills, weight loss, or fatigue.  Head and neck: Denies difficulty with swallowing or speech.  ENT: No hearing loss, change in hearing, or tinnitus.  Cardiovascular: Denies chest pain, " "palpitations, or peripheral edema.  Respiratory: Denies shortness of breath, cough, or recent pulmonary infections.  Gastrointestinal: Denies abdominal pain, diarrhea, constipation.  Genitourinary: Denies urinary frequency, hesitancy or incontinence.  Musculoskeletal: Denies joint aches or pains.   Psychiatric: Denies depression, anxiety or panic attacks.  Integument: Denies any rash or skin lesions. There have been no significant changes in hair growth or loss, or nail changes.    Physical Exam:   Blood pressure 143/87, pulse 102, height 1.854 m (6' 1\"), weight 88.1 kg (194 lb 4 oz).  General Appearance: He appears his stated age. No apparent distress. Well groomed. Estimated body mass index is 25.63 kg/m  as calculated from the following:  Height as of this encounter: 1.854 m (6' 1\").  Weight as of this encounter: 88.1 kg (194 lb 4 oz)..  HEENT: Normocephalic, atraumatic. Neck is supple.   Extremities: No edema.     Neurological Examination  Cognitive: The patient is alert and oriented fully to time and place. Able to maintain attention. Recent and remote memory are intact. Speech is fluent and non-dysarthric.     Cranial Nerves: Pupils are equally round and reactive to light. Visual fields are full. Extraocular movements are intact. No oculomotor dysmetria. Facial sensation is intact bilaterally. There is no facial asymmetry and facial movements are symmetrical. Lip closure and eye closure are strong bilaterally. Tongue and palate move in the midline. Full power is assessed in the sternocleidomastoids and trapezii.     Motor: There is normal tone and bulk. Strength is 5/5 proximally and distally in the upper and lower extremities. There are no abnormal movements noted. Slight finger tremor.     Reflexes: DTRs are 2+ bilaterally in the biceps, triceps, BR, patella and achilles. Symmetric. Toes are downgoing bilaterally to Babinski maneuvers. There is no Diaz's.    Sensory: Intact to light touch in the upper and " lower extremities with no gradient. Proprioception is normal throughout. Rhomberg is negative. Slight dancing of tendons.     Coordination: No dysmetria noted with finger-nose-finger testing. Normal finger and foot tapping.    Gait: Normal regular gait. No ataxia. Slight tandem walk swaying.     Medications:   No current outpatient medications on file prior to visit.     No current facility-administered medications on file prior to visit.     15mgs mirtazipine  20mg Zocor  CoQ10  Tumeric  ASA 81mg  Vit D    Allergies: has No Known Drug Allergies.    Medical History  has no past medical history on file.  has no past surgical history on file.Insomnia  Prediabetes  HL  Squamous cell carcinoma, s/p MOHS right cheek    Social History  reports that he has quit smoking. His smoking use included cigarettes. He has a 42.00 pack-year smoking history. he has never used smokeless tobacco. He reports that he drinks about 4.2 oz of alcohol per week. He reports that he does not use drugs.   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    Data: Labs and imaging were personally reviewed. No results found for: WBC, RBC, HGB, HCT, PLT  No results found for: TSH   No components found for: Abdiel Mora MD, 9/11/2018 4:32 PM      Electronically Signed by Abdiel Dominguez MD on 09/11/2018 4:00 PM CDT

## 2019-01-26 RX ORDER — AMOXICILLIN 500 MG/1
CAPSULE ORAL
Refills: 0 | COMMUNITY
Start: 2018-02-22 | End: 2019-01-28

## 2019-01-26 RX ORDER — SIMVASTATIN 20 MG
20 TABLET ORAL
COMMUNITY
End: 2019-02-01

## 2019-01-26 RX ORDER — CIPROFLOXACIN 500 MG/1
TABLET, FILM COATED ORAL
Refills: 0 | COMMUNITY
Start: 2018-07-05 | End: 2019-01-28

## 2019-01-26 RX ORDER — AZITHROMYCIN 250 MG/1
TABLET, FILM COATED ORAL
Refills: 0 | COMMUNITY
Start: 2018-03-08 | End: 2019-01-28

## 2019-01-26 RX ORDER — TEMAZEPAM 30 MG
CAPSULE ORAL
Refills: 0 | COMMUNITY
Start: 2018-07-01 | End: 2019-01-28

## 2019-01-26 RX ORDER — DONEPEZIL HYDROCHLORIDE 5 MG/1
TABLET, FILM COATED ORAL
Refills: 2 | COMMUNITY
Start: 2018-03-13 | End: 2019-02-01

## 2019-01-26 RX ORDER — TURMERIC 100 %
1 POWDER (GRAM) MISCELLANEOUS
COMMUNITY
End: 2019-01-28

## 2019-01-28 RX ORDER — MULTIVIT-MIN/IRON/FOLIC ACID/K 18-600-40
CAPSULE ORAL
Qty: 30 CAPSULE | COMMUNITY
Start: 2019-01-28 | End: 2019-01-28

## 2019-01-28 RX ORDER — MULTIVIT-MIN/IRON/FOLIC ACID/K 18-600-40
CAPSULE ORAL
Qty: 30 CAPSULE | COMMUNITY
Start: 2019-01-28 | End: 2019-02-01

## 2019-01-31 ENCOUNTER — OFFICE VISIT (OUTPATIENT)
Dept: NEUROPSYCHOLOGY | Facility: CLINIC | Age: 54
End: 2019-01-31
Payer: COMMERCIAL

## 2019-01-31 DIAGNOSIS — R41.3 MEMORY LOSS: ICD-10-CM

## 2019-01-31 NOTE — PROGRESS NOTES
Pt was seen for neuropsychological evaluation at the request of Dr. Silvreio Us for the purposes of diagnostic clarification and treatment planning. 3 hours and 45 minutes of test administration and scoring were provided by this writer. Please see Dr. Sandie Duval's report for a full interpretation of the findings.    Armando Velasco  Psychometrist

## 2019-02-01 ENCOUNTER — OFFICE VISIT (OUTPATIENT)
Dept: NEUROLOGY | Facility: CLINIC | Age: 54
End: 2019-02-01
Payer: COMMERCIAL

## 2019-02-01 VITALS
WEIGHT: 189 LBS | DIASTOLIC BLOOD PRESSURE: 90 MMHG | BODY MASS INDEX: 25.05 KG/M2 | HEIGHT: 73 IN | HEART RATE: 95 BPM | TEMPERATURE: 98 F | RESPIRATION RATE: 15 BRPM | SYSTOLIC BLOOD PRESSURE: 138 MMHG | OXYGEN SATURATION: 96 %

## 2019-02-01 DIAGNOSIS — F41.9 ANXIETY: ICD-10-CM

## 2019-02-01 DIAGNOSIS — F09 COGNITIVE DISORDER: Primary | ICD-10-CM

## 2019-02-01 DIAGNOSIS — F41.1 GAD (GENERALIZED ANXIETY DISORDER): ICD-10-CM

## 2019-02-01 RX ORDER — BIOTIN 5 MG
TABLET ORAL
COMMUNITY
Start: 2019-02-01 | End: 2019-12-03

## 2019-02-01 RX ORDER — PHENOL 1.4 %
10 AEROSOL, SPRAY (ML) MUCOUS MEMBRANE AT BEDTIME
COMMUNITY
Start: 2019-02-01 | End: 2019-12-03

## 2019-02-01 RX ORDER — MULTIVIT-MIN/IRON/FOLIC ACID/K 18-600-40
CAPSULE ORAL
Qty: 30 CAPSULE | COMMUNITY
Start: 2019-02-01 | End: 2019-12-03

## 2019-02-01 RX ORDER — SIMVASTATIN 20 MG
TABLET ORAL
COMMUNITY
Start: 2019-02-01

## 2019-02-01 RX ORDER — VENLAFAXINE HYDROCHLORIDE 75 MG/1
TABLET, EXTENDED RELEASE ORAL
Qty: 180 TABLET | Refills: 3 | COMMUNITY
Start: 2019-02-01 | End: 2019-08-06

## 2019-02-01 RX ORDER — ASPIRIN 81 MG/1
TABLET ORAL
COMMUNITY
Start: 2019-02-01 | End: 2019-12-03

## 2019-02-01 ASSESSMENT — MIFFLIN-ST. JEOR: SCORE: 1766.18

## 2019-02-01 ASSESSMENT — PAIN SCALES - GENERAL: PAINLEVEL: SEVERE PAIN (7)

## 2019-02-01 NOTE — NURSING NOTE
Chief Complaint   Patient presents with     Tremors     UMP RETURN MOVEMENT DISORDER, TREMOR/BALANCE/SPASTICITY/MEMORY LOSS-DECLINE F/U AFTER NEURO-PSYCH EVAL.     Balance/ Vestibular     Memory Loss     Akbar Ziegler, EMT

## 2019-02-01 NOTE — LETTER
2019       RE: Cesar Montejo  [address]  [facility] [address]     Dear Colleague,    Thank you for referring your patient, Cesar Montejo, to the [facility] NEUROLOGY at [facility]. Please see a copy of my visit note below.        Ag    Summary and Recommendations:     Orthostatic intolerance    Stable cognitive state.     Mood disorder    Sleep disorder.    AT this point we do not recommend symptomatic medications for his condition. We discussed his venlafaxine and remeron and he may wish to explore this further with his pcp or/and psychiatrist and psychologist.     He has followup appointments with Dr. Lake and Juanito.       Silverio sU MD  _____________________________________________________________________  PATIENT: Cesar Montejo  53 year old male   : 1965  ISAIAH: 2019    Consult requested by pcp/other    Outside records reviewed and revealed - inserted.       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 Dominguez 2018  Impression:  Orthostatic intolerance  Subjective balance alteration  Normal neurologic exam  REM behavior disorder  Normal autonomic testing at [facility]  Plan:  Repeat autonomic testing after one year, or sooner if symptoms worsen.    neuropsych  Performance falls within normal limits across cognitive domains. Motor functioning is somewhat variable, but impaired overall. He has a relative weakness in word retrieval. Learning and memory, language, visual processing, executive functioning, and complex attentional processing all fall in the average to above average range. His performance overall is probably a little lower than expected for someone with a Ph.D., but what is more notable is that he's had improvements since last year in verbal fluency, learning, memory, and attention. He's had declines in finger tapping speed bilaterally, and on a visual  "search and scanning task. Otherwise everything is stable.     Vision  Wears glasses  Visual processing - things look different  Has had \"concussion\" in the past but different  States there is light sensitivity and things are too confusing to him  Has problems with reading subtitles.   He has some problems with tremor that is affecting his use of his computer  Reviewed.   He is taking melatonin.   He is having problems with sleep that has been helped by remeron  He has been on ambien and lunesta in the past and trazodone  He has not been on seroquel  He has not been on benadryl  He is taking 10mg of melatonin and has not been on a higher dose.   His blood pressure - it was described as orthostatic intolerance.   Lungs - he has problems h e has problems at night.   He has shortness of breath  He has seen pulmonary during the day.   He has not had thyroid or diabetes  He has a normal a1c  He has cholesterol levels that were up last year.   Medication allergies - denies  No anemia  Denies infections.   He has had problems with function - he has had problems doing his job.    Jaclyn Vallejo, Ph.D., L.P. [phone]                           Bernard Dominguez, Ph.D.,  L.P. [phone]   Varun Alford, Ph.D., A.B.P.P., L.P. [phone]   Ila Shafer, Ph.D., L.P. [phone]     msk - has neck pain that affects him with standing and left shoulder  He states he has a few new symptoms.   He was scheduled to see Dr. Solomon  He has had involuntary jerks during the day.   He has had problems every few nights in the past when falling asleep  He has these at night and has had jerks in wrist, knees and torso  These have been present since September and has worsened in December/january  Started on effexor 10 days ago.   He has an appointment because of his breathing to see dr. Lake.   He is able to fall asleep at night and wakes up during the night.   He has a feeling in his arms and legs - he feels sensations in " his arms and legs and the tremor is present and feels like he is in a vibrating bed in his lower back and torso and legs. When he feels these symptoms - he does not describe an urge to move and does not get relief with movement.   He denies creepy crawling feeling that affects his ability to sit or fall asleep.  His anxiety is there all the time.   He states it is a 6/10.   Skin cancer in the past.   Denies other skin problems  Hearing has been okay  His taste perception is different.   He thinks his sense of smell is less.   States that he has problems smelling foods.   States pizza or boiling tomato sauce.   Bladder control - has problems getting started and he is wondering if he is not emptying. He is urinating less frequently and is dehydrated and has no nocturia.  Has constipation since age 51 yrs.   He states it is difficult to exercise up right due to his Orthostatic symptoms.   He does recumbent exercise bike 30 minutes 6 times per week.     He states he has problems moving his hand a centimeter h e moves it further.           Medications     7am 11pm   Aspirin 81mg   1   Cholecalciferol vitamin D liquid or tablet 2000 units  1   Coenzyme Q10 dose ?mg  4   Donepezil aricept 5mg Not taking    KRill oil   4   Mirtazapine remeron 15mg   1   MVI Not taking    Ranitidine zantac 300mg Not taking    Simvastatin zocor 20mg   1   Simvastatin zocor 40mg  Not taking    Turmeric  Not taking    Venlafaxine effexor ER 75mg 24 hr tablet  1                                                             IMPRESSIONS 2018 neuropsych by Dr. Park     The neuropsychological results are subtly abnormal. As was the case in 2012, the variable and broadly average-range data are a bit unexpected for an individual with such high academic and vocational achievement. However, there are no indications of deleterious change from 2012 to now. On the whole, his neuropsychological profile is stable to slightly improved. Stability/slight  improvement over a five-year period contraindicates the presence of the degenerative syndrome.     In the present data set, there is a consistent finding of relative weakness in fine motor dexterity for the nondominant left hand compared to the dominant right hand. Presuming typical cortical organization, this may suggest relative weakness in the functioning of posterior frontal cortex in the right hemisphere or associated subcortical systems.     His descriptions of the events that led to concerns about post-concussive syndrome do not provide plausible mechanisms for significant brain damage. I think that these events are  red herrings.  Even when head strike events produce brief loss of consciousness or post-traumatic amnesia, the expected course for cognitive symptoms is resolution within two weeks. The neuropsychological literature is clear that, for minor head strike events in individuals with anxious-depressive histories, protracted post-concussive symptoms are best seen as nonorganic phenomena and should not be conflated with residual brain injury. It is worth noting that the symptoms of post-concussive syndrome are not specific to mild brain injury, and they are seen in non-concussed individuals with various psychological/non-neurologic conditions.     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 This does not mean such issues are definitively absent; it means they are not endorsed. I think it is significant that anxiety and tension are readily observable and that he reports them conversationally, but that they do not get endorsed  on the record.  I believe that some degree of suppression and indirect expression of negative affect is present, and this could explain at least some of his presenting concerns. This also aligns with his own description of seeing his more recent cognitive issues as rooted in anxiety over his physical  functioning.      In summary, cognitive data that are stable/slightly improved after 5-1/2 years contraindicate the presence of a neurodegenerative syndrome. The descriptions of the events with head strikes and the contemporaneous clinical findings do not make me concerned about brain damage. There are no indications in the psychometric data of flagrant psychopathology, but my interactions with Dr. Montejo lead me to believe that anxious-depressive traits are under-examined parts of his presenting issues.      RECOMMENDATIONS     By no means do I want this report to cause other providers to dismiss Dr. Montejo s concerns about physical functioning as  all in his head.  Workups for plausible physical etiologies should always be pursued. He does have an observable tremor and seemingly reliable relative weakness in dexterity of the left hand. I defer to Pat Camarena and [place] on what this means in terms of neurologic monitoring or treatment.      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 The descriptions of sleep behaviors that were given to me do not necessarily bring to mind REM sleep behavior disorder. It is plausible that they could be rooted in anxiety. Following through on the sleep study could provide important clinical information. In any event, a thorough in-office evaluation with a specialist in the Sleep Health clinic seems reasonable.      Additionally, it is reasonable to follow through on the prior recommendation to explore psychotherapy. He could be a good fit for services through the Health Psychology clinic housed at the [facility] (i.e., Dr. Alford and colleagues).      I do not suspect alcohol abuse/dependence at this time, but given that alcohol interferes with normal sleep patterns and that he has concerns about chronically disrupted sleep, it is reasonable to rethink his longstanding habit of 1-2 drinks  per night.      An up-to-date neuropsychological baseline has been established. I do not foresee a need to plan on reevaluation along a prespecified timeline, but I would be happy to see Dr. Montejo any time it is clinically indicated.      Wallace Park, PhD,   Clinical Neuropsychologist        Time spent:  Four hours professional time, including interview, testing, records review, data integration, and report writing (CPT 02842); an additional three hours, including testing administered by a psychometrist and interpreted by a neuropsychologist (CPT 52818). ICD-10 diagnosis: R41.3       14 Review of systems  are negative except for   Patient Active Problem List   Diagnosis     Insomnia     Adjustment reaction     CARDIOVASCULAR SCREENING; LDL GOAL LESS THAN 130     Pure hypercholesterolemia     GERD (gastroesophageal reflux disease)     Right shoulder pain     Calcific tendonitis     Solar lentiginosis     Skin cancer screening     Dermatitis, seborrheic     Keratosis, actinic     Family history of skin cancer     AK (actinic keratosis)     History of actinic keratosis     History of basal cell cancer     Seborrheic keratosis     Cherry angioma     Actinic cheilitis     History of nonmelanoma skin cancer     History of multiple concussions     Elevated blood pressure reading without diagnosis of hypertension     Dream enactment behavior     Disturbance in sleep behavior     Dyspnea and respiratory abnormality     Chest pain     H/O magnetic resonance imaging of brain and brain stem     History of echocardiogram     Encounter for neuropsychological testing 2018 with Dr. Park     Benign neoplasm of skin of right upper extremity      No Known Allergies  Past Surgical History:   Procedure Laterality Date     COLONOSCOPY N/A 4/17/2017    Procedure: COLONOSCOPY;  Surgeon: Larry Fields MD;  Location:  GI     LASER CO2 LESION ORAL N/A 10/5/2016    Procedure: LASER CO2 LESION ORAL;  Surgeon: Josué Rojo DDS;   Location: [facility] OR     MOHS MICROGRAPHIC PROCEDURE       Past Medical History:   Diagnosis Date     Anxiety      Basal cell carcinoma      Gastro-oesophageal reflux disease      H/O magnetic resonance imaging of brain and brain stem 2018    MR BRAIN WITHOUT AND WITH CONTRAST 2017 9:10 PM   HISTORY:  Left-sided numbness. Disequilibrium.   COMPARISON: MR brain 2015.   TECHNIQUE: Sagittal T1, axial T2, axial FLAIR, axial GRE, axial diffusion scan, coronal FLAIR, axial post Gd enhanced T1 weighted images.   FINDINGS: There is no intracranial hemorrhage, mass, or recent infarct. There is a cyst in the floor of the right maxilla     Head injury 2012    Had a bad concussion with post concussion synd for year     High cholesterol      History of echocardiogram 2018    TUYET Keyes MD 2018  Narrative     [id number]^SHUNTHEODORA^BLAKE^KRISTAL       [facility] [address] [place] [id number] Name: JONATHAN FIORE MRN: [id number] : 1965 Study Date: 2018 09:13 AM Age: 52 yrs Gender: Male Patient Location: [facility] Reason For Study: Chest Pain Ordering Physician:      Hypertension early     Not on meds. Usually in pre-hypertesion categ.     Insomnia      Squamous cell carcinoma      Social History     Socioeconomic History     Marital status: Single     Spouse name: Not on file     Number of children: Not on file     Years of education: Not on file     Highest education level: Not on file   Social Needs     Financial resource strain: Not on file     Food insecurity - worry: Not on file     Food insecurity - inability: Not on file     Transportation needs - medical: Not on file     Transportation needs - non-medical: Not on file   Occupational History     Not on file   Tobacco Use     Smoking status: Former Smoker     Packs/day: 0.50     Years: 5.00     Pack years: 2.50     Types: Cigarettes      Start date: 1986     Last attempt to quit: 1991     Years since quittin.0     Smokeless tobacco: Never Used     Tobacco comment: After  no longer a half pack a day. Very occasional--maybe 75 cigarettes a year--91 to 98   Substance and Sexual Activity     Alcohol use: Yes     Alcohol/week: 0.6 - 1.2 oz     Comment: 1 to 2 glasses of red wine per day     Drug use: No     Comment: quit more than 20 years ago     Sexual activity: Not Currently     Partners: Female     Birth control/protection: Abstinence, Condom   Other Topics Concern     Parent/sibling w/ CABG, MI or angioplasty before 65F 55M? No     Comment: Mom had recommended angiopl. in late 50s. 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          lives in [facility]. single. Brother Bogdan Montejo        Allergies:  No Known Allergies         Family History:     Mother age 76, TIA, CAD    Father 81, TIA    Brother autistic, panic attacks     Family History   Problem Relation Age of Onset     Lipids Mother         on meds     Cerebrovascular Disease Mother         TIA     Alzheimer Disease Mother      Skin Cancer Mother         SCC     Lipids Father         on meds     Hypertension Father         controlled with meds     Thyroid Disease Father         ?not sure but possibly     Diabetes Father      Cerebrovascular Disease Father      Cancer - colorectal Maternal Grandmother         still alive at 99     Cancer Maternal Grandfather         lung but lifetime smoker     Melanoma Maternal Grandfather      Asthma No family hx of      C.A.D. No family hx of      Prostate Cancer No family hx of      Current Outpatient Medications   Medication Sig Dispense Refill     amoxicillin (AMOXIL) 500 MG capsule   0     aspirin 81 MG EC tablet Take 1 tablet (81 mg) by mouth daily       azithromycin (ZITHROMAX) 250 MG tablet   0     Cholecalciferol 1000 UNT/0.03ML LIQD Take 2,000 Units  by mouth       ciprofloxacin (CIPRO) 500 MG tablet   0     Coenzyme Q10 (COQ10 PO)        donepezil (ARICEPT) 5 MG tablet   2     FISH OIL-KRILL OIL PO Take 1 capsule by mouth       KRILL OIL PO Take 1 capsule by mouth daily        mirtazapine (REMERON) 15 MG tablet TAKE 1 TABLET(15 MG) BY MOUTH AT BEDTIME 90 tablet 0     Multiple Vitamins-Minerals (MULTIVITAL PO) Take 1 tablet by mouth daily        Multiple Vitamins-Minerals (MULTIVITAMIN ADULT PO) Take 1 tablet by mouth       ranitidine (ZANTAC) 300 MG tablet   0     simvastatin (ZOCOR) 20 MG tablet Take 20 mg by mouth       simvastatin (ZOCOR) 40 MG tablet Take 0.5 tablets by mouth daily       temazepam (RESTORIL) 30 MG capsule TK 1 C PO THE NIGHT OF SLEEP STUDY  0     TURMERIC PO Take 1 tablet by mouth daily        Turmeric POWD Take 1 tablet by mouth       venlafaxine (EFFEXOR-ER) 75 MG 24 hr tablet Take 1 tablet daily times 2 weeks then 2 tablets daily 180 tablet 1     VITAMIN D, CHOLECALCIFEROL, PO Take 2,000 Units by mouth daily        Examination  B/P: Data Unavailable, T: Data Unavailable, P: Data Unavailable, R: Data Unavailable 0 lbs 0 oz  There were no vitals taken for this visit., There is no height or weight on file to calculate BMI.    Vitals signs were added and reviewed if not above. Please refer to the chart from this visit.    General examination: well developed, nourished and normal affect  Carotid: No bruits. Chest CTA, Heart regular without gallops or murmurs. Abdomen soft nontender, no masses, bowel sounds intact. Periphery: normal pulses without edema. No skin lesions. MENTAL STATUS:  Alert, oriented x3.  Speech fluent with normal naming, repetition, comprehension.  Good right-left orientation, Can remember 3/3 objects.  5/5 on spelling world backwards.  CRANIAL NERVES:  Disks flat. Pupils are equal, round, reactive to light.  Normal vascularity and fields. Extraocular movements full.  Facial sensation and movement normal.  Hearing intact.  Palate moves symmetrically.  Tongue midline.  Sternocleidomastoid and trapezius strength intact.  Neck strength was normal.  NEUROLOGIC:  Tone: normal. Motor in upper and lower extremities. 5/5.  Reflexes 2/4.  Toe signs downgoing.  Good finger-nose-finger, fine finger movement, heel-shin maneuver, sensation to light touch, position sense and vibration and temperature was normal. Gait normal. Romberg and postural stability intact Tremor  - variable postural tremor.       Summary and Recommendations:   Patient cancelled/no show       Cardiology workup including stress echo, ecg and EP tilt table         Sleep consultation pending with Dr. Lake.         Brain mri done in past in  was normal.          Cognitive complains - seen by Dr. Park 2018 and compared to prior evaluation was cognitively stable.          Silverio Us MD    _____________________________________________________________________    PATIENT: Cesar Montejo    52 year old male     : 1965    ISAIAH: 2018         Consult requested by patient/other                   Outside records reviewed and revealed  - inserted.               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 Lake in the future.          MR BRAIN WITHOUT AND WITH CONTRAST 2017 9:10 PM         HISTORY:  Left-sided numbness. Disequilibrium.         COMPARISON: MR brain 2015.         TECHNIQUE: Sagittal T1, axial T2, axial FLAIR, axial GRE, axial    diffusion scan, coronal FLAIR, axial post Gd enhanced T1 weighted    images.         FINDINGS: There is no intracranial hemorrhage, mass, or recent    infarct. There is a cyst in the floor of the right maxillary antrum.    No discernible change since 2015.               IMPRESSION: Normal MR brain.         ANDRY LAGUNA MD       Department of Neurology    Movement Disorders Division      Follow-up Note         Patient: Cesar Montejo     MRN: [id number]     : 1965     Date of Visit: 2018          Chief Complaint:    Cesar Montejo is a 52 year old male with a history of multiple concussions who returns to clinic for follow up of tremors, balance issues, dream enactment and cognitive complaints. He was initially seen in this clinic on 3/21/18 and his tremor was felt to be consistent with essential tremor. A sleep study was recommended to evaluate his concerns of dream enactment behavior and possible RBD. Today he presents with a new complaint of slurred speech.         Interval History:    Today he reports for the past three weeks he has been intermittently slurring his speech and feels like he has decreased tongue control. The slurring is intermittent, and he has not noticed any provoking or alleviating factors. He reports his difficulty is articulation, not language. He has noted some word-finding difficulty more chronically, however and is making more errors in writing (misspelling and leaving out words). He also feels like his left hand dexterity is decreasing.         No vision changes, diplopia. Imbalance is stable. Feels like he has to think more about where he places his feet. No falls.         He reports he has become concerned that his dysarthria could be a first sign of MSA or bulbar-onset ALS. He has a workup scheduled for dysautonomia. Denies bladder problems but does have constipation.         We discussed the workup for RBD. He states that he is almost certain he has RBD and thus does not feel like a sleep study will help as much as RBD is not always captured.       Impression:  Cesar Montejo is a 52 year old male with multiple concussions and dream enactment behaviors, postural HR changes, and essential tremor who presents in follow-up with a new complaint of intermittent dysarthria. On exam there is no evidence of neurologic abnormalities aside from an action tremor  consistent with essential tremor. We discussed today that his symptoms could be due to hypervigilance, or due to the very subtle beginning stages of a neurologic condition that is not definable at this time.      We recommended having his dream enactment behavior more fully worked up. As he is convinced he has RBD, he could only benefit by undergoing further testing.         Recommendations:     -referral to Dr. Meet Lake for a second opinion regarding possible RBD    -PT gait and balance assessment         Follow-up after above         Manuel Quiles MD    Movement Disorders Fellow         Neurology Attending Attestation:          I, Vida Solomon, personally saw this patient with our Movement Disorders Fellow and agree with the fellow's findings and plan of care as documented in the movement disorder fellow's note, with my personal summary below. I personally performed salient aspects of the history and neurological examination.          I personally reviewed the vital signs, medications, and labs. I personally viewed the imaging, and agree with the interpretation documented by the fellow.              Time spent with patient: I personally spent Greater than 50% of this 50 minute visit was spent in counseling and coordination of care related to diagnosis, recommended evaluation, and plan of care         Vida Solomon MD      of Neurology        -----------------------------------------------------------------------------------------------------------------------------------------------------------         Movement disorders clinic         Chief Complaint:  imbalance, cognitive complaints, tremor, dream enactment         History of present illness:    Mr. Montejo is a 52-year-old gentleman who is self-referred for imbalance, cognitive changes, and tremor. He reports he has had tremor in his bilateral upper extremities, left> right, that has been present since childhood. He thinks it  "is more prominent over the last couple of years. Overall, he does not notice any difficulty in his daily activities related to tremor.  He may have some difficulty eating peas on a fork when his left hand he is otherwise able to feed himself without spilling on the right.  His father had a history of similar tremors.  There is also improvement with alcohol.         He is also noted cognitive concerns that been more prominent since last November. He has noticed more poor concentration and attention.  He is also having more difficulty with word finding and naming that he previously did.  Of note, when he had neuropsych earlier in 2017 he felt he had increased difficulty with recalling a list of words and remembering details of the story.         He has previously been followed at the [facility] neurology clinic by Dr. Arreola for imbalance and cognitive concerns.  He had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 Following through on the sleep study could  provide important clinical information. In any event, a thorough in-office evaluation with a specialist in the Sleep Health clinic seems reasonable.           Additionally, it is reasonable to follow through on the prior recommendation to explore psychotherapy. He could be a good fit for services through the Health Psychology clinic housed at the [facility] (i.e., Dr. Alford and colleagues).          I do not suspect alcohol abuse/dependence at this time, but given that alcohol interferes with normal sleep patterns and that he has concerns about chronically disrupted sleep, it is reasonable to rethink his longstanding habit of 1-2 drinks per night.          An up-to-date neuropsychological baseline has been established. I do not foresee a need to plan on reevaluation along a prespecified timeline, but I would be happy to see Dr. Montejo any time it is clinically indicated.          Wallace Park, PhD,     Clinical Neuropsychologist              Time spent:  Four hours professional time, including interview, testing, records review, data integration, and report writing (CPT 92049); an additional three hours, including testing administered by a psychometrist and interpreted by a neuropsychologist (CPT 17336). ICD-10 diagnosis: R41.3         Department of Neurology  Movement Disorders Division   Follow-up Note     Patient: Cesar Montejo   MRN: [id number]   : 1965   Date of Visit: 2018      Chief Complaint:  Cesar Montejo is a 52 year old male with a history of multiple concussions, dream enactment behaviors, orthostatic hypotension, and tremor      Interval History:  SInce his last visit he had a cardiology evaluation, including Stress Echo (normal) and EP tilt table testing (+orthostatic hypotension). He also did have an overnight polysomnogram and some abnormal increased tone during REM (see results below).      He has also had autonomic testing at [facility], which was normal (see details below).     He  reports having the sensation of slurred speech for and difficulty articulating for the past several weeks. This is intermittent.         Review of Systems:  Other than that noted at the end of this note, the remainder of 12 systems reviewed were negative.     Current Medications:   Current Outpatient Prescriptions          Current Outpatient Prescriptions   Medication Sig Dispense Refill     aspirin 81 MG EC tablet Take 1 tablet (81 mg) by mouth daily         Coenzyme Q10 (COQ10 PO)           KRILL OIL PO Take 1 capsule by mouth daily          mirtazapine (REMERON) 15 MG tablet TAKE 1 TABLET(15 MG) BY MOUTH AT BEDTIME 90 tablet 3     Multiple Vitamins-Minerals (MULTIVITAL PO) Take 1 tablet by mouth daily          ranitidine (ZANTAC) 300 MG tablet     0     simvastatin (ZOCOR) 40 MG tablet TAKE 1 TABLET(40 MG) BY MOUTH AT BEDTIME 90 tablet 0     simvastatin (ZOCOR) 40 MG tablet Take 0.5 tablets by mouth daily         temazepam (RESTORIL) 30 MG capsule Take one tab po qhs the night of your sleep study 1 capsule 0     TURMERIC PO Take 1 tablet by mouth daily          VITAMIN D, CHOLECALCIFEROL, PO Take 2,000 Units by mouth daily                 Allergies: has No Known Allergies.     Past Medical History:  Past Medical History        Past Medical History:   Diagnosis Date     Anxiety       Basal cell carcinoma       Gastro-oesophageal reflux disease       H/O magnetic resonance imaging of brain and brain stem 6/12/2018     MR BRAIN WITHOUT AND WITH CONTRAST 11/29/2017 9:10 PM   HISTORY:  Left-sided numbness. Disequilibrium.   COMPARISON: MR brain 4/18/2015.   TECHNIQUE: Sagittal T1, axial T2, axial FLAIR, axial GRE, axial diffusion scan, coronal FLAIR, axial post Gd enhanced T1 weighted images.   FINDINGS: There is no intracranial hemorrhage, mass, or recent infarct. There is a cyst in the floor of the right maxilla     Head injury April 2012     Had a bad concussion with post concussion synd for year     High  cholesterol       History of echocardiogram 2018     TUYTE Keyes MD          2018             Narrative                      [id number]^MECHE^BLAKE^KRISTAL       [facility] [address] [place] [id number] Name: JONATHAN FIORE MRN: [id number] : 1965 Study Date: 2018 09:13 AM Age: 52 yrs Gender: Male Patient Location: [facility] Reason For Study: Chest Pain Ordering Physician:      Hypertension early      Not on meds. Usually in pre-hypertesion categ.     Insomnia       Squamous cell carcinoma              Past Surgical History:  Past Surgical History         Past Surgical History:   Procedure Laterality Date     COLONOSCOPY N/A 2017     Procedure: COLONOSCOPY;  Surgeon: Larry Fields MD;  Location:  GI     LASER CO2 LESION ORAL N/A 10/5/2016     Procedure: LASER CO2 LESION ORAL;  Surgeon: Josué Rojo DDS;  Location:  OR     MOHS MICROGRAPHIC PROCEDURE                Social History:  Social History            Social History     Marital status: Single       Spouse name: N/A     Number of children: N/A     Years of education: N/A              Social History Main Topics     Smoking status: Former Smoker       Packs/day: 0.50       Years: 5.00       Types: Cigarettes       Start date: 1986       Quit date: 1991     Smokeless tobacco: Never Used         Comment: After  no longer a half pack a day. Very occasional--maybe 75 cigarettes a year--91 to 98     Alcohol use 0.6 - 1.2 oz/week          Comment: 1 to 2 glasses of red wine per day     Drug use: No         Comment: quit more than 20 years ago     Sexual activity: Not Currently       Partners: Female       Birth control/ protection: Abstinence, Condom            Other Topics Concern     Parent/Sibling W/ Cabg, Mi Or Angioplasty Before 65f 55m? No       Mom had recommended angiopl. in late 50s. 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             lives in [place]. single. Brother Bogdan Montejo           Allergies:  No Known Allergies           Family History:      Mother age 76, TIA, CAD     Father 81, TIA     Brother autistic, panic attacks         Family History:  Family History          Family History   Problem Relation Age of Onset     Lipids Mother         on meds     Cerebrovascular Disease Mother         TIA     Alzheimer Disease Mother       Skin Cancer Mother         SCC     Lipids Father         on meds     Hypertension Father         controlled with meds     Thyroid Disease Father         ?not sure but possibly     Diabetes Father       Cerebrovascular Disease Father       Cancer - colorectal Maternal Grandmother         still alive at 99     Cancer Maternal Grandfather         lung but lifetime smoker     Melanoma Maternal Grandfather       Asthma No family hx of       C.A.D. No family hx of       Prostate Cancer No family hx of              Physical Exam:  The patient's  height is 1.867 m (6' 1.5\") and weight is 88.5 kg (195 lb 1.6 oz). His blood pressure is 126/90 and his pulse is 108. His oxygen saturation is 96%.    Neurological Examination:   He is alert and oriented and has fluent speech without dysarthria and is able to provide an interval medical history. There are no speech abnormalities with detailed testing.  Extraocular movements are full. He has normal smooth pursuits and saccades. His face is symmetric with equal activation.   He has a mild jerky postural and kinetic tremor in the left > right arm.   Questionable grade 1 rigidity in RUE, otherwise normal.   Gait is normal.        Data Reviewed:   Cardiology workup:     6/4/18 Stress Echo - Normal exercise stress echocardiogram.  The target heart rate was achieved. No wall motion abnormalities at rest or  after peak exercise.  Normal LV size and function. The " LVEF is 55-60% at rest and increased to >70%  after exercise with decrease in LV cavity size.  Heart rate and blood pressure response to exercise were normal.  Low-normal functional capacity. No subjective symptoms to suggest ischemia.  Peak MVO2 29 ml/kg/min.  No significant valvular abnormalities noted on screening tomograms.  No ECG evidence of ischemia at rest or with exercise.  No new findings compared to prior study dated 03/20/2007.     5/29/18 EP Tilt Table Testing -  Standing 10 minutes: HR 85, /77 > 92 bpm, 112/76  Right carotid sinus massage with lightheaded, left no symptoms, no change between R/L CSM.  1L IVF given before tilt. Baseline 81 bpm, 96/60 > 98 bpm, BP 86/56 at 20 minutes of tilt. Report states patient had no symptoms but patient says he was lightheaded.  No evidence of POTs, no evidence of autonomic neurological abnl     Assessment :  Orthostatic hypotension. Again recommend aggressive hydration, compression stocking. I would not recommend florinef or midodrine to avoid possibility of precipitating HTN.      7/31/18 Sleep Study:     Sleep Architecture: Mild sleep fragmentation    Respiration: This study did not demonstrate evidence suggestive of clinically significant sleep disordered breathing. This was a good study that included REM supine.   Movement Activity: Mildly abnormal REM muscle activity and unclear whether dream enactment behavior were present.   Cardiac Summary: Predominantly narrow QRS complexes preceded by P waves suggestive of sinus rhythm. Intermittent tachycardia.   Recommendations:    Patient may be reassured that, per this study they do not have clinically significant sleep disordered breathing. If interested in treating snoring options include dental appliance and upper airway surgery.    Mildly increased REM motor activity was noted and dream enactment was rare. Recommend close surveillance in RBD clinic.   o Consider treatment with either high dose melatonin or  low dose clonazepam if symptoms warrant.   o Consider repeat 4-limb PSG if symptoms progress.       9/7/18 Autonomic testing at [facility]    Normal study. There is no evidence of autonomic failure on this study.   Although there was also no evidence of orthostatic intolerance on this   study, the somewhat generous rise in heart rate during tilt and blood   pressure responses to the Valsalva maneuver may suggest some tendency   towards orthostatic intolerance, maybe associated with   deconditioning/dehydration.                                                  COMMENTS     (1)  Heart rate responses to deep breathing were normal; Valsalva-ratio   was normal.    (2)  QSWEAT responses were normal for all sites.                                   VALSALVA AND TILT     (1)  Patient was tilted for 10 minutes. Orthostatic hypotension was not   detected. Heart rate response was generous. The patient reported no   symptoms.   (2)  Wiot-if-sdlf blood pressure responses to the Valsalva maneuver showed   an exaggerated early phase II and excessive phase IV. Late phase II and   PRT were normal.   Brain mri done in past in 2017 was normal.      Cognitive complains - seen by Dr. Park 2/7/2018 and compared to prior evaluation was cognitively stable.      Impression:  Cesar Montejo is a 52 year old male with a history of multiple concussions, dream enactment behaviors, orthostatic hypotension, and tremor. EP Tilt table testing confirmed orthostatic hypotension without postural tachycardia. Autonomic testing otherwise normal. Polysomnogram suggestive of mild REM without atonia (although not surekha dream enactment behaviors on the night of PSG). Mr. Montejo has subjective complaints about balance difficulty and intermittent speech difficulty, but gait and speech exam is unremarkable today. 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 At this point no parkinsonism is detected.  Neuropsychological testing has thus far been reassuring, although Mr. Montejo still feels there has been a decline from his baseline.   We will follow him with annual neurological exams, and as needed if new symptoms arise.      Recommendations:   Mr. Montejo was provided a handout about orthostatic hypotension, which includes conservative approaches to treatment to start (increased fluid and salt intake, thigh-high compression stockings, slow transitions from lying/sitting to standing).     Time spent with patient: Greater than 50% of this 45 minute visit was spent in counseling and coordination of care related to diagnosis, review of detailed evaluations completed thus far (see data reviewed), and answering patient questions about diagnosis, prognosis, and plan of care.        Vida Solomon MD    of Neurology       Angelica, Abdiel MANN MD - 09/11/2018 4:00 PM CDT  Formatting of this note might be different from the original.  [facility]  DEPARTMENT OF NEUROLOGY  [address] [place] [address] [id number]  [place] [address] [phone] (fax)    Impression:  Orthostatic intolerance  Subjective balance alteration  Normal neurologic exam  REM behavior disorder  Normal autonomic testing at [place]  Plan:  Repeat autonomic testing after one year, or sooner if symptoms worsen.    The patient is especially concerned about recent retrospective research studies which suggested that patients with RBD have a very high likelihood of developing Multiple System Atrophy or other synucleinopathy syndromes. I explained that I believe those studies overestimate this likelihood greatly due to selection bias - they draw from MSA referral centers, they are more selective about which RBD cases to include. A true population based study would find far more cases of reported RBD than could be accounted for by incidence of synucleinopathy cases.     I am also reassured that there was not even any subtle  abnormality on his autonomic testing at [facility] - normal Qsweat, normal Valsalva BP responses. Today I do not see even subtle signs of cerebellar or extrapyramidal dysfunction on exam.     RTC in 12 months. The patient knows to call if there are any issues in the interim.    Seen for 65 minutes, of which >50% was counseling and coordination of care.  Abdiel Dominguez MD  Staff Physician  Neurology Service    HPI:  I had the pleasure of seeing Cesar Montejo, a 52 y.o. male referred by his sleep neurologist Dr. Meet Lake.     He had numbness of one side in 11/17 as well as balance difficulty. A stroke evaluation in the ED was normal. He was noted to have a tremor of the left hand and arm and neurologic follow up ordered. He wondered if this was present for a while. His father has a mild essential tremor which has remained mild into his 80s.     He has more REM behaviors. He had taken mirtazapine for a long time. He saw Meet Lake. He has REM behaviors such as fighting or driving. Usually stressful scenarios.     He notices more light sensitivity. He has had a prediabetes measurement. He had 5.4 HgbA1C.     He has had orthostatic intolerance many times since 7 years ago, more frequently in the past 3 years. He is able to preempt this by squatting. Recently it happens at least once per week. He fainted once in 7 years. With episodes recently he starts to feel the visual brown out, and can avoid having it worsen. He knows how to flex his muscles, take a deep breath, and bend over to keep BP up now.     He had a tilt table at the  and they found a BP drop but only with carotid massages (both sides). He had a normal HR increase, not excessive.     He tends to sweat a lot. He exercises every day, and feels he sweats normally.    He was referred to [facility]. Autonomic testing there was normal. HR response to tilt and Valsalva was robust but not excessive.     He has normal bladder control. He notices rarely he has  "excessive urgency in a day. A day here or there in the past 3 years. There was no UTI. It has resolved after 2 days and only once per year. H has had a colonoscopy after 3 months of constipation. He stays well hydrated.   His testing with Dr. Julien was as follows:  \"The following maneuvers were done sequentially:  1- Sitting for 5 minutes:   End of 5 min: HR 70 , /81  2- Standing for 10 minutes:   30 sec: HR 85 , /77  1 min: HR 80 , /78  2 min: HR 88 , /74  3 min: HR 86 , /77  4 min: HR 93 , /79  5 min: HR 85 , /79  6 min: HR 89 , /79  7 min: HR 95 , BP 97/72  8 min:  , /83  9 min: HR 96 , /79  10 min: HR 92 , /76  2- Maneuvers in seated position:  A. Carotid sinus massage:  Right:  Baseline HR 70 , /98.   Low BP HR 70 bpm, BP 61/49  Low HR 30 bpm, 2.1sec pause  BP recovery time 48.6s  HR recovery time 15.1s  RCSM seated was assoc with lightheaded symptoms, but NOT consistent with clinical history  Left:  Baseline HR 78 , /80.   Low BP HR 78bpm, BP 70/46  Low HR 37bpm, 1.6sec pause  BP recovery time 43.6s  HR recovery time 12.4s  LCSM Symptoms: None  B. Valsalva:   Baseline: HR 67 , /77  Phase 1: HR 73 , Max /103  Phase 2: HR 94 , Min /105  Phase 3: Present  Phase 4 (Overshoot): HR 46 , Max /84  Symptoms: None  C. Cough:   Not done  D. Swallowing test  Baseline HR 94 bpm, /86  High BP  bpm, /108, Latency time 10.85s  Low BP notning  Symptoms: None  E. Deep breathing  In 67 out 47  In 89 out 57  In 91 out 57  In 82 out 57  In 80 out 58  Delta HR = 20, 32, 34, 25, 22   3- Supine rest for 5 minutes:   HR 80 , Max BP 84/64  4- TILT at 70 degrees for 20 minutes: (1 liter fluid was given before tilt)  1 min: HR 81 , BP 96/60  2 min: HR 71 , BP 95/67  3 min: HR 91 , BP 97/68  4 min: HR 86 , BP 93/67  5 min: HR 76 , BP 95/61  6 min: HR 88 , BP 87/62  7 min: HR 93 , BP 87/54  8 min: HR 87 , BP " "89/59  9 min: HR 87 , BP 92/61  10 min: HR 86 , BP 90/62  12 min: HR 87 , BP 98/63  14 min: HR 84 , BP 93/60  16 min: HR 88 , BP 88/59  18 min: HR 96 , BP 90/60  20 min: HR 98 , BP 86/56  No symptoms  5- NTG 0.4mg SL and monitoring for 5 minutes:   Not done  After 500 saline infusion  30sec; HR 80 , /64  1 min: HR 72 , /67  2 min: HR 78 , /68  3 min: HR 81 , /68  4 min: HR 69 , BP 95/57  5 min: HR 88 , /65  Symptoms: None  DISCUSSION:  History is consistent with immediate and possibly delayed OH., He had fluid infusion that may have blunted findings  Role of abnormal CSM is unclear  No evidence of autonomic neurological aqbnormality  Not POTs  Likely managed by focus on hydration and lower body isometrics. If possible avoid antihypertensives during day. If necessary at night for supine hypertension, then consider short-acting antihypertensives but be ware of symptomatic OH in the middle of night if he gets up to BR/\"    ROS:  A full 10 point review of systems was done.  Constitutional: The patient denies fever, chills, weight loss, or fatigue.  Head and neck: Denies difficulty with swallowing or speech.  ENT: No hearing loss, change in hearing, or tinnitus.  Cardiovascular: Denies chest pain, palpitations, or peripheral edema.  Respiratory: Denies shortness of breath, cough, or recent pulmonary infections.  Gastrointestinal: Denies abdominal pain, diarrhea, constipation.  Genitourinary: Denies urinary frequency, hesitancy or incontinence.  Musculoskeletal: Denies joint aches or pains.   Psychiatric: Denies depression, anxiety or panic attacks.  Integument: Denies any rash or skin lesions. There have been no significant changes in hair growth or loss, or nail changes.    Physical Exam:   Blood pressure 143/87, pulse 102, height 1.854 m (6' 1\"), weight 88.1 kg (194 lb 4 oz).  General Appearance: He appears his stated age. No apparent distress. Well groomed. Estimated body mass index is " "25.63 kg/m  as calculated from the following:  Height as of this encounter: 1.854 m (6' 1\").  Weight as of this encounter: 88.1 kg (194 lb 4 oz)..  HEENT: Normocephalic, atraumatic. Neck is supple.   Extremities: No edema.     Neurological Examination  Cognitive: The patient is alert and oriented fully to time and place. Able to maintain attention. Recent and remote memory are intact. Speech is fluent and non-dysarthric.     Cranial Nerves: Pupils are equally round and reactive to light. Visual fields are full. Extraocular movements are intact. No oculomotor dysmetria. Facial sensation is intact bilaterally. There is no facial asymmetry and facial movements are symmetrical. Lip closure and eye closure are strong bilaterally. Tongue and palate move in the midline. Full power is assessed in the sternocleidomastoids and trapezii.     Motor: There is normal tone and bulk. Strength is 5/5 proximally and distally in the upper and lower extremities. There are no abnormal movements noted. Slight finger tremor.     Reflexes: DTRs are 2+ bilaterally in the biceps, triceps, BR, patella and achilles. Symmetric. Toes are downgoing bilaterally to Babinski maneuvers. There is no Diaz's.    Sensory: Intact to light touch in the upper and lower extremities with no gradient. Proprioception is normal throughout. Rhomberg is negative. Slight dancing of tendons.     Coordination: No dysmetria noted with finger-nose-finger testing. Normal finger and foot tapping.    Gait: Normal regular gait. No ataxia. Slight tandem walk swaying.     Medications:   No current outpatient medications on file prior to visit.     No current facility-administered medications on file prior to visit.     15mgs mirtazipine  20mg Zocor  CoQ10  Tumeric  ASA 81mg  Vit D    Allergies: has No Known Drug Allergies.    Medical History  has no past medical history on file.  has no past surgical history on file.Insomnia  Prediabetes  HL  Squamous cell carcinoma, s/p " MOHS right cheek    Social History  reports that he has quit smoking. His smoking use included cigarettes. He has a 42.00 pack-year smoking history. he has never used smokeless tobacco. He reports that he drinks about 4.2 oz of alcohol per week. He reports that he does not use drugs.   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    Data: Labs and imaging were personally reviewed. No results found for: WBC, RBC, HGB, HCT, PLT  No results found for: TSH   No components found for: Abdiel Mora MD, 9/11/2018 4:32 PM      Electronically Signed by Abdiel Dominguez MD on 09/11/2018 4:00 PM CDT        Silverio Us MD

## 2019-02-07 ENCOUNTER — ALLIED HEALTH/NURSE VISIT (OUTPATIENT)
Dept: PHARMACY | Facility: CLINIC | Age: 54
End: 2019-02-07
Payer: COMMERCIAL

## 2019-02-07 DIAGNOSIS — G47.52 REM BEHAVIORAL DISORDER: Primary | ICD-10-CM

## 2019-02-07 DIAGNOSIS — F41.1 GAD (GENERALIZED ANXIETY DISORDER): ICD-10-CM

## 2019-02-07 DIAGNOSIS — Z78.9 TAKES DIETARY SUPPLEMENTS: ICD-10-CM

## 2019-02-07 DIAGNOSIS — I95.1 ORTHOSTATIC HYPOTENSION: ICD-10-CM

## 2019-02-07 DIAGNOSIS — F09 COGNITIVE DISORDER: ICD-10-CM

## 2019-02-07 DIAGNOSIS — F32.A DEPRESSION, UNSPECIFIED DEPRESSION TYPE: ICD-10-CM

## 2019-02-07 DIAGNOSIS — E78.2 MIXED HYPERLIPIDEMIA: ICD-10-CM

## 2019-02-07 DIAGNOSIS — G47.00 INSOMNIA, UNSPECIFIED TYPE: ICD-10-CM

## 2019-02-07 DIAGNOSIS — F41.9 ANXIETY: ICD-10-CM

## 2019-02-07 PROCEDURE — 99605 MTMS BY PHARM NP 15 MIN: CPT | Performed by: PHARMACIST

## 2019-02-07 NOTE — PROGRESS NOTES
SUBJECTIVE/OBJECTIVE:                           Cesar Montejo is a 53 year old male called for an initial visit for Medication Therapy Management.  He was referred to me from Dr. Us.    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  Patient states that he had a sleep study done and was told he has this disorder.  Because of this, he has been concerned about neurodegenerative diseases that he may have in the future, such as Parkinson's disease, Lewy body dementia, or multiple system atrophy.  Patient had a normal neuropsych test done but he is concerned that his cognition has worsened.  He is interested in any supplements that could be neuroprotective.  Currently taking alpha  mg daily,  mg daily, and co-Q10 1000 milligrams daily because of studies he has read regarding neuroprotection.  States he tried the keto diet but it was not sustainable so he stopped it.  Patient states that his REM behavior disorder has been relatively mild, he has hit the wall with his hand one time in the last 8 years since he has been having symptoms.      Insomnia: Currently taking mirtazapine 15 mg nightly.  States that this medication helps him stay asleep for 7 hours.  He does wake up throughout the night but will quickly fall back asleep.    Orthostatic hypotension: Patient also endorses autonomic dysfunction - orthostatic hypotension, which has made exercise difficult for him.  States that he gets short of breath and lightheaded when he exercises intensely.  Patient states that he monitors his blood  pressure at home and typically is 135-138/88-89.  Occasionally he is over 140/90 but not consistently.  Higher numbers occur when he is stressed or anxious.  States that he has done the tilt table test and his blood pressure dropped to 72/49.    BP Readings from Last 3 Encounters:   02/01/19 138/90   01/21/19 (!) 126/92   01/08/19 126/90     Anxiety/depression: Currently taking venlafaxine XR 75 mg daily. This medication was started by his PCP a couple weeks ago. Has had increased anxiety and depression since receiving the sleep study results. States he has felt a positive effect at 75 mg and he is hesitant to increase the dose of venlafaxine due to risk of serotonin syndrome with concomitant mirtazapine.    PHQ-9 SCORE 12/16/2016 3/21/2018 1/21/2019   PHQ-9 Total Score - - -   PHQ-9 Total Score 0 2 16     PREMA-7 SCORE 12/16/2016   Total Score 1     Hyperlipidemia: Current therapy includes simvastatin 20 mg once daily.  Pt reports no significant myalgias or other side effects. He also takes aspirin 81 mg daily for cardiovascular protection.  The 10-year ASCVD risk score (Hornbrookmitesh BELL Jr., et al., 2013) is: 7.4%    Values used to calculate the score:      Age: 53 years      Sex: Male      Is Non- : No      Diabetic: No      Tobacco smoker: No      Systolic Blood Pressure: 138 mmHg      Is BP treated: No      HDL Cholesterol: 34 mg/dL      Total Cholesterol: 223 mg/dL     Supplements/vitamins: Takes Vitamin D 2000 units once daily and Krill oil 4000 mg once daily. States he stopped taking tumeric recently due to concerns about serotonergic effects.     ASSESSMENT:                             Current medications were reviewed today.     Medication Adherence: excellent, no issues identified    REM behavior disorder: Improving. Discussed the various supplements he is on and provided him with recommendation to use [employer] as a reference database to read about supplements since he has access  through the [facility].    Insomnia: Stable.     Orthostatic hypotension: Stable.     Anxiety/depression: Improving.     Hyperlipidemia: Stable.     Supplements/vitamins: Stable.     PLAN:                            No medication changes recommended    I spent 35 minutes with this patient today. I offer these suggestions for consideration by Dr. Us. A copy of the visit note was provided to the patient's referring provider and PCP.    Will follow up as needed.    The patient was sent via [employer] a summary of these recommendations as an after visit summary.     Chart documentation was completed in part with Dragon voice-recognition software. Even though reviewed, some grammatical, spelling, and word errors may remain.    Emelyn Garcia, Pharm.D.  Medication Therapy Management Pharmacist  Phone: [phone]

## 2019-02-09 DIAGNOSIS — Z13.6 CARDIOVASCULAR SCREENING; LDL GOAL LESS THAN 130: ICD-10-CM

## 2019-02-09 DIAGNOSIS — E78.00 HYPERCHOLESTEREMIA: ICD-10-CM

## 2019-02-11 RX ORDER — SIMVASTATIN 40 MG
TABLET ORAL
Qty: 90 TABLET | Refills: 0 | Status: SHIPPED | OUTPATIENT
Start: 2019-02-11 | End: 2019-05-08

## 2019-02-11 NOTE — TELEPHONE ENCOUNTER
"Requested Prescriptions   Pending Prescriptions Disp Refills     simvastatin (ZOCOR) 40 MG tablet [Pharmacy Med Name: SIMVASTATIN 40MG TABLETS] 90 tablet 0    Last Written Prescription Date:  02/01/2019  Last Fill Quantity: ,  # refills:    Last office visit: 1/21/2019 with prescribing provider:  01/21/2019   Future Office Visit:   Sig: TAKE 1 TABLET(40 MG) BY MOUTH AT BEDTIME    Statins Protocol Passed - 2/9/2019  3:51 PM       Passed - LDL on file in past 12 months    Recent Labs   Lab Test 03/08/18  1431   *            Passed - No abnormal creatine kinase in past 12 months    Recent Labs   Lab Test 01/06/19  0425                  Passed - Recent (12 mo) or future (30 days) visit within the authorizing provider's specialty    Patient had office visit in the last 12 months or has a visit in the next 30 days with authorizing provider or within the authorizing provider's specialty.  See \"Patient Info\" tab in inbasket, or \"Choose Columns\" in Meds & Orders section of the refill encounter.             Passed - Medication is active on med list       Passed - Patient is age 18 or older        "

## 2019-02-11 NOTE — TELEPHONE ENCOUNTER
Dr. Messina,  Please review/sign or advise for refill request of: simvastatin (ZOCOR) 40 MG tablet     Routing refill request to provider for review/approval because:  Medication discontinued 01/08/2019    Thank You!  Gita Up, LAMONT  Triage Nurse

## 2019-02-14 ENCOUNTER — DOCUMENTATION ONLY (OUTPATIENT)
Dept: CARE COORDINATION | Facility: CLINIC | Age: 54
End: 2019-02-14

## 2019-02-15 PROBLEM — R41.3 MEMORY LOSS: Status: ACTIVE | Noted: 2019-02-15

## 2019-02-15 NOTE — PROGRESS NOTES
Name: Cesar Montejo MRN: [id number]  : 1965  DAHL: 2019  Staff:  Tech:  Age: 53  Sex: Male Hand: Right   Educ: 20  Occupation: professor    WAIS-IV     Raw SSa     Similarities  33 15     Vocabulary  45 12     Block Design  39 10     Matrix Reasoning 20 12     Digit Span  27 10 RDS= 10     Arithmetic  19 14     Symbol Search  22 7     Coding  59 9    WMS-4   Raw    SS     Info & Orientation 14       LM I   28 11     LM II   23 11     LM Recognition  24           26-50%ile     VR I   33 9     VR II   22 9     VR Recognition  6             51-75%ile    Dayday AVLT   (30 item recognition)     Trial 1 2 3 4 5 B (6) 7              8 10 12 12 13 5 10       Raw z     30 minute recall   10 .43     30 min Recognition  15 1.33       Intrusions   0    DAYDAY-COMPLEX FIGURE TEST      Raw    T %ile     Time to Copy  196      >16     Copy    22     11-16     Short Delay Recall 20 52 58     Long Delay Recall 9 49 46     Recognition Total 19 41 18    BOSTON NAMING TEST   Score: 55  T: 38                           [ 55    w/o cues        3   w/phonemic cues]     COWAT (FAS)   Raw: 54       z: 0.83      ANIMAL FLUENCY   Raw: 29   z: 1.31      CLOCK DRAWING     Command   3/3             Copy     3/3    TRAIL MAKING TEST    Raw         Err  z    A 31        0  0.39    B 69        0  0.37         WCST     Raw   T/%ile   Categories: 6 >16   % Persev. Err.: 10 47   Concept. Resp.: 63 47   FTMS:  0    FINGER TAPPING    Avg  z   RH  41.67 -2.12    LH 30.33 -3.05      GROOVED PEGBOARD    Raw  Drops T   RH  67  0 47    LH 86  0 39     LETTER CANCELLATION   Time: 145  Err: 5    TIWARI JUDGMENT OF LINE ORIENTATION Form H   Raw: 25  56%ile:    STAI:  State   57     97%ile              Trait 49    94%ile    [id number]

## 2019-02-15 NOTE — PROGRESS NOTES
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 Information was obtained via interview with the patient and review of his medical records, including a prior neuropsychological evaluation. Records indicate a history of multiple concussions, dream enactment behaviors, orthostatic hypotension, and tremor. He is being followed in neurology, with no specific diagnosis. EP tilt table testing was noted to be positive for orthostatic hypotension, but autonomic testing at [facility] was normal. An overnight polysomnogram demonstrated some abnormal increases tone during REM sleep. At a 9/19/2018 neurology clinic visit, he reported the sensation of slurred speech and difficulty articulating for the previous several weeks, which was intermittent. He also reported balance difficulties, but gait and speech examination has been unremarkable. 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 Neuropsychological testing at that point had been reassuring, although he still felt that there have been in decline from his baseline. Given his concerns regarding decline, he was referred for neuropsychological evaluation by Silverio Us M.D., for further characterization of any cognitive difficulties and to evaluate mood and personality.    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 At the time, he presented with concerns about cognitive and physical changes, with specific questions about a degenerative syndrome such as multiple  system atrophy. He was also concerned that his concussion history could be related to his symptoms. He reported several instances of hitting his head in 2010 and 2012, and none of these were associated with loss of consciousness/alternate awareness. The event in 2012 resulted in prolonged cognitive and balance changes, after he hit his forehead against the top frame of a golf cart. Twenty four to 48 hours later, he began to feel disoriented and dizzy. Dr. Park reviewed a neuropsychological evaluation under the direction of Dr. Mesha Cooper, PhD, which took place about five months after the 2012 injury. Her data were largely within normal limits, but were below expectations for his level of educational and vocational achievement. The results were seen as reflecting cognitive inefficiency and were consistent with postconcussive syndrome. Dr. Park s evaluation in February 2018 was notable for subtly abnormal findings. As was the case in 2012, the variable and broadly average-range data were a bit unexpected for an individual with such high academic and vocational achievement, but there was no indication of deleterious change from 2012 until 2018. On the whole, his neuropsychological profile was noted to be stable to slightly improved, contraindicating the presence of a degenerative syndrome. There was a consistent finding of relative weakness in fine motor dexterity for the nondominant left hand compared to the dominant right hand.    CLINICAL INTERVIEW FINDINGS    Upon interview for this evaluation, Dr. Montejo stated that he has had progressive neurologic and autonomic problems, with declines in attention span, concentration, and processing of information. He has trouble reading subtitles and processing graphics with statistics that he used to be able to grasp. He has greater difficulty remembering names of colleagues, sports figures, and movie stars. He denied difficulty remembering what others have said, has  not had trouble remembering names of familiar people, and has not become lost in familiar places. He notices somewhat greater difficulty with word finding and feels that his attention and concentration have worsened since about November 2017. He finds that he must reread information more than he used to. It is harder to make decisions and he feels less organized than he used to be.    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 He drives without difficulty, although he did state that he has cut down on his driving because he feels less comfortable when the roads are slick, at night, and driving long distances. He handles his personal cares independently.     Dr. Montejo reported a history of anxiety. He stated that in his life, he has normally not been anxious, but four years ago, he had noticed something on his face, and asked his doctor about it. He did not get a referral to a dermatologist for some time, but was ultimately diagnosed with squamous cell carcinoma. He was treated between 2016 and 2018, and has been concerned about an increased risk for metastases. He indicated that he has read that depression can accelerate dementia, so he started taking Effexor two weeks ago. He indicated that around 2006, he experienced two weeks of depression, but read that exercise was more effective the medications, so he did that and the depression seemed to go away. He has not worked with a psychologist, nor has he been hospitalized for psychiatric care.     When asked to describe his mood currently, Dr. Montejo stated that he is worried that he has Lewy body dementia. He has myoclonus which is worse in the evenings, and he does little movements to try to reduce it. If he is around people it makes him worried. He is no more irritable than normal. He stated that he has cried more often at various points since November 2017 when he learned that he had REM sleep  behavior disorder. He thinks that this started 11 years ago, when he had sleep maintenance insomnia. In retrospect he thinks he may have been acting out his dreams. He takes melatonin now, and has been sleeping seven hours a night. He does not nap during the day because it disturbs his sleep at night. His appetite has generally been good although he lost his appetite for a week after he started taking Effexor recently. He lost 4 pounds that week, but has gained some weight back. His energy level is low, which he attributes to worsening orthostatic hypotension and increased heart rate so he becomes short of breath more quickly. It is hard for him to exercise as much. He can walk but is not able to jog or do exercises standing up. He does have a recumbent exercise bike. His interest level and motivation are lower than normal. He has been very worried, which causes him to avoid social situations. He stated that he has not seriously considered suicidal ideation, but that if he were headed towards advanced dementia he might consider it. He has no plan to commit suicide and denied any history of attempts to commit suicide. He denied visual hallucinations, but stated that in the last couple of years it is harder for him to differentiate between sleep and wakefulness reality in the first five minutes. He denied auditory hallucinations.    In college and graduate school, Dr. Montejo used to binge drink. From the age of 32 on he would have one to two alcoholic beverages a day. Now, he has one glass of wine a day, at least three or four hours before he goes to sleep, because he has central apnea. He is not using illicit drugs. He smoked a pack of cigarettes every three weeks until he was 32. He does not willis.    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 in the History of Technology and Science, and has worked as a professor since 1998. It is getting more difficult for him to pay attention to things and keep track of things at work. He runs a small  [employer] and has published seven or eight books. He noted that over the course of his career, he has received many more grants than is typical in his field, and that he is able to do the important things to raise funds for the [employer] but has not had any meaningful scholarship in the last year and a half. It takes him longer to do tasks such as reviewing books. He feels that his work is still high-quality. He has given talks about the [place] but not scholarly talks in the last year and a half. He has never been .    When asked about seizures, Dr. Montejo stated that he has myoclonus. Earlier in life he would doze off and jerk at times, but that may happen 50 times a night now. These movements lessened for a while since August but have been happening more in the last three or four months. Balance has been worsening since 2017. He cannot tandem walk or stand on one foot. He described his gait as mildly ataxic. He has not been falling. He has been to the emergency department a couple of times to be checked out for stroke, because he has had some numbness on his left side. MRIs were negative. He stated that he has a resting tremor in his left leg and a body tremor since August 2018. He feels that this has been downplayed because his father had essential tremor. He indicated that several providers have told him that he has cerebellar intention tremor. He has pain in his neck and stiffness, as well as pain in his shoulder which he believes is neurological and possibly related orthostatic hypotension.    PAST MEDICAL HISTORY: Medical records indicate a history of insomnia, benign neoplasm of the skin, cherry angioma, basal cell cancer, actinic keratosis, hypercholesterolemia, gastroesophageal reflux disease.    CURRENT MEDICATIONS:  Include aspirin 81 mg, cholecalciferol, coenzyme Q10, Krill oil, melatonin, mirtazapine, simvastatin, venlafaxine.    FAMILY MEDICAL HISTORY:  Significant for a father with essential  tremor, a mother with dementia diagnosed at the age of 74 which may have been Alzheimer s disease and vascular dementia, a maternal grandmother who lived to 101 and developed dementia at the age of 98, and a maternal great-grandmother who had dementia in her 90s, both parents with multiple TIAs, and a brother with Asperger s syndrome.    BEHAVIORAL OBSERVATIONS    During the evaluation, Dr. Lovest was pleasant, cooperative, and seemed to understand the instructions. He was alert and oriented to person, place, and time. Tremors were observed clinically in both hands when in motion. This seemed to affect performance on motor tasks particularly in his left hand, although tremor was observed bilaterally. Mood was euthymic although affect was flat. Speech was fluent, with normal articulation, volume, and rate. Spontaneous conversation was present and appropriate. Internal performance validity measures fell within normal limits. The results are believed to accurately reflect his current level of functioning.    MEASURES ADMINISTERED    The following measures were administered by a trained psychometrist, under the direct supervision of a licensed psychologist.    Subtests of the Wechsler Adult Intelligence Scale-4; subtests of the Wechsler Memory Scale-4; Matthew Complex Figure Test; Matthew Auditory Verbal Learning Test; [place] Naming Test; Controlled Oral Word Association Test; Semantic Fluency; Clock Drawing; Trail Making Test; [place] Card Sorting Test; Letter Cancellation; Finger Tapping; Grooved Pegboard; Magana Judgment of Line Orientation; [place] Multiphasic Personality Sxvzvgeqj-0-Rydpalwmtfpj Form (MMPI-2-RF); State Trait Anxiety Inventory (STAI).    RESULTS AND INTERPRETATION    Overall intellectual functioning was estimated to fall in the average range. There was a nonsignificant difference between estimates of verbal intellectual functioning, falling in the above average range, and nonverbal intellectual  functioning, falling in the average range. Performance on an index of working memory was high average, while performance on an index of processing speed was low average.    Confrontation naming was borderline impaired for his age and level of education, and improved with phonemic cues. Expressive vocabulary was high average. Verbal abstract reasoning was above average. Letter fluency was high average. Generative naming to category was also above average.    Attention span was average for his age. Mental arithmetic was above average. Divided attention was average. Performance on a measure of sustained attention fell within normal limits, although he had several errors of omission on this letter cancellation task. Psychomotor processing speed was average. Visual search and scanning was low average. Finger tapping speed was moderately impaired bilaterally, with greater impairment in his left, nondominant hand, which was affected by tremor. Fine manual dexterity was average in his right hand and was borderline impaired in his left hand.    Basic visual perception, including matching lines and angles, was average for his age. Construction of a clock fell within normal limits. Construction of a complex design was borderline impaired, and was notable for tremor and one missing salient detail. Assembly of visual material was average. Nonverbal deductive reasoning was high average.    Novel problem-solving, including the ability to generate strategies and solutions, fell within normal limits for his age and level of education.    Immediate and 30 minute delayed recall of verbal narrative material was average. On a multiple trial list learning task, immediate recall was average, with average recall following a 30 minute delay. Recognition memory on this task was high average. Immediate and 30 minute delayed recall of relatively structured visual material was average. Immediate and 30 minute delayed recall of more complex  visual material was also average.    On the MMPI-2-RF, a self-report questionnaire, Mr. Montejo responded to the items in a consistent manner. Possible overreporting of psychological dysfunction and somatic symptoms was indicated by a larger than average number of infrequent responses. He had a tendency to magnify both somatic and cognitive complaints. The profile is nonetheless considered to be valid. He reports a diffuse pattern of somatic complaints involving different bodily systems including experiencing poor health and feeling weak or tired, a large number of various neurological complaints, a diffuse pattern of cognitive difficulties, and head pain. He may be prone to developing physical symptoms in response to stress, and may have a psychological component to his somatic complaints. He reports feeling sad and unhappy and being dissatisfied with his current life circumstances. He reports feeling hopeless and pessimistic, and also feeling anxious.    On the STAI, his responses suggest a mild problem with generalized day to day anxiety or ruminative worry, as well as a mild-to-moderate reactive anxiety concern, as he was more anxious than usual during the testing session.    IMPRESSIONS AND RECOMMENDATIONS    Current results indicate performance that falls within normal limits across cognitive domains. Motor functioning is somewhat variable but is impaired overall, with marginally greater impairment in his left, nondominant hand. Clinically, he was observed to have greater tremor in his left hand when working on motor tasks. He also has a relative weakness in word retrieval. Learning and memory, language, visual processing, executive functioning, and complex attentional processing all fall in the average to above average range. Personality assessment is suggestive of somatic complaints and anxiety.    Compared to his performance at a neuropsychological evaluation in February 2018, he has had improvements in verbal  fluency, learning, memory, and attention. He has had declines in finger tapping speed bilaterally, and on a visual search and scanning task. Otherwise, performance is stable over time.    This pattern of performance does not reflect dementia at this time. In fact, given improvements in several cognitive domains over the last year, the findings contraindicate the presence of a progressive neurodegenerative etiology. He is preoccupied with his perceived neurologic disorder, and it is notable that personality assessment, when compared to 2018, was more striking for somatic complaints and anxiety. This preoccupation was notable on interview as well. He connected his anxiety to an apparent delayed diagnosis of squamous cell carcinoma in the last few years, which has prompted him to feel more concerned that his physicians may not take him seriously or that he has some other condition that is as yet undetected. In addition to follow up with neurology, if not already considered, he may benefit from referral to health psychology. Psychotherapy could focus specifically on strategies to manage the debilitating anxiety surrounding his physical symptoms. He was receptive to this suggestion, and was provided with information and resources on health psychology at the [facility].     In terms of daily functioning, Dr. Montejo is not experiencing cognitive difficulties that might interfere with his ability to actively participate in treatment, or to manage his instrumental activities of daily living independently. These findings were reviewed with him by his neurologist; he is welcome to contact me to review the findings in detail if he has additional questions. Results may serve as a more recent baseline of his neurocognitive functioning, and the evaluation may be repeated in the future for comparison should a change in mental status occur.    Sandie Duval, Ph.D., ABPP  Licensed Psychologist, [id number]  Board Certified in  Clinical Neuropsychology    Time spent: 75 minutes neurobehavioral status exam including interview, clinical assessment by licensed and board-certified neuropsychologist (CPT 00611). 60 minutes (1 unit) neuropsychological testing evaluation by licensed and board-certified neuropsychologist, including integration of patient data, interpretation of standardized test results and clinical data, clinical decision-making, treatment planning, report, and interactive feedback to the patient, first hour (CPT 08990). 75 minutes (2 units) of neuropsychological testing evaluation by licensed and board-certified neuropsychologist, including integration of patient data, interpretation of standardized test results and clinical data, clinical decision-making, treatment planning, report, and interactive feedback to the patient, subsequent hours (CPT 45467). 30 minutes of neuropsychological test administration and scoring by technician, first 30 minutes (CPT [id number]). 195 additional minutes (6 units) neuropsychological test administration and scoring by technician, subsequent 30 minutes (CPT 86115). ICD-10 Diagnoses: R41.3

## 2019-02-21 ENCOUNTER — OFFICE VISIT (OUTPATIENT)
Dept: SLEEP MEDICINE | Facility: CLINIC | Age: 54
End: 2019-02-21
Payer: COMMERCIAL

## 2019-02-21 VITALS
BODY MASS INDEX: 26.11 KG/M2 | WEIGHT: 197 LBS | RESPIRATION RATE: 16 BRPM | OXYGEN SATURATION: 96 % | SYSTOLIC BLOOD PRESSURE: 121 MMHG | HEIGHT: 73 IN | HEART RATE: 93 BPM | DIASTOLIC BLOOD PRESSURE: 78 MMHG

## 2019-02-21 DIAGNOSIS — G47.52 RBD (REM BEHAVIORAL DISORDER): Primary | ICD-10-CM

## 2019-02-21 PROCEDURE — 99215 OFFICE O/P EST HI 40 MIN: CPT | Performed by: INTERNAL MEDICINE

## 2019-02-21 ASSESSMENT — MIFFLIN-ST. JEOR: SCORE: 1802.34

## 2019-02-21 NOTE — PROGRESS NOTES
Sleep Consultation Follow Up Note:    Date on this visit: 2/21/2019    Cesar Montejo is sent by No ref. provider found for a sleep consultation regarding RBD.    Primary Physician: Rocky Messina     Cesar Montejo 53 year old RH male presenting as follow up with concerns for dream enactment while sleeping.  Mr. Montejo has a past medical history of anxiety, RBD , constipation, orthostatic hypotension essential/kinetic tremor, GERD, HLD, and basal cell carcinoma status post Mohs surgery x2.    He has  had a previous sleep study which revealed REM motor tone increase (subtle) and no PATRICIA (AHI 4).  No dream enactment was captured on that overnight study in July 2018.      He has concerns today regarding the sleep report referring to 14 central apnea events and whether these represent neurodegeneration (a biomarker as such), whether he has limb movement disorder, and whether there are neuroprotective efforts for sleep.    Sleep Disordered Breathing  Cesar does not complain of snoring, snort arousals, choking/gasping for air, witnessed apneas, dry mouth, morning headaches, non-refreshing sleep, daytime sleepiness/fatigue ([place] of 3 today).  Cesar has gained 0-5 pounds in the last year.    Patient sleeps on his back and side. Patient does not have a history of allergies/ nasal congestion. He does present also with complaints of GERD symptoms affecting his sleep at night.    Weight gain 5 over 12 months.   He has no history of surgeries of the face/ nasal septum. Any problems with anesthesia in the past are not apparent in the chart.   Recent BMP (1/6/2019) with no elevation CO2 of (22).     Sleep Schedule/Sleep Complaints  He does not complain of difficulty with falling asleep. Cesar goes to bed at 11 PM, and it usually takes <30  minutes to fall asleep.    He wakes up at 630 AM, with and without out an alarm.    On non-work days, He falls asleep at 11 PM and gets up 7 AM.  Patient is neither a morning or  night person.  He would naturally to go to sleep at 11:30 PM and wake up at 7:00 AM.    Medications for sleep: (10mg melatonin 30 mins prior to bed, mirtazapine)     Patient does not have a regular bed partner.  Patient does not have any pets in the bedroom at night during sleep.    In the past, used to take ambien, clonazepam, and trazodone but has reservations about continued use due to addiction potential.    He does complain of difficulty with staying asleep.  He wakes up 5-6 times throughout the night.  Night time awakenings occurs due to dream awakenings and movements at night, he returns to sleep within 1 minute.    Patient does complain of chronic pain, ruminating thoughts, stress/anxiety, depression, which affects the maintenance of sleep, notably anxiety affects sleep once every 2 weeks and he does believe he may need to take mirtazapine at this time to maintain sleep      Sleep Behaviors  He denies any cataplexy, sleep paralysis, sleep hallucinations.  He denies any night time behaviors - sleep walking, sleep talking, sleep eating.  He does complain of acting out dreams.  This is documented in a previous note from June 2018.    This started at the age of ~42.   Episodes occur 5-15 a month.  They are getting progressively worse.  They occur in the middle or later part of the night.  Episodes are not necessarily preceded by stress, sleep deprivation.  Patient does complain of anosmia, constipation, resting tremor,  muscle rigidity, orthostasis; denies unstable gait or family history of Parkinson's.  Patient states 1.5 months ago he hit the wall during one of his dreams and hurt his hand.  He has since moved his bed away from the wall and does not believe he is currently at risk of injury although does have concerns about this should his symptoms progress    Daytime Functioning  Cesar naps 0 times per day    He does not doze off during the day   He denies dozing and falling asleep while driving.  Patient's  Jefferson Sleepiness score 3/24 consistent with no daytime sleepiness.      Cesar does now complain of restlessness feelings in the legs.  Symptoms are worse at night when trying to sleep.  Symptoms can be described as 'resting tremor of the legs which happens the minutes before sleep and has progressed in intensity over the last few months'.  Symptoms are not clearly relieved with any volitional action by patient  He has tried  nothing in the past for RLS.  He does complain of spontaneous leg movements/jerks in the middle of the night.    Social History  Cesar currently works as a history of  at the [employer].  Work schedule is as follows:  Regular 9-5.  He does  not do shift work currently or in the past.   He does drink caffeine, about 2-4 drinks/day. Last caffeine intake not within 6 hours of bed time.  He drinks alcohol, 1-2drinks/week.  Does /not use alcohol as a sleep aid.  Patient is a former smoker.   Drug abuse: negative, marijuana use for sleep negative  Denies any secondhand exposure.  Patient does not use drugs.    Allergies:    No Known Allergies    Medications:    Current Outpatient Medications   Medication Sig Dispense Refill     aspirin 81 MG EC tablet 81mg tab by mouth nightly @ 11pm       Cholecalciferol (VITAMIN D) 2000 units CAPS 2000 units by mouth nightly @ 11pm 30 capsule      COENZYME Q10 PO Take 1,000 mg by mouth daily @ 10 pm       Krill Oil 1000 MG CAPS 4 x 1000mg capsule by mouth @ 11pm       Melatonin 10 MG TABS tablet Take 10 mg by mouth At Bedtime @11pm       mirtazapine (REMERON) 15 MG tablet TAKE 1 TABLET(15 MG) BY MOUTH AT BEDTIME 90 tablet 0     NONFORMULARY L-Alpha glycerylphosphorylcholine (GCP) 400 mg daily       NONFORMULARY Pyrroloquinoline quinone (PQQ) 600 mg daily       simvastatin (ZOCOR) 20 MG tablet 20mg tab by mouth nightly @ 11pm       simvastatin (ZOCOR) 40 MG tablet TAKE 1 TABLET(40 MG) BY MOUTH AT BEDTIME 90 tablet 0      venlafaxine (EFFEXOR-ER) 75 MG 24 hr tablet 75mg tab by mouth daily @ 7am and may increase to 2 x 75mg tabs by mouth daily at some point 180 tablet 3       Problem List:  Patient Active Problem List    Diagnosis Date Noted     Benign neoplasm of skin of right upper extremity 2019     Priority: Medium     H/O magnetic resonance imaging of brain and brain stem 2018     Priority: Medium     MR BRAIN WITHOUT AND WITH CONTRAST 2017 9:10 PM     HISTORY:  Left-sided numbness. Disequilibrium.     COMPARISON: MR brain 2015.     TECHNIQUE: Sagittal T1, axial T2, axial FLAIR, axial GRE, axial  diffusion scan, coronal FLAIR, axial post Gd enhanced T1 weighted  images.     FINDINGS: There is no intracranial hemorrhage, mass, or recent  infarct. There is a cyst in the floor of the right maxillary antrum.  No discernible change since 2015.         IMPRESSION: Normal MR brain.     ANDRY LAGUNA MD       History of echocardiogram 2018     Priority: Medium     TUYET Keyes MD 2018    Narrative         [id number]  [id number]  [id number]  [id number]^MECHE^BLAKE^KRISTAL           [facility],[place]  Echocardiography Laboratory  [address] [place] [address]  Name: JONATHAN FIORE  MRN: [id number]  : 1965  Study Date: 2018 09:13 AM  Age: 52 yrs  Gender: Male  Patient Location: [facility]  Reason For Study: Chest Pain  Ordering Physician: BLAKE MCGREGOR  Performed By: Micheal Arnett RDCS     BSA: 2.1 m2  Height: 72 in  Weight: 183 lb  HR: 61  BP: 120/75 mmHg  _____________________________________________________________________________  __        Procedure  Stress Echo Bike with two dimensional, color and spectral Doppler performed.  Contrast Definity.  _____________________________________________________________________________  __        Interpretation Summary  Normal exercise stress echocardiogram.  The target heart rate was achieved. No wall motion  abnormalities at rest or  after peak exercise.  Normal LV size and function. The LVEF is 55-60% at rest and increased to >70%  after exercise with decrease in LV cavity size.  Heart rate and blood pressure response to exercise were normal.  Low-normal functional capacity. No subjective symptoms to suggest ischemia.  Peak MVO2 29 ml/kg/min.  No significant valvular abnormalities noted on screening tomograms.  No ECG evidence of ischemia at rest or with exercise.  No new findings compared to prior study dated 03/20/2007.  _____________________________________________________________________________  __     Stress  The patient did not exhibit any symptoms during exercise.  Limiting [place]: fatigue.  Exercise was stopped due to fatigue.  Target Heart Rate was achieved.  Peak MVO2 18.3 ml/kg/min .  Percent predicted MVO2 64 %.  RPP 85059.  Maximum workload 100 resendiz.     Stress Results                                       Maximum Predicted HR:   170 bpm             Target HR: 145 bpm        % Maximum Predicted HR: 89 %                             Stage  DurationHeart Rate  BP                                 (mm:ss)   (bpm)                         Baseline            61    120/75                           Peak    4:16     151    211/97                             Stress Duration:   4:16 mm:ss                       Maximum Stress HR: 151 bpm *     Left Ventricle  The left ventricular ejection fraction is normal.     Aortic Valve  The aortic valve is trileaflet.     Mitral Valve  The mitral valve leaflets appear normal. There is no evidence of stenosis,  fluttering, or prolapse.        Tricuspid Valve  The tricuspid valve is not well visualized, but is grossly normal.     Right Ventricle  The right ventricular systolic function is normal.     Vessels  Normal aortic arch.     Pericardium  There is no pericardial effusion.     Contrast  Definity (NDC #84875-110-94) given intravenously. Patient was given 5ml  mixture of  1.5ml Definity and 8.5ml saline. 5 ml wasted.        Attestation  I was present and supervised the stress test. I personally viewed the imaging  and agree with the interpretation and report as documented by the fellow,  Simone Raman.  _____________________________________________________________________________  __  MMode/2D Measurements & Calculations     asc Aorta Diam: 3.0 cm        Doppler Measurements & Calculations  PA acc time: 0.11 sec               Encounter for neuropsychological testing 2018 with Dr. Park 06/12/2018     Priority: Medium     IMPRESSIONS     The neuropsychological results are subtly abnormal. As was the case in 2012, the variable and broadly average-range data are a bit unexpected for an individual with such high academic and vocational achievement. However, there are no indications of deleterious change from 2012 to now. On the whole, his neuropsychological profile is stable to slightly improved. Stability/slight improvement over a five-year period contraindicates the presence of the degenerative syndrome.     In the present data set, there is a consistent finding of relative weakness in fine motor dexterity for the nondominant left hand compared to the dominant right hand. Presuming typical cortical organization, this may suggest relative weakness in the functioning of posterior frontal cortex in the right hemisphere or associated subcortical systems.     His descriptions of the events that led to concerns about post-concussive syndrome do not provide plausible mechanisms for significant brain damage. I think that these events are  red herrings.  Even when head strike events produce brief loss of consciousness or post-traumatic amnesia, the expected course for cognitive symptoms is resolution within two weeks. The neuropsychological literature is clear that, for minor head strike events in individuals with anxious-depressive histories, protracted post-concussive symptoms are best seen as  nonorganic phenomena and should not be conflated with residual brain injury. It is worth noting that the symptoms of post-concussive syndrome are not specific to mild brain injury, and they are seen in non-concussed individuals with various psychological/non-neurologic conditions.     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 This does not mean such issues are definitively absent; it means they are not endorsed. I think it is significant that anxiety and tension are readily observable and that he reports them conversationally, but that they do not get endorsed  on the record.  I believe that some degree of suppression and indirect expression of negative affect is present, and this could explain at least some of his presenting concerns. This also aligns with his own description of seeing his more recent cognitive issues as rooted in anxiety over his physical functioning.      In summary, cognitive data that are stable/slightly improved after 5-1/2 years contraindicate the presence of a neurodegenerative syndrome. The descriptions of the events with head strikes and the contemporaneous clinical findings do not make me concerned about brain damage. There are no indications in the psychometric data of flagrant psychopathology, but my interactions with Dr. Montejo lead me to believe that anxious-depressive traits are under-examined parts of his presenting issues.      RECOMMENDATIONS     By no means do I want this report to cause other providers to dismiss Dr. Montejo s concerns about physical functioning as  all in his head.  Workups for plausible physical etiologies should always be pursued. He does have an observable tremor and seemingly reliable relative weakness in dexterity of the left hand. I defer to Pat Camarena and [place] on what this means in terms of neurologic monitoring or treatment.      Dr. Montejo tells me that he cancelled his  sleep study because he was too anxious about what it would mean if it showed REM sleep behavior disorder. The descriptions of sleep behaviors that were given to me do not necessarily bring to mind REM sleep behavior disorder. It is plausible that they could be rooted in anxiety. Following through on the sleep study could provide important clinical information. In any event, a thorough in-office evaluation with a specialist in the Sleep Health clinic seems reasonable.       Additionally, it is reasonable to follow through on the prior recommendation to explore psychotherapy. He could be a good fit for services through the Health Psychology clinic housed at the [facility] (i.e., Dr. Alford and colleagues).      I do not suspect alcohol abuse/dependence at this time, but given that alcohol interferes with normal sleep patterns and that he has concerns about chronically disrupted sleep, it is reasonable to rethink his longstanding habit of 1-2 drinks per night.      An up-to-date neuropsychological baseline has been established. I do not foresee a need to plan on reevaluation along a prespecified timeline, but I would be happy to see Dr. Montejo any time it is clinically indicated.      Wallace Park, PhD,   Clinical Neuropsychologist        Time spent:  Four hours professional time, including interview, testing, records review, data integration, and report writing (CPT 39134); an additional three hours, including testing administered by a psychometrist and interpreted by a neuropsychologist (CPT 03317). ICD-10 diagnosis: R41.3          Chest pain 06/04/2018     Priority: Medium     Dream enactment behavior 03/26/2018     Priority: Medium     Elevated blood pressure reading without diagnosis of hypertension 03/08/2018     Priority: Medium     History of multiple concussions 11/24/2017     Priority: Medium     Seborrheic keratosis 10/01/2017     Priority: Medium     Cherry angioma 10/01/2017     Priority: Medium     Actinic  cheilitis 10/01/2017     Priority: Medium     History of nonmelanoma skin cancer 10/01/2017     Priority: Medium     History of basal cell cancer 06/17/2017     Priority: Medium     History of actinic keratosis 12/21/2016     Priority: Medium     AK (actinic keratosis) 08/03/2016     Priority: Medium     Solar lentiginosis 05/17/2016     Priority: Medium     Skin cancer screening 05/17/2016     Priority: Medium     Dermatitis, seborrheic 05/17/2016     Priority: Medium     Keratosis, actinic 05/17/2016     Priority: Medium     Family history of skin cancer 05/17/2016     Priority: Medium     Right shoulder pain 04/19/2016     Priority: Medium     Calcific tendonitis 04/19/2016     Priority: Medium     Insomnia 02/10/2012     Priority: Medium     Adjustment reaction 02/10/2012     Priority: Medium     Overview:   Epic        CARDIOVASCULAR SCREENING; LDL GOAL LESS THAN 130 02/10/2012     Priority: Medium     Pure hypercholesterolemia 02/10/2012     Priority: Medium     GERD (gastroesophageal reflux disease) 02/10/2012     Priority: Medium     Overview:   managed with diet       Disturbance in sleep behavior 11/28/2007     Priority: Medium     Overview:   Epic        Dyspnea and respiratory abnormality 06/25/2007     Priority: Medium     Overview:   Other dyspnea and respiratory abnormality          Past Medical/Surgical History:  Past Medical History:   Diagnosis Date     Anxiety      Basal cell carcinoma      Gastro-oesophageal reflux disease      H/O magnetic resonance imaging of brain and brain stem 6/12/2018    MR BRAIN WITHOUT AND WITH CONTRAST 11/29/2017 9:10 PM   HISTORY:  Left-sided numbness. Disequilibrium.   COMPARISON: MR brain 4/18/2015.   TECHNIQUE: Sagittal T1, axial T2, axial FLAIR, axial GRE, axial diffusion scan, coronal FLAIR, axial post Gd enhanced T1 weighted images.   FINDINGS: There is no intracranial hemorrhage, mass, or recent infarct. There is a cyst in the floor of the right maxilla     Head  injury 2012    Had a bad concussion with post concussion synd for year     High cholesterol      History of echocardiogram 2018    TUYET Keyes MD 2018  Narrative     [id number]^MECHE^BLAKE^KRISTAL       [facility] [facility] [address] [place] [address] Name: JONATHAN FIORE MRN: [id number] : 1965 Study Date: 2018 09:13 AM Age: 52 yrs Gender: Male Patient Location: [facility] Reason For Study: Chest Pain Ordering Physician:      Hypertension early     Not on meds. Usually in pre-hypertesion categ.     Insomnia      Squamous cell carcinoma      Past Surgical History:   Procedure Laterality Date     COLONOSCOPY N/A 2017    Procedure: COLONOSCOPY;  Surgeon: Larry Fields MD;  Location:  GI     LASER CO2 LESION ORAL N/A 10/5/2016    Procedure: LASER CO2 LESION ORAL;  Surgeon: Josué Rojo DDS;  Location:  OR     MOHS MICROGRAPHIC PROCEDURE      squamous and basal cell         Social History:  Social History     Socioeconomic History     Marital status: Single     Spouse name: Not on file     Number of children: Not on file     Years of education: Not on file     Highest education level: Not on file   Occupational History     Not on file   Social Needs     Financial resource strain: Not on file     Food insecurity:     Worry: Not on file     Inability: Not on file     Transportation needs:     Medical: Not on file     Non-medical: Not on file   Tobacco Use     Smoking status: Former Smoker     Packs/day: 0.50     Years: 5.00     Pack years: 2.50     Types: Cigarettes     Start date: 1986     Last attempt to quit: 1991     Years since quittin.1     Smokeless tobacco: Never Used     Tobacco comment: After  no longer a half pack a day. Very occasional--maybe 75 cigarettes a year--91 to 98   Substance and Sexual Activity     Alcohol use: Yes     Alcohol/week: 0.6 - 1.2 oz      Comment: 1 to 2 glasses of red wine per day     Drug use: No     Comment: quit more than 20 years ago     Sexual activity: Not Currently     Partners: Female     Birth control/protection: Abstinence, Condom   Lifestyle     Physical activity:     Days per week: Not on file     Minutes per session: Not on file     Stress: Not on file   Relationships     Social connections:     Talks on phone: Not on file     Gets together: Not on file     Attends Taoism service: Not on file     Active member of club or organization: Not on file     Attends meetings of clubs or organizations: Not on file     Relationship status: Not on file     Intimate partner violence:     Fear of current or ex partner: Not on file     Emotionally abused: Not on file     Physically abused: Not on file     Forced sexual activity: Not on file   Other Topics Concern     Parent/sibling w/ CABG, MI or angioplasty before 65F 55M? No     Comment: Mom had recommended angiopl. in late 50s. 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          lives in [facility]. single. Brother Bogdan Gustabo        Allergies:  No Known Allergies         Family History:     Mother age 76, TIA, CAD    Father 81, TIA    Brother autistic, panic attacks         Family History:  Family History   Problem Relation Age of Onset     Lipids Mother         on meds     Cerebrovascular Disease Mother         TIA     Alzheimer Disease Mother         severe     Skin Cancer Mother         SCC     Other - See Comments Mother         [facility]     Lipids Father         on meds     Hypertension Father         controlled with meds     Thyroid Disease Father         ?not sure but possibly     Diabetes Father      Cerebrovascular Disease Father      Cancer - colorectal Maternal Grandmother         still alive at 99     Cancer Maternal Grandfather         lung but lifetime smoker     Melanoma Maternal  "Grandfather      Other - See Comments Brother         [facility] [place]     Asperger's syndrome Brother      Asthma No family hx of      C.A.D. No family hx of      Prostate Cancer No family hx of      Sleep family hx:  Nothing pertinent    Review of Systems:  General:  no fever, chills, weakness  HEENT:  No loss of hearing, vertigo, ear ringing, double/blurry vision, sore throat, epistaxis  Pulmonary:  No dyspnea, wheezing, cough, sputum, hemoptysis, chest pain  CVS: No chest discomfort, palpitations  GI:   No diarrhea, some constipation, some dysphagia, no early satiety, bleeding  Renal: No pain on urination, hematuria  Musculoskeletal: somemuscle ache, joint pain, no swelling  Skin: No rash, ecchymosis, itching, icterus  Heme: No easy bruisibility or bleeding  Allergy: negative runny nose, sneezing, urticeria  Psychiatry: increased anxiety, possibly mild depression    Physical Examination:  /78   Pulse 93   Resp 16   Ht 1.854 m (6' 0.99\")   Wt 89.4 kg (197 lb)   SpO2 96%   BMI 26.00 kg/m           [place] today 3    General appearance: No apparent distress, somewhat disheveled and poorly kempt    HEENT:   Head: Normocephalic, atraumatic.  Eyes: PERRL  Nose: Nares  patent.  No exudate.  No septal deviation noted.  Mouth: Teeth: with modest mal-alignment   Tongue: mild enlargement   Oropharynx:  Mallampati Classification: 2        Neck: Supple without bruit.     Neck Cir (cm): 37 cm (6/25/2018  7:55 AM)      Cardiac: Regular rate and rhythm.  No murmurs.  Radial pulses are strong and symmetric.  Pulmonary: Symmetric air movement, lungs clear to auscultation bilaterally.  Musculoskeletal: no edema noted.  No clubbing or cyanosis.  Skin: Warm, dry, intact.  Neurologic: Alert and oriented to person, place and time   Gait is normal.  Psychiatric: anxious    Impression/Plan:    53 year old RH male presenting as follow up with concerns for dream enactment while sleeping.  Mr. Montejo has a past medical history " of anxiety, RBD , constipation, orthostatic hypotension essential/kinetic tremor, GERD, HLD, and basal cell carcinoma status post Mohs surgery x2.    He has  had a previous sleep study which revealed REM motor tone increase (subtle) and no PATRICIA (AHI 4).  No dream enactment was captured on that overnight study in July 2018.      #REM Behavior Disorder    Patient has been evaluated for PATRICIA.  STOP BANG score is 3. Patient had previous sleep evaluation showing AHI of 4 and apnea is effectively ruled out and not playing a role here.   For the patient's RBD, it does seem stable (occurring several times per week), the patient currently takes 10mg melatonin nightly which he believes is not changing the course of his RBD but allows him to not remember his dreams, which is alleviating for him.  It was discussed that this can portend early-stage neurodegenerative disease although there is by no means a guarantee of that.  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  Patient does have follow up for anxiety which should help over time adapt to this diagnosis.    Recommendations  -Increase melatonin from 10mg to 15mg should dream enactment worsen in the interim  -We can consider 0.5mg clonazepam should prior measure fail  -We can consider further sleep evaluations in the future given prior evaluation did not capture the patient's described dream enactment but did capture REM tone (mild), this does not need to be scheduled at present.  -Follow up in 12 months    Patient is aformer smoker and has been encouraged to continue to not smoke.    Encourage weight loss, healthy diet, and exercise.  It was discussed that the best evidence for neuroprotective interventions is aerobic exercise of moderate to high intensity >3 times/week.  Evidence for all other interventions is lacking at this point.    Patient was strongly advised to avoid driving, operating  any heavy machinery or other hazardous situations while drowsy or sleepy.  Patient was counseled on the importance of driving while alert, to pull over if drowsy, or nap before getting into the vehicle if sleepy.        CC: No ref. provider found    Seen and examined with Dr. aJya Avila MD/PhD  [facility]  [phone]    Sleep Staff Addendum  I have seen and evaluated the patient today with Dr Avila and agree with the documentation.      JAY CONTRERAS MD

## 2019-02-21 NOTE — PATIENT INSTRUCTIONS
Your BMI is Body mass index is 26 kg/m .  Weight management is a personal decision.  If you are interested in exploring weight loss strategies, the following discussion covers the approaches that may be successful. Body mass index (BMI) is one way to tell whether you are at a healthy weight, overweight, or obese. It measures your weight in relation to your height.  A BMI of 18.5 to 24.9 is in the healthy range. A person with a BMI of 25 to 29.9 is considered overweight, and someone with a BMI of 30 or greater is considered obese. More than two-thirds of [place] adults are considered overweight or obese.  Being overweight or obese increases the risk for further weight gain. Excess weight may lead to heart disease and diabetes.  Creating and following plans for healthy eating and physical activity may help you improve your health.  Weight control is part of healthy lifestyle and includes exercise, emotional health, and healthy eating habits. Careful eating habits lifelong are the mainstay of weight control. Though there are significant health benefits from weight loss, long-term weight loss with diet alone may be very difficult to achieve- studies show long-term success with dietary management in less than 10% of people. Attaining a healthy weight may be especially difficult to achieve in those with severe obesity. In some cases, medications, devices and surgical management might be considered.  What can you do?  If you are overweight or obese and are interested in methods for weight loss, you should discuss this with your provider.     Consider reducing daily calorie intake by 500 calories.     Keep a food journal.     Avoiding skipping meals, consider cutting portions instead.    Diet combined with exercise helps maintain muscle while optimizing fat loss. Strength training is particularly important for building and maintaining muscle mass. Exercise helps reduce stress, increase energy, and improves fitness.  Increasing exercise without diet control, however, may not burn enough calories to loose weight.       Start walking three days a week 10-20 minutes at a time    Work towards walking thirty minutes five days a week     Eventually, increase the speed of your walking for 1-2 minutes at time    In addition, we recommend that you review healthy lifestyles and methods for weight loss available through the [employer] of [employer] patient information sites:  http://win.niddk.nih.gov/publications/index.htm    And look into health and wellness programs that may be available through your health insurance provider, employer, local community center, or [employer].    Weight management plan: Patient was referred to their PCP to discuss a diet and exercise plan.

## 2019-02-21 NOTE — NURSING NOTE
"Chief Complaint   Patient presents with     Sleep Problem     Concerned with breathing at night       Initial /78   Pulse 93   Resp 16   Ht 1.854 m (6' 0.99\")   Wt 89.4 kg (197 lb)   SpO2 96%   BMI 26.00 kg/m   Estimated body mass index is 26 kg/m  as calculated from the following:    Height as of this encounter: 1.854 m (6' 0.99\").    Weight as of this encounter: 89.4 kg (197 lb).    Medication Reconciliation: complete    ESS 5    Lorenza Sanchez MA    "

## 2019-03-26 ENCOUNTER — OFFICE VISIT (OUTPATIENT)
Dept: PSYCHIATRY | Facility: CLINIC | Age: 54
End: 2019-03-26
Attending: FAMILY MEDICINE
Payer: COMMERCIAL

## 2019-03-26 VITALS
BODY MASS INDEX: 26.52 KG/M2 | OXYGEN SATURATION: 96 % | DIASTOLIC BLOOD PRESSURE: 79 MMHG | WEIGHT: 201 LBS | TEMPERATURE: 98.8 F | RESPIRATION RATE: 16 BRPM | HEART RATE: 106 BPM | SYSTOLIC BLOOD PRESSURE: 145 MMHG

## 2019-03-26 DIAGNOSIS — F41.8 MIXED ANXIETY AND DEPRESSIVE DISORDER: ICD-10-CM

## 2019-03-26 DIAGNOSIS — F45.21 ILLNESS ANXIETY DISORDER: Primary | ICD-10-CM

## 2019-03-26 PROCEDURE — 90792 PSYCH DIAG EVAL W/MED SRVCS: CPT | Performed by: NURSE PRACTITIONER

## 2019-03-26 RX ORDER — MIRTAZAPINE 15 MG/1
TABLET, FILM COATED ORAL
Qty: 90 TABLET | Refills: 0 | Status: SHIPPED | OUTPATIENT
Start: 2019-03-26 | End: 2019-07-10

## 2019-03-26 ASSESSMENT — ANXIETY QUESTIONNAIRES
5. BEING SO RESTLESS THAT IT IS HARD TO SIT STILL: SEVERAL DAYS
GAD7 TOTAL SCORE: 9
1. FEELING NERVOUS, ANXIOUS, OR ON EDGE: MORE THAN HALF THE DAYS
2. NOT BEING ABLE TO STOP OR CONTROL WORRYING: MORE THAN HALF THE DAYS
6. BECOMING EASILY ANNOYED OR IRRITABLE: NOT AT ALL
3. WORRYING TOO MUCH ABOUT DIFFERENT THINGS: SEVERAL DAYS
7. FEELING AFRAID AS IF SOMETHING AWFUL MIGHT HAPPEN: SEVERAL DAYS
IF YOU CHECKED OFF ANY PROBLEMS ON THIS QUESTIONNAIRE, HOW DIFFICULT HAVE THESE PROBLEMS MADE IT FOR YOU TO DO YOUR WORK, TAKE CARE OF THINGS AT HOME, OR GET ALONG WITH OTHER PEOPLE: SOMEWHAT DIFFICULT

## 2019-03-26 ASSESSMENT — PATIENT HEALTH QUESTIONNAIRE - PHQ9
5. POOR APPETITE OR OVEREATING: MORE THAN HALF THE DAYS
SUM OF ALL RESPONSES TO PHQ QUESTIONS 1-9: 7

## 2019-03-26 NOTE — Clinical Note
Manuel, Dr. Messina. I consulted with your patient,  Gustabo. He was calm and relatable with me today, but wow, he was truly perseverative on his various diagnoses and his inevitable demise. The likes of which I could not surmise in yours or the various specialist's notes. At any rate, I do think therapy will be quite valuable. His psych meds are available to increase, but he's deferring on this. Let me know if you have any questions. Thanks and regards,Rober

## 2019-03-26 NOTE — PROGRESS NOTES
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 Crisis Text Line: Text [place] to 339477  Suicide LifeLine Chat: suicideDropifi.org/chat    Administrative Billing:   Time spent with patient was 60 minutes and greater than 50% of time or 40 minutes was spent in counseling and coordination of care regarding above diagnoses and treatment plan.    Patient Status:  The patient is being returned to the referring provider for ongoing care and medication prescribing.  The patient can be referred back to this service for further consultation as needed.    Signed:   Rober Bach, CNP  Psychiatry

## 2019-03-27 ASSESSMENT — ANXIETY QUESTIONNAIRES: GAD7 TOTAL SCORE: 9

## 2019-04-04 ENCOUNTER — OFFICE VISIT (OUTPATIENT)
Dept: PSYCHOLOGY | Facility: CLINIC | Age: 54
End: 2019-04-04
Attending: PSYCHIATRY & NEUROLOGY
Payer: COMMERCIAL

## 2019-04-04 DIAGNOSIS — F45.21 ILLNESS ANXIETY DISORDER: Primary | ICD-10-CM

## 2019-04-04 ASSESSMENT — ANXIETY QUESTIONNAIRES
3. WORRYING TOO MUCH ABOUT DIFFERENT THINGS: SEVERAL DAYS
6. BECOMING EASILY ANNOYED OR IRRITABLE: NOT AT ALL
1. FEELING NERVOUS, ANXIOUS, OR ON EDGE: MORE THAN HALF THE DAYS
7. FEELING AFRAID AS IF SOMETHING AWFUL MIGHT HAPPEN: SEVERAL DAYS
2. NOT BEING ABLE TO STOP OR CONTROL WORRYING: SEVERAL DAYS
5. BEING SO RESTLESS THAT IT IS HARD TO SIT STILL: NOT AT ALL
GAD7 TOTAL SCORE: 7
4. TROUBLE RELAXING: MORE THAN HALF THE DAYS
GAD7 TOTAL SCORE: 7
7. FEELING AFRAID AS IF SOMETHING AWFUL MIGHT HAPPEN: SEVERAL DAYS
GAD7 TOTAL SCORE: 7

## 2019-04-04 ASSESSMENT — PATIENT HEALTH QUESTIONNAIRE - PHQ9
10. IF YOU CHECKED OFF ANY PROBLEMS, HOW DIFFICULT HAVE THESE PROBLEMS MADE IT FOR YOU TO DO YOUR WORK, TAKE CARE OF THINGS AT HOME, OR GET ALONG WITH OTHER PEOPLE: SOMEWHAT DIFFICULT
SUM OF ALL RESPONSES TO PHQ QUESTIONS 1-9: 11
SUM OF ALL RESPONSES TO PHQ QUESTIONS 1-9: 11

## 2019-04-04 NOTE — PROGRESS NOTES
Health Psychology                  Clinic    Department of Medicine  Jaclyn Vallejo, PhD, LP [phone]                          Clinics and Surgery Center  [facility] Bernard Dominguez, PhD, LP [phone]                  3rd Floor  [place] Mail Code 741   Varun Alford, PhD, ABPP, LP [phone]     [address] [facility],   [address] [facility] Alyssa Kendrick,  PhD, LP [phone]            [place] [address] Ila Shafer, PhD, LP [phone]     Confidential Summary of Standard Psychodiagnostic Evaluation*    Referral Source:  Dr. Park, Neuropsychology    Reason for Referral:  Anxiety about illness    Sources of Information:  Information was obtained from a clinical interview with the patient, review of available medical records, and administration of psychological assessments.     History of Presenting Concerns:  Cesar Montejo is a 53 year old male who experiences significant anxiety in the context of ongoing health issues.  He described the onset of health issues to begin in late 2017 when he noticed problems with balance and concentration.  He reported that his GP referred him to neurology who diagnosed orthostatic hypotension and dysautonomia.  He reported seeking further evaluation at the [facility] and reports being eventually diagnosed with REM sleep disorder.  He reports that this is a strong precursor to a progressive neurodegenerative illness such as Lewy body dementia or multisystem atrophy and as a result he reports significant anxiety about neurodegenerative illness.  He also reported anxiety following basal cell carcinoma in 2016 which required Mohs surgery, and that his concerns about skin cancer were initially ignored by his GP.  He reported that ongoing health issues and associated anxiety decreases productivity at work, primarily in completion of academic writing tasks.  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  He also reported he has been less able to be physically active (e.g., biking) due to balance issues.  He endorsed that his anxiety worsens tremors, increases his heart rate, and decrease his engagement in social activities.  In the last 5 weeks he reported significant stress at work due to disclosing his health issues to a  colleague in confidence, whom he believes disclose this to other symptoms workplace, which he responded reacting to both high levels of anger and multiple emails towards this person.  He reports that he soy by staying occupied and this allows him to feel as if he is accomplish things at the end of the day.  He also has started Effexor 75 mg to help with anxiety, which has alleviated ruminative worry about his health issues.  Prior to starting this, he stated he would worry about his diagnosis and health most of the time and now it is periodic.  He reported that he worries significantly about fear of intellectual decline and death, as well as experiences ongoing symptoms of worry.      Medical History:    Past Medical History:   Diagnosis Date     Anxiety      Basal cell carcinoma      Gastro-oesophageal reflux disease      H/O magnetic resonance imaging of brain and brain stem 6/12/2018    MR BRAIN WITHOUT AND WITH CONTRAST 11/29/2017 9:10 PM   HISTORY:  Left-sided numbness. Disequilibrium.   COMPARISON: MR brain 4/18/2015.   TECHNIQUE: Sagittal T1, axial T2, axial FLAIR, axial GRE, axial diffusion scan, coronal FLAIR, axial post Gd enhanced T1 weighted images.   FINDINGS: There is no intracranial hemorrhage, mass, or recent infarct. There is a cyst in the floor of the right maxilla     Head injury April 2012    Had a bad concussion with post concussion synd for year     High cholesterol      History of echocardiogram 6/12/2018    Stephy  TUYET YANES MD 2018  Narrative     [id number]^MECHE^BLAKE^KRISTAL       [facility],[place] [facility] [address] [place] [id number] Name: JONATHAN FIORE MRN: [id number] : 1965 Study Date: 2018 09:13 AM Age: 52 yrs Gender: Male Patient Location: [facility] Reason For Study: Chest Pain Ordering Physician:      Hypertension early     Not on meds. Usually in pre-hypertesion categ.     Insomnia      Squamous cell carcinoma        Past Surgical History:   Procedure Laterality Date     COLONOSCOPY N/A 2017    Procedure: COLONOSCOPY;  Surgeon: Larry Fields MD;  Location:  GI     LASER CO2 LESION ORAL N/A 10/5/2016    Procedure: LASER CO2 LESION ORAL;  Surgeon: Josué Rojo DDS;  Location:  OR     MOHS MICROGRAPHIC PROCEDURE      squamous and basal cell       Current Outpatient Medications   Medication     aspirin 81 MG EC tablet     Cholecalciferol (VITAMIN D) 2000 units CAPS     COENZYME Q10 PO     Krill Oil 1000 MG CAPS     Melatonin 10 MG TABS tablet     mirtazapine (REMERON) 15 MG tablet     NONFORMULARY     NONFORMULARY     simvastatin (ZOCOR) 20 MG tablet     simvastatin (ZOCOR) 40 MG tablet     venlafaxine (EFFEXOR-ER) 75 MG 24 hr tablet     No current facility-administered medications for this visit.        Psychiatric History:  He recently saw a psychiatric prescriber at [facility] who diagnosed illness anxiety disorder.  Current psychotropic medications include Effexor 75 mg and Remeron 15 mg nightly.  He also has been diagnosed with a REM sleep behavior disorder.  He reported that he experienced an episode of classic depression 12-13 years ago and was treated initially with Prozac, but discontinued due to negative side effects of jitteriness, and instead treated with a rigorous exercise program which she found to be effective within 2 weeks.  He reported that he does not have a history of  attending psychotherapy, psychiatric hospitalization, or suicidal ideation or behavior.  He reported that his family psychiatric history significant for anxiety and his brother and father.    Substance Use History:  He stated that he consumes 1-2 glasses of wine nightly and denied other use of tobacco products or recreational drugs.    Social History:  He currently lives alone in [place].  He has never been  and has no children.  He reported that he has friendships from college that he maintains to this day and feels like these are strong, supportive relationships.  He is a  at the [employer].  He was raised in [place] with his mother, father, and brother.  He reported that his upbringing was difficult at times due to a negative relationship with his father.  He reported having a strong relationship with his mother who now has advanced dementia.  His brother was described to have an autism spectrum disorder and he is disabled.    Psychological Assessment:  The patient completed the following battery of assessments during this psychological evaluation: World Health Organization Disability Assessment Schedule 2.0 12-item (WHODAS), Patient Health Questionnaire-9 (PHQ-9), Generalized Anxiety Disorder-7 screener (PREMA-7), and the CAGE Questionnaire Adapted to Include Drugs (CAGE-AID).    The WHODAS measures disability and functional impairment due to health conditions including diseases, illnesses, injuries, mental or emotional problems, and problems with alcohol or drugs. The possible range of scores is 12-60 and higher scores indicate higher levels of disability.   WHODAS 2.0 Total Score 3/26/2019 4/4/2019   Total Score 33 31   Total Score MyChart - 31       The PHQ-9 is an instrument for screening, diagnosing, monitoring and measuring the severity of depression. Scores of 5, 10, 15, and 20 represent cutpoints for mild, moderate, moderately severe and severe  depression, respectively.   PHQ-9 SCORE 1/21/2019 3/26/2019 4/4/2019   PHQ-9 Total Score - - -   PHQ-9 Total Score MyChart - - 11 (Moderate depression)   PHQ-9 Total Score 16 7 11       The PREMA-7 is an instrument for screening, diagnosing, monitoring and measuring the severity of anxiety. Scores of 5, 10, and 15 represent cutpoints for mild, moderate, and severe anxiety, respectively.  PREMA-7 SCORE 12/16/2016 3/26/2019 4/4/2019   Total Score - - 7 (mild anxiety)   Total Score 1 9 7       The CAGE-AID questionnaire is used to screen for alcohol or drug abuse and dependence in adults. A CAGE-AID score  > 1 is a positive screen, suggesting further discussion is needed to determine if evaluation for alcohol or substance abuse is appropriate. A score > 2 is considered clinically significant, suggesting further evaluation of alcohol or substance-related problems is indicated.  CAGE-AID Total Score 4/4/2019   Total Score 0   Total Score MyChart 0 (A total score of 2 or greater is considered clinically significant)       Mental Status Examination:  Appearance/Behavior/Orientation: Patient was on time, appropriately groomed and dressed, and demonstrated good eye contact. Alert and oriented to person, place, time, and situation. No evidence of psychomotor agitation.     Cooperation/Reliability: Patient was open and cooperative throughout the session.    Speech/Language: Speech was clear, coherent, and of normal rate, rhythm and volume.   Thought Form: Overall logical and organized.   Thought Content: Appropriate to interview and situation.  Cognition/Memory: Not formally assessed, but no difficulties apparent upon interview.   Attention/Concentration: Good throughout interview.    Fund of knowledge: Consistent with age and level of education.    Abstract reasoning: Not assessed.   Judgment: Intact.    Mood/Affect: Mood anxious; appropriate range of affect.    Insight/Motivation: Good, good  Suicide/Assault: Patient denies  suicidal or assaultive ideation, plan, or intent.    Impression:  Cesar Montejo is a 53 year old male experiencing significant anxiety about REM sleep behavior disorder diagnosis was thought extensive and repeat medical workups related to ongoing symptoms.  He reports significant anxiety about progression to a neuro degenerative illness but this has not been diagnosed.  He reports the Effexor helps mitigate anxiety but is interested in ongoing psychotherapy to manage  anxiety as well.    Diagnosis:  300.7 (F45.21) Illness Anxiety Disorder  Care seeking    Recommendation/Plan:  Provided recommendation for cognitive behavioral therapy for illness anxiety including cognitive restructuring, behavioral interventions, and relaxation training.  He agreed with recommendations.  Provided brief psychoeducation and recommendation for diaphragmatic breathing.  A treatment plan will be completed at the next session    Alyssa Kendrick, PhD,   Clinical Health Psychologist    *In accordance with the Rules of the [place] Board of Psychology, it is noted that psychological descriptions and scientific procedures underlying psychological evaluations have limitations.  Absolute predictions cannot be made based on information in this report.

## 2019-04-05 ASSESSMENT — ANXIETY QUESTIONNAIRES: GAD7 TOTAL SCORE: 7

## 2019-04-05 ASSESSMENT — PATIENT HEALTH QUESTIONNAIRE - PHQ9: SUM OF ALL RESPONSES TO PHQ QUESTIONS 1-9: 11

## 2019-04-18 ENCOUNTER — OFFICE VISIT (OUTPATIENT)
Dept: PSYCHOLOGY | Facility: CLINIC | Age: 54
End: 2019-04-18
Payer: COMMERCIAL

## 2019-04-18 DIAGNOSIS — F41.9 ANXIETY: Primary | ICD-10-CM

## 2019-04-18 NOTE — PROGRESS NOTES
Health Psychology                  Clinic    Department of Medicine  Jaclyn Vallejo, PhD, LP [phone]                          Clinics and Surgery Center  [facility] Bernard Dominguez, PhD, LP [phone]                  3rd Floor  [place] Mail Code 741   Varun Alford, PhD, ABPP, LP [phone]     [address] [facility], [address] Alyssa Kendrick,  PhD, LP [phone]            [place] [id number]  [place] [address] Ila Shafer, PhD, LP [phone]    Health Psychology Follow-Up Note    SUBJECTIVE:  Cesar Montejo was seen for individual psychotherapy. Reviewed emotional and physical health since our last session. The patient reported noticing significant muscle fatigue with mild exertion and shortness of breath over the last few days, and wanting to f/u with PCP about these symptoms. He acknowledged stress related to appt with supervisor with whom he had conflict affected these symptoms - after this meeting ended symptoms have not changed. He reported coping with anxiety by distracting at times and focusing on present at other times. A treatment plan was completed in collaboration with the patient in this session (See EMR).    Provided a rationale for cognitive behavioral interventions and psychoeducation about the course and duration of treatment.  The patient agreed with recommendations. Oriented patient to structure of therapy sessions, including beginning with a brief review of mood, health, and relevant stressors since the last session; working to set an agenda in order to prioritize how we spend time in treatment; taking an active, approach focused on addressing agenda items in session; creating an action plan together in session to facilitate the patient's ability to practice coping strategies discussed in this session in order to help the patient reach their goals in treatment as quickly as possible; and checking in about the helpfulness of that session.       Patient reported the tendency to over-utilize problem solving strategies when situation is uncontrollable. Provided education about problem-focused (e.g., changing or modifying the fundamental cause of the stress; the overarching goal for this type of coping is to reduce or remove the cause of the stressor) and emotion-focused (reducing and managing the intensity of the negative and distressing emotions that a stressful situation has caused rather than solving the problematic situation itself). Discussed the importance of matching the coping strategy to the controllability or changeability of a situation (eg., problem-focused coping is effective when the stressor is changeable or modifiable whereas emotion-focused coping strategies are effective in the management of unchangeable stressors). Discussed ways in which the patient may know when to choose emotion-focused or problem-focused coping strategies and examples of each for the patient. Discussed ways to enhance emotion-focused coping strategies (e.g., acceptance, exercise, cognitive reframing, observing, relaxation strategies).     OBJECTIVE:  Appearance/Behavior/Orientation: Patient was on time, appropriately groomed and dressed, and demonstrated good eye contact. Alert and oriented to person, place, time, and situation. No evidence of psychomotor agitation.     Cooperation/Reliability: Patient was open and cooperative throughout the session.    Speech/Language: Speech was clear, coherent, and of normal rate, rhythm and volume.   Thought Form: Overall logical and organized.   Thought Content: Appropriate to interview and situation.  Mood/Affect: Mood anxious; appropriate range of affect.    Insight/Motivation: Good, good  Suicide/Assault: Patient denies suicidal or assaultive ideation, plan, or intent.    ASSESSMENT:  Darlene with anxiety through excessive worry and avoidance of time alone in which he thinks about his health issues.  Will continue to benefit  from cognitive behavioral interventions and relaxation training to manage anxiety.    DIAGNOSIS:  Anxiety, unspecified    PLAN:  RTC for continued psychotherapy.     Time in: 2:08  Time out: 3:01  Extended session due to complexity of case and length of interval.    Alyssa Kendrick, PhD,   Clinical Health Psychologist    Tx plan completed: 04/18/19  Tx plan due:  04/18/20    *no letter

## 2019-05-07 ENCOUNTER — OFFICE VISIT (OUTPATIENT)
Dept: DERMATOLOGY | Facility: CLINIC | Age: 54
End: 2019-05-07
Payer: COMMERCIAL

## 2019-05-07 DIAGNOSIS — Z85.828 HISTORY OF NONMELANOMA SKIN CANCER: Primary | ICD-10-CM

## 2019-05-07 DIAGNOSIS — D18.01 CHERRY ANGIOMA: ICD-10-CM

## 2019-05-07 DIAGNOSIS — L56.8 ACTINIC CHEILITIS: ICD-10-CM

## 2019-05-07 DIAGNOSIS — L81.4 SOLAR LENTIGO: ICD-10-CM

## 2019-05-07 ASSESSMENT — PAIN SCALES - GENERAL: PAINLEVEL: NO PAIN (0)

## 2019-05-07 NOTE — NURSING NOTE
Dermatology Rooming Note    Cesar Montejo's goals for this visit include:   Chief Complaint   Patient presents with     Derm Problem     Travis is here for a skin check, states no concerns.        Alyssa Trent LPN

## 2019-05-07 NOTE — PROGRESS NOTES
[facility] Dermatology Note      Dermatology Problem List:  1. NMSC  -Superficial BCC, left cheek, s/p Mohns 6/16, NER  -SCCIS right cheek, s/p Mohs 6/16, NER  2. Actinic keratoses  3. Actinic cheilitis  - s/p CO2 laser ablation of lower lip vermilion 10/16  - s/p laser vermilionectomy 8/2017  - Follows with oral surgeon q 6 months  4. Neoplasm of uncertain behavior, L ear - s/p shave biopsy 8/15/18  Irregular epidermal hyperplasia with hypergranulosis and dermal fibrosis  5. Lesion to monitor: left superior eyelid      Encounter Date: May 7, 2019    CC:   History of skin cancer    History of Present Illness:  Mr. Cesar Montejo is a 53 year old male who presents as a follow-up for NMSC. The patient was last seen 12/11/2018 when he had an unremarkable skin check. Today, the pt denies any new complaints. He denies any new or changing skin lesions of concern. He denies any skin lesions that itch, hurt, or bleed. The pt denies any sores or pain on his lips. He notes he is no longer following with an oral surgeon. He wants to know if dermatology can monitor his lips going forward. Otherwise, the pt denies any other general or skin-specific complaints at this time.     Past Medical History:   Patient Active Problem List   Diagnosis     Insomnia     Adjustment reaction     CARDIOVASCULAR SCREENING; LDL GOAL LESS THAN 130     Pure hypercholesterolemia     GERD (gastroesophageal reflux disease)     Right shoulder pain     Calcific tendonitis     Solar lentiginosis     Skin cancer screening     Dermatitis, seborrheic     Keratosis, actinic     Family history of skin cancer     AK (actinic keratosis)     History of actinic keratosis     History of basal cell cancer     Seborrheic keratosis     Cherry angioma     Actinic cheilitis     History of nonmelanoma skin cancer     History of multiple concussions     Elevated blood pressure reading without diagnosis of hypertension     Dream enactment behavior      Disturbance in sleep behavior     Dyspnea and respiratory abnormality     Chest pain     H/O magnetic resonance imaging of brain and brain stem     History of echocardiogram     Encounter for neuropsychological testing  with Dr. Park     Benign neoplasm of skin of right upper extremity     Illness anxiety disorder     Past Medical History:   Diagnosis Date     Anxiety      Basal cell carcinoma      Gastro-oesophageal reflux disease      H/O magnetic resonance imaging of brain and brain stem 2018    MR BRAIN WITHOUT AND WITH CONTRAST 2017 9:10 PM   HISTORY:  Left-sided numbness. Disequilibrium.   COMPARISON: MR brain 2015.   TECHNIQUE: Sagittal T1, axial T2, axial FLAIR, axial GRE, axial diffusion scan, coronal FLAIR, axial post Gd enhanced T1 weighted images.   FINDINGS: There is no intracranial hemorrhage, mass, or recent infarct. There is a cyst in the floor of the right maxilla     Head injury 2012    Had a bad concussion with post concussion synd for year     High cholesterol      History of echocardiogram 2018    TUYET Keyes MD 2018  Narrative     [id number]^MECHE^BLAKE^KRISTAL       [facility] [facility] [address] [place] [address] Name: JONATHAN FIORE MRN: [id number] : 1965 Study Date: 2018 09:13 AM Age: 52 yrs Gender: Male Patient Location: [facility] Reason For Study: Chest Pain Ordering Physician:      Hypertension early     Not on meds. Usually in pre-hypertesion categ.     Insomnia      Squamous cell carcinoma      Past Surgical History:   Procedure Laterality Date     COLONOSCOPY N/A 2017    Procedure: COLONOSCOPY;  Surgeon: Larry Fields MD;  Location: [facility]     LASER CO2 LESION ORAL N/A 10/5/2016    Procedure: LASER CO2 LESION ORAL;  Surgeon: Josué Rojo DDS;  Location: [facility] OR     MOHS MICROGRAPHIC PROCEDURE      squamous and basal cell        Social History:  Patient reports that he quit smoking about 28 years ago. His smoking use included cigarettes. He started smoking about 33 years ago. He has a 2.50 pack-year smoking history. He has never used smokeless tobacco. He reports that he drinks about 0.6 - 1.2 oz of alcohol per week. He reports that he does not use drugs.    Family History:  Family History   Problem Relation Age of Onset     Lipids Mother         on meds     Cerebrovascular Disease Mother         TIA     Alzheimer Disease Mother         severe     Skin Cancer Mother         SCC     Other - See Comments Mother         huy [place]     Lipids Father         on meds     Hypertension Father         controlled with meds     Thyroid Disease Father         ?not sure but possibly     Diabetes Father      Cerebrovascular Disease Father      Cancer - colorectal Maternal Grandmother         still alive at 99     Cancer Maternal Grandfather         lung but lifetime smoker     Melanoma Maternal Grandfather      Other - See Comments Brother         huy, [place]     Asperger's syndrome Brother      Asthma No family hx of      C.A.D. No family hx of      Prostate Cancer No family hx of        Medications:  Current Outpatient Medications   Medication Sig Dispense Refill     aspirin 81 MG EC tablet 81mg tab by mouth nightly @ 11pm       Cholecalciferol (VITAMIN D) 2000 units CAPS 2000 units by mouth nightly @ 11pm 30 capsule      COENZYME Q10 PO Take 1,000 mg by mouth daily @ 10 pm       Krill Oil 1000 MG CAPS 4 x 1000mg capsule by mouth @ 11pm       Melatonin 10 MG TABS tablet Take 10 mg by mouth At Bedtime @11pm       mirtazapine (REMERON) 15 MG tablet TAKE 1 TABLET(15 MG) BY MOUTH AT BEDTIME 90 tablet 0     NONFORMULARY L-Alpha glycerylphosphorylcholine (GCP) 400 mg daily       NONFORMULARY Pyrroloquinoline quinone (PQQ) 600 mg daily       simvastatin (ZOCOR) 20 MG tablet 20mg tab by mouth nightly @ 11pm       simvastatin (ZOCOR) 40 MG  tablet TAKE 1 TABLET(40 MG) BY MOUTH AT BEDTIME 90 tablet 0     venlafaxine (EFFEXOR-ER) 75 MG 24 hr tablet 75mg tab by mouth daily @ 7am and may increase to 2 x 75mg tabs by mouth daily at some point 180 tablet 3        No Known Allergies          Physical exam:  Vitals: There were no vitals taken for this visit.  GEN: This is a well developed, well-nourished male in no acute distress, in a pleasant mood.    SKIN: Full skin, which includes the head/face, both arms, chest, back, abdomen, both legs,  buttocks, digits and/or nails, was examined.  -There are 1-2 dozen red to purple vascular papules on the trunk, face, and scalp  -There is, perhaps, a very thin, subtle annular papule on the left superior eyelid that is neither scaling or red  -Numerous hyperpigmented macules on sun exposed areas of the face, neck, and shoulders  -Sites of previous NMSC are NERD on inspection  -No masses of tenderness of the inferior lip are noted in inspection or palpation  -No other lesions of concern on areas examined.     Impression/Plan:    1. History of nonmelanoma skin cancer, no clincial evidence of recurrence    Sun precaution was advised including the use of sun screens of SPF 30 or higher, sun protective clothing, and avoidance of tanning beds.    2. Cherry angiomas    No further intervention required. Patient to report changes.     3. Solar lentigines    No further intervention required. Patient to report changes.     4. Possible GA vs porokeratosis--left eyelid    There is a solitary annular papule on the left superior eyelid. This is difficult to see except without additional light. Given location, we will continue to monitor this area for evolution or resolution    5. History of actinic chelitis    No lesiosn of concern today. Will continue to monitor      Follow-up in 6 months, earlier for new or changing lesions.       Dr. Smith staffed the patient.    Staff Involved:  Resident(Janette)/Staff  .Cherie CHOI  MD, saw this patient with the resident and agree with the resident s findings and plan of care as documented in the resident s note.

## 2019-05-07 NOTE — LETTER
5/7/2019       RE: Cesar Montejo  [address]  [place] [address]     Dear Colleague,    Thank you for referring your patient, Cesar Montejo, to the [facility] DERMATOLOGY at [facility]. Please see a copy of my visit note below.    [facility] Dermatology Note      Dermatology Problem List:  1. NMSC  -Superficial BCC, left cheek, s/p Mohns 6/16, NER  -SCCIS right cheek, s/p Mohs 6/16, NER  2. Actinic keratoses  3. Actinic cheilitis  - s/p CO2 laser ablation of lower lip vermilion 10/16  - s/p laser vermilionectomy 8/2017  - Follows with oral surgeon q 6 months  4. Neoplasm of uncertain behavior, L ear - s/p shave biopsy 8/15/18  Irregular epidermal hyperplasia with hypergranulosis and dermal fibrosis  5. Lesion to monitor: left superior eyelid      Encounter Date: May 7, 2019    CC:   History of skin cancer    History of Present Illness:  Mr. Cesar Montejo is a 53 year old male who presents as a follow-up for NMSC. The patient was last seen 12/11/2018 when he had an unremarkable skin check. Today, the pt denies any new complaints. He denies any new or changing skin lesions of concern. He denies any skin lesions that itch, hurt, or bleed. The pt denies any sores or pain on his lips. He notes he is no longer following with an oral surgeon. He wants to know if dermatology can monitor his lips going forward. Otherwise, the pt denies any other general or skin-specific complaints at this time.     Past Medical History:   Patient Active Problem List   Diagnosis     Insomnia     Adjustment reaction     CARDIOVASCULAR SCREENING; LDL GOAL LESS THAN 130     Pure hypercholesterolemia     GERD (gastroesophageal reflux disease)     Right shoulder pain     Calcific tendonitis     Solar lentiginosis     Skin cancer screening     Dermatitis, seborrheic     Keratosis, actinic     Family history of skin cancer     AK (actinic keratosis)     History of actinic  keratosis     History of basal cell cancer     Seborrheic keratosis     Cherry angioma     Actinic cheilitis     History of nonmelanoma skin cancer     History of multiple concussions     Elevated blood pressure reading without diagnosis of hypertension     Dream enactment behavior     Disturbance in sleep behavior     Dyspnea and respiratory abnormality     Chest pain     H/O magnetic resonance imaging of brain and brain stem     History of echocardiogram     Encounter for neuropsychological testing  with Dr. Park     Benign neoplasm of skin of right upper extremity     Illness anxiety disorder     Past Medical History:   Diagnosis Date     Anxiety      Basal cell carcinoma      Gastro-oesophageal reflux disease      H/O magnetic resonance imaging of brain and brain stem 2018    MR BRAIN WITHOUT AND WITH CONTRAST 2017 9:10 PM   HISTORY:  Left-sided numbness. Disequilibrium.   COMPARISON: MR brain 2015.   TECHNIQUE: Sagittal T1, axial T2, axial FLAIR, axial GRE, axial diffusion scan, coronal FLAIR, axial post Gd enhanced T1 weighted images.   FINDINGS: There is no intracranial hemorrhage, mass, or recent infarct. There is a cyst in the floor of the right maxilla     Head injury 2012    Had a bad concussion with post concussion synd for year     High cholesterol      History of echocardiogram 2018    TUYET Keyes MD 2018  Narrative     [id number]^MECHE^BLAKE^KRISTAL       [facility] [facility] [address] [place] [address] Name: JONATHAN FIORE MRN: [id number] : 1965 Study Date: 2018 09:13 AM Age: 52 yrs Gender: Male Patient Location: [facility] Reason For Study: Chest Pain Ordering Physician:      Hypertension early     Not on meds. Usually in pre-hypertesion categ.     Insomnia      Squamous cell carcinoma      Past Surgical History:   Procedure Laterality Date     COLONOSCOPY  N/A 4/17/2017    Procedure: COLONOSCOPY;  Surgeon: Larry Fields MD;  Location: [facility]     LASER CO2 LESION ORAL N/A 10/5/2016    Procedure: LASER CO2 LESION ORAL;  Surgeon: Josué Rojo DDS;  Location: [facility] OR     MOHS MICROGRAPHIC PROCEDURE      squamous and basal cell       Social History:  Patient reports that he quit smoking about 28 years ago. His smoking use included cigarettes. He started smoking about 33 years ago. He has a 2.50 pack-year smoking history. He has never used smokeless tobacco. He reports that he drinks about 0.6 - 1.2 oz of alcohol per week. He reports that he does not use drugs.    Family History:  Family History   Problem Relation Age of Onset     Lipids Mother         on meds     Cerebrovascular Disease Mother         TIA     Alzheimer Disease Mother         severe     Skin Cancer Mother         SCC     Other - See Comments Mother         [facility]     Lipids Father         on meds     Hypertension Father         controlled with meds     Thyroid Disease Father         ?not sure but possibly     Diabetes Father      Cerebrovascular Disease Father      Cancer - colorectal Maternal Grandmother         still alive at 99     Cancer Maternal Grandfather         lung but lifetime smoker     Melanoma Maternal Grandfather      Other - See Comments Brother         Tob[place], [place]     Asperger's syndrome Brother      Asthma No family hx of      C.A.D. No family hx of      Prostate Cancer No family hx of        Medications:  Current Outpatient Medications   Medication Sig Dispense Refill     aspirin 81 MG EC tablet 81mg tab by mouth nightly @ 11pm       Cholecalciferol (VITAMIN D) 2000 units CAPS 2000 units by mouth nightly @ 11pm 30 capsule      COENZYME Q10 PO Take 1,000 mg by mouth daily @ 10 pm       Krill Oil 1000 MG CAPS 4 x 1000mg capsule by mouth @ 11pm       Melatonin 10 MG TABS tablet Take 10 mg by mouth At Bedtime @11pm       mirtazapine (REMERON) 15 MG tablet TAKE 1  TABLET(15 MG) BY MOUTH AT BEDTIME 90 tablet 0     NONFORMULARY L-Alpha glycerylphosphorylcholine (GCP) 400 mg daily       NONFORMULARY Pyrroloquinoline quinone (PQQ) 600 mg daily       simvastatin (ZOCOR) 20 MG tablet 20mg tab by mouth nightly @ 11pm       simvastatin (ZOCOR) 40 MG tablet TAKE 1 TABLET(40 MG) BY MOUTH AT BEDTIME 90 tablet 0     venlafaxine (EFFEXOR-ER) 75 MG 24 hr tablet 75mg tab by mouth daily @ 7am and may increase to 2 x 75mg tabs by mouth daily at some point 180 tablet 3        No Known Allergies          Physical exam:  Vitals: There were no vitals taken for this visit.  GEN: This is a well developed, well-nourished male in no acute distress, in a pleasant mood.    SKIN: Full skin, which includes the head/face, both arms, chest, back, abdomen, both legs,  buttocks, digits and/or nails, was examined.  -There are 1-2 dozen red to purple vascular papules on the trunk, face, and scalp  -There is, perhaps, a very thin, subtle annular papule on the left superior eyelid that is neither scaling or red  -Numerous hyperpigmented macules on sun exposed areas of the face, neck, and shoulders  -Sites of previous NMSC are NERD on inspection  -No masses of tenderness of the inferior lip are noted in inspection or palpation  -No other lesions of concern on areas examined.     Impression/Plan:    1. History of nonmelanoma skin cancer, no clincial evidence of recurrence    Sun precaution was advised including the use of sun screens of SPF 30 or higher, sun protective clothing, and avoidance of tanning beds.    2. Cherry angiomas    No further intervention required. Patient to report changes.     3. Solar lentigines    No further intervention required. Patient to report changes.     4. Possible GA vs porokeratosis--left eyelid    There is a solitary annular papule on the left superior eyelid. This is difficult to see except without additional light. Given location, we will continue to monitor this area for  evolution or resolution    5. History of actinic chelitis    No lesiosn of concern today. Will continue to monitor      Follow-up in 6 months, earlier for new or changing lesions.       Dr. Smith staffed the patient.    Staff Involved:  Resident(Janette)/Staff  .I, Cherie Gillespie MD, saw this patient with the resident and agree with the resident s findings and plan of care as documented in the resident s note.      Again, thank you for allowing me to participate in the care of your patient.      Sincerely,    Cherie Gillespie MD

## 2019-05-08 DIAGNOSIS — Z13.6 CARDIOVASCULAR SCREENING; LDL GOAL LESS THAN 130: ICD-10-CM

## 2019-05-08 DIAGNOSIS — E78.00 HYPERCHOLESTEREMIA: ICD-10-CM

## 2019-05-08 RX ORDER — SIMVASTATIN 40 MG
TABLET ORAL
Qty: 90 TABLET | Refills: 0 | Status: SHIPPED | OUTPATIENT
Start: 2019-05-08 | End: 2019-08-06

## 2019-05-08 NOTE — TELEPHONE ENCOUNTER
Dr. Messina    Routing refill request to provider for review/approval because:  Labs not current:  LDL    Pt is due for fasting LDL and also requesting fasting glucose  He made an appointment with you for 6/7/19 and wants to do the labs the day before    Labs cued are there any other you wish    Amira Cm RN   [facility]

## 2019-05-08 NOTE — TELEPHONE ENCOUNTER
"Requested Prescriptions   Pending Prescriptions Disp Refills     simvastatin (ZOCOR) 40 MG tablet [Pharmacy Med Name: SIMVASTATIN 40MG TABLETS] 90 tablet 0     Sig: TAKE 1 TABLET(40 MG) BY MOUTH AT BEDTIME   Last Written Prescription Date:  2/11/19  Last Fill Quantity: 90,  # refills: 0   Last office visit: 1/21/2019 with prescribing provider:     Future Office Visit:        Statins Protocol Failed - 5/8/2019  9:49 AM        Failed - LDL on file in past 12 months     Recent Labs   Lab Test 03/08/18  1431   *             Passed - No abnormal creatine kinase in past 12 months     Recent Labs   Lab Test 01/06/19  0425                   Passed - Recent (12 mo) or future (30 days) visit within the authorizing provider's specialty     Patient had office visit in the last 12 months or has a visit in the next 30 days with authorizing provider or within the authorizing provider's specialty.  See \"Patient Info\" tab in inbasket, or \"Choose Columns\" in Meds & Orders section of the refill encounter.              Passed - Medication is active on med list        Passed - Patient is age 18 or older        "

## 2019-05-15 ENCOUNTER — OFFICE VISIT (OUTPATIENT)
Dept: PSYCHOLOGY | Facility: CLINIC | Age: 54
End: 2019-05-15
Payer: COMMERCIAL

## 2019-05-15 DIAGNOSIS — F41.9 ANXIETY: Primary | ICD-10-CM

## 2019-05-15 NOTE — PROGRESS NOTES
Health Psychology                  Clinic    Department of Medicine  Jaclyn Vallejo, PhD, LP [phone]                          Clinics and Surgery Center  [facility] Bernard Dominguez, PhD, LP [phone]                  3rd Floor  [place] Mail Code [address]   Varun Alford, PhD, ABPP, LP [phone]     [address] [facility], [address],  Alyssa Kendrick,  PhD, LP [phone]            [place] [address] Ila Shafer, PhD, LP [phone]    Health Psychology Follow-Up Note    SUBJECTIVE:  Cesar Montejo was seen for individual psychotherapy. Reviewed emotional and physical health since our last session.  He reported no major changes in physical health.  He endorsed that he has been more engaged with work, which has taken his mind off of his health issues and as a result has felt less anxious.  Reviewed contents from the last session, focusing on the overview of problem focused coping versus emotion focused coping with the goal to increase his ability to cope with the emotions that physical symptoms bring up.  Reviewed potential emotion focused coping strategies including relaxation training, mindfulness, and cognitive reframing.  Reviewed relaxation training in more detail today including the rationale and review for diaphragmatic breathing and guided imagery.  Provided instructions for each and let him through a guided practice.  Provided recommendations for practice of relaxation training exercise 5-7 times per week for 5 to 10 minutes a day.    OBJECTIVE:  Appearance/Behavior/Orientation: Patient was on time, appropriately groomed and dressed, and demonstrated good eye contact. Alert and oriented to person, place, time, and situation. No evidence of psychomotor agitation.     Cooperation/Reliability: Patient was open and cooperative throughout the session.    Speech/Language: Speech was clear, coherent, and of normal rate, rhythm and volume.    Thought Form: Overall logical and organized.   Thought Content: Appropriate to interview and situation.  Mood/Affect: Mood anxious; appropriate range of affect.    Insight/Motivation: Good, good.    ASSESSMENT:  Darlene with anxiety through excessive worry and avoidance of time alone in which he thinks about his health issues.  Will continue to benefit from cognitive behavioral interventions and relaxation training to manage anxiety.    DIAGNOSIS:  Anxiety, unspecified    PLAN:  RTC for continued psychotherapy.     Time in: 1:16  Time out: 2:01  Extended session due to complexity of case and length of interval.    Alyssa Kendrick, PhD,   Clinical Health Psychologist    Tx plan completed: 04/18/19  Tx plan due:  04/18/20    *no letter

## 2019-06-06 ENCOUNTER — OFFICE VISIT (OUTPATIENT)
Dept: PSYCHOLOGY | Facility: CLINIC | Age: 54
End: 2019-06-06
Payer: COMMERCIAL

## 2019-06-06 DIAGNOSIS — F41.9 ANXIETY: Primary | ICD-10-CM

## 2019-06-06 NOTE — PROGRESS NOTES
Health Psychology                  Clinic    Department of Medicine  Jaclyn Vallejo, PhD, LP [phone]                          Clinics and Surgery Center  [facility] Bernard Dominguez, PhD, LP [phone]                  3rd Floor  [place] Mail Code [address]49   Varun Alford, PhD, ABPP, LP [phone]     [address] [facility],   [address] [facility],  Alyssa Kendrick,  PhD, LP [phone]            [place] [address] Ila Shafer, PhD, LP [phone]    Health Psychology Follow-Up Note    SUBJECTIVE:  Cesar Montejo was seen for individual psychotherapy. Reviewed emotional and physical health since our last session.  He reported no major changes in physical health since the last session.  He endorsed continued engagement in work, as working long hours has occupied his time and left him tired and as a result has not focused much on health anxiety. He continues to exercise regularly. He reports several times per week he has practiced deep breathing, which has resulted in some reduction in heart rate and relaxation.  Overall, he endorsed satisfaction with his level of functioning in regard to ability to be at work and remain for active and he reported no major concerns related to anxiety and believes that it is well managed currently discussed his preference for returning to therapy given improvement in functioning; he wishes to return for follow-up sessions but schedule sessions 2 to 3 months apart.  Agreed with this plan given level of functioning and sustained improvement over the last several sessions.  Discussed ways to continue to manage chronic health conditions and anxiety, including focusing on things that he can control such as exercise, focusing on one day at a time, and engaging in meaningful activities such as mentoring students.  He plans to pursue the mindfulness based stress reduction class at some point in the future but stated he is unable to find the  time right now due to high work demands.    OBJECTIVE:  Appearance/Behavior/Orientation: Patient was on time, appropriately groomed and dressed, and demonstrated good eye contact. Alert and oriented to person, place, time, and situation. No evidence of psychomotor agitation.     Cooperation/Reliability: Patient was open and cooperative throughout the session.    Speech/Language: Speech was clear, coherent, and of normal rate, rhythm and volume.   Thought Form: Overall logical and organized.   Thought Content: Appropriate to interview and situation.  Mood/Affect: Mood anxious; appropriate range of affect.    Insight/Motivation: Good, good.    ASSESSMENT:  Darlene with anxiety through excessive worry and avoidance of time alone in which he thinks about his health issues.  Will continue to benefit from cognitive behavioral interventions and relaxation training to manage anxiety.    DIAGNOSIS:  Anxiety, unspecified    PLAN:  RTC for continued psychotherapy. Plan is to have him RTC in 2-3 months based on current level of functioning.     Time in: 2:10  Time out: 2:34  Extended session due to complexity of case and length of interval.    Alyssa Kendrick, PhD, LP  Clinical Health Psychologist    Tx plan completed: 04/18/19  Tx plan due:  04/18/20    *no letter

## 2019-07-09 DIAGNOSIS — F41.8 MIXED ANXIETY AND DEPRESSIVE DISORDER: ICD-10-CM

## 2019-07-09 NOTE — TELEPHONE ENCOUNTER
Requested Prescriptions   Pending Prescriptions Disp Refills     mirtazapine (REMERON) 15 MG tablet 90 tablet 0     Sig: TAKE 1 TABLET(15 MG) BY MOUTH AT BEDTIME       There is no refill protocol information for this order        Last Written Prescription Date:  3/26/2019  Last Fill Quantity: 90,  # refills: 0   Last office visit: 3/26/2019 with prescribing provider:  Mikala   Future Office Visit:    None

## 2019-07-10 RX ORDER — MIRTAZAPINE 15 MG/1
TABLET, FILM COATED ORAL
Qty: 90 TABLET | Refills: 0 | Status: SHIPPED | OUTPATIENT
Start: 2019-07-10 | End: 2019-10-09

## 2019-07-27 DIAGNOSIS — F41.1 GAD (GENERALIZED ANXIETY DISORDER): ICD-10-CM

## 2019-07-29 NOTE — TELEPHONE ENCOUNTER
Left vm for pt to return call to clinic    BP not in goal also at last OV pt was to return to clinic in 1 month    Amira Cm RN   [facility]

## 2019-07-30 RX ORDER — VENLAFAXINE HYDROCHLORIDE 75 MG/1
TABLET, EXTENDED RELEASE ORAL
Qty: 180 TABLET | Refills: 0 | OUTPATIENT
Start: 2019-07-30

## 2019-07-30 NOTE — TELEPHONE ENCOUNTER
Called patient, LVM to call clinic. Per LOV, pt to f/u.    Sydney Tai RN  [facility]

## 2019-07-30 NOTE — TELEPHONE ENCOUNTER
Received call from patient. Notified him that he is due for f/u appt. Pt scheduled appt for 08/06/19. He states that he is currently taking 1 75 mg tablet daily and has enough medication to last him until his appt.    Sydney Tai RN  [facility]

## 2019-08-06 ENCOUNTER — OFFICE VISIT (OUTPATIENT)
Dept: FAMILY MEDICINE | Facility: CLINIC | Age: 54
End: 2019-08-06
Payer: COMMERCIAL

## 2019-08-06 VITALS
SYSTOLIC BLOOD PRESSURE: 124 MMHG | TEMPERATURE: 97.9 F | WEIGHT: 202 LBS | BODY MASS INDEX: 26.77 KG/M2 | HEIGHT: 73 IN | DIASTOLIC BLOOD PRESSURE: 72 MMHG | HEART RATE: 95 BPM | OXYGEN SATURATION: 96 %

## 2019-08-06 DIAGNOSIS — F41.1 GAD (GENERALIZED ANXIETY DISORDER): Primary | ICD-10-CM

## 2019-08-06 DIAGNOSIS — G47.9 DISTURBANCE IN SLEEP BEHAVIOR: ICD-10-CM

## 2019-08-06 DIAGNOSIS — R06.89 DYSPNEA AND RESPIRATORY ABNORMALITY: ICD-10-CM

## 2019-08-06 DIAGNOSIS — E78.00 HYPERCHOLESTEREMIA: ICD-10-CM

## 2019-08-06 DIAGNOSIS — R06.00 DYSPNEA AND RESPIRATORY ABNORMALITY: ICD-10-CM

## 2019-08-06 LAB
ALBUMIN SERPL-MCNC: 4.1 G/DL (ref 3.4–5)
ALP SERPL-CCNC: 49 U/L (ref 40–150)
ALT SERPL W P-5'-P-CCNC: 59 U/L (ref 0–70)
ANION GAP SERPL CALCULATED.3IONS-SCNC: 8 MMOL/L (ref 3–14)
AST SERPL W P-5'-P-CCNC: 24 U/L (ref 0–45)
BILIRUB SERPL-MCNC: 0.4 MG/DL (ref 0.2–1.3)
BUN SERPL-MCNC: 17 MG/DL (ref 7–30)
CALCIUM SERPL-MCNC: 8.6 MG/DL (ref 8.5–10.1)
CHLORIDE SERPL-SCNC: 112 MMOL/L (ref 94–109)
CHOLEST SERPL-MCNC: 218 MG/DL
CO2 SERPL-SCNC: 22 MMOL/L (ref 20–32)
CREAT SERPL-MCNC: 0.88 MG/DL (ref 0.66–1.25)
GFR SERPL CREATININE-BSD FRML MDRD: >90 ML/MIN/{1.73_M2}
GLUCOSE SERPL-MCNC: 118 MG/DL (ref 70–99)
HDLC SERPL-MCNC: 33 MG/DL
LDLC SERPL CALC-MCNC: 140 MG/DL
NONHDLC SERPL-MCNC: 185 MG/DL
POTASSIUM SERPL-SCNC: 3.9 MMOL/L (ref 3.4–5.3)
PROT SERPL-MCNC: 7.2 G/DL (ref 6.8–8.8)
SODIUM SERPL-SCNC: 142 MMOL/L (ref 133–144)
TRIGL SERPL-MCNC: 226 MG/DL

## 2019-08-06 PROCEDURE — 36415 COLL VENOUS BLD VENIPUNCTURE: CPT | Performed by: FAMILY MEDICINE

## 2019-08-06 PROCEDURE — 99214 OFFICE O/P EST MOD 30 MIN: CPT | Performed by: FAMILY MEDICINE

## 2019-08-06 PROCEDURE — 80061 LIPID PANEL: CPT | Performed by: FAMILY MEDICINE

## 2019-08-06 PROCEDURE — 80053 COMPREHEN METABOLIC PANEL: CPT | Performed by: FAMILY MEDICINE

## 2019-08-06 RX ORDER — VENLAFAXINE HYDROCHLORIDE 75 MG/1
75 TABLET, EXTENDED RELEASE ORAL DAILY
Qty: 90 TABLET | Refills: 3 | Status: SHIPPED | OUTPATIENT
Start: 2019-08-06 | End: 2019-12-03

## 2019-08-06 ASSESSMENT — ANXIETY QUESTIONNAIRES
5. BEING SO RESTLESS THAT IT IS HARD TO SIT STILL: SEVERAL DAYS
IF YOU CHECKED OFF ANY PROBLEMS ON THIS QUESTIONNAIRE, HOW DIFFICULT HAVE THESE PROBLEMS MADE IT FOR YOU TO DO YOUR WORK, TAKE CARE OF THINGS AT HOME, OR GET ALONG WITH OTHER PEOPLE: SOMEWHAT DIFFICULT
GAD7 TOTAL SCORE: 9
2. NOT BEING ABLE TO STOP OR CONTROL WORRYING: SEVERAL DAYS
7. FEELING AFRAID AS IF SOMETHING AWFUL MIGHT HAPPEN: SEVERAL DAYS
1. FEELING NERVOUS, ANXIOUS, OR ON EDGE: NEARLY EVERY DAY
6. BECOMING EASILY ANNOYED OR IRRITABLE: NOT AT ALL
3. WORRYING TOO MUCH ABOUT DIFFERENT THINGS: SEVERAL DAYS

## 2019-08-06 ASSESSMENT — PATIENT HEALTH QUESTIONNAIRE - PHQ9: 5. POOR APPETITE OR OVEREATING: MORE THAN HALF THE DAYS

## 2019-08-06 ASSESSMENT — MIFFLIN-ST. JEOR: SCORE: 1825.15

## 2019-08-06 NOTE — PROGRESS NOTES
Subjective     Cesar Montejo is a 53 year old male who presents to clinic today for the following health issues:    HPI   Anxiety Follow-Up    How are you doing with your anxiety since your last visit? No change    Are you having other symptoms that might be associated with anxiety? Yes:  has had a lot more constipation latley, unsure what it is from     Have you had a significant life event? OTHER: Mom has dementia, has a different boss that he doesnt see eye to eye with      Are you feeling depressed? Yes:  mildly if any    Do you have any concerns with your use of alcohol or other drugs? Yes:  Drinking: daily glass of wine, sometimes two but never more    Social History     Tobacco Use     Smoking status: Former Smoker     Packs/day: 0.50     Years: 5.00     Pack years: 2.50     Types: Cigarettes     Start date: 1986     Last attempt to quit: 1991     Years since quittin.6     Smokeless tobacco: Never Used     Tobacco comment: After  no longer a half pack a day. Very occasional--maybe 75 cigarettes a year--91 to 98   Substance Use Topics     Alcohol use: Yes     Alcohol/week: 0.6 - 1.2 oz     Comment: 1 to 2 glasses of red wine per day     Drug use: No     Comment: quit more than 20 years ago     PREMA-7 SCORE 3/26/2019 2019 2019   Total Score - 7 (mild anxiety) -   Total Score 9 7 9     PHQ 2019 3/26/2019 2019   PHQ-9 Total Score 16 7 11   Q9: Thoughts of better off dead/self-harm past 2 weeks Not at all Not at all Not at all     No flowsheet data found.  No flowsheet data found.      Amount of exercise or physical activity: 6-7 days/week for an average of 45-60 minutes    Problems taking medications regularly: Concerns about of the Remeron and increasing Effexor dosage    Medication side effects: none    Diet: regular (no restrictions)          Current Outpatient Medications   Medication Sig Dispense Refill     aspirin 81 MG EC tablet 81mg tab by mouth nightly @ 11pm        "Cholecalciferol (VITAMIN D) 2000 units CAPS 2000 units by mouth nightly @ 11pm 30 capsule      COENZYME Q10 PO Take 1,000 mg by mouth daily @ 10 pm       Krill Oil 1000 MG CAPS 4 x 1000mg capsule by mouth @ 11pm       Melatonin 10 MG TABS tablet Take 10 mg by mouth At Bedtime @11pm       mirtazapine (REMERON) 15 MG tablet TAKE 1 TABLET(15 MG) BY MOUTH AT BEDTIME 90 tablet 0     NONFORMULARY L-Alpha glycerylphosphorylcholine (GCP) 400 mg daily       NONFORMULARY Pyrroloquinoline quinone (PQQ) 600 mg daily       simvastatin (ZOCOR) 20 MG tablet 20mg tab by mouth nightly @ 11pm       venlafaxine (EFFEXOR-ER) 75 MG 24 hr tablet Take 1 tablet (75 mg) by mouth daily 75mg tab by mouth daily @ 7am 90 tablet 3       Encounter Diagnoses   Name Primary?     PREMA (generalized anxiety disorder) Yes     Hypercholesteremia, eats heathy diet, needs recheck      Disturbance in sleep behavior saw sleep specialist  / seeing neurlogist      Dyspnea and respiratory abnormality        Reviewed and updated as needed this visit by Provider         Review of Systems   ROS COMP: Constitutional, HEENT, cardiovascular, pulmonary, gi and gu systems are negative, except as otherwise noted.      Objective    /72   Pulse 95   Temp 97.9  F (36.6  C) (Temporal)   Ht 1.854 m (6' 1\")   Wt 91.6 kg (202 lb)   SpO2 96%   BMI 26.65 kg/m    Body mass index is 26.65 kg/m .  Physical Exam   GENERAL: alert, no distress and fatigued  NECK: no adenopathy, no asymmetry, masses, or scars and thyroid normal to palpation  RESP: lungs clear to auscultation - no rales, rhonchi or wheezes  CV: regular rate and rhythm, normal S1 S2, no S3 or S4, no murmur, click or rub, no peripheral edema and peripheral pulses strong  ABDOMEN: soft, nontender, no hepatosplenomegaly, no masses and bowel sounds normal  MS: no gross musculoskeletal defects noted, no edema  NEURO: Normal strength and tone, mentation intact and speech normal  PSYCH: mentation appears normal, " "anxious, fatigued and judgement and insight intact    Diagnostic Test Results:  Labs reviewed in Epic       .Assessment & Plan      1. PREMA (generalized anxiety disorder)  Lots of situational stress  Follow up with consultant as planned.   - venlafaxine (EFFEXOR-ER) 75 MG 24 hr tablet; Take 1 tablet (75 mg) by mouth daily 75mg tab by mouth daily @ 7am  Dispense: 90 tablet; Refill: 3  - **Glucose FUTURE 2mo; Future    2. Hypercholesteremia  recheck  - **Comprehensive metabolic panel FUTURE anytime  - Lipid panel reflex to direct LDL Fasting    3. Disturbance in sleep behavior  revoiewed medications  Follow up with consultant as planned.     4. Dyspnea and respiratory abnormality  normal examtoday      BMI:   Estimated body mass index is 26.65 kg/m  as calculated from the following:    Height as of this encounter: 1.854 m (6' 1\").    Weight as of this encounter: 91.6 kg (202 lb).           Regular exercise  See Patient Instructions    No follow-ups on file.    Rocky Messina MD  [facility]            "

## 2019-08-07 ASSESSMENT — ANXIETY QUESTIONNAIRES: GAD7 TOTAL SCORE: 9

## 2019-08-12 ENCOUNTER — MYC MEDICAL ADVICE (OUTPATIENT)
Dept: FAMILY MEDICINE | Facility: CLINIC | Age: 54
End: 2019-08-12

## 2019-08-12 DIAGNOSIS — E78.2 ELEVATED CHOLESTEROL WITH ELEVATED TRIGLYCERIDES: Primary | ICD-10-CM

## 2019-08-20 ENCOUNTER — OFFICE VISIT (OUTPATIENT)
Dept: PSYCHOLOGY | Facility: CLINIC | Age: 54
End: 2019-08-20
Payer: COMMERCIAL

## 2019-08-20 DIAGNOSIS — F41.9 ANXIETY: Primary | ICD-10-CM

## 2019-08-20 NOTE — PROGRESS NOTES
Health Psychology                  Clinic    Department of Medicine  Jaclyn Vallejo, PhD, LP [phone]                          Clinics and Surgery Center  [facility] Bernard Dominguez, PhD, LP [phone]                  [address] [facility] Mail Code 740   Varun Alford, PhD, ABPP, LP [phone]     [address],   [address] [facility] [place],  Alyssa Kendrick,  PhD, LP [phone]            [place] [address] Ila Shafer, PhD, LP [phone]    Health Psychology Follow-Up Note    SUBJECTIVE:  Cesar Montejo was seen for individual psychotherapy. Reviewed emotional and physical health since our last session. Endorsed stress at work regarding a negative work evaluation, and processed impact and way of coping with this stressor. Endorsed change in diet to keto diet to address high cholesterol identified in recent annual exam, and plans to follow-up with repeat blood work to see how his labs change in response to these changes. Also believes these diet changes may be neuroprotective for potential neurological degeneration. Tied this approach into focusing on things he can control related to health, and he believes the keto diet increases energy which will also help increase exercise. Felt as if the 2.5 month interval between sessions was appropriate despite work stress. Continues to use imagery as a relaxation activity. Discussed continued application of mindfulness in order to cope with changes in health. Practiced a mindful breathing exercise in session to encourage nonjudgmental awareness of physical sensations.     Informed the patient about my plan for maternity leave January-March 2020. Discussed options of how to approach psychological services during this time, which includes waiting I return from maternity leave to resume therapy, being given the name(s) of another provider to schedule with if needed while on leave, or to proactively schedule with  another provider while I am gone on maternity leave.  Agreed to discuss what the patient feels would be best closer to these leave dates.    OBJECTIVE:  Appearance/Behavior/Orientation: Patient was on time, appropriately groomed and dressed, and demonstrated good eye contact. Alert and oriented to person, place, time, and situation. No evidence of psychomotor agitation.     Cooperation/Reliability: Patient was open and cooperative throughout the session.    Speech/Language: Speech was clear, coherent, and of normal rate, rhythm and volume.   Thought Form: Overall logical and organized.   Thought Content: Appropriate to interview and situation.  Mood/Affect: Mood anxious; appropriate range of affect.    Insight/Motivation: Good, good.    ASSESSMENT:  Darlene with anxiety through excessive worry and avoidance of time alone in which he thinks about his health issues.  Will continue to benefit from cognitive behavioral interventions and relaxation training to manage anxiety.    DIAGNOSIS:  Anxiety, unspecified    PLAN:  RTC for continued psychotherapy. Plan is to have him RTC in 2-3 months based on current level of functioning.     Time in: 11:06  Time out: 12:03  Extended session due to complexity of case and length of interval.    Alyssa Kendrick, PhD,   Clinical Health Psychologist    Tx plan completed: 04/18/19  Tx plan due:  04/18/20    *no letter

## 2019-09-01 DIAGNOSIS — Z13.6 CARDIOVASCULAR SCREENING; LDL GOAL LESS THAN 130: ICD-10-CM

## 2019-09-01 DIAGNOSIS — E78.00 HYPERCHOLESTEREMIA: ICD-10-CM

## 2019-09-03 RX ORDER — SIMVASTATIN 40 MG
TABLET ORAL
Qty: 90 TABLET | Refills: 1 | Status: SHIPPED | OUTPATIENT
Start: 2019-09-03

## 2019-09-03 NOTE — TELEPHONE ENCOUNTER
Prescription approved per [facility] Refill Protocol.    Amira Cm RN   [facility]

## 2019-09-03 NOTE — TELEPHONE ENCOUNTER
"Requested Prescriptions   Pending Prescriptions Disp Refills     simvastatin (ZOCOR) 40 MG tablet [Pharmacy Med Name: SIMVASTATIN 40MG TABLETS] 90 tablet 0     Sig: TAKE 1 TABLET(40 MG) BY MOUTH AT BEDTIME  Last Written Prescription Date:  05/08/2019  Last Fill Quantity: 90,  # refills: 0   Last office visit: 8/6/2019 with prescribing provider:  08/06/2019   Future Office Visit:         Statins Protocol Passed - 9/1/2019  9:24 AM        Passed - LDL on file in past 12 months     Recent Labs   Lab Test 08/06/19  1127   *             Passed - No abnormal creatine kinase in past 12 months     Recent Labs   Lab Test 01/06/19  0425                   Passed - Recent (12 mo) or future (30 days) visit within the authorizing provider's specialty     Patient had office visit in the last 12 months or has a visit in the next 30 days with authorizing provider or within the authorizing provider's specialty.  See \"Patient Info\" tab in inbasket, or \"Choose Columns\" in Meds & Orders section of the refill encounter.              Passed - Medication is active on med list        Passed - Patient is age 18 or older        "

## 2019-11-04 ENCOUNTER — HEALTH MAINTENANCE LETTER (OUTPATIENT)
Age: 54
End: 2019-11-04

## 2019-11-21 ENCOUNTER — OFFICE VISIT (OUTPATIENT)
Dept: PSYCHOLOGY | Facility: CLINIC | Age: 54
End: 2019-11-21
Payer: COMMERCIAL

## 2019-11-21 DIAGNOSIS — F41.9 ANXIETY: Primary | ICD-10-CM

## 2019-11-21 NOTE — PROGRESS NOTES
"  Health Psychology                  Clinic    Department of Medicine  Jaclyn Vallejo, PhD, LP [phone]                          Clinics and Surgery Center  [facility] Bernard Dominguez, PhD, LP [phone]                  3rd Floor  [place] Mail Code 741   Varun Alford, PhD, ABPP, LP [phone] [address] [facility],   [address] [facility],  Alyssa Kendrick,  PhD, LP [phone]            [place] [address] Ila Shafer, PhD, LP [phone]    Health Psychology Follow-Up Note    SUBJECTIVE:  Cesar Montejo was seen for individual psychotherapy. Reviewed emotional and physical health since our last session. He reported that neurological symptoms have worsened with increased confusion and forgetfulness since Sept. Forgets to set alarm in the morning, takes pills in AM, getting all items when grocery shopping.  Notes his mother passed away 3 weeks ago, and traveling home to [place] for the  was very emotionally difficult. Reported feeling very confused in the airport traveling to the , \"in tears\" the whole time, and experienced tension with his father in this time period (described father as narcissist). Reported long-term history of significant tension in relationship with father. Overall, reports feeling very scared about these symptoms. Darlene with these changes and anxiety by increasing exercise, but feels he also experiences excessive sweating with activity, which he also feels could be a possible neurological change.  Has followed up with Neurology at the [facility] and [facility]. Feels he doesn't want to complete another neurocognitive test as he believes results will indicated cognitive worsening and he very much fears this. Endorsed ongoing significant stress at work and continues to feel as if his supervisor is favoring others over him. Fears symptoms will progress to the point of significant impairment at work that would require him to " transition to disability.     Encouraged discussion of role of mind-body connection in worsening of symptoms, especially given report of significant anxiety, and increased stressors (recent death of mother and perceived worsening in occupational relationships and functioning).  Attempted to review recent results from medical professionals, included an objective below, that appear to reassure him that there are no significant neurological findings, and he continues to refute this with listing examples of increased confusion and forgetfulness (putting partial items in cupboards and forgetting where his car is parked).  Attempted to discuss how the physiologic stress response can result in generation of real physiologic sensations and changes in cognitive functioning, although he appeared resistant to this interpretation.  States that he plans to increase behavioral coping strategies including increasing physical activity from 30 minutes a day to 45 minutes a day 6 times per week as studies have shown this could prevent cognitive decline in Alzheimer's patients as well as integrate meditation into his lifestyle.  Encouraged these healthy lifestyle behaviors while creating dialogue about the possibility of somatization in symptom presentation.    OBJECTIVE:  Appearance/Behavior/Orientation: Patient was casually groomed and dressed. Oriented to person, plan, time and situation. Appeared to be sweating and flushed at arrival to session, which he attributed to reacting to the walk over to the clinic.     Cooperation/Reliability: Patient was open and cooperative throughout the session.    Speech/Language: Speech was clear, coherent, and of normal rate, rhythm and volume.   Thought Process: Perseverative on physical symptoms and neurological illness.  Mood/Affect: Mood anxious; affect mood congruent  Insight/Motivation: Fair, good.    Results from most recent medical visits include the following interpretations:   -Overall I  think we are dealing with a very benign condition and we wanted to reassure the patient that he has nothing to indicate a generalized autonomic failure or multiple system atrophy or else. (Mariangel Posada M.D., Ph.D., 9/7/19, [facility])    -The patient is especially concerned about recent retrospective research studies which suggested that patients with RBD have a very high likelihood of developing Multiple System Atrophy or other synucleinopathy syndromes. I explained that I believe those studies overestimate this likelihood greatly due to selection bias - they draw from [facility] referral centers, they are more selective about which RBD cases to include. A true population based study would find far more cases of reported RBD than could be accounted for by incidence of synucleinopathy cases. I am also reassured that there was not even any subtle abnormality on his autonomic testing at [place] - normal Qsweat, normal Valsalva BP responses. Today I do not see even subtle signs of cerebellar or extrapyramidal dysfunction on exam. (Abdiel Dominguez MD, 9/11/2018, [facility]).     ASSESSMENT:  While I am unable to rule out etiology of neurological illness, it appears that multiple medical providers in various specialities continuously provide feedback to him that test results are benign/mild and not in support of diagnosis of a neurodegenerative illness at this time.  Previous psychiatric providers have also diagnosed an illness anxiety disorder.  These medical results are in discrepancy with his belief about what the physical symptoms indicate as he is quite perseverative on being diagnosed with a REM sleep disorder that will lead to multiple system atrophy or other neurodegenerative illness.  Neuropsychological testing indicates that anxiety and somatization may be playing a significant role in his presenting concern, although he appears resistant to this interpretation.  Anxiety at this session appears quite  significant, and coupled with the recent life stressors of his mother's death and having to interact with his father with whom he has had lifelong tension, may be playing a role in worsening of symptoms.  He acknowledges that stress can exacerbate medical illness but refutes the interpretation that it is a source of symptom presentation.    DIAGNOSIS:  Anxiety disorder, unspecified (F41.9)    PLAN:  RTC for continued psychotherapy.     Discussed plan for maternity leave January-March 2020. Discussed options of how to approach psychological services during this time, which includes waiting I return from maternity leave to resume therapy, being given the name(s) of another provider to schedule with if needed while on leave, or to proactively schedule with another provider while I am gone on maternity leave.  Agreed to provide his name to a new staff member who will arrive in December, and I will have her contact him when she arrives. Also provided name of a colleague for him to contact as needed or if questions arise about scheduling.     Time in: 11:14  Time out: 12:10  Extended session due to complexity of case and length of interval.    Alyssa Kendrick, PhD, LP  Clinical Health Psychologist    Tx plan completed: 04/18/19  Tx plan due:  04/18/20    *no letter

## 2019-12-02 DIAGNOSIS — E78.2 ELEVATED CHOLESTEROL WITH ELEVATED TRIGLYCERIDES: ICD-10-CM

## 2019-12-02 DIAGNOSIS — F41.1 GAD (GENERALIZED ANXIETY DISORDER): ICD-10-CM

## 2019-12-02 LAB
ALBUMIN SERPL-MCNC: 4.2 G/DL (ref 3.4–5)
ALP SERPL-CCNC: 45 U/L (ref 40–150)
ALT SERPL W P-5'-P-CCNC: 43 U/L (ref 0–70)
ANION GAP SERPL CALCULATED.3IONS-SCNC: 6 MMOL/L (ref 3–14)
AST SERPL W P-5'-P-CCNC: 21 U/L (ref 0–45)
BILIRUB SERPL-MCNC: 0.5 MG/DL (ref 0.2–1.3)
BUN SERPL-MCNC: 15 MG/DL (ref 7–30)
CALCIUM SERPL-MCNC: 9.1 MG/DL (ref 8.5–10.1)
CHLORIDE SERPL-SCNC: 105 MMOL/L (ref 94–109)
CHOLEST SERPL-MCNC: 272 MG/DL
CO2 SERPL-SCNC: 25 MMOL/L (ref 20–32)
CREAT SERPL-MCNC: 0.98 MG/DL (ref 0.66–1.25)
GFR SERPL CREATININE-BSD FRML MDRD: 88 ML/MIN/{1.73_M2}
GLUCOSE SERPL-MCNC: 102 MG/DL (ref 70–99)
HDLC SERPL-MCNC: 43 MG/DL
LDLC SERPL CALC-MCNC: 185 MG/DL
NONHDLC SERPL-MCNC: 229 MG/DL
POTASSIUM SERPL-SCNC: 3.9 MMOL/L (ref 3.4–5.3)
PROT SERPL-MCNC: 7 G/DL (ref 6.8–8.8)
SODIUM SERPL-SCNC: 136 MMOL/L (ref 133–144)
TRIGL SERPL-MCNC: 221 MG/DL

## 2019-12-02 PROCEDURE — 80061 LIPID PANEL: CPT | Performed by: FAMILY MEDICINE

## 2019-12-02 PROCEDURE — 80053 COMPREHEN METABOLIC PANEL: CPT | Performed by: FAMILY MEDICINE

## 2019-12-02 PROCEDURE — 36415 COLL VENOUS BLD VENIPUNCTURE: CPT | Performed by: FAMILY MEDICINE

## 2019-12-03 ENCOUNTER — OFFICE VISIT (OUTPATIENT)
Dept: FAMILY MEDICINE | Facility: CLINIC | Age: 54
End: 2019-12-03
Payer: COMMERCIAL

## 2019-12-03 VITALS
HEART RATE: 123 BPM | HEIGHT: 72 IN | TEMPERATURE: 98.4 F | DIASTOLIC BLOOD PRESSURE: 84 MMHG | RESPIRATION RATE: 12 BRPM | BODY MASS INDEX: 25.63 KG/M2 | OXYGEN SATURATION: 95 % | SYSTOLIC BLOOD PRESSURE: 98 MMHG | WEIGHT: 189.2 LBS

## 2019-12-03 DIAGNOSIS — Z00.00 ROUTINE GENERAL MEDICAL EXAMINATION AT A HEALTH CARE FACILITY: Primary | ICD-10-CM

## 2019-12-03 DIAGNOSIS — Z13.6 CARDIOVASCULAR SCREENING; LDL GOAL LESS THAN 130: ICD-10-CM

## 2019-12-03 DIAGNOSIS — G47.9 DISTURBANCE IN SLEEP BEHAVIOR: ICD-10-CM

## 2019-12-03 DIAGNOSIS — R06.00 DYSPNEA AND RESPIRATORY ABNORMALITY: ICD-10-CM

## 2019-12-03 DIAGNOSIS — F41.1 GAD (GENERALIZED ANXIETY DISORDER): ICD-10-CM

## 2019-12-03 DIAGNOSIS — F41.8 MIXED ANXIETY AND DEPRESSIVE DISORDER: ICD-10-CM

## 2019-12-03 DIAGNOSIS — R06.89 DYSPNEA AND RESPIRATORY ABNORMALITY: ICD-10-CM

## 2019-12-03 DIAGNOSIS — E78.00 HYPERCHOLESTEREMIA: ICD-10-CM

## 2019-12-03 PROCEDURE — 99396 PREV VISIT EST AGE 40-64: CPT | Mod: 25 | Performed by: FAMILY MEDICINE

## 2019-12-03 PROCEDURE — 90682 RIV4 VACC RECOMBINANT DNA IM: CPT | Performed by: FAMILY MEDICINE

## 2019-12-03 PROCEDURE — 90471 IMMUNIZATION ADMIN: CPT | Performed by: FAMILY MEDICINE

## 2019-12-03 RX ORDER — MULTIVIT-MIN/IRON/FOLIC ACID/K 18-600-40
CAPSULE ORAL
Qty: 30 CAPSULE | COMMUNITY
Start: 2019-12-03

## 2019-12-03 RX ORDER — BIOTIN 5 MG
TABLET ORAL
COMMUNITY
Start: 2019-12-03

## 2019-12-03 RX ORDER — ATORVASTATIN CALCIUM 40 MG/1
40 TABLET, FILM COATED ORAL DAILY
Qty: 90 TABLET | Refills: 3 | Status: SHIPPED | OUTPATIENT
Start: 2019-12-03 | End: 2021-01-27

## 2019-12-03 RX ORDER — MIRTAZAPINE 15 MG/1
TABLET, FILM COATED ORAL
Qty: 90 TABLET | Refills: 0 | Status: SHIPPED | OUTPATIENT
Start: 2019-12-03 | End: 2020-01-08

## 2019-12-03 RX ORDER — PHENOL 1.4 %
10 AEROSOL, SPRAY (ML) MUCOUS MEMBRANE AT BEDTIME
COMMUNITY
Start: 2019-12-03

## 2019-12-03 RX ORDER — SIMVASTATIN 40 MG
40 TABLET ORAL AT BEDTIME
Qty: 90 TABLET | Refills: 1 | Status: CANCELLED | OUTPATIENT
Start: 2019-12-03

## 2019-12-03 RX ORDER — VENLAFAXINE HYDROCHLORIDE 75 MG/1
75 TABLET, EXTENDED RELEASE ORAL DAILY
Qty: 90 TABLET | Refills: 3 | Status: SHIPPED | OUTPATIENT
Start: 2019-12-03 | End: 2020-10-28

## 2019-12-03 RX ORDER — ASPIRIN 81 MG/1
TABLET ORAL
COMMUNITY
Start: 2019-12-03

## 2019-12-03 ASSESSMENT — ANXIETY QUESTIONNAIRES
5. BEING SO RESTLESS THAT IT IS HARD TO SIT STILL: NOT AT ALL
1. FEELING NERVOUS, ANXIOUS, OR ON EDGE: SEVERAL DAYS
GAD7 TOTAL SCORE: 6
6. BECOMING EASILY ANNOYED OR IRRITABLE: SEVERAL DAYS
7. FEELING AFRAID AS IF SOMETHING AWFUL MIGHT HAPPEN: SEVERAL DAYS
2. NOT BEING ABLE TO STOP OR CONTROL WORRYING: SEVERAL DAYS
3. WORRYING TOO MUCH ABOUT DIFFERENT THINGS: SEVERAL DAYS
IF YOU CHECKED OFF ANY PROBLEMS ON THIS QUESTIONNAIRE, HOW DIFFICULT HAVE THESE PROBLEMS MADE IT FOR YOU TO DO YOUR WORK, TAKE CARE OF THINGS AT HOME, OR GET ALONG WITH OTHER PEOPLE: SOMEWHAT DIFFICULT

## 2019-12-03 ASSESSMENT — MIFFLIN-ST. JEOR: SCORE: 1750.7

## 2019-12-03 ASSESSMENT — PATIENT HEALTH QUESTIONNAIRE - PHQ9
SUM OF ALL RESPONSES TO PHQ QUESTIONS 1-9: 9
5. POOR APPETITE OR OVEREATING: SEVERAL DAYS

## 2019-12-03 NOTE — PROGRESS NOTES
3  SUBJECTIVE:   CC: Cesar Montejo is an 54 year old male who presents for preventive health visit.     Healthy Habits:    Do you get at least three servings of calcium containing foods daily (dairy, green leafy vegetables, etc.)? yes    Amount of exercise or daily activities, outside of work: 30 minutes aerobic exercise everyday    Problems taking medications regularly No    Medication side effects: No    Have you had an eye exam in the past two years? yes    Do you see a dentist twice per year? yes    Do you have sleep apnea, excessive snoring or daytime drowsiness?no      Encounter Diagnoses   Name Primary?     Routine general medical examination at a health care facility Yes     Mixed anxiety and depressive disorder, seeing thereapist      Hypercholesteremia      PREMA (generalized anxiety disorder)      Dyspnea and respiratory abnormality      Disturbance in sleep behavior      CARDIOVASCULAR SCREENING; LDL GOAL LESS THAN 130         Today's PHQ-2 Score:   PHQ-2 (  [employer]) 2019   Q1: Little interest or pleasure in doing things 1 2   Q2: Feeling down, depressed or hopeless 2 3   PHQ-2 Score 3 5   Q1: Little interest or pleasure in doing things Several days -   Q2: Feeling down, depressed or hopeless More than half the days -   PHQ-2 Score 3 -       Abuse: Current or Past(Physical, Sexual or Emotional)- No  Do you feel safe in your environment? Yes        Social History     Tobacco Use     Smoking status: Former Smoker     Packs/day: 0.50     Years: 5.00     Pack years: 2.50     Types: Cigarettes     Start date: 1986     Last attempt to quit: 1991     Years since quittin.9     Smokeless tobacco: Never Used     Tobacco comment: After  no longer a half pack a day. Very occasional--maybe 75 cigarettes a year--91 to 98   Substance Use Topics     Alcohol use: Yes     Alcohol/week: 1.0 - 2.0 standard drinks     Comment: 1 to 2 glasses of red wine per day     If you drink alcohol do  you typically have >3 drinks per day or >7 drinks per week? No                      Last PSA: No results found for: PSA    Reviewed orders with patient. Reviewed health maintenance and updated orders accordingly - Yes  Lab work is in process    Reviewed and updated as needed this visit by clinical staff  Tobacco  Meds  Med Hx  Surg Hx  Fam Hx  Soc Hx        Reviewed and updated as needed this visit by Provider        Past Medical History:   Diagnosis Date     Anxiety      Basal cell carcinoma      Gastro-oesophageal reflux disease      H/O magnetic resonance imaging of brain and brain stem 2018    MR BRAIN WITHOUT AND WITH CONTRAST 2017 9:10 PM   HISTORY:  Left-sided numbness. Disequilibrium.   COMPARISON: MR brain 2015.   TECHNIQUE: Sagittal T1, axial T2, axial FLAIR, axial GRE, axial diffusion scan, coronal FLAIR, axial post Gd enhanced T1 weighted images.   FINDINGS: There is no intracranial hemorrhage, mass, or recent infarct. There is a cyst in the floor of the right maxilla     Head injury 2012    Had a bad concussion with post concussion synd for year     High cholesterol      History of echocardiogram 2018    TUYET Keyes MD 2018  Narrative     [id number]^MECHE^BLAKE^R       [facility] [address] [place] [address] Name: JONATHAN FIORE MRN: [id number] : 1965 Study Date: 2018 09:13 AM Age: 52 yrs Gender: Male Patient Location: [facility] [place] Reason For Study: Chest Pain Ordering Physician:      Hypertension early     Not on meds. Usually in pre-hypertesion categ.     Insomnia      Squamous cell carcinoma         ROS:  CONSTITUTIONAL: NEGATIVE for fever, chills, change in weight  INTEGUMENTARY/SKIN: NEGATIVE for worrisome rashes, moles or lesions  EYES: NEGATIVE for vision changes or irritation  ENT: POSITIVE for bal;ance issues  RESP: NEGATIVE for significant  "cough or SOB  CV: NEGATIVE for chest pain, palpitations or peripheral edema  GI: NEGATIVE for nausea, abdominal pain, heartburn, or change in bowel habits   male: negative for dysuria, hematuria, decreased urinary stream, erectile dysfunction, urethral discharge  MUSCULOSKELETAL: NEGATIVE for significant arthralgias or myalgia  NEURO: POSITIVE for gait disturbance and memory problems and NEGATIVE for dysphagia and involuntary movements  HEME/ALLERGY/IMMUNE: NEGATIVE for bleeding problems  PSYCHIATRIC: POSITIVE foranxiety, concentration difficulty and obsessive thoughts and NEGATIVE forfeelings of worthlessness/guilt, hallucinations, hopelessness and drug usage    OBJECTIVE:   BP 98/84   Pulse 123   Temp 98.4  F (36.9  C) (Oral)   Resp 12   Ht 1.836 m (6' 0.28\")   Wt 85.8 kg (189 lb 3.2 oz)   SpO2 95%   BMI 25.46 kg/m    EXAM:  GENERAL: healthy, alert and no distress  EYES: Eyes grossly normal to inspection, PERRL and conjunctivae and sclerae normal  HENT: ear canals and TM's normal, nose and mouth without ulcers or lesions  NECK: no adenopathy, no asymmetry, masses, or scars and thyroid normal to palpation  RESP: lungs clear to auscultation - no rales, rhonchi or wheezes  CV: regular rate and rhythm, normal S1 S2, no S3 or S4, no murmur, click or rub, no peripheral edema and peripheral pulses strong  ABDOMEN: soft, nontender, no hepatosplenomegaly, no masses and bowel sounds normal   (male): normal male genitalia without lesions or urethral discharge, no hernia  RECTAL (male): normal sphincter tone, no rectal masses, prostate normal size, smooth, nontender without nodules or masses  MS: no gross musculoskeletal defects noted, no edema  SKIN: no suspicious lesions or rashes  NEURO: Normal strength and tone, mentation intact and speech normal  PSYCH: mentation appears normal, affect normal/bright  LYMPH: no cervical, supraclavicular, axillary, or inguinal adenopathy    Diagnostic Test Results:  Labs " "reviewed in Epic    ASSESSMENT/PLAN:   1. Routine general medical examination at a health care facility  Overall healthy    2. Mixed anxiety and depressive disorder  Urged him ti get more help   keep  sseing therapist  - mirtazapine (REMERON) 15 MG tablet; TAKE 1 TABLET BY MOUTH EVERY NIGHT AT BEDTIME  Dispense: 90 tablet; Refill: 0  - **Basic metabolic panel FUTURE anytime; Future  - atorvastatin (LIPITOR) 40 MG tablet; Take 1 tablet (40 mg) by mouth daily  Dispense: 90 tablet; Refill: 3  - CRP, inflammation; Future    3. Hypercholesteremia  Recheck on Keto diet  And oil supplements  - **Basic metabolic panel FUTURE anytime; Future  - Lipid panel reflex to direct LDL Fasting; Future  - atorvastatin (LIPITOR) 40 MG tablet; Take 1 tablet (40 mg) by mouth daily  Dispense: 90 tablet; Refill: 3  - CRP, inflammation; Future    4. PREMA (generalized anxiety disorder)  worse  - venlafaxine (EFFEXOR-ER) 75 MG 24 hr tablet; Take 1 tablet (75 mg) by mouth daily 75mg tab by mouth daily @ 7am  Dispense: 90 tablet; Refill: 3  - **Basic metabolic panel FUTURE anytime; Future    5. Dyspnea and respiratory abnormality  At baseline    6. Disturbance in sleep behavior  Responds well to medications     7. CARDIOVASCULAR SCREENING; LDL GOAL LESS THAN 130  Recheck , switch to lipitor      COUNSELING:  Reviewed preventive health counseling, as reflected in patient instructions       Regular exercise       Healthy diet/nutrition       Vision screening       Hearing screening       Safe sex practices/STD prevention       Colon cancer screening       Prostate cancer screening    Estimated body mass index is 25.46 kg/m  as calculated from the following:    Height as of this encounter: 1.836 m (6' 0.28\").    Weight as of this encounter: 85.8 kg (189 lb 3.2 oz).         reports that he quit smoking about 28 years ago. His smoking use included cigarettes. He started smoking about 33 years ago. He has a 2.50 pack-year smoking history. He has never " used smokeless tobacco.      Counseling Resources:  ATP IV Guidelines  Pooled Cohorts Equation Calculator  FRAX Risk Assessment  ICSI Preventive Guidelines  Dietary Guidelines for Americans, 2010  USDA's MyPlate  ASA Prophylaxis  Lung CA Screening    Rocky Messina MD  [facility]

## 2019-12-04 ASSESSMENT — ANXIETY QUESTIONNAIRES: GAD7 TOTAL SCORE: 6

## 2020-01-08 DIAGNOSIS — F41.8 MIXED ANXIETY AND DEPRESSIVE DISORDER: ICD-10-CM

## 2020-01-08 RX ORDER — MIRTAZAPINE 15 MG/1
TABLET, FILM COATED ORAL
Qty: 90 TABLET | Refills: 0 | Status: SHIPPED | OUTPATIENT
Start: 2020-01-08 | End: 2020-07-15

## 2020-01-08 NOTE — TELEPHONE ENCOUNTER
"Requested Prescriptions   Pending Prescriptions Disp Refills     mirtazapine (REMERON) 15 MG tablet [Pharmacy Med Name: MIRTAZAPINE 15MG TABLETS] 90 tablet 0     Sig: TAKE 1 TABLET BY MOUTH EVERY NIGHT AT BEDTIME  Last Written Prescription Date:  12/03/2019  Last Fill Quantity: 90,  # refills: 0   Last office visit: 3/26/2019 with prescribing provider:  03/26/2019   Future Office Visit:         Atypical Antidepressants Protocol Failed - 1/8/2020  3:40 AM        Failed - Patient has PHQ-9 score less than 5 in past 6 months.     Please review last PHQ-9 score.           Passed - Medication active on med list        Passed - Patient is age 18 or older        Passed - Recent (6 mo) or future (30 days) visit within the authorizing provider's specialty     Patient had office visit in the last 6 months or has a visit in the next 30 days with authorizing provider or within the authorizing provider's specialty.  See \"Patient Info\" tab in inbasket, or \"Choose Columns\" in Meds & Orders section of the refill encounter.            "

## 2020-01-08 NOTE — TELEPHONE ENCOUNTER
Prescription approved per [facility] Refill Protocol.    Amira Cm RN   [facility]

## 2020-01-15 ENCOUNTER — OFFICE VISIT (OUTPATIENT)
Dept: DERMATOLOGY | Facility: CLINIC | Age: 55
End: 2020-01-15
Payer: COMMERCIAL

## 2020-01-15 DIAGNOSIS — Z85.89 HISTORY OF SQUAMOUS CELL CARCINOMA: ICD-10-CM

## 2020-01-15 DIAGNOSIS — Z12.83 SKIN CANCER SCREENING: Primary | ICD-10-CM

## 2020-01-15 DIAGNOSIS — Z87.2 HISTORY OF ACTINIC KERATOSIS: ICD-10-CM

## 2020-01-15 DIAGNOSIS — Z85.828 HISTORY OF BASAL CELL CARCINOMA: ICD-10-CM

## 2020-01-15 DIAGNOSIS — D18.01 CHERRY ANGIOMA: ICD-10-CM

## 2020-01-15 DIAGNOSIS — L21.9 DERMATITIS, SEBORRHEIC: ICD-10-CM

## 2020-01-15 DIAGNOSIS — L81.4 SOLAR LENTIGO: ICD-10-CM

## 2020-01-15 RX ORDER — KETOCONAZOLE 20 MG/G
CREAM TOPICAL DAILY
Qty: 60 G | Refills: 3 | Status: SHIPPED | OUTPATIENT
Start: 2020-01-15

## 2020-01-15 ASSESSMENT — PAIN SCALES - GENERAL: PAINLEVEL: NO PAIN (0)

## 2020-01-15 NOTE — PROGRESS NOTES
[facility] Dermatology Note      Dermatology Problem List:  1. NMSC  -Superficial BCC, left cheek, s/p Mohns 6/16, NER  -SCCIS right cheek, s/p Mohs 6/16, NER  2. Actinic keratoses  3. Actinic cheilitis  - s/p CO2 laser ablation of lower lip vermilion 10/16  - s/p laser vermilionectomy 8/2017  - Follows with oral surgeon q 6 months  4. Neoplasm of uncertain behavior, L ear - s/p shave biopsy 8/15/18  -Irregular epidermal hyperplasia with hypergranulosis and dermal fibrosis  5. Lesion to monitor: left superior eyelid  6. Seborrheic dermatitis   - ketoconazole 2% cream     Date of last skin check: 01/15/2020      Encounter Date: Willy 15, 2020    CC:  Skin check, lesion of concern noted on left cheek     History of Present Illness:  Mr. Cesar Montejo is a 54 year old male, with a PMH of NMSC and AK, who presents for a skin check. The patient was last seen in dermatology on 05/07/19 by Dr. Gillespie during which his skin check was unremarkable.    Today he reports a lesion of concern on his left cheek. He is concerned about this lesion is very close to an area on his left cheek where he had a skin cancer. This spot has remained stable since April in his opinion. He states he has flaking near his eyebrows as well.     Otherwise he is feeling well, without additional skin concerns.    Past Medical History:   Patient Active Problem List   Diagnosis     Insomnia     Adjustment reaction     CARDIOVASCULAR SCREENING; LDL GOAL LESS THAN 130     Pure hypercholesterolemia     GERD (gastroesophageal reflux disease)     Right shoulder pain     Calcific tendonitis     Solar lentiginosis     Skin cancer screening     Dermatitis, seborrheic     Keratosis, actinic     Family history of skin cancer     AK (actinic keratosis)     History of actinic keratosis     History of basal cell cancer     Seborrheic keratosis     Cherry angioma     Actinic cheilitis     History of nonmelanoma skin cancer     History of multiple  concussions     Elevated blood pressure reading without diagnosis of hypertension     Dream enactment behavior     Disturbance in sleep behavior     Dyspnea and respiratory abnormality     Chest pain     H/O magnetic resonance imaging of brain and brain stem     History of echocardiogram     Encounter for neuropsychological testing  with Dr. Park     Benign neoplasm of skin of right upper extremity     Illness anxiety disorder     Past Medical History:   Diagnosis Date     Anxiety      Basal cell carcinoma      Gastro-oesophageal reflux disease      H/O magnetic resonance imaging of brain and brain stem 2018    MR BRAIN WITHOUT AND WITH CONTRAST 2017 9:10 PM   HISTORY:  Left-sided numbness. Disequilibrium.   COMPARISON: MR brain 2015.   TECHNIQUE: Sagittal T1, axial T2, axial FLAIR, axial GRE, axial diffusion scan, coronal FLAIR, axial post Gd enhanced T1 weighted images.   FINDINGS: There is no intracranial hemorrhage, mass, or recent infarct. There is a cyst in the floor of the right maxilla     Head injury 2012    Had a bad concussion with post concussion synd for year     High cholesterol      History of echocardiogram 2018    TUYET Keyes MD 2018  Narrative     [id number]^MECHE^BLAKE^KRISTAL       [facility] [address] [place], [place] [address] Name: JONATHAN FIORE MRN: [id number] : 1965 Study Date: 2018 09:13 AM Age: 52 yrs Gender: Male Patient Location: [facility] Reason For Study: Chest Pain Ordering Physician:      Hypertension early     Not on meds. Usually in pre-hypertesion categ.     Insomnia      Squamous cell carcinoma      Past Surgical History:   Procedure Laterality Date     COLONOSCOPY N/A 2017    Procedure: COLONOSCOPY;  Surgeon: Larry Fields MD;  Location:  GI     LASER CO2 LESION ORAL N/A 10/5/2016    Procedure: LASER CO2 LESION ORAL;   Surgeon: Josué Rojo DDS;  Location: [facility] OR     MOHS MICROGRAPHIC PROCEDURE      squamous and basal cell       Social History:  Patient reports that he quit smoking about 29 years ago. His smoking use included cigarettes. He started smoking about 34 years ago. He has a 2.50 pack-year smoking history. He has never used smokeless tobacco. He reports current alcohol use of about 1.0 - 2.0 standard drinks of alcohol per week. He reports that he does not use drugs.    Family History:  Family History   Problem Relation Age of Onset     Lipids Mother         on meds     Cerebrovascular Disease Mother         TIA     Alzheimer Disease Mother         severe     Skin Cancer Mother         SCC     Other - See Comments Mother         [facility]     Lipids Father         on meds     Hypertension Father         controlled with meds     Thyroid Disease Father         ?not sure but possibly     Diabetes Father      Cerebrovascular Disease Father      Cancer - colorectal Maternal Grandmother         still alive at 99     Cancer Maternal Grandfather         lung but lifetime smoker     Melanoma Maternal Grandfather      Other - See Comments Brother         [place], [place]     Asperger's syndrome Brother      Asthma No family hx of      C.A.D. No family hx of      Prostate Cancer No family hx of        Medications:  Current Outpatient Medications   Medication Sig Dispense Refill     aspirin 81 MG EC tablet 81mg tab by mouth nightly @ 11pm       atorvastatin (LIPITOR) 40 MG tablet Take 1 tablet (40 mg) by mouth daily 90 tablet 3     Cholecalciferol (VITAMIN D) 50 MCG (2000 UT) CAPS 2000 units by mouth nightly @ 11pm 30 capsule      COENZYME Q10 PO Take 1,000 mg by mouth daily @ 10 pm       Krill Oil 1000 MG CAPS 4 x 1000mg capsule by mouth @ 11pm       Melatonin 10 MG TABS tablet Take 1 tablet (10 mg) by mouth At Bedtime @11pm       mirtazapine (REMERON) 15 MG tablet TAKE 1 TABLET BY MOUTH EVERY NIGHT AT BEDTIME 90  tablet 0     NONFORMULARY L-Alpha glycerylphosphorylcholine (GCP) 400 mg daily       NONFORMULARY Pyrroloquinoline quinone (PQQ) 600 mg daily       venlafaxine (EFFEXOR-ER) 75 MG 24 hr tablet Take 1 tablet (75 mg) by mouth daily 75mg tab by mouth daily @ 7am 90 tablet 3     simvastatin (ZOCOR) 20 MG tablet 20mg tab by mouth nightly @ 11pm       simvastatin (ZOCOR) 40 MG tablet TAKE 1 TABLET(40 MG) BY MOUTH AT BEDTIME (Patient not taking: Reported on 1/15/2020) 90 tablet 1        No Known Allergies          Physical exam:  Vitals: There were no vitals taken for this visit.  GEN: This is a well developed, well-nourished male in no acute distress, in a pleasant mood.    SKIN: Full skin, which includes the head/face, both arms, chest, back, abdomen, both legs,  buttocks, digits and/or nails, was examined:  - There is a pink slightly greasy scaled plaque on the left medial cheek   -Numerous hyperpigmented macules on sun exposed areas of the face, neck, and shoulders  -Sites of previous NMSC are NERD on inspection  - Mild erythema and scale to glabella with a superficial erosion towards the eyebrows. No concerning features upon dermoscopy.  -No other lesions of concern on areas examined.     Impression/Plan:    1. History of nonmelanoma skin cancer, no clincial evidence of recurrence    Continue skin checks q 6 months    Sun precaution was advised including the use of sun screens of SPF 30 or higher, sun protective clothing, and avoidance of tanning beds.    2. Seborrheic dermatitis, cheek and glabella     Start ketoconazole 2% cream, apply to affected areas daily     Return to the dermatology department if worsening sx or no change.     3. Mckee angiomas    No further intervention required. Patient to report changes.     4. Solar lentigines    No further intervention required. Patient to report changes.         Follow-up in 6 months, earlier for new or changing lesions.     Staff Involved:  Scribe/Staff    Scribe  Disclosure:   I, Kamini Parks, am serving as a scribe to document services personally performed by Joan Gomez PA-C, based on data collection and the provider's statements to me.  Provider Disclosure:   The documentation recorded by the scribe accurately reflects the services I personally performed and the decisions made by me.    All risks, benefits and alternatives were discussed with patient.  Patient is in agreement and understands the assessment and plan.  All questions were answered.  Sun Screen Education was given.   Return to Clinic in 6 months or sooner as needed.   Joan Gomez PA-C   [facility]

## 2020-01-15 NOTE — LETTER
1/15/2020       RE: Cesar Montejo  [address]  [facility] [address]     Dear Colleague,    Thank you for referring your patient, Cesar Montejo, to the [facility] DERMATOLOGY at [facility]. Please see a copy of my visit note below.    [facility] Dermatology Note      Dermatology Problem List:  1. NMSC  -Superficial BCC, left cheek, s/p Mohns 6/16, NER  -SCCIS right cheek, s/p Mohs 6/16, NER  2. Actinic keratoses  3. Actinic cheilitis  - s/p CO2 laser ablation of lower lip vermilion 10/16  - s/p laser vermilionectomy 8/2017  - Follows with oral surgeon q 6 months  4. Neoplasm of uncertain behavior, L ear - s/p shave biopsy 8/15/18  -Irregular epidermal hyperplasia with hypergranulosis and dermal fibrosis  5. Lesion to monitor: left superior eyelid  6. Seborrheic dermatitis   - ketoconazole 2% cream     Date of last skin check: 01/15/2020      Encounter Date: Willy 15, 2020    CC:  Skin check, lesion of concern noted on left cheek     History of Present Illness:  Mr. Cesar Montejo is a 54 year old male, with a PMH of NMSC and AK, who presents for a skin check. The patient was last seen in dermatology on 05/07/19 by Dr. Gillespie during which his skin check was unremarkable.    Today he reports a lesion of concern on his left cheek. He is concerned about this lesion is very close to an area on his left cheek where he had a skin cancer. This spot has remained stable since April in his opinion. He states he has flaking near his eyebrows as well.     Otherwise he is feeling well, without additional skin concerns.    Past Medical History:   Patient Active Problem List   Diagnosis     Insomnia     Adjustment reaction     CARDIOVASCULAR SCREENING; LDL GOAL LESS THAN 130     Pure hypercholesterolemia     GERD (gastroesophageal reflux disease)     Right shoulder pain     Calcific tendonitis     Solar lentiginosis     Skin cancer screening     Dermatitis,  seborrheic     Keratosis, actinic     Family history of skin cancer     AK (actinic keratosis)     History of actinic keratosis     History of basal cell cancer     Seborrheic keratosis     Cherry angioma     Actinic cheilitis     History of nonmelanoma skin cancer     History of multiple concussions     Elevated blood pressure reading without diagnosis of hypertension     Dream enactment behavior     Disturbance in sleep behavior     Dyspnea and respiratory abnormality     Chest pain     H/O magnetic resonance imaging of brain and brain stem     History of echocardiogram     Encounter for neuropsychological testing  with Dr. Park     Benign neoplasm of skin of right upper extremity     Illness anxiety disorder     Past Medical History:   Diagnosis Date     Anxiety      Basal cell carcinoma      Gastro-oesophageal reflux disease      H/O magnetic resonance imaging of brain and brain stem 2018    MR BRAIN WITHOUT AND WITH CONTRAST 2017 9:10 PM   HISTORY:  Left-sided numbness. Disequilibrium.   COMPARISON: MR brain 2015.   TECHNIQUE: Sagittal T1, axial T2, axial FLAIR, axial GRE, axial diffusion scan, coronal FLAIR, axial post Gd enhanced T1 weighted images.   FINDINGS: There is no intracranial hemorrhage, mass, or recent infarct. There is a cyst in the floor of the right maxilla     Head injury 2012    Had a bad concussion with post concussion synd for year     High cholesterol      History of echocardiogram 2018    TUYET Keyes MD 2018  Narrative     [id number]^MECHE^BLAKE^KRISTAL       [facility] [address] [place] [id number] Name: JONATHAN FIORE MRN: [id number] : 1965 Study Date: 2018 09:13 AM Age: 52 yrs Gender: Male Patient Location: [facility] Reason For Study: Chest Pain Ordering Physician:      Hypertension early     Not on meds. Usually in pre-hypertesion  categ.     Insomnia      Squamous cell carcinoma      Past Surgical History:   Procedure Laterality Date     COLONOSCOPY N/A 4/17/2017    Procedure: COLONOSCOPY;  Surgeon: Larry Fields MD;  Location: [facility] GI     LASER CO2 LESION ORAL N/A 10/5/2016    Procedure: LASER CO2 LESION ORAL;  Surgeon: Josué Rojo DDS;  Location: [facility] OR     MOHS MICROGRAPHIC PROCEDURE      squamous and basal cell       Social History:  Patient reports that he quit smoking about 29 years ago. His smoking use included cigarettes. He started smoking about 34 years ago. He has a 2.50 pack-year smoking history. He has never used smokeless tobacco. He reports current alcohol use of about 1.0 - 2.0 standard drinks of alcohol per week. He reports that he does not use drugs.    Family History:  Family History   Problem Relation Age of Onset     Lipids Mother         on meds     Cerebrovascular Disease Mother         TIA     Alzheimer Disease Mother         severe     Skin Cancer Mother         SCC     Other - See Comments Mother         [facility]     Lipids Father         on meds     Hypertension Father         controlled with meds     Thyroid Disease Father         ?not sure but possibly     Diabetes Father      Cerebrovascular Disease Father      Cancer - colorectal Maternal Grandmother         still alive at 99     Cancer Maternal Grandfather         lung but lifetime smoker     Melanoma Maternal Grandfather      Other - See Comments Brother         [place], [place]     Asperger's syndrome Brother      Asthma No family hx of      C.A.D. No family hx of      Prostate Cancer No family hx of        Medications:  Current Outpatient Medications   Medication Sig Dispense Refill     aspirin 81 MG EC tablet 81mg tab by mouth nightly @ 11pm       atorvastatin (LIPITOR) 40 MG tablet Take 1 tablet (40 mg) by mouth daily 90 tablet 3     Cholecalciferol (VITAMIN D) 50 MCG (2000 UT) CAPS 2000 units by mouth nightly @ 11pm 30 capsule       COENZYME Q10 PO Take 1,000 mg by mouth daily @ 10 pm       Krill Oil 1000 MG CAPS 4 x 1000mg capsule by mouth @ 11pm       Melatonin 10 MG TABS tablet Take 1 tablet (10 mg) by mouth At Bedtime @11pm       mirtazapine (REMERON) 15 MG tablet TAKE 1 TABLET BY MOUTH EVERY NIGHT AT BEDTIME 90 tablet 0     NONFORMULARY L-Alpha glycerylphosphorylcholine (GCP) 400 mg daily       NONFORMULARY Pyrroloquinoline quinone (PQQ) 600 mg daily       venlafaxine (EFFEXOR-ER) 75 MG 24 hr tablet Take 1 tablet (75 mg) by mouth daily 75mg tab by mouth daily @ 7am 90 tablet 3     simvastatin (ZOCOR) 20 MG tablet 20mg tab by mouth nightly @ 11pm       simvastatin (ZOCOR) 40 MG tablet TAKE 1 TABLET(40 MG) BY MOUTH AT BEDTIME (Patient not taking: Reported on 1/15/2020) 90 tablet 1        No Known Allergies          Physical exam:  Vitals: There were no vitals taken for this visit.  GEN: This is a well developed, well-nourished male in no acute distress, in a pleasant mood.    SKIN: Full skin, which includes the head/face, both arms, chest, back, abdomen, both legs,  buttocks, digits and/or nails, was examined:  - There is a pink slightly greasy scaled plaque on the left medial cheek   -Numerous hyperpigmented macules on sun exposed areas of the face, neck, and shoulders  -Sites of previous NMSC are NERD on inspection  - Mild erythema and scale to glabella with a superficial erosion towards the eyebrows. No concerning features upon dermoscopy.  -No other lesions of concern on areas examined.     Impression/Plan:    1. History of nonmelanoma skin cancer, no clincial evidence of recurrence    Continue skin checks q 6 months    Sun precaution was advised including the use of sun screens of SPF 30 or higher, sun protective clothing, and avoidance of tanning beds.    2. Seborrheic dermatitis, cheek and glabella     Start ketoconazole 2% cream, apply to affected areas daily     Return to the dermatology department if worsening sx or no change.      3. Mckee angiomas    No further intervention required. Patient to report changes.     4. Solar lentigines    No further intervention required. Patient to report changes.         Follow-up in 6 months, earlier for new or changing lesions.     Staff Involved:  Scribe/Staff    Scribe Disclosure:   I, Kamini Parks, am serving as a scribe to document services personally performed by Joan Gomez PA-C, based on data collection and the provider's statements to me.  Provider Disclosure:   The documentation recorded by the scribe accurately reflects the services I personally performed and the decisions made by me.    All risks, benefits and alternatives were discussed with patient.  Patient is in agreement and understands the assessment and plan.  All questions were answered.  Sun Screen Education was given.   Return to Clinic in 6 months or sooner as needed.   Joan Gomez PA-C   [facility]

## 2020-01-15 NOTE — NURSING NOTE
Dermatology Rooming Note    Cesar Montejo's goals for this visit include:   Chief Complaint   Patient presents with     Skin Check     Skin check, lesion of concern noted on left cheek.     Gini Ford LPN

## 2020-01-16 ENCOUNTER — OFFICE VISIT (OUTPATIENT)
Dept: PSYCHOLOGY | Facility: CLINIC | Age: 55
End: 2020-01-16
Payer: COMMERCIAL

## 2020-01-16 DIAGNOSIS — F41.9 ANXIETY DISORDER, UNSPECIFIED TYPE: Primary | ICD-10-CM

## 2020-01-17 NOTE — PROGRESS NOTES
Health Psychology                   Clinic    Department of Medicine  Melissa Malave, Ph.D., L.P. [phone]                        Clinics and Surgery Center  [facility] Jaclyn Vallejo, Ph.D., L.P. [phone]                 3rd [facility] Mail Code 741   Yudy Herrera, Ph.D.  [phone]     [address] Bernard Dominguez, Ph.D.,  L.P. [phone]      [place]   [address]  [place] [address]           Hieu Parham, Ph.D. [phone]      Alyssa Kendrick, Ph.D., L.P. [phone]    Varun Alford, Ph.D., A.B.P.P., L.P. [phone]     Ila Shafer, Ph.D., L.P. [phone]     Health Psychology Follow-Up Note    SUBJECTIVE  First session with client who has been seeing Dr. Kendrick, I will be providing therapy while Dr. Kendrick is on leave. Spent session building therapeutic rapport and discussing patient's experience with therapy thus far, including discussing updates since his last session. Client provided brief history of his recent health concerns/medical stressors in his own words as well. In addition to known recent stressors, he also received a highly prestigious job offer for which he is interviewing. Discussed pros/cons and thoughts related to this opportunity within the context of his health-related concerns. Encouraged continued participation in physical activity as he cited that as a source of stress relief.     OBJECTIVE  Appearance/Behavior/Orientation: Patient was casually groomed and dressed. Oriented to person, plan, time and situation.   Cooperation/Reliability: Patient was open and cooperative throughout the session.    Speech/Language: Speech was clear, coherent, and of normal rate, rhythm and volume.   Thought Process: Clear, linear  Mood/Affect: Mood moderately anxious; affect mood congruent  Insight/Motivation: Good, fair  Self-Rated Current Mood: (0 worst, 10 best): 6-7  Self-Rated Current Anxiety: (0 none, 10  worst): 3-4  Suicidal ideation: denied    ASSESSMENT  Rumination regarding medical conditions continues, appears certain that his predictions about his health are accurate (I.e., that REM disorder will develop into dementia). Would benefit from continued challenging of distorted thoughts (cognitive-behavioral therapy) and stress management via relaxation training. Grief related to his mother's death in November 2019 appears within normal limits and appears to have ameliorated since previous session with Dr. Kendrick.     DIAGNOSIS  Anxiety disorder, unspecified (F41.9)    PLAN/GOALS  RTC for continued psychotherapy.     Next session will discuss progress towards increasing physical activity (currently 6-7x week/30 minutes) and stress management skills (currently 3-4x week/5-10 minutes). Will discuss possibility of adding strength training.    Time in: 1:00  Time out: 1:57  Extended session due to complexity of case and length of interval.      Melissa Malave, PhD,    Clinical Psychologist     Tx plan completed:             04/18/19  Tx plan due:                        04/18/20

## 2020-02-06 ENCOUNTER — OFFICE VISIT (OUTPATIENT)
Dept: PSYCHOLOGY | Facility: CLINIC | Age: 55
End: 2020-02-06
Payer: COMMERCIAL

## 2020-02-06 DIAGNOSIS — F06.4 ANXIETY DISORDER DUE TO GENERAL MEDICAL CONDITION: Primary | ICD-10-CM

## 2020-02-06 NOTE — PROGRESS NOTES
"  Health Psychology                   Clinic    Department of Medicine  Melissa Malave, Ph.D., L.P. [phone]                        Clinics and Surgery Center  [facility] Jaclyn Vallejo, Ph.D., L.P. [phone]                 3rd Floor  [place] Mail Code 748   Yudy Herrera, Ph.D.  [phone]     [address] Bernard Dominguez, Ph.D.,  L.P. [phone]      [address] [address]           Hieu Parham, Ph.D. [phone]      Alyssa Kendrick, Ph.D., L.P. [phone]    Varun Alford, Ph.D., A.B.P.P., L.P. [phone]     Ila Shafer, Ph.D., L.P. [phone]     Health Psychology Follow-Up Note    SUBJECTIVE  Patient continues to express frustration regarding poor work evaluation last year.  Discussed thoughts, stress, and anxiety related to upcoming evaluation and ongoing conflict with work supervisor.  Patient is attempting to recognize what he can and cannot control related to his work environment as a means of decreasing his stress.  He is attempting to view the upcoming evaluation with \"curiosity.\" Utilized solution focused therapy to address ongoing concerns related to job and ongoing job application/interview process.    OBJECTIVE  Appearance/Behavior/Orientation: Patient was casually groomed and dressed. Oriented to person, plan, time and situation.   Cooperation/Reliability: Patient was open and cooperative throughout the session.    Speech/Language: Speech was clear, coherent, and of normal rate, rhythm and volume.   Thought Process: Clear, linear, somewhat perseverative regarding job stress, difficult to redirect/interject, however.  Mood/Affect: Mood moderately anxious; affect mood congruent  Insight/Motivation: Good, fair    ASSESSMENT  Patient's rumination regarding his medical condition was less apparent at this session, with more focus on ongoing job related stressors.  He appears to continue to derive " benefit from treatment.    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 3/23 10:00    Time in: 1:05  Time out: 2:00  Extended session due to complexity of case and length of interval.      Melissa Malave, PhD,    Clinical Psychologist     Tx plan completed:             04/18/19  Tx plan due:                        04/18/20

## 2020-03-12 DIAGNOSIS — F41.1 GAD (GENERALIZED ANXIETY DISORDER): ICD-10-CM

## 2020-03-12 DIAGNOSIS — F41.8 MIXED ANXIETY AND DEPRESSIVE DISORDER: ICD-10-CM

## 2020-03-12 DIAGNOSIS — E78.00 HYPERCHOLESTEREMIA: ICD-10-CM

## 2020-03-12 LAB
ANION GAP SERPL CALCULATED.3IONS-SCNC: 12 MMOL/L (ref 3–14)
BUN SERPL-MCNC: 15 MG/DL (ref 7–30)
CALCIUM SERPL-MCNC: 8.8 MG/DL (ref 8.5–10.1)
CHLORIDE SERPL-SCNC: 104 MMOL/L (ref 94–109)
CHOLEST SERPL-MCNC: 212 MG/DL
CO2 SERPL-SCNC: 21 MMOL/L (ref 20–32)
CREAT SERPL-MCNC: 0.87 MG/DL (ref 0.66–1.25)
GFR SERPL CREATININE-BSD FRML MDRD: >90 ML/MIN/{1.73_M2}
GLUCOSE SERPL-MCNC: 96 MG/DL (ref 70–99)
HDLC SERPL-MCNC: 38 MG/DL
LDLC SERPL CALC-MCNC: 142 MG/DL
NONHDLC SERPL-MCNC: 174 MG/DL
POTASSIUM SERPL-SCNC: 3.6 MMOL/L (ref 3.4–5.3)
SODIUM SERPL-SCNC: 137 MMOL/L (ref 133–144)
TRIGL SERPL-MCNC: 162 MG/DL

## 2020-03-12 PROCEDURE — 80048 BASIC METABOLIC PNL TOTAL CA: CPT | Performed by: FAMILY MEDICINE

## 2020-03-12 PROCEDURE — 36415 COLL VENOUS BLD VENIPUNCTURE: CPT | Performed by: FAMILY MEDICINE

## 2020-03-12 PROCEDURE — 80061 LIPID PANEL: CPT | Performed by: FAMILY MEDICINE

## 2020-05-06 ENCOUNTER — E-VISIT (OUTPATIENT)
Dept: FAMILY MEDICINE | Facility: CLINIC | Age: 55
End: 2020-05-06
Payer: COMMERCIAL

## 2020-05-06 DIAGNOSIS — J01.00 ACUTE MAXILLARY SINUSITIS, RECURRENCE NOT SPECIFIED: Primary | ICD-10-CM

## 2020-05-06 PROCEDURE — 99422 OL DIG E/M SVC 11-20 MIN: CPT | Performed by: FAMILY MEDICINE

## 2020-05-07 RX ORDER — AMOXICILLIN 500 MG/1
500 CAPSULE ORAL 3 TIMES DAILY
Qty: 30 CAPSULE | Refills: 0 | Status: SHIPPED | OUTPATIENT
Start: 2020-05-07 | End: 2020-05-17

## 2020-05-07 NOTE — TELEPHONE ENCOUNTER
Encounter Diagnosis   Name Primary?     Acute maxillary sinusitis, recurrence not specified Yes      Orders Placed This Encounter     amoxicillin (AMOXIL) 500 MG capsule     Sig: Take 1 capsule (500 mg) by mouth 3 times daily for 10 days     Dispense:  30 capsule     Refill:  0     Provider E-Visit time total (minutes): 11

## 2020-07-13 DIAGNOSIS — F41.8 MIXED ANXIETY AND DEPRESSIVE DISORDER: ICD-10-CM

## 2020-07-15 NOTE — TELEPHONE ENCOUNTER
"Requested Prescriptions   Pending Prescriptions Disp Refills     mirtazapine (REMERON) 15 MG tablet [Pharmacy Med Name: MIRTAZAPINE 15MG TABLETS] 90 tablet 0     Sig: TAKE 1 TABLET BY MOUTH EVERY NIGHT AT BEDTIME   Last Written Prescription Date:  1/8/20  Last Fill Quantity: 90,  # refills: 0   Last office visit: 12/3/20 Visit date not found with prescribing provider:  CHINMAY Messina   Future Office Visit:        Atypical Antidepressants Protocol Failed - 7/13/2020 12:36 PM        Failed - Patient has PHQ-9 score less than 5 in past 6 months.     Please review last PHQ-9 score.   12/3/19 score 9         Failed - Recent (6 mo) or future (30 days) visit within the authorizing provider's specialty     Patient had office visit in the last 6 months or has a visit in the next 30 days with authorizing provider or within the authorizing provider's specialty.  See \"Patient Info\" tab in inbasket, or \"Choose Columns\" in Meds & Orders section of the refill encounter.            Passed - Medication active on med list        Passed - Patient is age 18 or older           Routing refill request to provider for review/approval because:  Drug not on the [facility] refill protocol     Collette Guajardo RN   [facility]          "

## 2020-07-16 RX ORDER — MIRTAZAPINE 15 MG/1
TABLET, FILM COATED ORAL
Qty: 90 TABLET | Refills: 1 | Status: SHIPPED | OUTPATIENT
Start: 2020-07-16 | End: 2021-03-09

## 2020-10-27 DIAGNOSIS — F41.1 GAD (GENERALIZED ANXIETY DISORDER): ICD-10-CM

## 2020-10-28 RX ORDER — VENLAFAXINE HYDROCHLORIDE 75 MG/1
75 TABLET, EXTENDED RELEASE ORAL DAILY
Qty: 90 TABLET | Refills: 0 | Status: SHIPPED | OUTPATIENT
Start: 2020-10-28 | End: 2020-10-30

## 2020-10-28 NOTE — TELEPHONE ENCOUNTER
"Requested Prescriptions   Pending Prescriptions Disp Refills     venlafaxine (EFFEXOR-ER) 75 MG 24 hr tablet 90 tablet 3     Sig: Take 1 tablet (75 mg) by mouth daily 75mg tab by mouth daily @ 7am       Serotonin-Norepinephrine Reuptake Inhibitors  Passed - 10/27/2020 10:59 AM        Passed - Blood pressure under 140/90 in past 12 months     BP Readings from Last 3 Encounters:   12/03/19 98/84   08/06/19 124/72   03/26/19 145/79                 Passed - Recent (12 mo) or future (30 days) visit within the authorizing provider's specialty     Patient has had an office visit with the authorizing provider or a provider within the authorizing providers department within the previous 12 mos or has a future within next 30 days. See \"Patient Info\" tab in inbasket, or \"Choose Columns\" in Meds & Orders section of the refill encounter.              Passed - Medication is active on med list        Passed - Patient is age 18 or older        Passed - Normal serum creatinine on file in past 12 months     Recent Labs   Lab Test 03/12/20  1047 01/21/18  1538 01/21/18  1538   CR 0.87   < >  --    CREAT  --   --  0.8    < > = values in this interval not displayed.       Ok to refill medication if creatinine is low           Prescription approved per [facility] Refill Protocol.  Lindsey Garcia RN   [facility]   "

## 2020-10-30 ENCOUNTER — MYC MEDICAL ADVICE (OUTPATIENT)
Dept: FAMILY MEDICINE | Facility: CLINIC | Age: 55
End: 2020-10-30

## 2020-10-30 DIAGNOSIS — F41.1 GAD (GENERALIZED ANXIETY DISORDER): Primary | ICD-10-CM

## 2020-10-30 RX ORDER — VENLAFAXINE HYDROCHLORIDE 75 MG/1
75 CAPSULE, EXTENDED RELEASE ORAL DAILY
Qty: 90 CAPSULE | Refills: 3 | Status: SHIPPED | OUTPATIENT
Start: 2020-10-30 | End: 2021-09-20

## 2020-10-30 NOTE — TELEPHONE ENCOUNTER
Dr. Messina,    Please see patients my chart message. Please advise.    Thanks  Lindsey Garcia RN   [facility]

## 2020-11-16 ENCOUNTER — HEALTH MAINTENANCE LETTER (OUTPATIENT)
Age: 55
End: 2020-11-16

## 2020-11-20 ENCOUNTER — MYC MEDICAL ADVICE (OUTPATIENT)
Dept: FAMILY MEDICINE | Facility: CLINIC | Age: 55
End: 2020-11-20

## 2020-11-20 NOTE — TELEPHONE ENCOUNTER
Dr Messina    See pt mychart message  Last e visit was 5/6/2020    Pharm cued    Amira Cm RN   [facility]

## 2021-01-15 ENCOUNTER — HEALTH MAINTENANCE LETTER (OUTPATIENT)
Age: 56
End: 2021-01-15

## 2021-01-27 DIAGNOSIS — E78.00 HYPERCHOLESTEREMIA: ICD-10-CM

## 2021-01-27 DIAGNOSIS — F41.8 MIXED ANXIETY AND DEPRESSIVE DISORDER: ICD-10-CM

## 2021-01-27 NOTE — TELEPHONE ENCOUNTER
Pending Prescriptions:                       Disp   Refills    atorvastatin (LIPITOR) 40 MG tablet       90 tab*0            Sig: Take 1 tablet (40 mg) by mouth daily    Routing refill request to provider for review/approval because:  Drug interaction warning  Major interaction warning for ketoconazole cream and statins.    Flavia Chand RN  [facility]

## 2021-01-28 RX ORDER — ATORVASTATIN CALCIUM 40 MG/1
40 TABLET, FILM COATED ORAL DAILY
Qty: 90 TABLET | Refills: 0 | Status: SHIPPED | OUTPATIENT
Start: 2021-01-28 | End: 2021-04-27

## 2021-03-08 DIAGNOSIS — F41.8 MIXED ANXIETY AND DEPRESSIVE DISORDER: ICD-10-CM

## 2021-03-09 RX ORDER — MIRTAZAPINE 15 MG/1
15 TABLET, FILM COATED ORAL AT BEDTIME
Qty: 90 TABLET | Refills: 1 | Status: SHIPPED | OUTPATIENT
Start: 2021-03-09 | End: 2021-08-31

## 2021-04-27 DIAGNOSIS — F41.8 MIXED ANXIETY AND DEPRESSIVE DISORDER: ICD-10-CM

## 2021-04-27 DIAGNOSIS — E78.00 HYPERCHOLESTEREMIA: ICD-10-CM

## 2021-04-27 RX ORDER — ATORVASTATIN CALCIUM 40 MG/1
TABLET, FILM COATED ORAL
Qty: 90 TABLET | Refills: 0 | Status: SHIPPED | OUTPATIENT
Start: 2021-04-27 | End: 2021-07-22

## 2021-04-27 NOTE — TELEPHONE ENCOUNTER
"Dr. Messina,    Requested Prescriptions   Pending Prescriptions Disp Refills     atorvastatin (LIPITOR) 40 MG tablet [Pharmacy Med Name: ATORVASTATIN 40MG TABLETS] 90 tablet 0     Sig: TAKE 1 TABLET BY MOUTH DAILY       Statins Protocol Failed - 4/27/2021  9:39 AM        Failed - LDL on file in past 12 months     Recent Labs   Lab Test 03/12/20  1047   *             Failed - Recent (12 mo) or future (30 days) visit within the authorizing provider's specialty     Patient has had an office visit with the authorizing provider or a provider within the authorizing providers department within the previous 12 mos or has a future within next 30 days. See \"Patient Info\" tab in inbasket, or \"Choose Columns\" in Meds & Orders section of the refill encounter.              Passed - No abnormal creatine kinase in past 12 months     Recent Labs   Lab Test 01/06/19  0425                   Passed - Medication is active on med list        Passed - Patient is age 18 or older           Pt is scheduled for physical with you on 6/8.    Routing refill request to provider for review/approval because:  Labs not current:  Lipids  Patient needs to be seen because it has been more than 1 year since last office visit.    Flavia Chand RN  [facility]          "

## 2021-04-28 ENCOUNTER — IMMUNIZATION (OUTPATIENT)
Dept: NURSING | Facility: CLINIC | Age: 56
End: 2021-04-28
Payer: COMMERCIAL

## 2021-04-28 PROCEDURE — 0001A PR COVID VAC PFIZER DIL RECON 30 MCG/0.3 ML IM: CPT

## 2021-04-28 PROCEDURE — 91300 PR COVID VAC PFIZER DIL RECON 30 MCG/0.3 ML IM: CPT

## 2021-05-19 ENCOUNTER — IMMUNIZATION (OUTPATIENT)
Dept: NURSING | Facility: CLINIC | Age: 56
End: 2021-05-19
Attending: INTERNAL MEDICINE
Payer: COMMERCIAL

## 2021-05-19 PROCEDURE — 91300 PR COVID VAC PFIZER DIL RECON 30 MCG/0.3 ML IM: CPT

## 2021-05-19 PROCEDURE — 0002A PR COVID VAC PFIZER DIL RECON 30 MCG/0.3 ML IM: CPT

## 2021-06-08 ENCOUNTER — OFFICE VISIT (OUTPATIENT)
Dept: FAMILY MEDICINE | Facility: CLINIC | Age: 56
End: 2021-06-08
Payer: COMMERCIAL

## 2021-06-08 VITALS
WEIGHT: 182 LBS | HEART RATE: 109 BPM | OXYGEN SATURATION: 97 % | BODY MASS INDEX: 24.12 KG/M2 | DIASTOLIC BLOOD PRESSURE: 72 MMHG | SYSTOLIC BLOOD PRESSURE: 122 MMHG | HEIGHT: 73 IN | TEMPERATURE: 97.4 F

## 2021-06-08 DIAGNOSIS — Z00.00 ROUTINE GENERAL MEDICAL EXAMINATION AT A HEALTH CARE FACILITY: Primary | ICD-10-CM

## 2021-06-08 DIAGNOSIS — E78.00 PURE HYPERCHOLESTEROLEMIA: ICD-10-CM

## 2021-06-08 DIAGNOSIS — Z00.00 ROUTINE GENERAL MEDICAL EXAMINATION AT A HEALTH CARE FACILITY: ICD-10-CM

## 2021-06-08 DIAGNOSIS — Z12.5 SCREENING FOR PROSTATE CANCER: ICD-10-CM

## 2021-06-08 DIAGNOSIS — G47.9 DISTURBANCE IN SLEEP BEHAVIOR: ICD-10-CM

## 2021-06-08 DIAGNOSIS — K21.00 GASTROESOPHAGEAL REFLUX DISEASE WITH ESOPHAGITIS WITHOUT HEMORRHAGE: ICD-10-CM

## 2021-06-08 DIAGNOSIS — F45.21 ILLNESS ANXIETY DISORDER: ICD-10-CM

## 2021-06-08 LAB — HBA1C MFR BLD: 5.4 % (ref 0–5.6)

## 2021-06-08 PROCEDURE — 99396 PREV VISIT EST AGE 40-64: CPT | Performed by: FAMILY MEDICINE

## 2021-06-08 PROCEDURE — 36415 COLL VENOUS BLD VENIPUNCTURE: CPT | Performed by: FAMILY MEDICINE

## 2021-06-08 PROCEDURE — G0103 PSA SCREENING: HCPCS | Performed by: FAMILY MEDICINE

## 2021-06-08 PROCEDURE — 80053 COMPREHEN METABOLIC PANEL: CPT | Performed by: FAMILY MEDICINE

## 2021-06-08 PROCEDURE — 80061 LIPID PANEL: CPT | Performed by: FAMILY MEDICINE

## 2021-06-08 PROCEDURE — 83036 HEMOGLOBIN GLYCOSYLATED A1C: CPT | Performed by: FAMILY MEDICINE

## 2021-06-08 PROCEDURE — 83721 ASSAY OF BLOOD LIPOPROTEIN: CPT | Mod: 59 | Performed by: FAMILY MEDICINE

## 2021-06-08 ASSESSMENT — MIFFLIN-ST. JEOR: SCORE: 1724.92

## 2021-06-08 NOTE — PROGRESS NOTES
3  SUBJECTIVE:   CC: Cesar Montejo is an 55 year old male who presents for preventive health visit.       Patient has been advised of split billing requirements and indicates understanding: Yes  Healthy Habits:    Do you get at least three servings of calcium containing foods daily (dairy, green leafy vegetables, etc.)? yes    Amount of exercise or daily activities, outside of work: 7 day(s) per week    Problems taking medications regularly No    Medication side effects: No    Have you had an eye exam in the past two years? yes    Do you see a dentist twice per year? Not during covid     Do you have sleep apnea, excessive snoring or daytime drowsiness? Yes  Encounter Diagnoses   Name Primary?     Routine general medical examination at a health care facility Yes     Pure hypercholesterolemia      Screening for prostate cancer      Gastroesophageal reflux disease with esophagitis without hemorrhage      Illness anxiety disorder      Disturbance in sleep behavior         Anxiety Follow-Up    How are you doing with your anxiety since your last visit? Worsened with covid, just got second shot    Are you having other symptoms that might be associated with anxiety? Yes:  sleep issues    Have you had a significant life event? Job Concerns and Health Concerns     Are you feeling depressed? No    Do you have any concerns with your use of alcohol or other drugs? No    Social History     Tobacco Use     Smoking status: Former Smoker     Packs/day: 0.50     Years: 5.00     Pack years: 2.50     Types: Cigarettes     Start date: 1986     Quit date: 1991     Years since quittin.4     Smokeless tobacco: Never Used     Tobacco comment: After  no longer a half pack a day. Very occasional--maybe 75 cigarettes a year--91 to 98   Substance Use Topics     Alcohol use: Yes     Alcohol/week: 1.0 - 2.0 standard drinks     Comment: 1 to 2 glasses of red wine per day     Drug use: No     Comment: quit more than 20 years ago      PREMA-7 SCORE 2019 2019 12/3/2019   Total Score 7 (mild anxiety) - -   Total Score 7 9 6     PHQ 3/26/2019 2019 12/3/2019   PHQ-9 Total Score 7 11 9   Q9: Thoughts of better off dead/self-harm past 2 weeks Not at all Not at all Not at all           Today's PHQ-2 Score:   PHQ-2 (  [employer]) 2021   Q1: Little interest or pleasure in doing things 0 1   Q2: Feeling down, depressed or hopeless 0 2   PHQ-2 Score 0 3   Q1: Little interest or pleasure in doing things - Several days   Q2: Feeling down, depressed or hopeless - More than half the days   PHQ-2 Score - 3       Abuse: Current or Past(Physical, Sexual or Emotional)- No  Do you feel safe in your environment? Yes    Have you ever done Advance Care Planning? (For example, a Health Directive, POLST, or a discussion with a medical provider or your loved ones about your wishes):     Social History     Tobacco Use     Smoking status: Former Smoker     Packs/day: 0.50     Years: 5.00     Pack years: 2.50     Types: Cigarettes     Start date: 1986     Quit date: 1991     Years since quittin.4     Smokeless tobacco: Never Used     Tobacco comment: After  no longer a half pack a day. Very occasional--maybe 75 cigarettes a year--91 to 98   Substance Use Topics     Alcohol use: Yes     Alcohol/week: 1.0 - 2.0 standard drinks     Comment: 1 to 2 glasses of red wine per day     If you drink alcohol do you typically have >3 drinks per day or >7 drinks per week? No                      Last PSA: No results found for: PSA    Reviewed orders with patient. Reviewed health maintenance and updated orders accordingly - Yes  Lab work is in process    Reviewed and updated as needed this visit by clinical staff  Tobacco  Allergies  Meds   Med Hx  Surg Hx  Fam Hx  Soc Hx        Reviewed and updated as needed this visit by Provider                Past Medical History:   Diagnosis Date     Anxiety      Basal cell carcinoma       "Gastro-oesophageal reflux disease      H/O magnetic resonance imaging of brain and brain stem 2018    MR BRAIN WITHOUT AND WITH CONTRAST 2017 9:10 PM   HISTORY:  Left-sided numbness. Disequilibrium.   COMPARISON: MR brain 2015.   TECHNIQUE: Sagittal T1, axial T2, axial FLAIR, axial GRE, axial diffusion scan, coronal FLAIR, axial post Gd enhanced T1 weighted images.   FINDINGS: There is no intracranial hemorrhage, mass, or recent infarct. There is a cyst in the floor of the right maxilla     Head injury 2012    Had a bad concussion with post concussion synd for year     High cholesterol      History of echocardiogram 2018    TUYET Keyes MD 2018  Narrative     [id number]^SHUNTHEODORA^BLAKE^KRISTAL       [facility] [facility] [address] [place] [id number] Name: JONATHAN FIORE MRN: [id number] : 1965 Study Date: 2018 09:13 AM Age: 52 yrs Gender: Male Patient Location: [facility] Reason For Study: Chest Pain Ordering Physician:      Hypertension early     Not on meds. Usually in pre-hypertesion categ.     Insomnia      Squamous cell carcinoma         ROS:  CONSTITUTIONAL: NEGATIVE for fever, chills, change in weight  INTEGUMENTARY/SKIN: NEGATIVE for worrisome rashes, moles or lesions  EYES: NEGATIVE for vision changes or irritation  ENT: NEGATIVE for ear, mouth and throat problems  RESP: NEGATIVE for significant cough or SOB  CV: NEGATIVE for chest pain, palpitations or peripheral edema  GI: NEGATIVE for nausea, abdominal pain, heartburn, or change in bowel habits   male: negative for dysuria, hematuria, decreased urinary stream, erectile dysfunction, urethral discharge  MUSCULOSKELETAL: NEGATIVE for significant arthralgias or myalgia    OBJECTIVE:   /72   Pulse 109   Temp 97.4  F (36.3  C) (Temporal)   Ht 1.855 m (6' 1.03\")   Wt 82.6 kg (182 lb)   SpO2 97%   BMI 23.99 kg/m  "   EXAM:  GENERAL: alert, pale and fatigued  EYES: Eyes grossly normal to inspection, PERRL and conjunctivae and sclerae normal  HENT: ear canals and TM's normal, nose and mouth without ulcers or lesions  NECK: no adenopathy, no asymmetry, masses, or scars and thyroid normal to palpation  RESP: lungs clear to auscultation - no rales, rhonchi or wheezes  CV: regular rate and rhythm, normal S1 S2, no S3 or S4, no murmur, click or rub, no peripheral edema and peripheral pulses strong  ABDOMEN: soft, nontender, no hepatosplenomegaly, no masses and bowel sounds normal   (male): normal male genitalia without lesions or urethral discharge, no hernia  RECTAL (male): normal sphincter tone, no rectal masses, prostate normal size, smooth, nontender without nodules or masses  MS: no gross musculoskeletal defects noted, no edema  SKIN: no suspicious lesions or rashes  NEURO: Normal strength and tone, mentation intact and speech normal  PSYCH: mentation appears normal, affect flat, anxious and judgement and insight intact  LYMPH: no cervical, supraclavicular, axillary, or inguinal adenopathy    Diagnostic Test Results:  Labs reviewed in Epic  No results found for this or any previous visit (from the past 24 hour(s)).    ASSESSMENT/PLAN:   1. Routine general medical examination at a health care facility  Overall well, urged therapy for sytress  Follow up with consultant as planned.   - **Comprehensive metabolic panel FUTURE anytime; Future  - **A1C FUTURE anytime; Future    2. Pure hypercholesterolemia  Recheck, wants full lipids  - Lipid panel reflex to direct LDL Fasting; Future  - LDL cholesterol direct; Future    3. Screening for prostate cancer  sent  - **Prostate spec antigen screen FUTURE anytime; Future    4. Gastroesophageal reflux disease with esophagitis without hemorrhage  Well controlled     5. Illness anxiety disorder  Follow up with consultant as planned.     6. Disturbance in sleep behavior  See sleep  "specialslsit         COUNSELING:  Reviewed preventive health counseling, as reflected in patient instructions       Regular exercise       Healthy diet/nutrition       Vision screening       Safe sex practices/STD prevention       Colon cancer screening       Prostate cancer screening    Estimated body mass index is 23.99 kg/m  as calculated from the following:    Height as of this encounter: 1.855 m (6' 1.03\").    Weight as of this encounter: 82.6 kg (182 lb).        He reports that he quit smoking about 30 years ago. His smoking use included cigarettes. He started smoking about 35 years ago. He has a 2.50 pack-year smoking history. He has never used smokeless tobacco.      Counseling Resources:  ATP IV Guidelines  Pooled Cohorts Equation Calculator  FRAX Risk Assessment  ICSI Preventive Guidelines  Dietary Guidelines for Americans, 2010  USDA's MyPlate  ASA Prophylaxis  Lung CA Screening    Rocky Messina MD  [facility]  "

## 2021-06-09 LAB
ALBUMIN SERPL-MCNC: 4.2 G/DL (ref 3.4–5)
ALP SERPL-CCNC: 52 U/L (ref 40–150)
ALT SERPL W P-5'-P-CCNC: 29 U/L (ref 0–70)
ANION GAP SERPL CALCULATED.3IONS-SCNC: 10 MMOL/L (ref 3–14)
AST SERPL W P-5'-P-CCNC: 12 U/L (ref 0–45)
BILIRUB SERPL-MCNC: 0.8 MG/DL (ref 0.2–1.3)
BUN SERPL-MCNC: 14 MG/DL (ref 7–30)
CALCIUM SERPL-MCNC: 9 MG/DL (ref 8.5–10.1)
CHLORIDE SERPL-SCNC: 103 MMOL/L (ref 94–109)
CHOLEST SERPL-MCNC: 227 MG/DL
CO2 SERPL-SCNC: 24 MMOL/L (ref 20–32)
CREAT SERPL-MCNC: 0.88 MG/DL (ref 0.66–1.25)
GFR SERPL CREATININE-BSD FRML MDRD: >90 ML/MIN/{1.73_M2}
GLUCOSE SERPL-MCNC: 98 MG/DL (ref 70–99)
HDLC SERPL-MCNC: 44 MG/DL
LDLC SERPL CALC-MCNC: 158 MG/DL
NONHDLC SERPL-MCNC: 183 MG/DL
POTASSIUM SERPL-SCNC: 4.2 MMOL/L (ref 3.4–5.3)
PROT SERPL-MCNC: 7.5 G/DL (ref 6.8–8.8)
PSA SERPL-ACNC: 1.65 UG/L (ref 0–4)
SODIUM SERPL-SCNC: 137 MMOL/L (ref 133–144)
TRIGL SERPL-MCNC: 127 MG/DL

## 2021-06-10 ENCOUNTER — TELEPHONE (OUTPATIENT)
Dept: FAMILY MEDICINE | Facility: CLINIC | Age: 56
End: 2021-06-10

## 2021-06-10 DIAGNOSIS — E78.00 HYPERCHOLESTEREMIA: Primary | ICD-10-CM

## 2021-06-10 LAB — LDLC SERPL DIRECT ASSAY-MCNC: 166 MG/DL

## 2021-06-25 ENCOUNTER — OFFICE VISIT (OUTPATIENT)
Dept: DERMATOLOGY | Facility: CLINIC | Age: 56
End: 2021-06-25
Payer: COMMERCIAL

## 2021-06-25 DIAGNOSIS — D48.9 NEOPLASM OF UNCERTAIN BEHAVIOR: ICD-10-CM

## 2021-06-25 DIAGNOSIS — Z85.828 HISTORY OF NONMELANOMA SKIN CANCER: ICD-10-CM

## 2021-06-25 DIAGNOSIS — B07.9 VERRUCA VULGARIS: Primary | ICD-10-CM

## 2021-06-25 PROCEDURE — 17110 DESTRUCTION B9 LES UP TO 14: CPT | Performed by: DERMATOLOGY

## 2021-06-25 PROCEDURE — 99213 OFFICE O/P EST LOW 20 MIN: CPT | Mod: 25 | Performed by: DERMATOLOGY

## 2021-06-25 ASSESSMENT — PAIN SCALES - GENERAL: PAINLEVEL: NO PAIN (0)

## 2021-06-25 NOTE — PATIENT INSTRUCTIONS
Sal acid 40% nightly (wart stick), then duct tape    Consider candida injection    Monitor spot on left calf     Return in 6-8 weeks, sooner if concerns.       Cryotherapy    What is it?    Use of a very cold liquid, such as liquid nitrogen, to freeze and destroy abnormal skin cells that need to be removed    What should I expect?    Tenderness and redness    A small blister that might grow and fill with dark purple blood. There may be crusting.    More than one treatment may be needed if the lesions do not go away.    How do I care for the treated area?    Gently wash the area with your hands when bathing.    Use a thin layer of Vaseline to help with healing. You may use a Band-Aid.     The area should heal within 7-10 days and may leave behind a pink or lighter color.     Do not use an antibiotic or Neosporin ointment.     You may take acetaminophen (Tylenol) for pain.     Call your Doctor if you have:    Severe pain    Signs of infection (warmth, redness, cloudy yellow drainage, and or a bad smell)    Questions or concerns    Who should I call with questions?       [facility]: [phone]       [facility]: [phone]       For urgent needs outside of business hours call the [facility] at [phone]        and ask for the dermatology resident on call

## 2021-06-25 NOTE — NURSING NOTE
Dermatology Rooming Note    Cesar Montejo's goals for this visit include:   Chief Complaint   Patient presents with     Skin Check     diego is coming in today for a skin check, states that he has had SCCs and BCCs, no new spots of concern     Angelia Balbuena CMA on 6/25/2021 at 11:13 AM

## 2021-06-25 NOTE — LETTER
6/25/2021       RE: Cesar Montejo  [address]  [facility] [address]     Dear Colleague,    Thank you for referring your patient, Cesar Montejo, to the [facility] [place] [facility]. Please see a copy of my visit note below.    [facility] Dermatology Note  Encounter Date: Jun 25, 2021  Office Visit     Dermatology Problem List:  #  NMSC  -Superficial BCC, left cheek, s/p Mohs 6/16  -SCCIS right cheek, s/p Mohs 6/16  # Verruca vulgaris, left plantar foot  - Cryo 06/25/2021  - Sal acid 40%  # Actinic keratoses. Cryo.  # Actinic cheilitis  - s/p CO2 laser ablation of lower lip vermilion 10/2016  - s/p laser vermilionectomy 8/2017  - Previously followed with oral surgery  # Seborrheic dermatitis   - ketoconazole 2% cream   # Benign bx:  - Focal ulceration with adjacent mild epidermal hyperplasia, R inner helix, s/p bx 2017  - Irregular epidermal hyperplasia with hypergranulosis and dermal fibrosis, L mid-helix, s/p shave bx 2018    ____________________________________________    Assessment & Plan:  # NUB, left posterior upper calf  Ddx ISK vs inflammatory, less likely NMSC. Discussed options of biopsy vs monitor. Will monitor. Recheck at next visit.    # Verruca vulgaris, left plantar foot. Natural history and etiology discussed. Advised management as below.    - See cryo note below.   - Start sal acid 40% nightly, then duct tape  - Consider [place] Candida pending response    # History of NMSC. No evidence of recurrent disease.  # History of actinic cheilitis. No evidence of recurrent disease.  - Continue photoprotection - recommend SPF 30 or higher with frequent application  - Continue yearly skin exams  - Advised to monitor for changing, non-healing, bleeding, painful, changing, or otherwise symptomatic lesions      Procedures Performed:   - Cryotherapy procedure note, location(s): See above. After verbal  consent and discussion of risks and benefits including, but not limited to, dyspigmentation/scar, blister, and pain, 1 wart was(were) treated with 1-2 mm freeze border for 1-2 cycles with liquid nitrogen. Post cryotherapy instructions were provided.    Follow-up: 6-8 week(s) in-person, or earlier for new or changing lesions    Staff and Scribe:     Provider Disclosure:   The documentation recorded by the scribe accurately reflects the services I personally performed and the decisions made by me.    Mary Kumar MD    Department of Dermatology  [facility]note 3[facility]: Phone: [phone], Fax: [phone]  7/3/2021       Scribe Disclosure:  I, Melba Anthony, am serving as a scribe to document services personally performed by Mary Kumar MD at this visit, based upon the provider's statements to me. All documentation has been reviewed by the aforementioned provider prior to being entered into the official medical record.     IMelba, a scribe, prepared the chart for today's encounter.   ____________________________________________    CC: Skin Check (diego is coming in today for a skin check, states that he has had SCCs and BCCs, no new spots of concern)      HPI:  Mr. Cesar Montejo is a(n) 55 year old male who presents today as a return patient for FBSE. The patient was last seen in dermatology by Dr. Gomez on 01/15/2020 at which time he did not have any concerning lesions. He was started on ketoconazole cream 2% for treatment of seborrheic dermatitis.     Today, the patient reports a few spots on his cheeks, that were not bothersome, and have since resolved. He denies any sore areas in his mustache or beard. He notices some scaling on his lips if dehydrated, although this resolves with Vaseline. There are no sore or bothersome bumps on his lips. When prompted, he does have a wart on the bottom of his left  foot. Also when prompted, he does not recognize a spot on his left posterior calf. He otherwise denies any new painful, growing, or changing lesion.     Patient is otherwise feeling well, without additional skin concerns. Of note, the patient walks 5 miles a day. He works as a graduate professor in science and technology.    Labs Reviewed:  N/A    Physical Exam:  Vitals: There were no vitals taken for this visit.  SKIN: Total skin excluding the undergarment areas was performed. The exam included the head/face, neck, both arms, chest, back, abdomen, both legs, digits and/or nails.   - There is a verrucous papule with thrombosed capillaries interrupting dermatoglyphics on the left plantar foot.   -  Left poster upper calf very thin non specific pink scaly macule.  - Lips no scaling.  - No other lesions of concern on areas examined.     Medications:  Current Outpatient Medications   Medication     aspirin 81 MG EC tablet     atorvastatin (LIPITOR) 40 MG tablet     Cholecalciferol (VITAMIN D) 50 MCG (2000 UT) CAPS     COENZYME Q10 PO     ketoconazole (NIZORAL) 2 % external cream     Krill Oil 1000 MG CAPS     Melatonin 10 MG TABS tablet     mirtazapine (REMERON) 15 MG tablet     NONFORMULARY     NONFORMULARY     simvastatin (ZOCOR) 20 MG tablet     simvastatin (ZOCOR) 40 MG tablet     venlafaxine (EFFEXOR-XR) 75 MG 24 hr capsule     No current facility-administered medications for this visit.         Past Medical History:   Patient Active Problem List   Diagnosis     Insomnia     Adjustment reaction     CARDIOVASCULAR SCREENING; LDL GOAL LESS THAN 130     Pure hypercholesterolemia     GERD (gastroesophageal reflux disease)     Right shoulder pain     Calcific tendonitis     Solar lentiginosis     Skin cancer screening     Dermatitis, seborrheic     Family history of skin cancer     AK (actinic keratosis)     History of actinic keratosis     History of basal cell cancer     Seborrheic keratosis     Cherry angioma      Actinic cheilitis     History of nonmelanoma skin cancer     History of multiple concussions     Elevated blood pressure reading without diagnosis of hypertension     Dream enactment behavior     Disturbance in sleep behavior     Dyspnea and respiratory abnormality     Chest pain     H/O magnetic resonance imaging of brain and brain stem     History of echocardiogram     Encounter for neuropsychological testing  with Dr. Park     Benign neoplasm of skin of right upper extremity     Illness anxiety disorder     Past Medical History:   Diagnosis Date     Anxiety      Basal cell carcinoma      Gastro-oesophageal reflux disease      H/O magnetic resonance imaging of brain and brain stem 2018    MR BRAIN WITHOUT AND WITH CONTRAST 2017 9:10 PM   HISTORY:  Left-sided numbness. Disequilibrium.   COMPARISON: MR brain 2015.   TECHNIQUE: Sagittal T1, axial T2, axial FLAIR, axial GRE, axial diffusion scan, coronal FLAIR, axial post Gd enhanced T1 weighted images.   FINDINGS: There is no intracranial hemorrhage, mass, or recent infarct. There is a cyst in the floor of the right maxilla     Head injury 2012    Had a bad concussion with post concussion synd for year     High cholesterol      History of echocardiogram 2018    TUYET Keyes MD 2018  Narrative     [id number]^MECHE^BLAKE^KRISTAL       [facility] [facility] [address] [place] [address] Name: JONATHAN FIORE MRN: [id number] : 1965 Study Date: 2018 09:13 AM Age: 52 yrs Gender: Male Patient Location: [facility] Reason For Study: Chest Pain Ordering Physician:      Hypertension early     Not on meds. Usually in pre-hypertesion categ.     Insomnia      Squamous cell carcinoma         CC Referred Self, MD  No address on file on close of this encounter.

## 2021-08-29 DIAGNOSIS — F41.8 MIXED ANXIETY AND DEPRESSIVE DISORDER: ICD-10-CM

## 2021-08-31 ENCOUNTER — MYC MEDICAL ADVICE (OUTPATIENT)
Dept: FAMILY MEDICINE | Facility: CLINIC | Age: 56
End: 2021-08-31

## 2021-08-31 RX ORDER — MIRTAZAPINE 15 MG/1
TABLET, FILM COATED ORAL
Qty: 90 TABLET | Refills: 1 | Status: SHIPPED | OUTPATIENT
Start: 2021-08-31 | End: 2022-06-13

## 2021-08-31 ASSESSMENT — PATIENT HEALTH QUESTIONNAIRE - PHQ9
SUM OF ALL RESPONSES TO PHQ QUESTIONS 1-9: 4
10. IF YOU CHECKED OFF ANY PROBLEMS, HOW DIFFICULT HAVE THESE PROBLEMS MADE IT FOR YOU TO DO YOUR WORK, TAKE CARE OF THINGS AT HOME, OR GET ALONG WITH OTHER PEOPLE: NOT DIFFICULT AT ALL
SUM OF ALL RESPONSES TO PHQ QUESTIONS 1-9: 4

## 2021-08-31 NOTE — TELEPHONE ENCOUNTER
"Dr. Messina,     PHQ-9 sent to patient via [employer].     Requested Prescriptions   Pending Prescriptions Disp Refills     mirtazapine (REMERON) 15 MG tablet [Pharmacy Med Name: MIRTAZAPINE 15MG TABLETS] 90 tablet 1     Sig: TAKE 1 TABLET(15 MG) BY MOUTH AT BEDTIME       Atypical Antidepressants Protocol Failed - 8/29/2021  9:43 AM        Failed - Patient has PHQ-9 score less than 5 in past 6 months.     Please review last PHQ-9 score.           Passed - Medication active on med list        Passed - Patient is age 18 or older        Passed - Recent (6 mo) or future (30 days) visit within the authorizing provider's specialty     Patient had office visit in the last 6 months or has a visit in the next 30 days with authorizing provider or within the authorizing provider's specialty.  See \"Patient Info\" tab in inbasket, or \"Choose Columns\" in Meds & Orders section of the refill encounter.               Routing refill request to provider for review/approval because:  See above    Thanks,  LAMONT Delgadillo  [facility]           "

## 2021-09-01 ASSESSMENT — PATIENT HEALTH QUESTIONNAIRE - PHQ9: SUM OF ALL RESPONSES TO PHQ QUESTIONS 1-9: 4

## 2021-09-08 ENCOUNTER — E-VISIT (OUTPATIENT)
Dept: FAMILY MEDICINE | Facility: CLINIC | Age: 56
End: 2021-09-08
Payer: COMMERCIAL

## 2021-09-08 DIAGNOSIS — B02.9 HERPES ZOSTER WITHOUT COMPLICATION: ICD-10-CM

## 2021-09-08 DIAGNOSIS — L25.9 CONTACT DERMATITIS, UNSPECIFIED CONTACT DERMATITIS TYPE, UNSPECIFIED TRIGGER: Primary | ICD-10-CM

## 2021-09-08 PROCEDURE — 99422 OL DIG E/M SVC 11-20 MIN: CPT | Performed by: FAMILY MEDICINE

## 2021-09-08 RX ORDER — TRIAMCINOLONE ACETONIDE 1 MG/G
OINTMENT TOPICAL 2 TIMES DAILY
Qty: 30 G | Refills: 1 | Status: SHIPPED | OUTPATIENT
Start: 2021-09-08

## 2021-09-08 RX ORDER — FAMCICLOVIR 500 MG/1
500 TABLET ORAL 3 TIMES DAILY
Qty: 21 TABLET | Refills: 0 | Status: SHIPPED | OUTPATIENT
Start: 2021-09-08 | End: 2021-09-15

## 2021-09-08 NOTE — TELEPHONE ENCOUNTER
Encounter Diagnoses   Name Primary?     Contact dermatitis, unspecified contact dermatitis type, unspecified trigger Yes     Herpes zoster without complication         Orders Placed This Encounter     triamcinolone (KENALOG) 0.1 % external ointment     Sig: Apply topically 2 times daily     Dispense:  30 g     Refill:  1     famciclovir (FAMVIR) 500 MG tablet     Sig: Take 1 tablet (500 mg) by mouth 3 times daily for 7 days     Dispense:  21 tablet     Refill:  0     Call and to  asap if worseProvider E-Visit time total (minutes): 14

## 2021-09-14 ENCOUNTER — OFFICE VISIT (OUTPATIENT)
Dept: DERMATOLOGY | Facility: CLINIC | Age: 56
End: 2021-09-14
Payer: COMMERCIAL

## 2021-09-14 DIAGNOSIS — R21 RASH: Primary | ICD-10-CM

## 2021-09-14 PROCEDURE — 99213 OFFICE O/P EST LOW 20 MIN: CPT | Mod: GC | Performed by: DERMATOLOGY

## 2021-09-14 RX ORDER — DOXYCYCLINE 100 MG/1
100 CAPSULE ORAL 2 TIMES DAILY
Qty: 20 CAPSULE | Refills: 0 | Status: SHIPPED | OUTPATIENT
Start: 2021-09-14

## 2021-09-14 ASSESSMENT — PAIN SCALES - GENERAL: PAINLEVEL: NO PAIN (0)

## 2021-09-14 NOTE — PATIENT INSTRUCTIONS
-- For the rash on your left chest, this appears to either be an allergic reaction, a bug bite or shingles. For treatment, we would recommend continuing the steroid cream (triamcinolone) 2x/daily until symptoms have resolved. If symptoms do not resolve or the symptoms worsen/recur, please contact our clinic.  -- Based upon your symptoms and the appearance of your rash, we have low suspicion for Lyme disease, though to be on safe side, will start doxycycline 100mg 2x/daily today for a 10-day course, which is treatment for Lyme disease.

## 2021-09-14 NOTE — NURSING NOTE
Dermatology Rooming Note    Cesar Montejo's goals for this visit include:   Chief Complaint   Patient presents with     Derm Problem     diego is coming in today for a check up on the spot on the leg and a follow up on warts     Angelia Balbuena CMA on 9/14/2021 at 3:04 PM

## 2021-09-14 NOTE — PROGRESS NOTES
[facility] Dermatology Note  Encounter Date: Sep 14, 2021  Office Visit     Dermatology Problem List:  #. NMSC  -Superficial BCC, left cheek, s/p Mohs 6/16  -SCCIS right cheek, s/p Mohs 6/16  #. Verruca vulgaris, left plantar foot - resolved  - Cryo 06/25/2021, prior sal acid  #. Actinic keratoses. Cryo.  #. Actinic cheilitis  - s/p CO2 laser ablation of lower lip vermilion 10/2016  - s/p laser vermilionectomy 8/2017  - Previously followed with oral surgery  #. Seborrheic dermatitis   - ketoconazole 2% cream   #. Benign bx:  - Focal ulceration with adjacent mild epidermal hyperplasia, R inner helix, s/p bx 2017  - Irregular epidermal hyperplasia with hypergranulosis and dermal fibrosis, L mid-helix, s/p shave bx 2018  ____________________________________________    Assessment & Plan:    #. Pruritic, erythematous rash on L chest, unclear etiology, improving.  Exam non-specific today and improving - ddx dermatitis, possibly ACD versus arthropod, less likely erythema migrans or zoster. Advised today to continue topical steroids until resolved and hopefully will not recur. Will also treat empirically for Lyme disease given wooded exposure and inability to exclude.  -- Continue triamcinolone 0.1% ointment BID until symptoms resolve  -- Start doxycycline 100mg BID x10-days for empiric treatment of Lyme, per shared decision making with patient    #. NUB on L posterior calf - resolved  No evidence of residual lesion on today's exam. May have been inflammatory. Monitor for recurrence.    #. Verruca plantaris on L calf - resolved  No evidence of residual lesion on today's exam, now resolved after treatment with cryotherapy and topical salicylic acid  - Salicyclic acid qday PRN if patient develops recurrent findings/symptoms in this area    Procedures Performed:   None    Follow-up: 3 month(s) in-person for routine skin check, or earlier for new or changing lesions    Staff and Scribe:   This patient was  seen and staffed with the attending physician, who agrees with the assessment and plan.    Akbar Ziegler MD  Internal Medicine & Pediatrics, PGY-4  [facility]      Scribe Disclosure:  I, Dara Mcghee, am serving as a scribe to document services personally performed by Mary Kumar MD based on data collection and the provider's statements to me.     Staff Physician Comments:   I saw and evaluated the patient with the resident and I agree with the assessment and plan.  I was present for the examination.    Mary Kumar MD    Department of Dermatology  [facility]: Phone: [phone], Fax: [phone]  9/25/2021     ____________________________________________    CC: Derm Problem (diego is coming in today for a check up on the spot on the leg and a follow up on warts)      HPI:  Mr. Cesar Montejo is a(n) 55 year old male who presents today as a return patient for follow-up evaluation on multiple issues including NUB on L posterior upper calf, verruca plantaris on L foot, and recent history of pruritic rash on L chest. The patient was last seen on 6/25/21 by me, at which time one wart was treated with Ln2 and the patient was advised to treat at home with 40% salicylic acid. At that time, photodocumentation was obtained for an NUB on the left posterior upper calf.     The following issues were discussed during today's visit, with pertinent history, as follows:    1) NUB on L posterior calf: On today's exam, patient notes that he is no longer able to find this particular lesion; wondering if it may have fallen off.    2) Verruca plantaris on L foot: After having cryotherapy performed on lesion at last visit (6/25/21), patient applied wart removal solution as prescribed. Endorses complete resolution of symptoms with collective treatment, now unable to find the wart anymore. Also not noticing any  tenderness in this area with walking.    3) Pruritic rash on L chest: Symptoms have been present x10-days. Started as spontaneous development of 3 itchy, red bumps on the L side of his chest, with subsequent spread of redness around the bumps and worsening of itching. Patient was seen for rash via E-visit on 9/8/21 by PCP, at which time presentation was presumed 2/2 to contact dermatitis vs herpes zoster. Patient was prescribed triamcinolone BID for management, which he has been applying 1-2x/day since then. Regarding symptom course, patient notes that itching continued to worsen until 2-days ago, since which he has noticed gradual improvement in itching and redness. On further history, patient denies recent use of any new detergents, soaps, or lotions. Denies any known insect bites in this area and denies recent wooded outdoors exposures, though does frequently walk around [facility]. Wondering if symptoms could be 2/2 to Lyme disease.    Patient is otherwise feeling well, without additional skin concerns.    Labs Reviewed:  N/A    Physical Exam:  Vitals: There were no vitals taken for this visit.  SKIN: Focused examination of bilateral LE's and L chest was performed.  - L posterior calf: No identifiable lesion in the previously described area  - L foot: No evidence of residual verruca plantaris at previously described area  - There is a firm tan/flesh colored papule with central scarring on the R-calf (<5mm in diameter)  - L chest: Presence of 3 small papular vs follicular lesions on the L chest, with surrounding presence of macular erythema (please see photo below), much improved from prior photo which showed a cluster of 3 discrete erythematous papules with surrounding erythema         From 9/8/2021        Medications:  Current Outpatient Medications   Medication     aspirin 81 MG EC tablet     atorvastatin (LIPITOR) 40 MG tablet     Cholecalciferol (VITAMIN D) 50 MCG (2000 UT) CAPS     COENZYME Q10 PO      famciclovir (FAMVIR) 500 MG tablet     ketoconazole (NIZORAL) 2 % external cream     Krill Oil 1000 MG CAPS     Melatonin 10 MG TABS tablet     mirtazapine (REMERON) 15 MG tablet     NONFORMULARY     NONFORMULARY     simvastatin (ZOCOR) 20 MG tablet     simvastatin (ZOCOR) 40 MG tablet     triamcinolone (KENALOG) 0.1 % external ointment     venlafaxine (EFFEXOR-XR) 75 MG 24 hr capsule     No current facility-administered medications for this visit.        Past Medical History:   Patient Active Problem List   Diagnosis     Insomnia     Adjustment reaction     CARDIOVASCULAR SCREENING; LDL GOAL LESS THAN 130     Pure hypercholesterolemia     GERD (gastroesophageal reflux disease)     Right shoulder pain     Calcific tendonitis     Solar lentiginosis     Skin cancer screening     Dermatitis, seborrheic     Family history of skin cancer     AK (actinic keratosis)     History of actinic keratosis     History of basal cell cancer     Seborrheic keratosis     Cherry angioma     Actinic cheilitis     History of nonmelanoma skin cancer     History of multiple concussions     Elevated blood pressure reading without diagnosis of hypertension     Dream enactment behavior     Disturbance in sleep behavior     Dyspnea and respiratory abnormality     Chest pain     H/O magnetic resonance imaging of brain and brain stem     History of echocardiogram     Encounter for neuropsychological testing 2018 with Dr. Park     Benign neoplasm of skin of right upper extremity     Illness anxiety disorder     Past Medical History:   Diagnosis Date     Anxiety      Basal cell carcinoma      Gastro-oesophageal reflux disease      H/O magnetic resonance imaging of brain and brain stem 6/12/2018    MR BRAIN WITHOUT AND WITH CONTRAST 11/29/2017 9:10 PM   HISTORY:  Left-sided numbness. Disequilibrium.   COMPARISON: MR brain 4/18/2015.   TECHNIQUE: Sagittal T1, axial T2, axial FLAIR, axial GRE, axial diffusion scan, coronal FLAIR, axial post Gd  enhanced T1 weighted images.   FINDINGS: There is no intracranial hemorrhage, mass, or recent infarct. There is a cyst in the floor of the right maxilla     Head injury 2012    Had a bad concussion with post concussion synd for year     High cholesterol      History of echocardiogram 2018    TUYET Keyes MD 2018  Narrative     [id number]^MECHE^BLAKE^KRISTAL       [facility] [address] [place] [id number] Name: JONATHAN FIORE MRN: [id number] : 1965 Study Date: 2018 09:13 AM Age: 52 yrs Gender: Male Patient Location: [facility] Reason For Study: Chest Pain Ordering Physician:      Hypertension early     Not on meds. Usually in pre-hypertesion categ.     Insomnia      Squamous cell carcinoma         CC Referred Self, MD  No address on file on close of this encounter.

## 2021-09-14 NOTE — LETTER
9/14/2021       RE: Cesar Montejo  [address]  [facility] [address]     Dear Colleague,    Thank you for referring your patient, Cesar Montejo, to the [facility] [place] at [facility]. Please see a copy of my visit note below.    [facility] Dermatology Note  Encounter Date: Sep 14, 2021  Office Visit     Dermatology Problem List:  #. NMSC  -Superficial BCC, left cheek, s/p Mohs 6/16  -SCCIS right cheek, s/p Mohs 6/16  #. Verruca vulgaris, left plantar foot - resolved  - Cryo 06/25/2021, prior sal acid  #. Actinic keratoses. Cryo.  #. Actinic cheilitis  - s/p CO2 laser ablation of lower lip vermilion 10/2016  - s/p laser vermilionectomy 8/2017  - Previously followed with oral surgery  #. Seborrheic dermatitis   - ketoconazole 2% cream   #. Benign bx:  - Focal ulceration with adjacent mild epidermal hyperplasia, R inner helix, s/p bx 2017  - Irregular epidermal hyperplasia with hypergranulosis and dermal fibrosis, L mid-helix, s/p shave bx 2018  ____________________________________________    Assessment & Plan:    #. Pruritic, erythematous rash on L chest, unclear etiology, improving.  Exam non-specific today and improving - ddx dermatitis, possibly ACD versus arthropod, less likely erythema migrans or zoster. Advised today to continue topical steroids until resolved and hopefully will not recur. Will also treat empirically for Lyme disease given wooded exposure and inability to exclude.  -- Continue triamcinolone 0.1% ointment BID until symptoms resolve  -- Start doxycycline 100mg BID x10-days for empiric treatment of Lyme, per shared decision making with patient    #. NUB on L posterior calf - resolved  No evidence of residual lesion on today's exam. May have been inflammatory. Monitor for recurrence.    #. Verruca plantaris on L calf - resolved  No evidence of residual lesion on today's exam, now  resolved after treatment with cryotherapy and topical salicylic acid  - Salicyclic acid qday PRN if patient develops recurrent findings/symptoms in this area    Procedures Performed:   None    Follow-up: 3 month(s) in-person for routine skin check, or earlier for new or changing lesions    Staff and Scribe:   This patient was seen and staffed with the attending physician, who agrees with the assessment and plan.    Akbar Ziegler MD  Internal Medicine & Pediatrics, PGY-4  [facility]      Scribe Disclosure:  I, Dara Mcghee, am serving as a scribe to document services personally performed by Mary Kumar MD based on data collection and the provider's statements to me.     Staff Physician Comments:   I saw and evaluated the patient with the resident and I agree with the assessment and plan.  I was present for the examination.    Mary Kumar MD    Department of Dermatology  [facility]: Phone: [phone], Fax: [phone]  9/25/2021     ____________________________________________    CC: Derm Problem (diego is coming in today for a check up on the spot on the leg and a follow up on warts)      HPI:  Mr. Cesar Montejo is a(n) 55 year old male who presents today as a return patient for follow-up evaluation on multiple issues including NUB on L posterior upper calf, verruca plantaris on L foot, and recent history of pruritic rash on L chest. The patient was last seen on 6/25/21 by me, at which time one wart was treated with Ln2 and the patient was advised to treat at home with 40% salicylic acid. At that time, photodocumentation was obtained for an NUB on the left posterior upper calf.     The following issues were discussed during today's visit, with pertinent history, as follows:    1) NUB on L posterior calf: On today's exam, patient notes that he is no longer able to find this particular lesion;  wondering if it may have fallen off.    2) Verruca plantaris on L foot: After having cryotherapy performed on lesion at last visit (6/25/21), patient applied wart removal solution as prescribed. Endorses complete resolution of symptoms with collective treatment, now unable to find the wart anymore. Also not noticing any tenderness in this area with walking.    3) Pruritic rash on L chest: Symptoms have been present x10-days. Started as spontaneous development of 3 itchy, red bumps on the L side of his chest, with subsequent spread of redness around the bumps and worsening of itching. Patient was seen for rash via E-visit on 9/8/21 by PCP, at which time presentation was presumed 2/2 to contact dermatitis vs herpes zoster. Patient was prescribed triamcinolone BID for management, which he has been applying 1-2x/day since then. Regarding symptom course, patient notes that itching continued to worsen until 2-days ago, since which he has noticed gradual improvement in itching and redness. On further history, patient denies recent use of any new detergents, soaps, or lotions. Denies any known insect bites in this area and denies recent wooded outdoors exposures, though does frequently walk around [facility]. Wondering if symptoms could be 2/2 to Lyme disease.    Patient is otherwise feeling well, without additional skin concerns.    Labs Reviewed:  N/A    Physical Exam:  Vitals: There were no vitals taken for this visit.  SKIN: Focused examination of bilateral LE's and L chest was performed.  - L posterior calf: No identifiable lesion in the previously described area  - L foot: No evidence of residual verruca plantaris at previously described area  - There is a firm tan/flesh colored papule with central scarring on the R-calf (<5mm in diameter)  - L chest: Presence of 3 small papular vs follicular lesions on the L chest, with surrounding presence of macular erythema (please see photo below), much improved from prior  photo which showed a cluster of 3 discrete erythematous papules with surrounding erythema         From 9/8/2021        Medications:  Current Outpatient Medications   Medication     aspirin 81 MG EC tablet     atorvastatin (LIPITOR) 40 MG tablet     Cholecalciferol (VITAMIN D) 50 MCG (2000 UT) CAPS     COENZYME Q10 PO     famciclovir (FAMVIR) 500 MG tablet     ketoconazole (NIZORAL) 2 % external cream     Krill Oil 1000 MG CAPS     Melatonin 10 MG TABS tablet     mirtazapine (REMERON) 15 MG tablet     NONFORMULARY     NONFORMULARY     simvastatin (ZOCOR) 20 MG tablet     simvastatin (ZOCOR) 40 MG tablet     triamcinolone (KENALOG) 0.1 % external ointment     venlafaxine (EFFEXOR-XR) 75 MG 24 hr capsule     No current facility-administered medications for this visit.        Past Medical History:   Patient Active Problem List   Diagnosis     Insomnia     Adjustment reaction     CARDIOVASCULAR SCREENING; LDL GOAL LESS THAN 130     Pure hypercholesterolemia     GERD (gastroesophageal reflux disease)     Right shoulder pain     Calcific tendonitis     Solar lentiginosis     Skin cancer screening     Dermatitis, seborrheic     Family history of skin cancer     AK (actinic keratosis)     History of actinic keratosis     History of basal cell cancer     Seborrheic keratosis     Cherry angioma     Actinic cheilitis     History of nonmelanoma skin cancer     History of multiple concussions     Elevated blood pressure reading without diagnosis of hypertension     Dream enactment behavior     Disturbance in sleep behavior     Dyspnea and respiratory abnormality     Chest pain     H/O magnetic resonance imaging of brain and brain stem     History of echocardiogram     Encounter for neuropsychological testing 2018 with Dr. Park     Benign neoplasm of skin of right upper extremity     Illness anxiety disorder     Past Medical History:   Diagnosis Date     Anxiety      Basal cell carcinoma      Gastro-oesophageal reflux disease       H/O magnetic resonance imaging of brain and brain stem 2018    MR BRAIN WITHOUT AND WITH CONTRAST 2017 9:10 PM   HISTORY:  Left-sided numbness. Disequilibrium.   COMPARISON: MR brain 2015.   TECHNIQUE: Sagittal T1, axial T2, axial FLAIR, axial GRE, axial diffusion scan, coronal FLAIR, axial post Gd enhanced T1 weighted images.   FINDINGS: There is no intracranial hemorrhage, mass, or recent infarct. There is a cyst in the floor of the right maxilla     Head injury 2012    Had a bad concussion with post concussion synd for year     High cholesterol      History of echocardiogram 2018    TUYET Keyes MD 2018  Narrative     [id number]^MECHE^BLAKE^KRISTAL       [facility] [facility] [address] [place] [id number] Name: JONATHAN FIORE MRN: [id number] : 1965 Study Date: 2018 09:13 AM Age: 52 yrs Gender: Male Patient Location: [facility] Reason For Study: Chest Pain Ordering Physician:      Hypertension early     Not on meds. Usually in pre-hypertesion categ.     Insomnia      Squamous cell carcinoma         CC Referred Self, MD  No address on file on close of this encounter.

## 2021-09-18 ENCOUNTER — HEALTH MAINTENANCE LETTER (OUTPATIENT)
Age: 56
End: 2021-09-18

## 2021-09-19 DIAGNOSIS — F41.1 GAD (GENERALIZED ANXIETY DISORDER): ICD-10-CM

## 2021-09-20 RX ORDER — VENLAFAXINE HYDROCHLORIDE 75 MG/1
CAPSULE, EXTENDED RELEASE ORAL
Qty: 90 CAPSULE | Refills: 3 | Status: SHIPPED | OUTPATIENT
Start: 2021-09-20 | End: 2022-09-19

## 2021-09-20 NOTE — TELEPHONE ENCOUNTER
"Requested Prescriptions   Signed Prescriptions Disp Refills    venlafaxine (EFFEXOR-XR) 75 MG 24 hr capsule 90 capsule 3     Sig: TAKE 1 CAPSULE(75 MG) BY MOUTH DAILY       Serotonin-Norepinephrine Reuptake Inhibitors  Passed - 9/19/2021  9:40 AM        Passed - Blood pressure under 140/90 in past 12 months     BP Readings from Last 3 Encounters:   06/08/21 122/72   12/03/19 98/84   08/06/19 124/72                 Passed - Recent (12 mo) or future (30 days) visit within the authorizing provider's specialty     Patient has had an office visit with the authorizing provider or a provider within the authorizing providers department within the previous 12 mos or has a future within next 30 days. See \"Patient Info\" tab in inbasket, or \"Choose Columns\" in Meds & Orders section of the refill encounter.              Passed - Medication is active on med list        Passed - Patient is age 18 or older        Passed - Normal serum creatinine on file in past 12 months     Recent Labs   Lab Test 06/08/21  1441 03/08/18  1431 01/21/18  1538   CR 0.88   < >  --    CREAT  --   --  0.8    < > = values in this interval not displayed.       Ok to refill medication if creatinine is low             Leona Chaudhari RN  [facility]     "

## 2021-10-05 NOTE — DISCHARGE INSTRUCTIONS
Thank you for coming to the [facility] Emergency Department.     Please follow up with your primary care clinic on next week if pain does not go away with ibuprofen or tylenol, and gentle stretching. OK to stay as active as usual.     Return to the ER if you notice worsening pain, swelling in the foot, ankle, leg or thigh, or shortness of breath.   
06-Oct-2021 07:02

## 2021-11-13 ENCOUNTER — HEALTH MAINTENANCE LETTER (OUTPATIENT)
Age: 56
End: 2021-11-13

## 2021-11-22 ENCOUNTER — IMMUNIZATION (OUTPATIENT)
Dept: NURSING | Facility: CLINIC | Age: 56
End: 2021-11-22
Payer: COMMERCIAL

## 2021-11-22 PROCEDURE — 91306 COVID-19,PF,MODERNA (18+ YRS BOOSTER .25ML): CPT

## 2021-11-22 PROCEDURE — 0064A COVID-19,PF,MODERNA (18+ YRS BOOSTER .25ML): CPT

## 2021-12-10 ENCOUNTER — OFFICE VISIT (OUTPATIENT)
Dept: DERMATOLOGY | Facility: CLINIC | Age: 56
End: 2021-12-10
Payer: COMMERCIAL

## 2021-12-10 DIAGNOSIS — D18.01 CHERRY ANGIOMA: ICD-10-CM

## 2021-12-10 DIAGNOSIS — Z85.828 HISTORY OF NONMELANOMA SKIN CANCER: ICD-10-CM

## 2021-12-10 DIAGNOSIS — L21.9 DERMATITIS, SEBORRHEIC: Primary | ICD-10-CM

## 2021-12-10 DIAGNOSIS — L81.4 LENTIGINES: ICD-10-CM

## 2021-12-10 DIAGNOSIS — D22.9 MULTIPLE BENIGN NEVI: ICD-10-CM

## 2021-12-10 PROCEDURE — 99213 OFFICE O/P EST LOW 20 MIN: CPT | Performed by: DERMATOLOGY

## 2021-12-10 RX ORDER — KETOCONAZOLE 20 MG/ML
SHAMPOO TOPICAL
Qty: 120 ML | Refills: 11 | Status: SHIPPED | OUTPATIENT
Start: 2021-12-10 | End: 2023-01-11

## 2021-12-10 RX ORDER — KETOCONAZOLE 20 MG/ML
SHAMPOO TOPICAL
Qty: 120 ML | Refills: 11 | Status: SHIPPED | OUTPATIENT
Start: 2021-12-10 | End: 2021-12-10

## 2021-12-10 ASSESSMENT — PAIN SCALES - GENERAL: PAINLEVEL: NO PAIN (0)

## 2021-12-10 NOTE — LETTER
12/10/2021       RE: Cesar Montejo  [address]  [facility] [address]     Dear Colleague,    Thank you for referring your patient, Cesar Montejo, to the [facility] DERMATOLOGY CL[facility]IC [place] [facility]. Please see a copy of my visit note below.    [facility] Dermatology Note  Encounter Date: Dec 10, 2021  Office Visit     Dermatology Problem List:  # NMSC  - Superficial BCC, left cheek, s/p Mohs 6/16  - SCCIS right cheek, s/p Mohs 6/16  # Verruca vulgaris, left plantar foot - resolved  - Cryo 06/25/2021, prior sal acid  # Actinic keratoses. Cryo.  # Actinic cheilitis  - s/p CO2 laser ablation of lower lip vermilion 10/2016  - s/p laser vermilionectomy 8/2017  - Previously followed with oral surgery  # Seborrheic dermatitis   - ketoconazole shampoo   # Benign bx:  - Focal ulceration with adjacent mild epidermal hyperplasia, R inner helix, s/p bx 2017  - Irregular epidermal hyperplasia with hypergranulosis and dermal fibrosis, L mid-helix, s/p shave bx 2018    ____________________________________________    Assessment & Plan:    # Seborrheic dermatitis, face  - Start ketoconazole 2% shampoo 3x/week    # Cherry angiomas  Discussed the natural history and benign nature of this lesion. Reassurance provided that no additional treatment is necessary.     # Multiple benign nevi.  # Solar lentigines   - No concerning lesions today  - Counseled on ABCDEs of melanoma and sun protection - recommend SPF 30 or higher with frequent application   - Return sooner if noticing changing or symptomatic lesions    # History of NMSC. No evidence of recurrent disease.  - Continue photoprotection - recommend SPF 30 or higher with frequent reapplication  - Continue yearly skin exams  - Advised to monitor for changing, non-healing, bleeding, painful, changing, or otherwise symptomatic lesions      Procedures Performed:   None    Follow-up: 6  month(s) in-person, or earlier for new or changing lesions    Staff and Scribe:     Scribe Disclosure:  I, Eduard Bullock, am serving as a scribe to document services personally performed by Mary Kumar MD based on data collection and the provider's statements to me.     Provider Disclosure:   The documentation recorded by the scribe accurately reflects the services I personally performed and the decisions made by me.    Mary Kumar MD    Department of Dermatology  [facility]: Phone: [phone], Fax: [phone]  12/10/2021     ____________________________________________    CC: Skin Check (states that he has no new spots of concern )    HPI:  Mr. Cesar Montejo is a(n) 56 year old male who presents today as a return patient for FBSE. Last seen by me on 9/14/2021, at which time patient was started on doxycycline 100 mg BID x10 days for treatment of pruritic erythematous rash that was possibly Lyme disease.    Today, patient denies new spots of concern on his skin. He reports that his rash resolved on doxycycline. No painful, bleeding, non-healing, or otherwise symptomatic lesions.     Patient is otherwise feeling well, without additional skin concerns.    Labs Reviewed:  N/A    Physical Exam:  Vitals: There were no vitals taken for this visit.  SKIN: Total skin excluding the undergarment areas was performed. The exam included the head/face, neck, both arms, chest, back, abdomen, both legs, digits and/or nails.   - There are dome shaped bright red papules on the trunk and extremities.   - Multiple regular brown pigmented macules and papules are identified on the trunk and extremities.   - Scattered brown macules on sun exposed areas.  - Forehead and eyebrows, mild erythema and flaking.  - No other lesions of concern on areas examined.     Medications:  Current Outpatient Medications   Medication      aspirin 81 MG EC tablet     atorvastatin (LIPITOR) 40 MG tablet     Cholecalciferol (VITAMIN D) 50 MCG (2000 UT) CAPS     COENZYME Q10 PO     doxycycline monohydrate (MONODOX) 100 MG capsule     ketoconazole (NIZORAL) 2 % external cream     Krill Oil 1000 MG CAPS     Melatonin 10 MG TABS tablet     mirtazapine (REMERON) 15 MG tablet     NONFORMULARY     NONFORMULARY     simvastatin (ZOCOR) 20 MG tablet     simvastatin (ZOCOR) 40 MG tablet     triamcinolone (KENALOG) 0.1 % external ointment     venlafaxine (EFFEXOR-XR) 75 MG 24 hr capsule     famciclovir (FAMVIR) 500 MG tablet     No current facility-administered medications for this visit.      Past Medical History:   Patient Active Problem List   Diagnosis     Insomnia     Adjustment reaction     CARDIOVASCULAR SCREENING; LDL GOAL LESS THAN 130     Pure hypercholesterolemia     GERD (gastroesophageal reflux disease)     Right shoulder pain     Calcific tendonitis     Solar lentiginosis     Skin cancer screening     Dermatitis, seborrheic     Family history of skin cancer     AK (actinic keratosis)     History of actinic keratosis     History of basal cell cancer     Seborrheic keratosis     Cherry angioma     Actinic cheilitis     History of nonmelanoma skin cancer     History of multiple concussions     Elevated blood pressure reading without diagnosis of hypertension     Dream enactment behavior     Disturbance in sleep behavior     Dyspnea and respiratory abnormality     Chest pain     H/O magnetic resonance imaging of brain and brain stem     History of echocardiogram     Encounter for neuropsychological testing 2018 with Dr. Park     Benign neoplasm of skin of right upper extremity     Illness anxiety disorder     Past Medical History:   Diagnosis Date     Anxiety      Basal cell carcinoma      Gastro-oesophageal reflux disease      H/O magnetic resonance imaging of brain and brain stem 6/12/2018    MR BRAIN WITHOUT AND WITH CONTRAST 11/29/2017 9:10 PM    HISTORY:  Left-sided numbness. Disequilibrium.   COMPARISON: MR brain 2015.   TECHNIQUE: Sagittal T1, axial T2, axial FLAIR, axial GRE, axial diffusion scan, coronal FLAIR, axial post Gd enhanced T1 weighted images.   FINDINGS: There is no intracranial hemorrhage, mass, or recent infarct. There is a cyst in the floor of the right maxilla     Head injury 2012    Had a bad concussion with post concussion synd for year     High cholesterol      History of echocardiogram 2018    TUYET Keyes MD 2018  Narrative     [id number]^TERRENCEANGELTHEODORA^BLAKE^R       [facility] [address] [place], [place] [address] Name: JONATHAN FIORE MRN: [id number] : 1965 Study Date: 2018 09:13 AM Age: 52 yrs Gender: Male Patient Location: [facility] Reason For Study: Chest Pain Ordering Physician:      Hypertension early     Not on meds. Usually in pre-hypertesion categ.     Insomnia      Squamous cell carcinoma         CC Referred Self, MD  No address on file on close of this encounter.

## 2021-12-10 NOTE — PATIENT INSTRUCTIONS
Patient Education     Checking for Skin Cancer  You can find cancer early by checking your skin each month. There are 3 kinds of skin cancer. They are melanoma, basal cell carcinoma, and squamous cell carcinoma. Doing monthly skin checks is the best way to find new marks or skin changes. Follow the instructions below for checking your skin.   The ABCDEs of checking moles for melanoma   Check your moles or growths for signs of melanoma using ABCDE:     Asymmetry: the sides of the mole or growth don t match    Border: the edges are ragged, notched, or blurred    Color: the color within the mole or growth varies    Diameter: the mole or growth is larger than 6 mm (size of a pencil eraser)    Evolving: the size, shape, or color of the mole or growth is changing (evolving is not shown in the images below)    Checking for other types of skin cancer  Basal cell carcinoma or squamous cell carcinoma have symptoms such as:       A spot or mole that looks different from all other marks on your skin    Changes in how an area feels, such as itching, tenderness, or pain    Changes in the skin's surface, such as oozing, bleeding, or scaliness    A sore that does not heal    New swelling or redness beyond the border of a mole    Who s at risk?  Anyone can get skin cancer. But you are at greater risk if you have:     Fair skin, light-colored hair, or light-colored eyes    Many moles or abnormal moles on your skin    A history of sunburns from sunlight or tanning beds    A family history of skin cancer    A history of exposure to radiation or chemicals    A weakened immune system  If you have had skin cancer in the past, you are at risk for recurring skin cancer.   How to check your skin  Do your monthly skin checkups in front of a full-length mirror. Check all parts of your body, including your:     Head (ears, face, neck, and scalp)    Torso (front, back, and sides)    Arms (tops, undersides, upper, and lower armpits)    Hands  (palms, backs, and fingers, including under the nails)    Buttocks and genitals    Legs (front, back, and sides)    Feet (tops, soles, toes, including under the nails, and between toes)  If you have a lot of moles, take digital photos of them each month. Make sure to take photos both up close and from a distance. These can help you see if any moles change over time.   Most skin changes are not cancer. But if you see any changes in your skin, call your doctor right away. Only he or she can diagnose a problem. If you have skin cancer, seeing your doctor can be the first step toward getting the treatment that could save your life.   FUJIAN HAIYUAN last reviewed this educational content on 4/1/2019 2000-2020 [email or web address]. [address] [place] [address]. All rights reserved. This information is not intended as a substitute for professional medical care. Always follow your healthcare professional's instructions.       When should I call my doctor?    If you are worsening or not improving, please, contact us or seek urgent care as noted below.     Who should I call with questions (adults)?    [facility] (adult and pediatric): [phone]    [facility] (adult): [phone]    For urgent needs outside of business hours call the [facility] at [phone] and ask for the dermatology resident on call to be paged    If this is a medical emergency and you are unable to reach an ER, Call [phone]    Who should I call with questions (pediatric)?  [facility]- Pediatric Dermatology  Dr. Daniela Eduardo, Dr. Mima Garcia, Dr. Malgorzata Rust, VIKY Goodwin, Dr. Elsa Gipson, Dr. Cesia Villanueva & Dr. Riccardo Patrick  Non-urgent nurse triage line; [phone]- Mai and Kenya MIGUEL Care Coordinatorjose Mitchell (/Complex ) [phone]    If you need a prescription refill, please contact your  pharmacy. Refills are approved or denied by our Physicians during normal business hours, Monday through Fridays  Per office policy, refills will not be granted if you have not been seen within the past year (or sooner depending on your child's condition)    Scheduling Information:  Pediatric Appointment Scheduling and Call Center [phone]  Radiology Scheduling- [phone]  Sedation Unit Scheduling- [phone]  [place] Scheduling- [facility] [phone]; Pediatric Dermatology [phone]  Main  Services: [phone]  Tunisian: [phone]  Cymraes: [phone]  Hmong/[place] citizen/Myles: [phone]  Preadmission Nursing Department Fax Number: [phone] (Fax all pre-operative paperwork to this number)    For urgent matters arising during evenings, weekends, or holidays that cannot wait for normal business hours please call [phone] and ask for the dermatology resident on call to be paged.

## 2021-12-10 NOTE — PROGRESS NOTES
[facility] Dermatology Note  Encounter Date: Dec 10, 2021  Office Visit     Dermatology Problem List:  # NMSC  - Superficial BCC, left cheek, s/p Mohs 6/16  - SCCIS right cheek, s/p Mohs 6/16  # Verruca vulgaris, left plantar foot - resolved  - Cryo 06/25/2021, prior sal acid  # Actinic keratoses. Cryo.  # Actinic cheilitis  - s/p CO2 laser ablation of lower lip vermilion 10/2016  - s/p laser vermilionectomy 8/2017  - Previously followed with oral surgery  # Seborrheic dermatitis   - ketoconazole shampoo   # Benign bx:  - Focal ulceration with adjacent mild epidermal hyperplasia, R inner helix, s/p bx 2017  - Irregular epidermal hyperplasia with hypergranulosis and dermal fibrosis, L mid-helix, s/p shave bx 2018    ____________________________________________    Assessment & Plan:    # Seborrheic dermatitis, face  - Start ketoconazole 2% shampoo 3x/week    # Cherry angiomas  Discussed the natural history and benign nature of this lesion. Reassurance provided that no additional treatment is necessary.     # Multiple benign nevi.  # Solar lentigines   - No concerning lesions today  - Counseled on ABCDEs of melanoma and sun protection - recommend SPF 30 or higher with frequent application   - Return sooner if noticing changing or symptomatic lesions    # History of NMSC. No evidence of recurrent disease.  - Continue photoprotection - recommend SPF 30 or higher with frequent reapplication  - Continue yearly skin exams  - Advised to monitor for changing, non-healing, bleeding, painful, changing, or otherwise symptomatic lesions      Procedures Performed:   None    Follow-up: 6 month(s) in-person, or earlier for new or changing lesions    Staff and Scribe:     Scribe Disclosure:  I, Eduard Bullock, am serving as a scribe to document services personally performed by Mary Kumar MD based on data collection and the provider's statements to me.     Provider Disclosure:   The documentation  recorded by the scribe accurately reflects the services I personally performed and the decisions made by me.    Mary Kumar MD    Department of Dermatology  [facility]: Phone: [phone], Fax: [phone]  12/10/2021     ____________________________________________    CC: Skin Check (states that he has no new spots of concern )    HPI:  Mr. Cesar Montejo is a(n) 56 year old male who presents today as a return patient for FBSE. Last seen by me on 9/14/2021, at which time patient was started on doxycycline 100 mg BID x10 days for treatment of pruritic erythematous rash that was possibly Lyme disease.    Today, patient denies new spots of concern on his skin. He reports that his rash resolved on doxycycline. No painful, bleeding, non-healing, or otherwise symptomatic lesions.     Patient is otherwise feeling well, without additional skin concerns.    Labs Reviewed:  N/A    Physical Exam:  Vitals: There were no vitals taken for this visit.  SKIN: Total skin excluding the undergarment areas was performed. The exam included the head/face, neck, both arms, chest, back, abdomen, both legs, digits and/or nails.   - There are dome shaped bright red papules on the trunk and extremities.   - Multiple regular brown pigmented macules and papules are identified on the trunk and extremities.   - Scattered brown macules on sun exposed areas.  - Forehead and eyebrows, mild erythema and flaking.  - No other lesions of concern on areas examined.     Medications:  Current Outpatient Medications   Medication     aspirin 81 MG EC tablet     atorvastatin (LIPITOR) 40 MG tablet     Cholecalciferol (VITAMIN D) 50 MCG (2000 UT) CAPS     COENZYME Q10 PO     doxycycline monohydrate (MONODOX) 100 MG capsule     ketoconazole (NIZORAL) 2 % external cream     Krill Oil 1000 MG CAPS     Melatonin 10 MG TABS tablet     mirtazapine (REMERON) 15 MG  tablet     NONFORMULARY     NONFORMULARY     simvastatin (ZOCOR) 20 MG tablet     simvastatin (ZOCOR) 40 MG tablet     triamcinolone (KENALOG) 0.1 % external ointment     venlafaxine (EFFEXOR-XR) 75 MG 24 hr capsule     famciclovir (FAMVIR) 500 MG tablet     No current facility-administered medications for this visit.      Past Medical History:   Patient Active Problem List   Diagnosis     Insomnia     Adjustment reaction     CARDIOVASCULAR SCREENING; LDL GOAL LESS THAN 130     Pure hypercholesterolemia     GERD (gastroesophageal reflux disease)     Right shoulder pain     Calcific tendonitis     Solar lentiginosis     Skin cancer screening     Dermatitis, seborrheic     Family history of skin cancer     AK (actinic keratosis)     History of actinic keratosis     History of basal cell cancer     Seborrheic keratosis     Cherry angioma     Actinic cheilitis     History of nonmelanoma skin cancer     History of multiple concussions     Elevated blood pressure reading without diagnosis of hypertension     Dream enactment behavior     Disturbance in sleep behavior     Dyspnea and respiratory abnormality     Chest pain     H/O magnetic resonance imaging of brain and brain stem     History of echocardiogram     Encounter for neuropsychological testing 2018 with Dr. Park     Benign neoplasm of skin of right upper extremity     Illness anxiety disorder     Past Medical History:   Diagnosis Date     Anxiety      Basal cell carcinoma      Gastro-oesophageal reflux disease      H/O magnetic resonance imaging of brain and brain stem 6/12/2018    MR BRAIN WITHOUT AND WITH CONTRAST 11/29/2017 9:10 PM   HISTORY:  Left-sided numbness. Disequilibrium.   COMPARISON: MR brain 4/18/2015.   TECHNIQUE: Sagittal T1, axial T2, axial FLAIR, axial GRE, axial diffusion scan, coronal FLAIR, axial post Gd enhanced T1 weighted images.   FINDINGS: There is no intracranial hemorrhage, mass, or recent infarct. There is a cyst in the floor of the  right maxilla     Head injury 2012    Had a bad concussion with post concussion synd for year     High cholesterol      History of echocardiogram 2018    TUYET Keyes MD 2018  Narrative     [id number] [facility]28 [id number]^MECHE^BLAKE^KRISTAL       [facility] [address] [place] [address] Name: JONATHAN FIORE MRN: [id number] : 1965 Study Date: 2018 09:13 AM Age: 52 yrs Gender: Male Patient Location: [facility] Reason For Study: Chest Pain Ordering Physician:      Hypertension early     Not on meds. Usually in pre-hypertesion categ.     Insomnia      Squamous cell carcinoma         CC Referred Self, MD  No address on file on close of this encounter.

## 2021-12-10 NOTE — NURSING NOTE
Dermatology Rooming Note    Cesar Montejo's goals for this visit include:   Chief Complaint   Patient presents with     Skin Check     states that he has no new spots of concern      Angelia Balbuena CMA on 12/10/2021 at 1:55 PM

## 2022-01-19 NOTE — NURSING NOTE
"Chief Complaint   Patient presents with     Headache     Dizziness       Initial /90 (BP Location: Right arm, Patient Position: Chair, Cuff Size: Adult Regular)  Pulse 105  Temp 97.8  F (36.6  C) (Oral)  Wt 186 lb 11.2 oz (84.7 kg)  SpO2 95%  BMI 24.97 kg/m2 Estimated body mass index is 24.97 kg/(m^2) as calculated from the following:    Height as of 1/11/18: 6' 0.5\" (1.842 m).    Weight as of this encounter: 186 lb 11.2 oz (84.7 kg).  Medication Reconciliation: complete     Demetria Peck CMA    " Case Management Initial Discharge Plan  Jose Cruz Fenton             Spoke  with:patient to discuss discharge plans. Information verified: address, contacts, phone number, , insurance Yes  Insurance Provider: [facility]    Emergency Contact/Next of Kin name & number: Johnson Jack [phone]  Who are involved in patient's support system? Son    PCP: Daniel Welsh MD  Date of last visit: unknown      Discharge Planning    Living Arrangements:  Alone     Home has 2 stories, second floor apartment no elevator  2 stairs to climb to get into front door, 15stairs to climb to reach second floor  Patient able to perform ADL's:Independent    Current Services (outpatient & in home) none  DME equipment: na  DME provider: na    Is patient receiving oral anticoagulation therapy? No        Potential Assistance Needed:  Durable Medical Equipment    Patient agreeable to home care: No  [place] of choice provided:  n/a    Prior SNF/Rehab Placement and Facility: none  Agreeable to SNF/Rehab: No  [place] of choice provided: n/a     Evaluation: n/a    Expected Discharge date:  22    Patient expects to be discharged to:   home    If home: is the family and/or caregiver wiling & able to provide support at home? Yes  Who will be providing this support? son*    Follow Up Appointment: Best Day/ Time:      Transportation provider: yandel  Transportation arrangements needed for discharge: No    Readmission Risk              Risk of Unplanned Readmission:  17             Does patient have a readmission risk score greater than 14?: Yes  If yes, follow-up appointment must be made within 7 days of discharge.      Goals of Care: Breathe easier      Educated patient on transitional options, provided freedom of choice and are agreeable with plan      Discharge Plan: Home independently          Electronically signed by Devin Guevara on 22 at 9:27 AM EST

## 2022-02-17 PROBLEM — Z92.89 HISTORY OF ECHOCARDIOGRAM: Status: ACTIVE | Noted: 2018-06-12

## 2022-04-12 ENCOUNTER — IMMUNIZATION (OUTPATIENT)
Dept: NURSING | Facility: CLINIC | Age: 57
End: 2022-04-12
Payer: COMMERCIAL

## 2022-04-12 PROCEDURE — 0064A COVID-19,PF,MODERNA (18+ YRS BOOSTER .25ML): CPT

## 2022-04-12 PROCEDURE — 91306 COVID-19,PF,MODERNA (18+ YRS BOOSTER .25ML): CPT

## 2022-06-11 DIAGNOSIS — F41.8 MIXED ANXIETY AND DEPRESSIVE DISORDER: ICD-10-CM

## 2022-06-13 RX ORDER — MIRTAZAPINE 15 MG/1
TABLET, FILM COATED ORAL
Qty: 90 TABLET | Refills: 1 | Status: SHIPPED | OUTPATIENT
Start: 2022-06-13 | End: 2022-11-30

## 2022-06-14 ENCOUNTER — OFFICE VISIT (OUTPATIENT)
Dept: DERMATOLOGY | Facility: CLINIC | Age: 57
End: 2022-06-14
Payer: COMMERCIAL

## 2022-06-14 DIAGNOSIS — L81.4 SOLAR LENTIGO: ICD-10-CM

## 2022-06-14 DIAGNOSIS — D22.9 MULTIPLE BENIGN NEVI: ICD-10-CM

## 2022-06-14 DIAGNOSIS — Z85.828 HISTORY OF NONMELANOMA SKIN CANCER: Primary | ICD-10-CM

## 2022-06-14 PROCEDURE — 99213 OFFICE O/P EST LOW 20 MIN: CPT | Performed by: DERMATOLOGY

## 2022-06-14 ASSESSMENT — PAIN SCALES - GENERAL: PAINLEVEL: NO PAIN (0)

## 2022-06-14 NOTE — PROGRESS NOTES
[facility] Dermatology Note  Encounter Date: Jun 14, 2022  Office Visit     Dermatology Problem List:  1. NMSC  - Superficial BCC, left cheek, s/p Mohs 6/16  - SCCIS right cheek, s/p Mohs 6/16  2. Verruca vulgaris, left plantar foot - resolved  - Cryo 06/25/2021, prior sal acid  3. Actinic keratoses. Cryo.  4. Actinic cheilitis  - s/p CO2 laser ablation of lower lip vermilion 10/2016  - s/p laser vermilionectomy 8/2017  - Previously followed with oral surgery  5. Seborrheic dermatitis   - ketoconazole shampoo   6. 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    ____________________________________________    Assessment & Plan:    # Multiple benign nevi.  # Solar lentigines   - No concerning lesions today  - Counseled on ABCDEs of melanoma and sun protection - recommend SPF 30 or higher with frequent application   - Return sooner if noticing changing or symptomatic lesions     # History of NMSC. No evidence of recurrent disease.  # H/o actinic cheilitis.  - Continue photoprotection - recommend SPF 30 or higher with frequent reapplication  - Continue yearly skin exams  - Advised to monitor for changing, non-healing, bleeding, painful, changing, or otherwise symptomatic lesions    Procedures Performed:   None    Follow-up: 6 month(s) in-person, or earlier for new or changing lesions    Staff and Scribe:     Scribe Disclosure:  I, Eduard Bullock, am serving as a scribe to document services personally performed by Mary Kumar MD based on data collection and the provider's statements to me.     Provider Disclosure:   The documentation recorded by the scribe accurately reflects the services I personally performed and the decisions made by  me.    Mary Kumar MD    Department of Dermatology  [facility]: Phone: [phone], Fax: [phone]  6/14/2022     ____________________________________________    CC: Skin Check (States there are no spots of concern )    HPI:  Mr. Cesar Montejo is a(n) 56 year old male who presents today as a return patient for FBSE. Last seen by me on 12/10/2021, at which time patient was started on ketoconazole 2% shampoo for treatment of seborrheic dermatitis.    Today, patient denies new spots of concern on his skin.    Patient is otherwise feeling well, without additional skin concerns.    Labs Reviewed:  N/A    Physical Exam:  Vitals: There were no vitals taken for this visit.  SKIN: Total skin excluding the undergarment areas was performed. The exam included the head/face, neck, both arms, chest, back, abdomen, both legs, digits and/or nails.   - Multiple regular brown pigmented macules and papules are identified on the trunk and extremities.   - Scattered brown macules on sun exposed areas.  - No other lesions of concern on areas examined.     Medications:  Current Outpatient Medications   Medication     aspirin 81 MG EC tablet     atorvastatin (LIPITOR) 40 MG tablet     Cholecalciferol (VITAMIN D) 50 MCG (2000 UT) CAPS     COENZYME Q10 PO     doxycycline monohydrate (MONODOX) 100 MG capsule     ketoconazole (NIZORAL) 2 % external cream     ketoconazole (NIZORAL) 2 % external shampoo     Krill Oil 1000 MG CAPS     Melatonin 10 MG TABS tablet     mirtazapine (REMERON) 15 MG tablet     NONFORMULARY     NONFORMULARY     simvastatin (ZOCOR) 20 MG tablet     simvastatin (ZOCOR) 40 MG tablet     triamcinolone (KENALOG) 0.1 % external ointment     venlafaxine (EFFEXOR-XR) 75 MG 24 hr capsule     famciclovir (FAMVIR) 500 MG tablet     No current facility-administered medications for this visit.      Past Medical History:   Patient  Active Problem List   Diagnosis     Insomnia     Adjustment reaction     CARDIOVASCULAR SCREENING; LDL GOAL LESS THAN 130     Pure hypercholesterolemia     GERD (gastroesophageal reflux disease)     Right shoulder pain     Calcific tendonitis     Solar lentiginosis     Skin cancer screening     Dermatitis, seborrheic     Family history of skin cancer     AK (actinic keratosis)     History of actinic keratosis     History of basal cell cancer     Seborrheic keratosis     Cherry angioma     Actinic cheilitis     History of nonmelanoma skin cancer     History of multiple concussions     Elevated blood pressure reading without diagnosis of hypertension     Dream enactment behavior     Disturbance in sleep behavior     Dyspnea and respiratory abnormality     Chest pain     H/O magnetic resonance imaging of brain and brain stem     History of echocardiogram     Encounter for neuropsychological testing 2018 with Dr. Park     Benign neoplasm of skin of right upper extremity     Illness anxiety disorder     Past Medical History:   Diagnosis Date     Anxiety      Basal cell carcinoma      Gastro-oesophageal reflux disease      H/O magnetic resonance imaging of brain and brain stem 6/12/2018    MR BRAIN WITHOUT AND WITH CONTRAST 11/29/2017 9:10 PM   HISTORY:  Left-sided numbness. Disequilibrium.   COMPARISON: MR brain 4/18/2015.   TECHNIQUE: Sagittal T1, axial T2, axial FLAIR, axial GRE, axial diffusion scan, coronal FLAIR, axial post Gd enhanced T1 weighted images.   FINDINGS: There is no intracranial hemorrhage, mass, or recent infarct. There is a cyst in the floor of the right maxilla     Head injury April 2012    Had a bad concussion with post concussion synd for year     High cholesterol      History of echocardiogram 6/12/2018    TUYET Keyes MD 6/4/2018  Narrative     [id number]^MECHE^BLAKE^KRISTAL       [facility],[place] [facility] [address] [place] [address]  [place] [address] Name: JONATHAN FIORE MRN: [id number] : 1965 Study Date: 2018 09:13 AM Age: 52 yrs Gender: Male Patient Location: [facility] Reason For Study: Chest Pain Ordering Physician:      Hypertension early     Not on meds. Usually in pre-hypertesion categ.     Insomnia      Squamous cell carcinoma         CC Referred Self, MD  No address on file on close of this encounter.

## 2022-06-14 NOTE — PATIENT INSTRUCTIONS
Patient Education     Checking for Skin Cancer  You can find cancer early by checking your skin each month. There are 3 kinds of skin cancer. They are melanoma, basal cell carcinoma, and squamous cell carcinoma. Doing monthly skin checks is the best way to find new marks or skin changes. Follow the instructions below for checking your skin.   The ABCDEs of checking moles for melanoma   Check your moles or growths for signs of melanoma using ABCDE:   Asymmetry: the sides of the mole or growth don t match  Border: the edges are ragged, notched, or blurred  Color: the color within the mole or growth varies  Diameter: the mole or growth is larger than 6 mm (size of a pencil eraser)  Evolving: the size, shape, or color of the mole or growth is changing (evolving is not shown in the images below)    Checking for other types of skin cancer  Basal cell carcinoma or squamous cell carcinoma have symptoms such as:     A spot or mole that looks different from all other marks on your skin  Changes in how an area feels, such as itching, tenderness, or pain  Changes in the skin's surface, such as oozing, bleeding, or scaliness  A sore that does not heal  New swelling or redness beyond the border of a mole    Who s at risk?  Anyone can get skin cancer. But you are at greater risk if you have:   Fair skin, light-colored hair, or light-colored eyes  Many moles or abnormal moles on your skin  A history of sunburns from sunlight or tanning beds  A family history of skin cancer  A history of exposure to radiation or chemicals  A weakened immune system  If you have had skin cancer in the past, you are at risk for recurring skin cancer.   How to check your skin  Do your monthly skin checkups in front of a full-length mirror. Check all parts of your body, including your:   Head (ears, face, neck, and scalp)  Torso (front, back, and sides)  Arms (tops, undersides, upper, and lower armpits)  Hands (palms, backs, and fingers, including  under the nails)  Buttocks and genitals  Legs (front, back, and sides)  Feet (tops, soles, toes, including under the nails, and between toes)  If you have a lot of moles, take digital photos of them each month. Make sure to take photos both up close and from a distance. These can help you see if any moles change over time.   Most skin changes are not cancer. But if you see any changes in your skin, call your doctor right away. Only he or she can diagnose a problem. If you have skin cancer, seeing your doctor can be the first step toward getting the treatment that could save your life.   [employer] last reviewed this educational content on 4/1/2019 2000-2020 [employer]. [address] [place] [address], [place] [address]. All rights reserved. This information is not intended as a substitute for professional medical care. Always follow your healthcare professional's instructions.       When should I call my doctor?  If you are worsening or not improving, please, contact us or seek urgent care as noted below.     Who should I call with questions (adults)?  [facility] (adult and pediatric): [phone]  [facility] (adult): [phone]  For urgent needs outside of business hours call the [facility] at [phone] and ask for the dermatology resident on call to be paged  If this is a medical emergency and you are unable to reach an ER, Call [phone]    Who should I call with questions (pediatric)?  [facility]- Pediatric Dermatology  Dr. Daniela Eduardo, Dr. Mima Garcia, Dr. Malgorzata Rust, VIKY Goodwin, Dr. Elsa Gipson, Dr. Cesia Villanueva & Dr. Riccardo Patrick  Non-urgent nurse triage line; [phone]- Mai and Kenya MIGUEL Care Coordinatorjose Mitchell (/Complex ) [phone]    If you need a prescription refill, please contact your pharmacy. Refills are approved or denied by our  Physicians during normal business hours, Monday through Fridays  Per office policy, refills will not be granted if you have not been seen within the past year (or sooner depending on your child's condition)    Scheduling Information:  Pediatric Appointment Scheduling and Call Center [phone]  Radiology Scheduling- [phone]  Sedation Unit Scheduling- [phone]  [place] Scheduling- General [phone]; Pediatric Dermatology [phone]  Main  Services: [phone]  Pashto: [phone]  Tunisian: [phone]  Hmong/Puerto Rican/Hungarian: [phone]  Preadmission Nursing Department Fax Number: [phone] (Fax all pre-operative paperwork to this number)    For urgent matters arising during evenings, weekends, or holidays that cannot wait for normal business hours please call [phone] and ask for the dermatology resident on call to be paged.

## 2022-06-14 NOTE — NURSING NOTE
Dermatology Rooming Note    Cesar Montejo's goals for this visit include:   Chief Complaint   Patient presents with     Skin Check     States there are no spots of concern      Angelia Balbuena CMA on 6/14/2022 at 1:04 PM

## 2022-06-14 NOTE — LETTER
6/14/2022       RE: Cesar Montejo  [address]  [facility] [address]     Dear Colleague,    Thank you for referring your patient, Cesar Montejo, to the [facility] [place] [facility]. Please see a copy of my visit note below.    [facility] Dermatology Note  Encounter Date: Jun 14, 2022  Office Visit     Dermatology Problem List:  1. NMSC  - Superficial BCC, left cheek, s/p Mohs 6/16  - SCCIS right cheek, s/p Mohs 6/16  2. Verruca vulgaris, left plantar foot - resolved  - Cryo 06/25/2021, prior sal acid  3. Actinic keratoses. Cryo.  4. Actinic cheilitis  - s/p CO2 laser ablation of lower lip vermilion 10/2016  - s/p laser vermilionectomy 8/2017  - Previously followed with oral surgery  5. Seborrheic dermatitis   - ketoconazole shampoo   6. 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    ____________________________________________    Assessment & Plan:    # Multiple benign nevi.  # Solar lentigines   - No concerning lesions today  - Counseled on ABCDEs of melanoma and sun protection - recommend SPF 30 or higher with frequent application   - Return sooner if noticing changing or symptomatic lesions     # History of NMSC. No evidence of recurrent disease.  # H/o actinic cheilitis.  - Continue photoprotection - recommend SPF 30 or higher with frequent reapplication  - Continue yearly skin exams  - Advised to monitor for changing, non-healing, bleeding, painful, changing, or otherwise symptomatic lesions    Procedures Performed:   None    Follow-up: 6 month(s) in-person, or earlier for new or changing lesions    Staff and Scribe:     Scribe Disclosure:  I,  Eduard Bullock, am serving as a scribe to document services personally performed by Mary Kumar MD based on data collection and the provider's statements to me.     Provider Disclosure:   The documentation recorded by the scribe accurately reflects the services I personally performed and the decisions made by me.    Mary Kumar MD    Department of Dermatology  [facility]: Phone: [phone], Fax: [phone]  6/14/2022     ____________________________________________    CC: Skin Check (States there are no spots of concern )    HPI:  Mr. Cesar Montejo is a(n) 56 year old male who presents today as a return patient for FBSE. Last seen by me on 12/10/2021, at which time patient was started on ketoconazole 2% shampoo for treatment of seborrheic dermatitis.    Today, patient denies new spots of concern on his skin.    Patient is otherwise feeling well, without additional skin concerns.    Labs Reviewed:  N/A    Physical Exam:  Vitals: There were no vitals taken for this visit.  SKIN: Total skin excluding the undergarment areas was performed. The exam included the head/face, neck, both arms, chest, back, abdomen, both legs, digits and/or nails.   - Multiple regular brown pigmented macules and papules are identified on the trunk and extremities.   - Scattered brown macules on sun exposed areas.  - No other lesions of concern on areas examined.     Medications:  Current Outpatient Medications   Medication     aspirin 81 MG EC tablet     atorvastatin (LIPITOR) 40 MG tablet     Cholecalciferol (VITAMIN D) 50 MCG (2000 UT) CAPS     COENZYME Q10 PO     doxycycline monohydrate (MONODOX) 100 MG capsule     ketoconazole (NIZORAL) 2 % external cream     ketoconazole (NIZORAL) 2 % external shampoo     Krill Oil 1000 MG CAPS     Melatonin 10 MG TABS tablet     mirtazapine (REMERON) 15 MG tablet     NONFORMULARY      NONFORMULARY     simvastatin (ZOCOR) 20 MG tablet     simvastatin (ZOCOR) 40 MG tablet     triamcinolone (KENALOG) 0.1 % external ointment     venlafaxine (EFFEXOR-XR) 75 MG 24 hr capsule     famciclovir (FAMVIR) 500 MG tablet     No current facility-administered medications for this visit.      Past Medical History:   Patient Active Problem List   Diagnosis     Insomnia     Adjustment reaction     CARDIOVASCULAR SCREENING; LDL GOAL LESS THAN 130     Pure hypercholesterolemia     GERD (gastroesophageal reflux disease)     Right shoulder pain     Calcific tendonitis     Solar lentiginosis     Skin cancer screening     Dermatitis, seborrheic     Family history of skin cancer     AK (actinic keratosis)     History of actinic keratosis     History of basal cell cancer     Seborrheic keratosis     Cherry angioma     Actinic cheilitis     History of nonmelanoma skin cancer     History of multiple concussions     Elevated blood pressure reading without diagnosis of hypertension     Dream enactment behavior     Disturbance in sleep behavior     Dyspnea and respiratory abnormality     Chest pain     H/O magnetic resonance imaging of brain and brain stem     History of echocardiogram     Encounter for neuropsychological testing 2018 with Dr. Park     Benign neoplasm of skin of right upper extremity     Illness anxiety disorder     Past Medical History:   Diagnosis Date     Anxiety      Basal cell carcinoma      Gastro-oesophageal reflux disease      H/O magnetic resonance imaging of brain and brain stem 6/12/2018    MR BRAIN WITHOUT AND WITH CONTRAST 11/29/2017 9:10 PM   HISTORY:  Left-sided numbness. Disequilibrium.   COMPARISON: MR brain 4/18/2015.   TECHNIQUE: Sagittal T1, axial T2, axial FLAIR, axial GRE, axial diffusion scan, coronal FLAIR, axial post Gd enhanced T1 weighted images.   FINDINGS: There is no intracranial hemorrhage, mass, or recent infarct. There is a cyst in the floor of the right maxilla     Head  injury 2012    Had a bad concussion with post concussion synd for year     High cholesterol      History of echocardiogram 2018    TUYET Keyes MD 2018  Narrative     [id number]^MECHE^BLAKE^KRISTAL       [facility] [facility] [address] [place] [address] Name: JONATHAN FIORE MRN: [id number] : 1965 Study Date: 2018 09:13 AM Age: 52 yrs Gender: Male Patient Location: [facility] Reason For Study: Chest Pain Ordering Physician:      Hypertension early     Not on meds. Usually in pre-hypertesion categ.     Insomnia      Squamous cell carcinoma         CC Referred Self, MD  No address on file on close of this encounter.

## 2022-07-05 ENCOUNTER — TELEPHONE (OUTPATIENT)
Dept: DERMATOLOGY | Facility: CLINIC | Age: 57
End: 2022-07-05

## 2022-07-05 NOTE — TELEPHONE ENCOUNTER
Call patient to schedule follow up in the Dermatology Clinic per Dr Kumar last visit on 6/14/22 checkout comment dispositions. Left message with Dermatology number for patient to call back to schedule. Send Di[employer].    Schedule FBSE with Dr Kumar in 6 months.

## 2022-08-08 NOTE — PROGRESS NOTES
Patient is ready for the procedure. Here for Tilt Study, Awaiting physician to review procedure and sign consent, Normal Saline Bolus infusing at 50 cc's every 5 minutes   No

## 2022-08-20 ENCOUNTER — HEALTH MAINTENANCE LETTER (OUTPATIENT)
Age: 57
End: 2022-08-20

## 2022-08-22 NOTE — NURSING NOTE
Chief Complaint   Patient presents with     Shingles       Initial /90 mmHg  Pulse 104  Temp(Src) 96.5  F (35.8  C) (Oral)  Wt 178 lb 3.2 oz (80.831 kg)  SpO2 96% Estimated body mass index is 24.16 kg/(m^2) as calculated from the following:    Height as of 2/3/17: 6' (1.829 m).    Weight as of this encounter: 178 lb 3.2 oz (80.831 kg).     Xin Gaffney MA       [facility]    Dr. Sony Garcia, DO     Your procedure is scheduled for Wednesday September 7th at 10:45am. Please arrive by 10:15am.  Your procedure is scheduled at the   Outpatient Surgery Center- located next to the Emergency Department. .      The Pre-Surgical staff at [facility] will review your health history, perform an assessment and answer any questions you have.    PROCEDURE INSTRUCTIONS  Do not eat any solid food or drink liquids for at least 6 hours prior to your procedure.    Stop the following medication:        none    If you are going home following your procedure, you CANNOT drive after surgery. Make arrangements for transportation home.    Notify your doctor if you develop signs of illness such as fever, or cold/flu symptoms.    Wear comfortable clothing that is also appropriate for discharge.    Day of Procedure please take any Blood Pressure Medication and/or Pain Medication with a small sip of water.    Blood sugar must be under 200 to proceed with procedure, your blood sugar will be checked right before scheduled procedure time.    Post op appointment scheduled on 9/29/22 at 11:45am to see Dr. Garcia in office.

## 2022-09-19 DIAGNOSIS — F41.1 GAD (GENERALIZED ANXIETY DISORDER): ICD-10-CM

## 2022-09-19 RX ORDER — VENLAFAXINE HYDROCHLORIDE 75 MG/1
CAPSULE, EXTENDED RELEASE ORAL
Qty: 90 CAPSULE | Refills: 0 | Status: SHIPPED | OUTPATIENT
Start: 2022-09-19 | End: 2022-09-27

## 2022-09-27 ENCOUNTER — MYC REFILL (OUTPATIENT)
Dept: FAMILY MEDICINE | Facility: CLINIC | Age: 57
End: 2022-09-27

## 2022-09-27 DIAGNOSIS — F41.1 GAD (GENERALIZED ANXIETY DISORDER): ICD-10-CM

## 2022-09-27 RX ORDER — VENLAFAXINE HYDROCHLORIDE 75 MG/1
CAPSULE, EXTENDED RELEASE ORAL
Qty: 90 CAPSULE | Refills: 0 | Status: SHIPPED | OUTPATIENT
Start: 2022-09-27 | End: 2023-03-02

## 2022-09-27 NOTE — TELEPHONE ENCOUNTER
"Requested Prescriptions   Pending Prescriptions Disp Refills     venlafaxine (EFFEXOR XR) 75 MG 24 hr capsule 90 capsule 0     Sig: TAKE 1 CAPSULE(75 MG) BY MOUTH DAILY       Serotonin-Norepinephrine Reuptake Inhibitors  Failed - 9/27/2022  6:24 AM        Failed - Blood pressure under 140/90 in past 12 months     BP Readings from Last 3 Encounters:   06/08/21 122/72   12/03/19 98/84   08/06/19 124/72                 Failed - Normal serum creatinine on file in past 12 months     Recent Labs   Lab Test 06/08/21  1441 03/08/18  1431 01/21/18  1538   CR 0.88   < >  --    CREAT  --   --  0.8    < > = values in this interval not displayed.       Ok to refill medication if creatinine is low          Passed - Recent (12 mo) or future (30 days) visit within the authorizing provider's specialty     Patient has had an office visit with the authorizing provider or a provider within the authorizing providers department within the previous 12 mos or has a future within next 30 days. See \"Patient Info\" tab in inbasket, or \"Choose Columns\" in Meds & Orders section of the refill encounter.              Passed - Medication is active on med list        Passed - Patient is age 18 or older           Routing to PCP to approve as fails protocol and warning   Kerline LANDEROS RN      "

## 2022-10-13 ENCOUNTER — OFFICE VISIT (OUTPATIENT)
Dept: FAMILY MEDICINE | Facility: CLINIC | Age: 57
End: 2022-10-13
Payer: COMMERCIAL

## 2022-10-13 VITALS
BODY MASS INDEX: 25.71 KG/M2 | OXYGEN SATURATION: 96 % | WEIGHT: 195 LBS | SYSTOLIC BLOOD PRESSURE: 132 MMHG | HEART RATE: 83 BPM | TEMPERATURE: 97.3 F | DIASTOLIC BLOOD PRESSURE: 78 MMHG

## 2022-10-13 DIAGNOSIS — F41.1 GAD (GENERALIZED ANXIETY DISORDER): ICD-10-CM

## 2022-10-13 DIAGNOSIS — Z00.00 ROUTINE HISTORY AND PHYSICAL EXAMINATION OF ADULT: Primary | ICD-10-CM

## 2022-10-13 DIAGNOSIS — Z12.5 SCREENING FOR PROSTATE CANCER: ICD-10-CM

## 2022-10-13 DIAGNOSIS — Z13.6 CARDIOVASCULAR SCREENING; LDL GOAL LESS THAN 130: ICD-10-CM

## 2022-10-13 PROCEDURE — 80053 COMPREHEN METABOLIC PANEL: CPT | Performed by: FAMILY MEDICINE

## 2022-10-13 PROCEDURE — 36415 COLL VENOUS BLD VENIPUNCTURE: CPT | Performed by: FAMILY MEDICINE

## 2022-10-13 PROCEDURE — 80061 LIPID PANEL: CPT | Performed by: FAMILY MEDICINE

## 2022-10-13 PROCEDURE — 90750 HZV VACC RECOMBINANT IM: CPT | Performed by: FAMILY MEDICINE

## 2022-10-13 PROCEDURE — 90471 IMMUNIZATION ADMIN: CPT | Performed by: FAMILY MEDICINE

## 2022-10-13 PROCEDURE — 99396 PREV VISIT EST AGE 40-64: CPT | Mod: 25 | Performed by: FAMILY MEDICINE

## 2022-10-13 PROCEDURE — G0103 PSA SCREENING: HCPCS | Performed by: FAMILY MEDICINE

## 2022-10-13 ASSESSMENT — ENCOUNTER SYMPTOMS
HEMATOCHEZIA: 0
HEMATURIA: 0
NERVOUS/ANXIOUS: 0
MYALGIAS: 0
PARESTHESIAS: 0
DYSURIA: 0
NAUSEA: 0
JOINT SWELLING: 0
DIZZINESS: 0
CONSTIPATION: 1
WEAKNESS: 0
ABDOMINAL PAIN: 0
FEVER: 0
EYE PAIN: 0
PALPITATIONS: 0
COUGH: 0
SHORTNESS OF BREATH: 0
HEARTBURN: 1
FREQUENCY: 0
SORE THROAT: 0
HEADACHES: 0
CHILLS: 0
ARTHRALGIAS: 1
DIARRHEA: 0

## 2022-10-13 ASSESSMENT — PAIN SCALES - GENERAL: PAINLEVEL: NO PAIN (0)

## 2022-10-13 NOTE — PROGRESS NOTES
SUBJECTIVE:   CC: Travis is an 56 year old who presents for preventative health visit.     Patient has been advised of split billing requirements and indicates understanding: Yes     Get lipids, liver enzymes, glucose, general physical and renewing of meds - Effexor, Mirtazapine, Lipitor    Healthy Habits:     Getting at least 3 servings of Calcium per day:  Yes    Bi-annual eye exam:  Yes    Dental care twice a year:  NO    Sleep apnea or symptoms of sleep apnea:  Daytime drowsiness    Diet:  Carbohydrate counting    Frequency of exercise:  6-7 days/week    Duration of exercise:  30-45 minutes    Taking medications regularly:  Yes    Medication side effects:  None    PHQ-2 Total Score: 1    Additional concerns today:  No        Anxiety Follow-Up    How are you doing with your anxiety since your last visit? Improved on medication     Are you having other symptoms that might be associated with anxiety? Yes:  sleep issues    Have you had a significant life event? Health Concerns     Are you feeling depressed? No    Do you have any concerns with your use of alcohol or other drugs? No    Social History     Tobacco Use     Smoking status: Former     Packs/day: 0.50     Years: 5.00     Pack years: 2.50     Types: Cigarettes     Start date: 1986     Quit date: 1991     Years since quittin.8     Smokeless tobacco: Never     Tobacco comments:     After  no longer a half pack a day. Very occasional--maybe 75 cigarettes a year--91 to 98   Vaping Use     Vaping Use: Never used   Substance Use Topics     Alcohol use: Yes     Alcohol/week: 1.0 - 2.0 standard drink     Comment: 1 to 2 glasses of red wine per day     Drug use: No     Comment: quit more than 20 years ago     PREMA-7 SCORE 2019 2019 12/3/2019   Total Score 7 (mild anxiety) - -   Total Score 7 9 6     PHQ 2019 12/3/2019 2021   PHQ-9 Total Score 11 9 4   Q9: Thoughts of better off dead/self-harm past 2 weeks Not at all Not at all Not at  all     Last PHQ-9 8/31/2021   1.  Little interest or pleasure in doing things 1   2.  Feeling down, depressed, or hopeless 0   3.  Trouble falling or staying asleep, or sleeping too much 1   4.  Feeling tired or having little energy 1   5.  Poor appetite or overeating 0   6.  Feeling bad about yourself 0   7.  Trouble concentrating 1   8.  Moving slowly or restless 0   Q9: Thoughts of better off dead/self-harm past 2 weeks 0   PHQ-9 Total Score 4   Difficulty at work, home, or with people -     PREMA-7  12/3/2019   1. Feeling nervous, anxious, or on edge 1   2. Not being able to stop or control worrying 1   3. Worrying too much about different things 1   4. Trouble relaxing 1   5. Being so restless that it is hard to sit still 0   6. Becoming easily annoyed or irritable 1   7. Feeling afraid, as if something awful might happen 1   PREMA-7 Total Score 6   If you checked any problems, how difficult have they made it for you to do your work, take care of things at home, or get along with other people? Somewhat difficult         Today's PHQ-2 Score:   PHQ-2 ( 1999 [employer]) 10/13/2022   Q1: Little interest or pleasure in doing things 1   Q2: Feeling down, depressed or hopeless 0   PHQ-2 Score 1   PHQ-2 Total Score (12-17 Years)- Positive if 3 or more points; Administer PHQ-A if positive -   Q1: Little interest or pleasure in doing things Several days   Q2: Feeling down, depressed or hopeless Not at all   PHQ-2 Score 1   Some encounter information is confidential and restricted. Go to Review Flowsheets activity to see all data.       Abuse: Current or Past(Physical, Sexual or Emotional)- No  Do you feel safe in your environment? Yes    Have you ever done Advance Care Planning? (For example, a Health Directive, POLST, or a discussion with a medical provider or your loved ones about your wishes): No, advance care planning information given to patient to review.  Patient plans to discuss their wishes with loved ones or provider.       Social History     Tobacco Use     Smoking status: Former     Packs/day: 0.50     Years: 5.00     Pack years: 2.50     Types: Cigarettes     Start date: 1986     Quit date: 1991     Years since quittin.8     Smokeless tobacco: Never     Tobacco comments:     After  no longer a half pack a day. Very occasional--maybe 75 cigarettes a year--91 to 98   Substance Use Topics     Alcohol use: Yes     Alcohol/week: 1.0 - 2.0 standard drink     Comment: 1 to 2 glasses of red wine per day     If you drink alcohol do you typically have >3 drinks per day or >7 drinks per week? No    Alcohol Use 10/13/2022   Prescreen: >3 drinks/day or >7 drinks/week? No   Prescreen: >3 drinks/day or >7 drinks/week? -   No flowsheet data found.    Last PSA:   PSA   Date Value Ref Range Status   2021 1.65 0 - 4 ug/L Final     Comment:     Assay Method:  Chemiluminescence using Siemens Vista analyzer       Reviewed orders with patient. Reviewed health maintenance and updated orders accordingly - Yes  Lab work is in process    Reviewed and updated as needed this visit by clinical staff                  Reviewed and updated as needed this visit by Provider                 Past Medical History:   Diagnosis Date     Anxiety      Basal cell carcinoma      Gastro-oesophageal reflux disease      H/O magnetic resonance imaging of brain and brain stem 2018    MR BRAIN WITHOUT AND WITH CONTRAST 2017 9:10 PM   HISTORY:  Left-sided numbness. Disequilibrium.   COMPARISON: MR brain 2015.   TECHNIQUE: Sagittal T1, axial T2, axial FLAIR, axial GRE, axial diffusion scan, coronal FLAIR, axial post Gd enhanced T1 weighted images.   FINDINGS: There is no intracranial hemorrhage, mass, or recent infarct. There is a cyst in the floor of the right maxilla     Head injury 2012    Had a bad concussion with post concussion synd for year     High cholesterol      History of echocardiogram 2018    TUYET Keyes,  MD 2018  [facility]     [id number]^MECHE^BLAKE^KRISTAL       [facility] [facility] [address] [place] [id number] Name: JONATHAN FIORE MRN: [id number] : 1965 Study Date: 2018 09:13 AM Age: 52 yrs Gender: Male Patient Location: [facility] Reason For Study: Chest Pain Ordering Physician:      Hypertension early     Not on meds. Usually in pre-hypertesion categ.     Insomnia      Squamous cell carcinoma         Review of Systems   Constitutional: Negative for chills and fever.   HENT: Positive for congestion. Negative for ear pain, hearing loss and sore throat.    Eyes: Negative for pain and visual disturbance.   Respiratory: Negative for cough and shortness of breath.    Cardiovascular: Negative for chest pain, palpitations and peripheral edema.   Gastrointestinal: Positive for constipation and heartburn. Negative for abdominal pain, diarrhea, hematochezia and nausea.   Genitourinary: Negative for dysuria, frequency, genital sores, hematuria, impotence, penile discharge and urgency.   Musculoskeletal: Positive for arthralgias. Negative for joint swelling and myalgias.   Skin: Negative for rash.   Neurological: Negative for dizziness, weakness, headaches and paresthesias.   Psychiatric/Behavioral: Negative for mood changes. The patient is not nervous/anxious.          OBJECTIVE:   There were no vitals taken for this visit.    Physical Exam  GENERAL: healthy, alert and no distress  EYES: Eyes grossly normal to inspection, PERRL and conjunctivae and sclerae normal  HENT: ear canals and TM's normal, nose and mouth without ulcers or lesions  NECK: no adenopathy, no asymmetry, masses, or scars and thyroid normal to palpation  RESP: lungs clear to auscultation - no rales, rhonchi or wheezes  CV: regular rate and rhythm, normal S1 S2, no S3 or S4, no murmur, click or rub, no peripheral edema and peripheral pulses strong  ABDOMEN:  "soft, nontender, no hepatosplenomegaly, no masses and bowel sounds normal  MS: no gross musculoskeletal defects noted, no edema  SKIN: no suspicious lesions or rashes  NEURO: Normal strength and tone, mentation intact and speech normal  PSYCH: mentation appears normal, affect normal/bright  LYMPH: no cervical, supraclavicular, axillary, or inguinal adenopathy    Diagnostic Test Results:  Labs reviewed in Epic    ASSESSMENT/PLAN:   (Z00.00) Routine history and physical examination of adult  (primary encounter diagnosis)  Comment: doing better   very fit  Plan: Comprehensive metabolic panel (BMP + Alb, Alk         Phos, ALT, AST, Total. Bili, TP), Lipid panel         reflex to direct LDL Fasting            (Z12.5) Screening for prostate cancer  Comment: will do  Plan: PSA, screen            (F41.1) PREMA (generalized anxiety disorder)  Comment: Well controlled   Plan: refill effexer    (Z13.6) CARDIOVASCULAR SCREENING; LDL GOAL LESS THAN 130  Comment: recheck  Plan: lab        COUNSELING:   Reviewed preventive health counseling, as reflected in patient instructions       Regular exercise       Healthy diet/nutrition       Vision screening       Hearing screening       Immunizations    Vaccinated for: Zoster             Colorectal cancer screening       Prostate cancer screening    Estimated body mass index is 23.99 kg/m  as calculated from the following:    Height as of 6/8/21: 1.855 m (6' 1.03\").    Weight as of 6/8/21: 82.6 kg (182 lb).         He reports that he quit smoking about 31 years ago. His smoking use included cigarettes. He started smoking about 36 years ago. He has a 2.50 pack-year smoking history. He has never used smokeless tobacco.      Counseling Resources:  ATP IV Guidelines  Pooled Cohorts Equation Calculator  FRAX Risk Assessment  ICSI Preventive Guidelines  Dietary Guidelines for Americans, 2010  [employer]'s MyPlate  ASA Prophylaxis  Lung CA Screening    Rocky Messina MD  [facility]

## 2022-10-14 LAB
ALBUMIN SERPL-MCNC: 4.2 G/DL (ref 3.4–5)
ALP SERPL-CCNC: 47 U/L (ref 40–150)
ALT SERPL W P-5'-P-CCNC: 64 U/L (ref 0–70)
ANION GAP SERPL CALCULATED.3IONS-SCNC: 5 MMOL/L (ref 3–14)
AST SERPL W P-5'-P-CCNC: 28 U/L (ref 0–45)
BILIRUB SERPL-MCNC: 0.6 MG/DL (ref 0.2–1.3)
BUN SERPL-MCNC: 14 MG/DL (ref 7–30)
CALCIUM SERPL-MCNC: 9 MG/DL (ref 8.5–10.1)
CHLORIDE BLD-SCNC: 107 MMOL/L (ref 94–109)
CHOLEST SERPL-MCNC: 199 MG/DL
CO2 SERPL-SCNC: 26 MMOL/L (ref 20–32)
CREAT SERPL-MCNC: 0.88 MG/DL (ref 0.66–1.25)
FASTING STATUS PATIENT QL REPORTED: YES
GFR SERPL CREATININE-BSD FRML MDRD: >90 ML/MIN/1.73M2
GLUCOSE BLD-MCNC: 97 MG/DL (ref 70–99)
HDLC SERPL-MCNC: 38 MG/DL
LDLC SERPL CALC-MCNC: 135 MG/DL
NONHDLC SERPL-MCNC: 161 MG/DL
POTASSIUM BLD-SCNC: 4.2 MMOL/L (ref 3.4–5.3)
PROT SERPL-MCNC: 6.9 G/DL (ref 6.8–8.8)
PSA SERPL-MCNC: 1.1 UG/L (ref 0–4)
SODIUM SERPL-SCNC: 138 MMOL/L (ref 133–144)
TRIGL SERPL-MCNC: 129 MG/DL

## 2022-10-17 ENCOUNTER — TELEPHONE (OUTPATIENT)
Dept: DERMATOLOGY | Facility: CLINIC | Age: 57
End: 2022-10-17

## 2022-10-17 NOTE — TELEPHONE ENCOUNTER
Lvm sent mychart and letter informing Patient his 12-14-22 appointment with  was cancelled Provider will not be in Clinic.

## 2022-10-26 ENCOUNTER — E-VISIT (OUTPATIENT)
Dept: FAMILY MEDICINE | Facility: CLINIC | Age: 57
End: 2022-10-26
Payer: COMMERCIAL

## 2022-10-26 ENCOUNTER — MYC MEDICAL ADVICE (OUTPATIENT)
Dept: FAMILY MEDICINE | Facility: CLINIC | Age: 57
End: 2022-10-26
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
## 2022-10-26 PROCEDURE — 99422 OL DIG E/M SVC 11-20 MIN: CPT | Performed by: FAMILY MEDICINE

## 2022-10-26 RX ORDER — DOXYCYCLINE HYCLATE 100 MG
100 TABLET ORAL 2 TIMES DAILY
Qty: 20 TABLET | Refills: 0 | Status: SHIPPED | OUTPATIENT
Start: 2022-10-26 | End: 2022-11-05

## 2022-10-26 NOTE — TELEPHONE ENCOUNTER
Encounter Diagnosis   Name Primary?     Tick bite, initial encounter Yes      Orders Placed This Encounter     doxycycline hyclate (VIBRA-TABS) 100 MG tablet     Sig: Take 1 tablet (100 mg) by mouth 2 times daily for 10 days     Dispense:  20 tablet     Refill:  0     Provider E-Visit time total (minutes): 13

## 2022-11-30 DIAGNOSIS — F41.8 MIXED ANXIETY AND DEPRESSIVE DISORDER: ICD-10-CM

## 2022-11-30 RX ORDER — MIRTAZAPINE 15 MG/1
TABLET, FILM COATED ORAL
Qty: 90 TABLET | Refills: 1 | Status: SHIPPED | OUTPATIENT
Start: 2022-11-30 | End: 2023-10-03

## 2022-11-30 NOTE — TELEPHONE ENCOUNTER
"Requested Prescriptions   Pending Prescriptions Disp Refills     mirtazapine (REMERON) 15 MG tablet [Pharmacy Med Name: MIRTAZAPINE 15MG TABLETS] 90 tablet 1     Sig: TAKE 1 TABLET(15 MG) BY MOUTH AT BEDTIME       Atypical Antidepressants Protocol Failed - 11/30/2022  9:52 AM        Failed - Patient has PHQ-9 score less than 5 in past 6 months.     Please review last PHQ-9 score.           Passed - Medication active on med list        Passed - Patient is age 18 or older        Passed - Recent (6 mo) or future (30 days) visit within the authorizing provider's specialty     Patient had office visit in the last 6 months or has a visit in the next 30 days with authorizing provider or within the authorizing provider's specialty.  See \"Patient Info\" tab in inbasket, or \"Choose Columns\" in Meds & Orders section of the refill encounter.               Routing refill request to provider for review/approval because:  PHQ-9 out of date    [employer] message sent with PHQ-9.    Serafin Fuentes RN   [facility]          "

## 2023-01-11 DIAGNOSIS — L21.9 DERMATITIS, SEBORRHEIC: ICD-10-CM

## 2023-01-11 RX ORDER — KETOCONAZOLE 20 MG/ML
SHAMPOO TOPICAL
Qty: 120 ML | Refills: 5 | Status: SHIPPED | OUTPATIENT
Start: 2023-01-11

## 2023-01-11 NOTE — TELEPHONE ENCOUNTER
Last Clinic Visit: 6/14/2022 [facility] Dermat[facility]    Refilled qty to 12 months from last visit (process #1/Derm protocol).

## 2023-02-10 ENCOUNTER — MYC MEDICAL ADVICE (OUTPATIENT)
Dept: FAMILY MEDICINE | Facility: CLINIC | Age: 58
End: 2023-02-10
Payer: COMMERCIAL

## 2023-02-10 DIAGNOSIS — F41.8 MIXED ANXIETY AND DEPRESSIVE DISORDER: ICD-10-CM

## 2023-02-10 DIAGNOSIS — E78.00 HYPERCHOLESTEREMIA: ICD-10-CM

## 2023-02-10 RX ORDER — ATORVASTATIN CALCIUM 40 MG/1
40 TABLET, FILM COATED ORAL DAILY
Qty: 90 TABLET | Refills: 3 | Status: SHIPPED | OUTPATIENT
Start: 2023-02-10 | End: 2024-01-15

## 2023-02-10 NOTE — TELEPHONE ENCOUNTER
"Requested Prescriptions   Pending Prescriptions Disp Refills     atorvastatin (LIPITOR) 40 MG tablet 90 tablet 3     Sig: Take 1 tablet (40 mg) by mouth daily       Statins Protocol Passed - 2/10/2023  9:28 AM        Passed - LDL on file in past 12 months     Recent Labs   Lab Test 10/13/22  1440   *             Passed - No abnormal creatine kinase in past 12 months     Recent Labs   Lab Test 01/06/19  0425                   Passed - Recent (12 mo) or future (30 days) visit within the authorizing provider's specialty     Patient has had an office visit with the authorizing provider or a provider within the authorizing providers department within the previous 12 mos or has a future within next 30 days. See \"Patient Info\" tab in inbasket, or \"Choose Columns\" in Meds & Orders section of the refill encounter.              Passed - Medication is active on med list        Passed - Patient is age 18 or older         Prescription approved per [facility] Refill Protocol.    Serafin Fuentes RN   [facility]      "

## 2023-02-10 NOTE — TELEPHONE ENCOUNTER
Per pt mychart requesting refill of atorvastatin.    Serafin Fuentes RN   [facility]

## 2023-02-15 NOTE — TELEPHONE ENCOUNTER
Called pharmacy and got medication refill cleared up, and they stated they will email the pt stating they are refilling it. [employer] message sent to pt to explain.    Serafin Fuentes RN   [facility]

## 2023-02-22 NOTE — PROGRESS NOTES
[facility] Dermatology Note  Encounter Date: Jun 25, 2021  Office Visit     Dermatology Problem List:  #  NMSC  -Superficial BCC, left cheek, s/p Mohs 6/16  -SCCIS right cheek, s/p Mohs 6/16  # Verruca vulgaris, left plantar foot  - Cryo 06/25/2021  - Sal acid 40%  # Actinic keratoses. Cryo.  # Actinic cheilitis  - s/p CO2 laser ablation of lower lip vermilion 10/2016  - s/p laser vermilionectomy 8/2017  - Previously followed with oral surgery  # Seborrheic dermatitis   - ketoconazole 2% cream   # Benign bx:  - Focal ulceration with adjacent mild epidermal hyperplasia, R inner helix, s/p bx 2017  - Irregular epidermal hyperplasia with hypergranulosis and dermal fibrosis, L mid-helix, s/p shave bx 2018    ____________________________________________    Assessment & Plan:  # NUB, left posterior upper calf  Ddx ISK vs inflammatory, less likely NMSC. Discussed options of biopsy vs monitor. Will monitor. Recheck at next visit.    # Verruca vulgaris, left plantar foot. Natural history and etiology discussed. Advised management as below.    - See cryo note below.   - Start sal acid 40% nightly, then duct tape  - Consider IL Candida pending response    # History of NMSC. No evidence of recurrent disease.  # History of actinic cheilitis. No evidence of recurrent disease.  - Continue photoprotection - recommend SPF 30 or higher with frequent application  - Continue yearly skin exams  - Advised to monitor for changing, non-healing, bleeding, painful, changing, or otherwise symptomatic lesions      Procedures Performed:   - Cryotherapy procedure note, location(s): See above. After verbal consent and discussion of risks and benefits including, but not limited to, dyspigmentation/scar, blister, and pain, 1 wart was(were) treated with 1-2 mm freeze border for 1-2 cycles with liquid nitrogen. Post cryotherapy instructions were provided.    Follow-up: 6-8 week(s) in-person, or earlier for new or changing  lesions    Staff and Scribe:     Provider Disclosure:   The documentation recorded by the scribe accurately reflects the services I personally performed and the decisions made by me.    Mary Kumar MD    Department of Dermatology  [facility]: Phone: [phone], Fax: [phone]  7/3/2021       Scribe Disclosure:  I, Melba Anthony, am serving as a scribe to document services personally performed by Mary Kumar MD at this visit, based upon the provider's statements to me. All documentation has been reviewed by the aforementioned provider prior to being entered into the official medical record.     IMelba, a scribe, prepared the chart for today's encounter.   ____________________________________________    CC: Skin Check (diego is coming in today for a skin check, states that he has had SCCs and BCCs, no new spots of concern)      HPI:  Mr. Cesar Montejo is a(n) 55 year old male who presents today as a return patient for FBSE. The patient was last seen in dermatology by Dr. Gomez on 01/15/2020 at which time he did not have any concerning lesions. He was started on ketoconazole cream 2% for treatment of seborrheic dermatitis.     Today, the patient reports a few spots on his cheeks, that were not bothersome, and have since resolved. He denies any sore areas in his mustache or beard. He notices some scaling on his lips if dehydrated, although this resolves with Vaseline. There are no sore or bothersome bumps on his lips. When prompted, he does have a wart on the bottom of his left foot. Also when prompted, he does not recognize a spot on his left posterior calf. He otherwise denies any new painful, growing, or changing lesion.     Patient is otherwise feeling well, without additional skin concerns. Of note, the patient walks 5 miles a day. He works as a graduate professor in science and  technology.    Labs Reviewed:  N/A    Physical Exam:  Vitals: There were no vitals taken for this visit.  SKIN: Total skin excluding the undergarment areas was performed. The exam included the head/face, neck, both arms, chest, back, abdomen, both legs, digits and/or nails.   - There is a verrucous papule with thrombosed capillaries interrupting dermatoglyphics on the left plantar foot.   -  Left poster upper calf very thin non specific pink scaly macule.  - Lips no scaling.  - No other lesions of concern on areas examined.     Medications:  Current Outpatient Medications   Medication     aspirin 81 MG EC tablet     atorvastatin (LIPITOR) 40 MG tablet     Cholecalciferol (VITAMIN D) 50 MCG (2000 UT) CAPS     COENZYME Q10 PO     ketoconazole (NIZORAL) 2 % external cream     Krill Oil 1000 MG CAPS     Melatonin 10 MG TABS tablet     mirtazapine (REMERON) 15 MG tablet     NONFORMULARY     NONFORMULARY     simvastatin (ZOCOR) 20 MG tablet     simvastatin (ZOCOR) 40 MG tablet     venlafaxine (EFFEXOR-XR) 75 MG 24 hr capsule     No current facility-administered medications for this visit.         Past Medical History:   Patient Active Problem List   Diagnosis     Insomnia     Adjustment reaction     CARDIOVASCULAR SCREENING; LDL GOAL LESS THAN 130     Pure hypercholesterolemia     GERD (gastroesophageal reflux disease)     Right shoulder pain     Calcific tendonitis     Solar lentiginosis     Skin cancer screening     Dermatitis, seborrheic     Family history of skin cancer     AK (actinic keratosis)     History of actinic keratosis     History of basal cell cancer     Seborrheic keratosis     Cherry angioma     Actinic cheilitis     History of nonmelanoma skin cancer     History of multiple concussions     Elevated blood pressure reading without diagnosis of hypertension     Dream enactment behavior     Disturbance in sleep behavior     Dyspnea and respiratory abnormality     Chest pain     H/O magnetic resonance imaging  of brain and brain stem     History of echocardiogram     Encounter for neuropsychological testing  with Dr. Park     Benign neoplasm of skin of right upper extremity     Illness anxiety disorder     Past Medical History:   Diagnosis Date     Anxiety      Basal cell carcinoma      Gastro-oesophageal reflux disease      H/O magnetic resonance imaging of brain and brain stem 2018    MR BRAIN WITHOUT AND WITH CONTRAST 2017 9:10 PM   HISTORY:  Left-sided numbness. Disequilibrium.   COMPARISON: MR brain 2015.   TECHNIQUE: Sagittal T1, axial T2, axial FLAIR, axial GRE, axial diffusion scan, coronal FLAIR, axial post Gd enhanced T1 weighted images.   FINDINGS: There is no intracranial hemorrhage, mass, or recent infarct. There is a cyst in the floor of the right maxilla     Head injury 2012    Had a bad concussion with post concussion synd for year     High cholesterol      History of echocardiogram 2018    TUYET Keyes MD 2018  Narrative     [id number]^MECHE^BLAKE^R       [facility] [facility] [address] [place] [address] Name: JONATHAN FIORE MRN: [id number] : 1965 Study Date: 2018 09:13 AM Age: 52 yrs Gender: Male Patient Location: [facility] Reason For Study: Chest Pain Ordering Physician:      Hypertension early     Not on meds. Usually in pre-hypertesion categ.     Insomnia      Squamous cell carcinoma         CC Referred Self, MD  No address on file on close of this encounter.     Groslly graded 3/5 on BLE

## 2023-03-02 DIAGNOSIS — F41.1 GAD (GENERALIZED ANXIETY DISORDER): ICD-10-CM

## 2023-03-02 RX ORDER — VENLAFAXINE HYDROCHLORIDE 75 MG/1
CAPSULE, EXTENDED RELEASE ORAL
Qty: 90 CAPSULE | Refills: 0 | Status: SHIPPED | OUTPATIENT
Start: 2023-03-02 | End: 2023-05-30

## 2023-03-02 NOTE — TELEPHONE ENCOUNTER
"Requested Prescriptions   Pending Prescriptions Disp Refills     venlafaxine (EFFEXOR XR) 75 MG 24 hr capsule [Pharmacy Med Name: VENLAFAXINE ER 75MG CAPSULES] 90 capsule 0     Sig: TAKE ONE CAPSULE BY MOUTH DAILY       Serotonin-Norepinephrine Reuptake Inhibitors  Passed - 3/2/2023  9:39 AM        Passed - Blood pressure under 140/90 in past 12 months     BP Readings from Last 3 Encounters:   10/13/22 132/78   06/08/21 122/72   12/03/19 98/84                 Passed - Recent (12 mo) or future (30 days) visit within the authorizing provider's specialty     Patient has had an office visit with the authorizing provider or a provider within the authorizing providers department within the previous 12 mos or has a future within next 30 days. See \"Patient Info\" tab in inbasket, or \"Choose Columns\" in Meds & Orders section of the refill encounter.              Passed - Medication is active on med list        Passed - Patient is age 18 or older        Passed - Normal serum creatinine on file in past 12 months     Recent Labs   Lab Test 10/13/22  1440 03/08/18  1431 01/21/18  1538   CR 0.88   < >  --    CREAT  --   --  0.8    < > = values in this interval not displayed.       Ok to refill medication if creatinine is low             Prescription approved per [facility] Refill Protocol.    Pt was seen on 10/13/22    Hailey Mejia RN  [facility]   "

## 2023-05-23 NOTE — MR AVS SNAPSHOT
After Visit Summary   2/6/2017    Cesar oMntejo    MRN: [id number]           Patient Information     Date Of Birth          1965        Visit Information        Provider Department      2/6/2017 5:00 PM Argelia De Los Santos PA-C [facility]        Today's Diagnoses     Anterior cervical lymphadenopathy    -  1     Irritant contact dermatitis, unspecified trigger           Care Instructions    Take medrol pack as directed  Use all lotions, soaps and detergents without fragrances or dyes  Return to clinic for any new or worsening symptoms or go to ER Urgent care in off hours      Atopic Dermatitis (Adult)  Atopic dermatitis is a dry, itchy red rash. It s also known as eczema. The rash is chronic, or ongoing. It can come and go over time. The disease is often genetic and passed down in families. It causes a problem with the skin barrier that makes the skin more sensitive to the environment and other factors. The increased skin sensitivity causes an itch, which causes scratching. Scratching can worsen the itching or also break the skin. This can put the skin at risk of infection.  The condition is most common in people with asthma, hay fever, hives, or dry or sensitive skin. The rash may be caused by extreme heat or heavy sweating. Skin irritants can cause the rash to flare up. These can include wool or silk clothing, grease, oils, some medicines, and harsh soaps and detergents. Emotional stress can also be a trigger.  Treatment is done to relieve the itching and inflammation of the skin.  Home care  Follow these tips to care for your condition:    Keep the areas of rash clean by bathing at least every other day. Use lukewarm water to bathe. Don t use hot water, which can dry out the skin.    Don t use soaps with strong detergents. Use mild soaps made for sensitive skin.    Apply a cream or ointment to damp skin right after bathing.    Avoid things that irritate your skin.  Wear absorbent, soft fabrics next to the skin rather than rough or scratchy materials.    Use mild laundry soap free of scents and perfumes. Make sure to rinse all the soap out of your clothes.    Treat any skin infection as directed.    Use oral diphenhydramine to help reduce itching. This is an antihistamine you can buy at drug and grocery stores. It can make you sleepy, so use lower doses during the daytime. Or you can use loratadine. This is an antihistamine that will not make you sleepy. Do not use diphenhydramine if you have glaucoma or have trouble urinating due to an enlarged prostate.  Follow-up care  If your symptoms don t get better or if they get worse in the next 7 days, make an appointment with your healthcare provider.  When to seek medical advice  Call your healthcare provider right away  if any of these occur:    Increasing area of redness or pain in the skin    Yellow crusts or wet drainage from the rash    Fever of 100.4 F (38 C) or higher, or as directed by your health care provider    0169-9775 [employer]. [address] [place] [address]. All rights reserved. This information is not intended as a substitute for professional medical care. Always follow your healthcare professional's instructions.              Follow-ups after your visit        Your next 10 appointments already scheduled     Feb 09, 2017  1:30 PM   Office Visit with Rocky Messina MD   [facility] ([facility])    [address] [place] [id number] [phone]           Bring a current list of meds and any records pertaining to this visit.  For Physicals, please bring immunization records and any forms needing to be filled out.  Please arrive 10 minutes early to complete paperwork.              Who to contact     If you have questions or need follow up information about today's clinic visit or your schedule please contact [facility] [place]  [place] directly at [phone].  Normal or non-critical lab and imaging results will be communicated to you by Shopogoliqhart, letter or phone within 4 business days after the clinic has received the results. If you do not hear from us within 7 days, please contact the clinic through Shopogoliqhart or phone. If you have a critical or abnormal lab result, we will notify you by phone as soon as possible.  Submit refill requests through Group-IB or call your pharmacy and they will forward the refill request to us. Please allow 3 business days for your refill to be completed.          Additional Information About Your Visit        ShopogoliqharPerk Information     [employer] gives you secure access to your electronic health record. If you see a primary care provider, you can also send messages to your care team and make appointments. If you have questions, please call your primary care clinic.  If you do not have a primary care provider, please call [phone] and they will assist you.        Care EveryWhere ID     This is your Care EveryWhere ID. This could be used by other organizations to access your [place] medical records  [id number]        Your Vitals Were     Pulse Temperature Pulse Oximetry             104 96.5  F (35.8  C) (Oral) 96%          Blood Pressure from Last 3 Encounters:   02/06/17 135/90   02/03/17 118/74   01/29/17 117/83    Weight from Last 3 Encounters:   02/06/17 178 lb 3.2 oz (80.831 kg)   02/03/17 177 lb 11.2 oz (80.604 kg)   01/28/17 187 lb (84.823 kg)              We Performed the Following     CBC with platelets and differential          Today's Medication Changes          These changes are accurate as of: 2/6/17  5:26 PM.  If you have any questions, ask your nurse or doctor.               Start taking these medicines.        Dose/Directions    methylPREDNISolone 4 MG tablet   Commonly known as:  MEDROL DOSEPAK   Used for:  Irritant contact dermatitis, unspecified trigger   Started by:  Argelia De Los Santos  HALLIE Hendrix        Follow package instructions   Quantity:  21 tablet   Refills:  0            Where to get your medicines      These medications were sent to [id number] - [facility] [address] AT [address] [place] [address] & [address]  [address] [facility] [address]     Phone:  [phone]    - methylPREDNISolone 4 MG tablet             Primary Care Provider Office Phone # Fax #    Rocky Messina [phone]       [facility] [address] [facility] [address]  [facility] [address]        Thank you!     Thank you for choosing [facility]  for your care. Our goal is always to provide you with excellent care. Hearing back from our patients is one way we can continue to improve our services. Please take a few minutes to complete the written survey that you may receive in the mail after your visit with us. Thank you!             Your Updated Medication List - Protect others around you: Learn how to safely use, store and throw away your medicines at www.disposemymeds.org.          This list is accurate as of: 2/6/17  5:26 PM.  Always use your most recent med list.                   Brand Name Dispense Instructions for use    ASPIRIN PO      Take 81 mg by mouth daily       methylPREDNISolone 4 MG tablet    MEDROL DOSEPAK    21 tablet    Follow package instructions       metroNIDAZOLE 0.75 % cream    METROCREAM    45 g    Apply topically 2 times daily       mirtazapine 15 MG tablet    REMERON    90 tablet    Take 1 tablet (15 mg) by mouth At Bedtime       OMEGA-3 FISH OIL PO      Take 2 g by mouth daily       simvastatin 40 MG tablet    ZOCOR    90 tablet    Take 1 tablet (40 mg) by mouth At Bedtime          Use the 5 A's (Ask, Advise, Assess, Assist, Arrange)

## 2023-05-28 DIAGNOSIS — F41.1 GAD (GENERALIZED ANXIETY DISORDER): ICD-10-CM

## 2023-05-30 RX ORDER — VENLAFAXINE HYDROCHLORIDE 75 MG/1
CAPSULE, EXTENDED RELEASE ORAL
Qty: 90 CAPSULE | Refills: 0 | Status: SHIPPED | OUTPATIENT
Start: 2023-05-30 | End: 2023-08-23

## 2023-05-30 NOTE — TELEPHONE ENCOUNTER
"Requested Prescriptions   Pending Prescriptions Disp Refills     venlafaxine (EFFEXOR XR) 75 MG 24 hr capsule [Pharmacy Med Name: VENLAFAXINE ER 75MG CAPSULES] 90 capsule 0     Sig: TAKE ONE CAPSULE BY MOUTH DAILY.       Serotonin-Norepinephrine Reuptake Inhibitors  Passed - 5/28/2023  9:46 AM        Passed - Blood pressure under 140/90 in past 12 months     BP Readings from Last 3 Encounters:   10/13/22 132/78   06/08/21 122/72   12/03/19 98/84                 Passed - Recent (12 mo) or future (30 days) visit within the authorizing provider's specialty     Patient has had an office visit with the authorizing provider or a provider within the authorizing providers department within the previous 12 mos or has a future within next 30 days. See \"Patient Info\" tab in inbasket, or \"Choose Columns\" in Meds & Orders section of the refill encounter.              Passed - Medication is active on med list        Passed - Patient is age 18 or older        Passed - Normal serum creatinine on file in past 12 months     Recent Labs   Lab Test 10/13/22  1440 03/08/18  1431 01/21/18  1538   CR 0.88   < >  --    CREAT  --   --  0.8    < > = values in this interval not displayed.       Ok to refill medication if creatinine is low             Routing refill request to provider for review/approval because:  Drug interaction warning    Christian Márquez RN  [facility]           "

## 2023-06-06 ENCOUNTER — OFFICE VISIT (OUTPATIENT)
Dept: DERMATOLOGY | Facility: CLINIC | Age: 58
End: 2023-06-06
Payer: COMMERCIAL

## 2023-06-06 DIAGNOSIS — D49.2 NEOPLASM OF SKIN: ICD-10-CM

## 2023-06-06 DIAGNOSIS — L81.4 LENTIGINES: ICD-10-CM

## 2023-06-06 DIAGNOSIS — D22.9 MULTIPLE BENIGN NEVI: Primary | ICD-10-CM

## 2023-06-06 DIAGNOSIS — Z85.828 HISTORY OF NONMELANOMA SKIN CANCER: ICD-10-CM

## 2023-06-06 PROCEDURE — 88305 TISSUE EXAM BY PATHOLOGIST: CPT | Mod: TC | Performed by: DERMATOLOGY

## 2023-06-06 PROCEDURE — 88305 TISSUE EXAM BY PATHOLOGIST: CPT | Mod: 26 | Performed by: DERMATOLOGY

## 2023-06-06 PROCEDURE — 11103 TANGNTL BX SKIN EA SEP/ADDL: CPT | Performed by: DERMATOLOGY

## 2023-06-06 PROCEDURE — 99213 OFFICE O/P EST LOW 20 MIN: CPT | Mod: 25 | Performed by: DERMATOLOGY

## 2023-06-06 PROCEDURE — 11102 TANGNTL BX SKIN SINGLE LES: CPT | Performed by: DERMATOLOGY

## 2023-06-06 ASSESSMENT — PAIN SCALES - GENERAL: PAINLEVEL: MILD PAIN (2)

## 2023-06-06 NOTE — NURSING NOTE
Lidocaine-epinephrine 1-1:527635 % injection   0.3mL once for one use, starting 6/6/2023 ending 6/6/2023,  2mL disp, R-0, injection  Injected by Kathleen Casas RN

## 2023-06-06 NOTE — LETTER
6/6/2023       RE: Cesar Montejo  [address]  [facility] [address]     Dear Colleague,    Thank you for referring your patient, Cesar Montejo, to the [facility] [place] [facility]. Please see a copy of my visit note below.    [facility] Dermatology Note  Encounter Date: Jun 6, 2023  Office Visit     Dermatology Problem List:  1. NMSC  - Superficial BCC, left cheek, s/p Mohs 6/16  - SCCIS right cheek, s/p Mohs 6/16  2. Verruca vulgaris, left plantar foot - resolved  - Cryo 06/25/2021, prior sal acid  3. Actinic keratoses. Cryo.  4. Actinic cheilitis  - s/p CO2 laser ablation of lower lip vermilion 10/2016  - s/p laser vermilionectomy 8/2017  - Previously followed with oral surgery  5. Seborrheic dermatitis   - ketoconazole shampoo   6. 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    ____________________________________________    Assessment & Plan:    # NUBs:  - left nasal ala. Ddx BCC.  - left nasal superior sidewall, ddx nevus v bcc  - see shave bx note    # Multiple benign nevi.  # Solar lentigines   - No concerning lesions today  - Counseled on ABCDEs of melanoma and sun protection - recommend SPF 30 or higher with frequent application   - Return sooner if noticing changing or symptomatic lesions     # History of NMSC. No evidence of recurrent disease.  # H/o actinic cheilitis.  - Continue photoprotection - recommend SPF 30 or higher with frequent reapplication  - Continue q6 months skin exams   (preferred)  - Advised to monitor for changing, non-healing, bleeding, painful, changing, or otherwise symptomatic lesions    Procedures Performed:   Shave biopsy: Location(s) see above.  After discussion of benefits and risks including but not limited to bleeding/bruising, pain/swelling, infection, scar, incomplete removal, nerve damage/numbness, recurrence, and non-diagnostic biopsy,  verbal consent and photographs were obtained. Time-out was performed. The area was cleaned with isopropyl alcohol. 0.5mL of 1% lidocaine with epinephrine was injected to obtain adequate anesthesia of each lesion. Shave biopsy was performed. Hemostasis was achieved with aluminium chloride. Vaseline and a sterile dressing were applied. The patient tolerated the procedure and no complications were noted. The patient was provided with verbal and written post care instructions.       Follow-up: 6 month(s) in-person, or earlier for new or changing lesions    Staff:    Mary Kumar MD    Department of Dermatology  [facility]: Phone: [phone], Fax: [phone]  6/6/2023      ____________________________________________    CC: Skin Check (FBSC - spot of concern on nose. )    HPI:  Mr. Cesar Montejo is a(n) 57 year old male who presents today as a return patient for FBSE.    Spot on left nose  Noticed months ago  No changes, no symptoms    No other painful, bleeding, non-healing, or otherwise symptomatic lesions.     Patient is otherwise feeling well, without additional skin concerns.    Labs Reviewed:  N/A    Physical Exam:  Vitals: There were no vitals taken for this visit.  SKIN: Total skin excluding the undergarment areas was performed. The exam included the head/face, neck, both arms, chest, back, abdomen, both legs, digits and/or nails.   - pearly papule left nasal ala  - subtle fleshy papule left nasal superior sidewall  - Multiple  regular brown pigmented macules and papules are identified on the trunk and extremities.   - Scattered brown macules on sun exposed areas.  - No other lesions of concern on areas examined.     Medications:  Current Outpatient Medications   Medication    aspirin 81 MG EC tablet    atorvastatin (LIPITOR) 40 MG tablet    Cholecalciferol (VITAMIN D) 50 MCG (2000 UT) CAPS    COENZYME Q10 PO    doxycycline monohydrate (MONODOX) 100 MG capsule    ketoconazole (NIZORAL) 2 % external cream    ketoconazole (NIZORAL) 2 % external shampoo    Krill Oil 1000 MG CAPS    Melatonin 10 MG TABS tablet    mirtazapine (REMERON) 15 MG tablet    NONFORMULARY    NONFORMULARY    simvastatin (ZOCOR) 20 MG tablet    simvastatin (ZOCOR) 40 MG tablet    triamcinolone (KENALOG) 0.1 % external ointment    venlafaxine (EFFEXOR XR) 75 MG 24 hr capsule    famciclovir (FAMVIR) 500 MG tablet     No current facility-administered medications for this visit.      Past Medical History:   Patient Active Problem List   Diagnosis    Insomnia    Adjustment reaction    CARDIOVASCULAR SCREENING; LDL GOAL LESS THAN 130    Pure hypercholesterolemia    GERD (gastroesophageal reflux disease)    Right shoulder pain    Calcific tendonitis    Solar lentiginosis    Skin cancer screening    Dermatitis, seborrheic    Family history of skin cancer    AK (actinic keratosis)    History of actinic keratosis    History of basal cell cancer    Seborrheic keratosis    Cherry angioma    Actinic cheilitis    History of nonmelanoma skin cancer    History of multiple concussions    Elevated blood pressure reading without diagnosis of hypertension    Dream enactment behavior    Disturbance in sleep behavior    Dyspnea and respiratory abnormality    Chest pain    H/O magnetic resonance imaging of brain and brain stem    History of echocardiogram    Encounter for neuropsychological testing 2018 with Dr. Park    Benign neoplasm of skin of right upper extremity    Illness anxiety  disorder     Past Medical History:   Diagnosis Date    Anxiety     Basal cell carcinoma     Gastro-oesophageal reflux disease     H/O magnetic resonance imaging of brain and brain stem 2018    MR BRAIN WITHOUT AND WITH CONTRAST 2017 9:10 PM   HISTORY:  Left-sided numbness. Disequilibrium.   COMPARISON: MR brain 2015.   TECHNIQUE: Sagittal T1, axial T2, axial FLAIR, axial GRE, axial diffusion scan, coronal FLAIR, axial post Gd enhanced T1 weighted images.   FINDINGS: There is no intracranial hemorrhage, mass, or recent infarct. There is a cyst in the floor of the right maxilla    Head injury 2012    Had a bad concussion with post concussion synd for year    High cholesterol     History of echocardiogram 2018    TUYET Keyes MD 2018  Narrative     [id number]^MECHE^BLAKE^KRISTAL       [facility] [address] [place] [address] Name: JONATHAN FIORE MRN: [id number] : 1965 Study Date: 2018 09:13 AM Age: 52 yrs Gender: Male Patient Location: [facility] Reason For Study: Chest Pain Ordering Physician:     Hypertension early     Not on meds. Usually in pre-hypertesion categ.    Insomnia     Squamous cell carcinoma         CC Referred Self, MD  No address on file on close of this encounter.

## 2023-06-06 NOTE — NURSING NOTE
Dermatology Rooming Note    Cesar Montejo's goals for this visit include:   Chief Complaint   Patient presents with     Skin Check     FBSC - spot of concern on nose.      Eva Maldonado

## 2023-06-06 NOTE — PROGRESS NOTES
[facility] Dermatology Note  Encounter Date: Jun 6, 2023  Office Visit     Dermatology Problem List:  1. NMSC  - Superficial BCC, left cheek, s/p Mohs 6/16  - SCCIS right cheek, s/p Mohs 6/16  2. Verruca vulgaris, left plantar foot - resolved  - Cryo 06/25/2021, prior sal acid  3. Actinic keratoses. Cryo.  4. Actinic cheilitis  - s/p CO2 laser ablation of lower lip vermilion 10/2016  - s/p laser vermilionectomy 8/2017  - Previously followed with oral surgery  5. Seborrheic dermatitis   - ketoconazole shampoo   6. 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    ____________________________________________    Assessment & Plan:    # NUBs:  - left nasal ala. Ddx BCC.  - left nasal superior sidewall, ddx nevus v bcc  - see shave bx note    # Multiple benign nevi.  # Solar lentigines   - No concerning lesions today  - Counseled on ABCDEs of melanoma and sun protection - recommend SPF 30 or higher with frequent application   - Return sooner if noticing changing or symptomatic lesions     # History of NMSC. No evidence of recurrent disease.  # H/o actinic cheilitis.  - Continue photoprotection - recommend SPF 30 or higher with frequent reapplication  - Continue q6 months skin exams  (preferred)  - Advised to monitor for changing, non-healing, bleeding, painful, changing, or otherwise symptomatic lesions    Procedures Performed:   Shave biopsy: Location(s) see above.  After discussion of benefits and risks including but not limited to bleeding/bruising, pain/swelling, infection, scar, incomplete removal,  nerve damage/numbness, recurrence, and non-diagnostic biopsy,  verbal consent and photographs were obtained. Time-out was performed. The area was cleaned with isopropyl alcohol. 0.5mL of 1% lidocaine with epinephrine was injected to obtain adequate anesthesia of each lesion. Shave biopsy was performed. Hemostasis was achieved with aluminium chloride. Vaseline and a sterile dressing were applied. The patient tolerated the procedure and no complications were noted. The patient was provided with verbal and written post care instructions.       Follow-up: 6 month(s) in-person, or earlier for new or changing lesions    Staff:    Mary Kumar MD    Department of Dermatology  [facility]: Phone: [phone], Fax: [phone]  6/6/2023      ____________________________________________    CC: Skin Check (FBSC - spot of concern on nose. )    HPI:  Mr. Cesar Montejo is a(n) 57 year old male who presents today as a return patient for FBSE.    Spot on left nose  Noticed months ago  No changes, no symptoms    No other painful, bleeding, non-healing, or otherwise symptomatic lesions.     Patient is otherwise feeling well, without additional skin concerns.    Labs Reviewed:  N/A    Physical Exam:  Vitals: There were no vitals taken for this visit.  SKIN: Total skin excluding the undergarment areas was performed. The exam included the head/face, neck, both arms, chest, back, abdomen, both legs, digits and/or nails.   - pearly papule left nasal ala  - subtle fleshy papule left nasal superior sidewall  - Multiple regular brown pigmented macules and papules are identified on the trunk and extremities.   - Scattered brown macules on sun exposed areas.  - No other lesions of concern on areas examined.     Medications:  Current Outpatient Medications   Medication     aspirin 81 MG EC tablet     atorvastatin (LIPITOR) 40 MG tablet      Cholecalciferol (VITAMIN D) 50 MCG (2000 UT) CAPS     COENZYME Q10 PO     doxycycline monohydrate (MONODOX) 100 MG capsule     ketoconazole (NIZORAL) 2 % external cream     ketoconazole (NIZORAL) 2 % external shampoo     Krill Oil 1000 MG CAPS     Melatonin 10 MG TABS tablet     mirtazapine (REMERON) 15 MG tablet     NONFORMULARY     NONFORMULARY     simvastatin (ZOCOR) 20 MG tablet     simvastatin (ZOCOR) 40 MG tablet     triamcinolone (KENALOG) 0.1 % external ointment     venlafaxine (EFFEXOR XR) 75 MG 24 hr capsule     famciclovir (FAMVIR) 500 MG tablet     No current facility-administered medications for this visit.      Past Medical History:   Patient Active Problem List   Diagnosis     Insomnia     Adjustment reaction     CARDIOVASCULAR SCREENING; LDL GOAL LESS THAN 130     Pure hypercholesterolemia     GERD (gastroesophageal reflux disease)     Right shoulder pain     Calcific tendonitis     Solar lentiginosis     Skin cancer screening     Dermatitis, seborrheic     Family history of skin cancer     AK (actinic keratosis)     History of actinic keratosis     History of basal cell cancer     Seborrheic keratosis     Cherry angioma     Actinic cheilitis     History of nonmelanoma skin cancer     History of multiple concussions     Elevated blood pressure reading without diagnosis of hypertension     Dream enactment behavior     Disturbance in sleep behavior     Dyspnea and respiratory abnormality     Chest pain     H/O magnetic resonance imaging of brain and brain stem     History of echocardiogram     Encounter for neuropsychological testing 2018 with Dr. Park     Benign neoplasm of skin of right upper extremity     Illness anxiety disorder     Past Medical History:   Diagnosis Date     Anxiety      Basal cell carcinoma      Gastro-oesophageal reflux disease      H/O magnetic resonance imaging of brain and brain stem 6/12/2018    MR BRAIN WITHOUT AND WITH CONTRAST 11/29/2017 9:10 PM   HISTORY:  Left-sided  numbness. Disequilibrium.   COMPARISON: MR brain 2015.   TECHNIQUE: Sagittal T1, axial T2, axial FLAIR, axial GRE, axial diffusion scan, coronal FLAIR, axial post Gd enhanced T1 weighted images.   FINDINGS: There is no intracranial hemorrhage, mass, or recent infarct. There is a cyst in the floor of the right maxilla     Head injury 2012    Had a bad concussion with post concussion synd for year     High cholesterol      History of echocardiogram 2018    TUYET Keyes MD 2018  Narrative     [id number] [id number] [id number]^MECHE^BLAKE^KRISTAL       [facility] [address] [place] [id number] Name: JONATHAN FIORE MRN: [id number] : 1965 Study Date: 2018 09:13 AM Age: 52 yrs Gender: Male Patient Location: [facility] Reason For Study: Chest Pain Ordering Physician:      Hypertension early     Not on meds. Usually in pre-hypertesion categ.     Insomnia      Squamous cell carcinoma         CC Referred Self, MD  No address on file on close of this encounter.

## 2023-06-08 LAB
PATH REPORT.COMMENTS IMP SPEC: NORMAL
PATH REPORT.COMMENTS IMP SPEC: NORMAL
PATH REPORT.FINAL DX SPEC: NORMAL
PATH REPORT.GROSS SPEC: NORMAL
PATH REPORT.MICROSCOPIC SPEC OTHER STN: NORMAL
PATH REPORT.RELEVANT HX SPEC: NORMAL

## 2023-06-09 NOTE — PATIENT INSTRUCTIONS
Wound Care After a Biopsy    What is a skin biopsy?  A skin biopsy allows the doctor to examine a very small piece of tissue under the microscope to determine the diagnosis and the best treatment for the skin condition. A local anesthetic (numbing medicine) is injected with a very small needle into the skin area to be tested. A small piece of skin is taken from the area. Sometimes a suture (stitch) is used.     What are the risks of a skin biopsy?  I will experience scar, bleeding, swelling, pain, crusting and redness. I may experience incomplete removal or recurrence. Risks of this procedure are excessive bleeding, bruising, infection, nerve damage, numbness, thick (hypertrophic or keloidal) scar and non-diagnostic biopsy.    How should I care for my wound for the first 24 hours?  Keep the wound dry and covered for 24 hours  If it bleeds, hold direct pressure on the area for 15 minutes. If bleeding does not stop, call us or go to the emergency room  Avoid strenuous exercise the first 1-2 days or as your doctor instructs you    How should I care for the wound after 24 hours?  After 24 hours, remove the bandage  You may bathe or shower as normal  If you had a scalp biopsy, you can shampoo as usual and can use shower water to clean the biopsy site daily  Clean the wound once a day with gentle soap and water  Do not scrub, be gentle  Apply white petroleum/Vaseline after cleaning the wound with a cotton swab or a clean finger, and keep the site covered with a Bandaid /bandage. Bandages are not necessary with a scalp biopsy  If you are unable to cover the site with a Bandaid /bandage, re-apply ointment 2-3 times a day to keep the site moist. Moisture will help with healing  Avoid strenuous activity for first 1-2 days  Avoid lakes, rivers, pools, and oceans until the stitches are removed or the site is healed    How do I clean my wound?  Wash hands thoroughly with soap or use hand  before all wound care  Clean  the wound with gentle soap and water  Apply white petroleum/Vaseline  to wound after it is clean  Replace the Bandaid /bandage to keep the wound covered for the first few days or as instructed by your doctor  If you had a scalp biopsy, warm shower water to the area on a daily basis should suffice    What should I use to clean my wound?   Cotton-tipped applicators (Qtips )  White petroleum jelly (Vaseline ). Use a clean new container and use Q-tips to apply.  Bandaids  as needed  Gentle soap     How should I care for my wound long term?  Do not get your wound dirty  Keep up with wound care for one week or until the area is healed.  If you have stitches, stitches need to be removed in 14 days. You may return to our clinic for this or you may have it done locally at your doctor s office.  A small scab will form and fall off by itself when the area is completely healed. The area will be red and will become pink in color as it heals. Sun protection is very important for how your scar will turn out. Sunscreen with an SPF 30 or greater is recommended once the area is healed.  You should have some soreness but it should be mild and slowly go away over several days. Talk to your doctor about using tylenol for pain,    When should I call my doctor?  If you have increased:   Pain or swelling  Pus or drainage (clear or slightly yellow drainage is ok)  Temperature over 100F  Spreading redness or warmth around wound    When will I hear about my results?  The biopsy results can take 2 weeks to come back.  Your results will automatically release to [employer] before your provider has even reviewed them.  The clinic will call you with the results, send you a [employer] message, or have you schedule a follow-up clinic or phone time to discuss the results.  Contact our clinics if you do not hear from us in 2 weeks.    Who should I call with questions?  [facility]: [phone]  [facility]  [facility]: [phone]  For urgent needs outside of business hours call the [facility] at [phone] and ask for the dermatology resident on call

## 2023-06-29 ENCOUNTER — TELEPHONE (OUTPATIENT)
Dept: DERMATOLOGY | Facility: CLINIC | Age: 58
End: 2023-06-29
Payer: COMMERCIAL

## 2023-06-29 NOTE — TELEPHONE ENCOUNTER
Lvm, 1st attempt for the patient to call back and schedule the following:    Appointment type: Return  Provider: Dr. Kumar  Return date: Around 12/6/23  Specialty phone number: [phone]

## 2023-07-03 ENCOUNTER — TELEPHONE (OUTPATIENT)
Dept: DERMATOLOGY | Facility: CLINIC | Age: 58
End: 2023-07-03
Payer: COMMERCIAL

## 2023-07-03 NOTE — TELEPHONE ENCOUNTER
Lvm for the patient to call back and schedule the following:    Appointment type: Return  Provider: Stacy  Return date: December 2023  Specialty phone number: [phone]

## 2023-08-23 DIAGNOSIS — F41.1 GAD (GENERALIZED ANXIETY DISORDER): ICD-10-CM

## 2023-08-23 RX ORDER — VENLAFAXINE HYDROCHLORIDE 75 MG/1
CAPSULE, EXTENDED RELEASE ORAL
Qty: 90 CAPSULE | Refills: 1 | Status: SHIPPED | OUTPATIENT
Start: 2023-08-23 | End: 2023-10-03

## 2023-08-23 NOTE — TELEPHONE ENCOUNTER
"Requested Prescriptions   Pending Prescriptions Disp Refills    venlafaxine (EFFEXOR XR) 75 MG 24 hr capsule [Pharmacy Med Name: VENLAFAXINE ER 75MG CAPSULES] 90 capsule 0     Sig: TAKE ONE CAPSULE BY MOUTH DAILY       Serotonin-Norepinephrine Reuptake Inhibitors  Passed - 8/23/2023  9:50 AM        Passed - Blood pressure under 140/90 in past 12 months     BP Readings from Last 3 Encounters:   10/13/22 132/78   06/08/21 122/72   12/03/19 98/84                 Passed - Recent (12 mo) or future (30 days) visit within the authorizing provider's specialty     Patient has had an office visit with the authorizing provider or a provider within the authorizing providers department within the previous 12 mos or has a future within next 30 days. See \"Patient Info\" tab in inbasket, or \"Choose Columns\" in Meds & Orders section of the refill encounter.              Passed - Medication is active on med list        Passed - Patient is age 18 or older        Passed - Normal serum creatinine on file in past 12 months     Recent Labs   Lab Test 10/13/22  1440 03/08/18  1431 01/21/18  1538   CR 0.88   < >  --    CREAT  --   --  0.8    < > = values in this interval not displayed.       Ok to refill medication if creatinine is low             Last OV for annual on 10/13/22.    Routing refill request to provider for review/approval because:  Drug interaction warning    Thanks!  Serafin Fuentes RN   [facility]    "

## 2023-09-13 ENCOUNTER — PATIENT OUTREACH (OUTPATIENT)
Dept: CARE COORDINATION | Facility: CLINIC | Age: 58
End: 2023-09-13
Payer: COMMERCIAL

## 2023-09-27 ENCOUNTER — PATIENT OUTREACH (OUTPATIENT)
Dept: CARE COORDINATION | Facility: CLINIC | Age: 58
End: 2023-09-27
Payer: COMMERCIAL

## 2023-10-03 ENCOUNTER — OFFICE VISIT (OUTPATIENT)
Dept: FAMILY MEDICINE | Facility: CLINIC | Age: 58
End: 2023-10-03
Payer: COMMERCIAL

## 2023-10-03 ENCOUNTER — MYC MEDICAL ADVICE (OUTPATIENT)
Dept: FAMILY MEDICINE | Facility: CLINIC | Age: 58
End: 2023-10-03

## 2023-10-03 VITALS
SYSTOLIC BLOOD PRESSURE: 124 MMHG | OXYGEN SATURATION: 97 % | TEMPERATURE: 97.6 F | DIASTOLIC BLOOD PRESSURE: 89 MMHG | HEIGHT: 72 IN | BODY MASS INDEX: 25.68 KG/M2 | WEIGHT: 189.6 LBS | RESPIRATION RATE: 12 BRPM | HEART RATE: 106 BPM

## 2023-10-03 DIAGNOSIS — Z00.00 ROUTINE HISTORY AND PHYSICAL EXAMINATION OF ADULT: Primary | ICD-10-CM

## 2023-10-03 DIAGNOSIS — Z12.5 SCREENING FOR PROSTATE CANCER: ICD-10-CM

## 2023-10-03 DIAGNOSIS — F41.8 MIXED ANXIETY AND DEPRESSIVE DISORDER: ICD-10-CM

## 2023-10-03 DIAGNOSIS — F41.1 GAD (GENERALIZED ANXIETY DISORDER): ICD-10-CM

## 2023-10-03 DIAGNOSIS — E78.00 HYPERCHOLESTEREMIA: ICD-10-CM

## 2023-10-03 DIAGNOSIS — Z13.6 CARDIOVASCULAR SCREENING; LDL GOAL LESS THAN 130: ICD-10-CM

## 2023-10-03 LAB
ALBUMIN SERPL BCG-MCNC: 4.7 G/DL (ref 3.5–5.2)
ALP SERPL-CCNC: 51 U/L (ref 40–129)
ALT SERPL W P-5'-P-CCNC: 30 U/L (ref 0–70)
ANION GAP SERPL CALCULATED.3IONS-SCNC: 18 MMOL/L (ref 7–15)
AST SERPL W P-5'-P-CCNC: 32 U/L (ref 0–45)
BILIRUB SERPL-MCNC: 0.6 MG/DL
BUN SERPL-MCNC: 14.9 MG/DL (ref 6–20)
CALCIUM SERPL-MCNC: 9.1 MG/DL (ref 8.6–10)
CHLORIDE SERPL-SCNC: 101 MMOL/L (ref 98–107)
CHOLEST SERPL-MCNC: 204 MG/DL
CREAT SERPL-MCNC: 1.07 MG/DL (ref 0.67–1.17)
DEPRECATED HCO3 PLAS-SCNC: 20 MMOL/L (ref 22–29)
EGFRCR SERPLBLD CKD-EPI 2021: 82 ML/MIN/1.73M2
GLUCOSE SERPL-MCNC: 84 MG/DL (ref 70–99)
HDLC SERPL-MCNC: 33 MG/DL
LDLC SERPL CALC-MCNC: 143 MG/DL
NONHDLC SERPL-MCNC: 171 MG/DL
POTASSIUM SERPL-SCNC: 4.1 MMOL/L (ref 3.4–5.3)
PROT SERPL-MCNC: 7 G/DL (ref 6.4–8.3)
PSA SERPL DL<=0.01 NG/ML-MCNC: 1.72 NG/ML (ref 0–3.5)
SODIUM SERPL-SCNC: 139 MMOL/L (ref 135–145)
TRIGL SERPL-MCNC: 138 MG/DL

## 2023-10-03 PROCEDURE — 90750 HZV VACC RECOMBINANT IM: CPT | Performed by: FAMILY MEDICINE

## 2023-10-03 PROCEDURE — G0103 PSA SCREENING: HCPCS | Performed by: FAMILY MEDICINE

## 2023-10-03 PROCEDURE — 90471 IMMUNIZATION ADMIN: CPT | Performed by: FAMILY MEDICINE

## 2023-10-03 PROCEDURE — 36415 COLL VENOUS BLD VENIPUNCTURE: CPT | Performed by: FAMILY MEDICINE

## 2023-10-03 PROCEDURE — 80053 COMPREHEN METABOLIC PANEL: CPT | Performed by: FAMILY MEDICINE

## 2023-10-03 PROCEDURE — 80061 LIPID PANEL: CPT | Performed by: FAMILY MEDICINE

## 2023-10-03 PROCEDURE — 90480 ADMN SARSCOV2 VAC 1/ONLY CMP: CPT | Performed by: FAMILY MEDICINE

## 2023-10-03 PROCEDURE — 91320 SARSCV2 VAC 30MCG TRS-SUC IM: CPT | Performed by: FAMILY MEDICINE

## 2023-10-03 PROCEDURE — 90472 IMMUNIZATION ADMIN EACH ADD: CPT | Performed by: FAMILY MEDICINE

## 2023-10-03 PROCEDURE — 99396 PREV VISIT EST AGE 40-64: CPT | Mod: 25 | Performed by: FAMILY MEDICINE

## 2023-10-03 PROCEDURE — 90682 RIV4 VACC RECOMBINANT DNA IM: CPT | Performed by: FAMILY MEDICINE

## 2023-10-03 RX ORDER — MIRTAZAPINE 15 MG/1
15 TABLET, FILM COATED ORAL AT BEDTIME
Qty: 90 TABLET | Refills: 3 | Status: SHIPPED | OUTPATIENT
Start: 2023-10-03

## 2023-10-03 RX ORDER — VENLAFAXINE HYDROCHLORIDE 75 MG/1
75 CAPSULE, EXTENDED RELEASE ORAL DAILY
Qty: 90 CAPSULE | Refills: 3 | Status: SHIPPED | OUTPATIENT
Start: 2023-10-03

## 2023-10-03 ASSESSMENT — ENCOUNTER SYMPTOMS
COUGH: 0
FEVER: 0
FREQUENCY: 0
DIZZINESS: 0
DYSURIA: 0
NAUSEA: 0
HEARTBURN: 1
MYALGIAS: 0
HEMATOCHEZIA: 0
PARESTHESIAS: 0
ARTHRALGIAS: 1
SHORTNESS OF BREATH: 0
DIARRHEA: 0
WEAKNESS: 0
HEADACHES: 0
EYE PAIN: 0
NERVOUS/ANXIOUS: 1
ABDOMINAL PAIN: 0
CONSTIPATION: 1
HEMATURIA: 0
CHILLS: 0
SORE THROAT: 0
JOINT SWELLING: 0
PALPITATIONS: 0

## 2023-10-03 ASSESSMENT — PAIN SCALES - GENERAL: PAINLEVEL: MILD PAIN (2)

## 2023-10-03 NOTE — PROGRESS NOTES
SUBJECTIVE:   CC: Travis is an 57 year old who presents for preventative health visit.       10/3/2023    10:21 AM   Additional Questions   Roomed by Laura Perez       Healthy Habits:     Getting at least 3 servings of Calcium per day:  Yes    Bi-annual eye exam:  NO    Dental care twice a year:  Yes    Sleep apnea or symptoms of sleep apnea:  Daytime drowsiness    Diet:  Carbohydrate counting and Other    Frequency of exercise:  6-7 days/week    Duration of exercise:  30-45 minutes    Taking medications regularly:  Yes    Medication side effects:  None    Additional concerns today:  No          Encounter Diagnoses   Name Primary?    Routine history and physical examination of adult Yes    Mixed anxiety and depressive disorder     Hypercholesteremia     PREMA (generalized anxiety disorder)     Screening for prostate cancer     CARDIOVASCULAR SCREENING; LDL GOAL LESS THAN 130            Hyperlipidemia Follow-Up    Are you regularly taking any medication or supplement to lower your cholesterol?   Yes- statin  Are you having muscle aches or other side effects that you think could be caused by your cholesterol lowering medication?  No    Depression and Anxiety Follow-Up  How are you doing with your depression since your last visit? No change  How are you doing with your anxiety since your last visit?  No change  Are you having other symptoms that might be associated with depression or anxiety? No  Have you had a significant life event? Health Concerns   Do you have any concerns with your use of alcohol or other drugs? No    Social History     Tobacco Use    Smoking status: Former     Packs/day: 0.50     Years: 5.00     Pack years: 2.50     Types: Cigarettes     Start date: 1986     Quit date: 1991     Years since quittin.7    Smokeless tobacco: Never    Tobacco comments:     After  no longer a half pack a day. Very occasional--maybe 75 cigarettes a year--91 to 98   Vaping Use    Vaping Use: Never used    Substance Use Topics    Alcohol use: Yes     Alcohol/week: 1.0 - 2.0 standard drink of alcohol     Comment: 1 to 2 glasses of red wine per day    Drug use: No     Comment: quit more than 20 years ago         12/3/2019    11:54 AM 2021    11:47 AM 2022    12:03 PM   PHQ   PHQ-9 Total Score 9 4 4   Q9: Thoughts of better off dead/self-harm past 2 weeks Not at all Not at all Not at all         2019     1:57 PM 2019    11:04 AM 12/3/2019    11:54 AM   PREMA-7 SCORE   Total Score 7 (mild anxiety)     Total Score 7 9 6         2022    12:03 PM   Last PHQ-9   1.  Little interest or pleasure in doing things 1   2.  Feeling down, depressed, or hopeless 1   3.  Trouble falling or staying asleep, or sleeping too much 0   4.  Feeling tired or having little energy 1   5.  Poor appetite or overeating 0   6.  Feeling bad about yourself 0   7.  Trouble concentrating 1   8.  Moving slowly or restless 0   Q9: Thoughts of better off dead/self-harm past 2 weeks 0   PHQ-9 Total Score 4         12/3/2019    11:54 AM   PREMA-7    1. Feeling nervous, anxious, or on edge 1   2. Not being able to stop or control worrying 1   3. Worrying too much about different things 1   4. Trouble relaxing 1   5. Being so restless that it is hard to sit still 0   6. Becoming easily annoyed or irritable 1   7. Feeling afraid, as if something awful might happen 1   PREMA-7 Total Score 6   If you checked any problems, how difficult have they made it for you to do your work, take care of things at home, or get along with other people? Somewhat difficult       Suicide Assessment Five-step Evaluation and Treatment (SAFE-T)        Social History     Tobacco Use    Smoking status: Former     Packs/day: 0.50     Years: 5.00     Pack years: 2.50     Types: Cigarettes     Start date: 1986     Quit date: 1991     Years since quittin.7    Smokeless tobacco: Never    Tobacco comments:     After  no longer a half pack a day. Very  occasional--maybe 75 cigarettes a year--91 to 98   Substance Use Topics    Alcohol use: Yes     Alcohol/week: 1.0 - 2.0 standard drink of alcohol     Comment: 1 to 2 glasses of red wine per day             10/3/2023    10:13 AM   Alcohol Use   Prescreen: >3 drinks/day or >7 drinks/week? No          No data to display                Last PSA:   PSA   Date Value Ref Range Status   2021 1.65 0 - 4 ug/L Final     Comment:     Assay Method:  Chemiluminescence using Siemens Vista analyzer     Prostate Specific Antigen Screen   Date Value Ref Range Status   10/13/2022 1.10 0.00 - 4.00 ug/L Final       Reviewed orders with patient. Reviewed health maintenance and updated orders accordingly - Yes  Lab work is in process    Reviewed and updated as needed this visit by clinical staff   Tobacco  Allergies  Meds              Reviewed and updated as needed this visit by Provider                 Past Medical History:   Diagnosis Date    Anxiety     Basal cell carcinoma     Gastro-oesophageal reflux disease     H/O magnetic resonance imaging of brain and brain stem 2018    MR BRAIN WITHOUT AND WITH CONTRAST 2017 9:10 PM   HISTORY:  Left-sided numbness. Disequilibrium.   COMPARISON: MR brain 2015.   TECHNIQUE: Sagittal T1, axial T2, axial FLAIR, axial GRE, axial diffusion scan, coronal FLAIR, axial post Gd enhanced T1 weighted images.   FINDINGS: There is no intracranial hemorrhage, mass, or recent infarct. There is a cyst in the floor of the right maxilla    Head injury 2012    Had a bad concussion with post concussion synd for year    High cholesterol     History of echocardiogram 2018    TUYET Keyes MD 2018  Narrative     [id number]^MECHE^BLAKE^KRISTAL       [facility] [facility] [address] [place] [id number] Name: JONATHAN FIORE MRN: [id number] : 1965 Study Date: 2018 09:13 AM Age: 52 yrs  "Gender: Male Patient Location: [facility] Reason For Study: Chest Pain Ordering Physician:     Hypertension early 2000    Not on meds. Usually in pre-hypertesion categ.    Insomnia     Squamous cell carcinoma         Review of Systems   Constitutional:  Negative for chills and fever.   HENT:  Negative for congestion, ear pain, hearing loss and sore throat.    Eyes:  Negative for pain and visual disturbance.   Respiratory:  Negative for cough and shortness of breath.    Cardiovascular:  Negative for chest pain, palpitations and peripheral edema.   Gastrointestinal:  Positive for constipation and heartburn. Negative for abdominal pain, diarrhea, hematochezia and nausea.   Genitourinary:  Negative for dysuria, frequency, genital sores, hematuria, impotence, penile discharge and urgency.   Musculoskeletal:  Positive for arthralgias. Negative for joint swelling and myalgias.   Skin:  Negative for rash.   Neurological:  Negative for dizziness, weakness, headaches and paresthesias.   Psychiatric/Behavioral:  Negative for mood changes. The patient is nervous/anxious.          OBJECTIVE:   /89   Pulse 106   Temp 97.6  F (36.4  C) (Temporal)   Resp 12   Ht 1.838 m (6' 0.36\")   Wt 86 kg (189 lb 9.6 oz)   SpO2 97%   BMI 25.46 kg/m      Physical Exam  GENERAL: healthy, alert and no distress  EYES: Eyes grossly normal to inspection, PERRL and conjunctivae and sclerae normal  HENT: ear canals and TM's normal, nose and mouth without ulcers or lesions  NECK: no adenopathy, no asymmetry, masses, or scars and thyroid normal to palpation  RESP: lungs clear to auscultation - no rales, rhonchi or wheezes  CV: regular rate and rhythm, normal S1 S2, no S3 or S4, no murmur, click or rub, no peripheral edema and peripheral pulses strong  ABDOMEN: soft, nontender, no hepatosplenomegaly, no masses and bowel sounds normal  MS: no gross musculoskeletal defects noted, no edema  SKIN: no suspicious lesions or rashes  NEURO: Normal " "strength and tone, mentation intact and speech normal  PSYCH: mentation appears normal, affect normal/bright  LYMPH: no cervical, supraclavicular, axillary, or inguinal adenopathy  Diabetic foot exam: normal DP and PT pulses, no trophic changes or ulcerative lesions, and normal sensory exam    Diagnostic Test Results:  Labs reviewed in Epic  No results found for any visits on 10/03/23.    ASSESSMENT/PLAN:   (Z00.00) Routine history and physical examination of adult  (primary encounter diagnosis)  Comment: overl well  Plan: Comprehensive metabolic panel (BMP + Alb, Alk         Phos, ALT, AST, Total. Bili, TP)            (F41.8) Mixed anxiety and depressive disorder  Comment: Well controlled , sleeps well  Plan: mirtazapine (REMERON) 15 MG tablet            (E78.00) Hypercholesteremia  Comment: recheck  Plan: Lipid panel reflex to direct LDL Fasting            (F41.1) PREMA (generalized anxiety disorder)  Comment: Well controlled   Plan: venlafaxine (EFFEXOR XR) 75 MG 24 hr capsule            (Z12.5) Screening for prostate cancer  Comment: sent  Plan: PSA, screen            (Z13.6) CARDIOVASCULAR SCREENING; LDL GOAL LESS THAN 130  Comment: recheck  Plan: lab          COUNSELING:   Reviewed preventive health counseling, as reflected in patient instructions       Regular exercise       Healthy diet/nutrition       Vision screening       Hearing screening       Immunizations  Vaccinated for: Covid-19, Influenza, and Varicella           Colorectal cancer screening       Prostate cancer screening      BMI:   Estimated body mass index is 25.46 kg/m  as calculated from the following:    Height as of this encounter: 1.838 m (6' 0.36\").    Weight as of this encounter: 86 kg (189 lb 9.6 oz).         He reports that he quit smoking about 32 years ago. His smoking use included cigarettes. He started smoking about 37 years ago. He has a 2.50 pack-year smoking history. He has never used smokeless tobacco.            Rocky Sierra " MD Siddharth  [facility]

## 2023-11-20 ENCOUNTER — MYC MEDICAL ADVICE (OUTPATIENT)
Dept: DERMATOLOGY | Facility: CLINIC | Age: 58
End: 2023-11-20
Payer: COMMERCIAL

## 2024-01-14 DIAGNOSIS — F41.8 MIXED ANXIETY AND DEPRESSIVE DISORDER: ICD-10-CM

## 2024-01-14 DIAGNOSIS — E78.00 HYPERCHOLESTEREMIA: ICD-10-CM

## 2024-01-15 RX ORDER — ATORVASTATIN CALCIUM 40 MG/1
40 TABLET, FILM COATED ORAL DAILY
Qty: 90 TABLET | Refills: 3 | Status: SHIPPED | OUTPATIENT
Start: 2024-01-15

## 2024-01-16 ENCOUNTER — OFFICE VISIT (OUTPATIENT)
Dept: DERMATOLOGY | Facility: CLINIC | Age: 59
End: 2024-01-16
Payer: COMMERCIAL

## 2024-01-16 DIAGNOSIS — L57.8 ACTINIC SKIN DAMAGE: Primary | ICD-10-CM

## 2024-01-16 DIAGNOSIS — D48.5 NEOPLASM OF UNCERTAIN BEHAVIOR OF SKIN: ICD-10-CM

## 2024-01-16 PROCEDURE — 99213 OFFICE O/P EST LOW 20 MIN: CPT | Performed by: STUDENT IN AN ORGANIZED HEALTH CARE EDUCATION/TRAINING PROGRAM

## 2024-01-16 NOTE — NURSING NOTE
Dermatology Rooming Note    Cesar Montejo's goals for this visit include:   Chief Complaint   Patient presents with    Derm Problem     Pt has lesion of concern proxiaml to right ear.      Jarrod Salas, EMT-B

## 2024-01-16 NOTE — PROGRESS NOTES
[facility] Dermatology Note    Encounter Date: Jan 16, 2024    Dermatology Problem List:  1. NMSC  - Superficial BCC, left cheek, s/p Mohs 6/16  - SCCIS right cheek, s/p Mohs 6/16  2. Verruca vulgaris, left plantar foot - resolved  - Cryo 06/25/2021, prior sal acid  3. Actinic keratoses. Cryo.  4. Actinic cheilitis  - s/p CO2 laser ablation of lower lip vermilion 10/2016  - s/p laser vermilionectomy 8/2017  - Previously followed with oral surgery  5. Seborrheic dermatitis   - ketoconazole shampoo   6. 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    Major PMHx  -   ______________________________________    Impression/Plan:  Travis was seen today for derm problem.    Diagnoses and all orders for this visit:    Actinic skin damage  - Reviewed the compounding benefits of incremental changes to sun protective clothing behaviors including increased frequency of sunscreen and sun protective clothing like broad brimmed hats and longsleeved UPF containing clothing    Neoplasm of uncertain behavior of skin  - erosion on L anti helix, well healed         Follow-up PRN .       Staff Involved:  Staff Only    Chaparro Shirley MD   of Dermatology  Department of Dermatology  [facility]      CC:   Chief Complaint   Patient presents with    Derm Problem     Pt has lesion of concern proxiaml to right ear.        History of Present Illness:  Mr. Cesar Montejo is a 58 year old male who presents as a return patient.    A few months after last appointment noted a recurrent cycle of bleeding on L helix. Started applying vaseline about 6weeks ago, bleeding has become less frequent.  Hasn't noticed any bleeding since then in fact but has noted a scab     Labs:      Physical exam:  Vitals: There were no vitals taken for this visit.  GEN: well developed, well-nourished, in no acute distress, in a pleasant mood.     SKIN: Merchant phototype 1  - Full skin, which includes the head/face, both arms, chest, back, abdomen,both legs, genitalia and/or groin buttocks, digits and/or nails, was examined.  - well healed skin L ear   - Flat brown macules and patches in a sun exposed areas on face and extremities  - No other lesions of concern on areas examined.     Past Medical History:   Past Medical History:   Diagnosis Date    Anxiety     Basal cell carcinoma     Gastro-oesophageal reflux disease     H/O magnetic resonance imaging of brain and brain stem 2018    MR BRAIN WITHOUT AND WITH CONTRAST 2017 9:10 PM   HISTORY:  Left-sided numbness. Disequilibrium.   COMPARISON: MR brain 2015.   TECHNIQUE: Sagittal T1, axial T2, axial FLAIR, axial GRE, axial diffusion scan, coronal FLAIR, axial post Gd enhanced T1 weighted images.   FINDINGS: There is no intracranial hemorrhage, mass, or recent infarct. There is a cyst in the floor of the right maxilla    Head injury 2012    Had a bad concussion with post concussion synd for year    High cholesterol     History of echocardiogram 2018    TUYET Keyes MD 2018  Narrative     [id number]^MECHE^BLAKE^R       [facility] [facility]ch[facility] [address] [place] [id number] Name: JONATHAN FIORE MRN: [id number] : 1965 Study Date: 2018 09:13 AM Age: 52 yrs Gender: Male Patient Location: [facility] Reason For Study: Chest Pain Ordering Physician:     Hypertension early     Not on meds. Usually in pre-hypertesion categ.    Insomnia     Squamous cell carcinoma      Past Surgical History:   Procedure Laterality Date    COLONOSCOPY N/A 2017     Procedure: COLONOSCOPY;  Surgeon: Larry Fields MD;  Location: [facility]    LASER CO2 LESION ORAL N/A 10/5/2016    Procedure: LASER CO2 LESION ORAL;  Surgeon: Josué Rojo DDS;  Location:  OR    MOHS MICROGRAPHIC PROCEDURE      squamous and basal cell       Social History:   reports that he quit smoking about 33 years ago. His smoking use included cigarettes. He started smoking about 38 years ago. He has a 2.5 pack-year smoking history. He has never used smokeless tobacco. He reports current alcohol use of about 1.0 - 2.0 standard drink of alcohol per week. He reports that he does not use drugs.    Family History:  Family History   Problem Relation Age of Onset    Lipids Mother         on meds    Cerebrovascular Disease Mother         TIA    Alzheimer Disease Mother         severe    Skin Cancer Mother         SCC    Other - See Comments Mother         [facility]    Lipids Father         on meds    Hypertension Father         controlled with meds    Thyroid Disease Father         ?not sure but possibly    Diabetes Father     Cerebrovascular Disease Father     Cancer - colorectal Maternal Grandmother         still alive at 99    Cancer Maternal Grandfather         lung but lifetime smoker    Melanoma Maternal Grandfather     Other - See Comments Brother         [place]    Asperger's syndrome Brother     Asthma No family hx of     C.A.D. No family hx of     Prostate Cancer No family hx of        Medications:  Current Outpatient Medications   Medication Sig Dispense Refill    aspirin 81 MG EC tablet 81mg tab by mouth nightly @ 11pm      atorvastatin (LIPITOR) 40 MG tablet TAKE 1 TABLET(40 MG) BY MOUTH DAILY 90 tablet 3    Cholecalciferol (VITAMIN D) 50 MCG (2000 UT) CAPS 2000 units by mouth nightly @ 11pm 30 capsule     COENZYME Q10 PO Take 1,000 mg by mouth daily @ 10 pm      doxycycline monohydrate (MONODOX) 100 MG capsule Take 1 capsule (100 mg) by mouth 2 times daily 20 capsule 0     ketoconazole (NIZORAL) 2 % external cream Apply topically daily To affected areas on the face. 60 g 3    ketoconazole (NIZORAL) 2 % external shampoo Massage into wet scalp, let sit 3-5 min, then rinse. Do this 3x weekly. 120 mL 5    Krill Oil 1000 MG CAPS 4 x 1000mg capsule by mouth @ 11pm      Melatonin 10 MG TABS tablet Take 1 tablet (10 mg) by mouth At Bedtime @11pm      mirtazapine (REMERON) 15 MG tablet Take 1 tablet (15 mg) by mouth At Bedtime 90 tablet 3    NONFORMULARY L-Alpha glycerylphosphorylcholine (GCP) 400 mg daily      NONFORMULARY Pyrroloquinoline quinone (PQQ) 600 mg daily      simvastatin (ZOCOR) 20 MG tablet 20mg tab by mouth nightly @ 11pm      simvastatin (ZOCOR) 40 MG tablet TAKE 1 TABLET(40 MG) BY MOUTH AT BEDTIME 90 tablet 1    triamcinolone (KENALOG) 0.1 % external ointment Apply topically 2 times daily 30 g 1    venlafaxine (EFFEXOR XR) 75 MG 24 hr capsule Take 1 capsule (75 mg) by mouth daily 90 capsule 3    famciclovir (FAMVIR) 500 MG tablet Take 1 tablet (500 mg) by mouth 3 times daily for 7 days 21 tablet 0     No Known Allergies

## 2024-01-16 NOTE — LETTER
1/16/2024       RE: Cesar Montejo  [address]  [facility] [address]     Dear Colleague,    Thank you for referring your patient, Cesar Montejo, to the [facility] DERMATOLOGY CLINIC [place] at [facility]. Please see a copy of my visit note below.    [facility] Dermatology Note    Encounter Date: Jan 16, 2024    Dermatology Problem List:  1. NMSC  - Superficial BCC, left cheek, s/p Mohs 6/16  - SCCIS right cheek, s/p Mohs 6/16  2. Verruca vulgaris, left plantar foot - resolved  - Cryo 06/25/2021, prior sal acid  3. Actinic keratoses. Cryo.  4. Actinic cheilitis  - s/p CO2 laser ablation of lower lip vermilion 10/2016  - s/p laser vermilionectomy 8/2017  - Previously followed with oral surgery  5. Seborrheic dermatitis   - ketoconazole shampoo   6. 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    Major PMHx  -   ______________________________________    Impression/Plan:  Travis was seen today for derm problem.    Diagnoses and all orders for this visit:    Actinic skin damage  - Reviewed the compounding benefits of incremental changes to sun protective clothing behaviors including increased frequency of sunscreen and sun protective clothing like broad brimmed hats and longsleeved UPF containing clothing    Neoplasm of uncertain behavior of skin  - erosion on L anti helix, well healed         Follow-up PRN .       Staff Involved:  Staff Only    Chaparro Shirley MD   of Dermatology  Department of Dermatology  [facility] [employer] [facility]      CC:   Chief Complaint   Patient presents with    Derm Problem      Pt has lesion of concern proxiaml to right ear.        History of Present Illness:  Mr. Jonathan Fiore is a 58 year old male who presents as a return patient.    A few months after last appointment noted a recurrent cycle of bleeding on L helix. Started applying vaseline about 6weeks ago, bleeding has become less frequent. Hasn't noticed any bleeding since then in fact but has noted a scab     Labs:      Physical exam:  Vitals: There were no vitals taken for this visit.  GEN: well developed, well-nourished, in no acute distress, in a pleasant mood.     SKIN: Merchant phototype 1  - Full skin, which includes the head/face, both arms, chest, back, abdomen,both legs, genitalia and/or groin buttocks, digits and/or nails, was examined.  - well healed skin L ear   - Flat brown macules and patches in a sun exposed areas on face and extremities  - No other lesions of concern on areas examined.     Past Medical History:   Past Medical History:   Diagnosis Date    Anxiety     Basal cell carcinoma     Gastro-oesophageal reflux disease     H/O magnetic resonance imaging of brain and brain stem 2018    MR BRAIN WITHOUT AND WITH CONTRAST 2017 9:10 PM   HISTORY:  Left-sided numbness. Disequilibrium.   COMPARISON: MR brain 2015.   TECHNIQUE: Sagittal T1, axial T2, axial FLAIR, axial GRE, axial diffusion scan, coronal FLAIR, axial post Gd enhanced T1 weighted images.   FINDINGS: There is no intracranial hemorrhage, mass, or recent infarct. There is a cyst in the floor of the right maxilla    Head injury 2012    Had a bad concussion with post concussion synd for year    High cholesterol     History of echocardiogram 2018    TUYET Keyes MD 2018  Narrative     [id number] [facility]28 [id number]^MECHE^BLAKE^R       [facility] Echocardiography Laboratory [address] [place] [id number] Name: JONATHAN FIORE MRN: [id number] : 1965 Study Date:  06/04/2018 09:13 AM Age: 52 yrs Gender: Male Patient Location: [facility] Reason For Study: Chest Pain Ordering Physician:     Hypertension early 2000    Not on meds. Usually in pre-hypertesion categ.    Insomnia     Squamous cell carcinoma      Past Surgical History:   Procedure Laterality Date    COLONOSCOPY N/A 4/17/2017    Procedure: COLONOSCOPY;  Surgeon: Larry Fields MD;  Location:  GI    LASER CO2 LESION ORAL N/A 10/5/2016    Procedure: LASER CO2 LESION ORAL;  Surgeon: Josué Rojo DDS;  Location: [facility] OR    MOHS MICROGRAPHIC PROCEDURE      squamous and basal cell       Social History:   reports that he quit smoking about 33 years ago. His smoking use included cigarettes. He started smoking about 38 years ago. He has a 2.5 pack-year smoking history. He has never used smokeless tobacco. He reports current alcohol use of about 1.0 - 2.0 standard drink of alcohol per week. He reports that he does not use drugs.    Family History:  Family History   Problem Relation Age of Onset    Lipids Mother         on meds    Cerebrovascular Disease Mother         TIA    Alzheimer Disease Mother         severe    Skin Cancer Mother         SCC    Other - See Comments Mother         [facility]    Lipids Father         on meds    Hypertension Father         controlled with meds    Thyroid Disease Father         ?not sure but possibly    Diabetes Father     Cerebrovascular Disease Father     Cancer - colorectal Maternal Grandmother         still alive at 99    Cancer Maternal Grandfather         lung but lifetime smoker    Melanoma Maternal Grandfather     Other - See Comments Brother         [place]    Asperger's syndrome Brother     Asthma No family hx of     C.A.D. No family hx of     Prostate Cancer No family hx of        Medications:  Current Outpatient Medications   Medication Sig Dispense Refill    aspirin 81 MG EC tablet 81mg tab by mouth nightly @ 11pm      atorvastatin (LIPITOR) 40 MG tablet  TAKE 1 TABLET(40 MG) BY MOUTH DAILY 90 tablet 3    Cholecalciferol (VITAMIN D) 50 MCG (2000 UT) CAPS 2000 units by mouth nightly @ 11pm 30 capsule     COENZYME Q10 PO Take 1,000 mg by mouth daily @ 10 pm      doxycycline monohydrate (MONODOX) 100 MG capsule Take 1 capsule (100 mg) by mouth 2 times daily 20 capsule 0    ketoconazole (NIZORAL) 2 % external cream Apply topically daily To affected areas on the face. 60 g 3    ketoconazole (NIZORAL) 2 % external shampoo Massage into wet scalp, let sit 3-5 min, then rinse. Do this 3x weekly. 120 mL 5    Krill Oil 1000 MG CAPS 4 x 1000mg capsule by mouth @ 11pm      Melatonin 10 MG TABS tablet Take 1 tablet (10 mg) by mouth At Bedtime @11pm      mirtazapine (REMERON) 15 MG tablet Take 1 tablet (15 mg) by mouth At Bedtime 90 tablet 3    NONFORMULARY L-Alpha glycerylphosphorylcholine (GCP) 400 mg daily      NONFORMULARY Pyrroloquinoline quinone (PQQ) 600 mg daily      simvastatin (ZOCOR) 20 MG tablet 20mg tab by mouth nightly @ 11pm      simvastatin (ZOCOR) 40 MG tablet TAKE 1 TABLET(40 MG) BY MOUTH AT BEDTIME 90 tablet 1    triamcinolone (KENALOG) 0.1 % external ointment Apply topically 2 times daily 30 g 1    venlafaxine (EFFEXOR XR) 75 MG 24 hr capsule Take 1 capsule (75 mg) by mouth daily 90 capsule 3    famciclovir (FAMVIR) 500 MG tablet Take 1 tablet (500 mg) by mouth 3 times daily for 7 days 21 tablet 0     No Known Allergies

## 2024-01-19 NOTE — TELEPHONE ENCOUNTER
FUTURE VISIT INFORMATION      FUTURE VISIT INFORMATION:    Date: 6.21.18    Time: 10:40 AM    Location: [facility]  REFERRAL INFORMATION:    Referring provider:  Dr. Messina    Referring providers clinic:  [facility]    Reason for visit/diagnosis  Dyspnea and respiratory abnormality    RECORDS REQUESTED FROM:       Clinic name Comments Records Status Imaging Status   [facility] Referral Received PACS                                   RECORDS STATUS      RECORDS RECEIVED FROM:    DATE RECEIVED: 6.21.18   NOTES STATUS DETAILS   OFFICE NOTE from referring provider Internal 6.7.18 Dr. Messina,    OFFICE NOTE from other specialist N/A    DISCHARGE SUMMARY from hospital N/A    DISCHARGE REPORT from the ER N/A    OPERATIVE REPORT N/A    MEDICATION LIST Internal    IMAGING  (NEED IMAGES AND REPORTS)     CT SCAN N/A    CHEST XRAY (CXR) Internal 5.25.18  11.29.17  12.11.16   TESTS     PULMONARY FUNCTION TESTING (PFT) In process Scheduled 6.21.18   FLOW LOOP VOLUME (FVL) In process Scheduled 6.21.18   CYSTIC FIBROSIS     CF SPUTUM CULTURE N/A         
fugax  ENT:   Negative sore throat,oral abscess   Endocrine Negative for thyroid replacement Rx; goiter; DM  Respiratory:  Negative chronic cough,sputum production  Cards:   Negative for palpitations, varicosities, claudication, lower extremity edema  GI:   Negative for dysphagia, bleeding, nausea, vomiting, diarrhea, and abdominal pain  Genitourinary: Negative for frequency, dysuria  Integument:  Negative for rash and pruritus  Hematologic:  Negative for easy bruising; bleeding dyscrasia   Musculoskel: Negative for muscle weakness inhibiting ambulation  Neurological:  Negative for stroke, TIA, syncope, dizziness  Behavl/Psych: Negative for feelings of anxiety, depression     Cardiovascular Testing:     EKG, 1/18/24:       TTE:  01/17/24    ECHO (TTE) COMPLETE (PRN CONTRAST/BUBBLE/STRAIN/3D) 01/17/2024  4:20 PM (Final)    Interpretation Summary    Left Ventricle: Normal left ventricular systolic function with a visually estimated EF of 55 - 60%. Left ventricle size is normal. Increased wall thickness. Normal wall motion.    Aortic Valve: Bioprosthetic valve. AV mean gradient is 10 mmHg.    Mitral Valve: Mild regurgitation.    Tricuspid Valve: The estimated RVSP is 36 mmHg.    Image quality is technically difficult. Contrast used: Definity.    Signed by: Lv Lott MD on 1/17/2024  4:20 PM      Physical Exam: Deferred- Virtual     [facility]      Assessment/Plan:     Severe AS s/p TAVR: Doing well. Follow up appointments as scheduled on AVS.  SBE prophylax reviewed.  May begin Cardiac Rehab  HTN: Continue home regimen.   HLD: Continue statin.  DMII: Continue Jardiance and metformin.   Mild carotid stenosis: Continue ASA, statin. OP follow up with Vascular.   Pancreatic cystic lesion: OP follow up with GI.      Documentation:    Tamia Cruz was evaluated through a synchronous (real-time) audio encounter. Patient identification was verified at the start of the visit. She

## 2024-01-22 ENCOUNTER — TELEPHONE (OUTPATIENT)
Dept: DERMATOLOGY | Facility: CLINIC | Age: 59
End: 2024-01-22
Payer: COMMERCIAL

## 2024-01-22 NOTE — TELEPHONE ENCOUNTER
Patient Contacted and schedule the following:    Appointment type: Return  Provider: Dr. Kumar  Return date: 7/31/24  Specialty phone number: [phone]

## 2024-03-12 ENCOUNTER — OFFICE VISIT (OUTPATIENT)
Dept: DERMATOLOGY | Facility: CLINIC | Age: 59
End: 2024-03-12
Payer: COMMERCIAL

## 2024-03-12 DIAGNOSIS — D48.5 NEOPLASM OF UNCERTAIN BEHAVIOR OF SKIN: ICD-10-CM

## 2024-03-12 DIAGNOSIS — L98.9 BENIGN SKIN LESION: Primary | ICD-10-CM

## 2024-03-12 DIAGNOSIS — D22.9 MULTIPLE BENIGN NEVI: ICD-10-CM

## 2024-03-12 PROCEDURE — 99213 OFFICE O/P EST LOW 20 MIN: CPT | Performed by: STUDENT IN AN ORGANIZED HEALTH CARE EDUCATION/TRAINING PROGRAM

## 2024-03-12 NOTE — PATIENT INSTRUCTIONS
ABCDEs - Counseled ABCDEs of melanoma: Asymmetry, Border (irregularity), Color (not uniform, changes in color), Diameter (greater than 6 mm which is about the size of a pencil eraser), and Evolving (any changes in preexisting moles)      Sun protection: Counseled SPF30+ sunscreen, UPF clothing, sun avoidance, tanning bed avoidance     Patient will follow up for new or changing lesions.

## 2024-03-12 NOTE — LETTER
3/12/2024       RE: Cesar Montejo  [address]  [facility] [address]     Dear Colleague,    Thank you for referring your patient, Cesar Montejo, to the [facility] DERMATOLOGY CLINIC [place] at [facility]. Please see a copy of my visit note below.    I have personally examined this patient and agree with the medical student's documentation and plan of care. I have reviewed and amended the medical student's note as necessary.The documentation accurately reflects my clinical observations, diagnoses, treatment and follow-up plans.     Chaparro Shirley MD  Dermatology Staff      [facility] Dermatology Note  Encounter Date: Mar 12, 2024  Office Visit     Dermatology Problem List:  1. NMSC  - Superficial BCC, left cheek, s/p Mohs 6/16  - SCCIS right cheek, s/p Mohs 6/16  2. Verruca vulgaris, left plantar foot - resolved  - Cryo 06/25/2021, prior sal acid  3. Actinic keratoses. Cryo.  4. Actinic cheilitis  - s/p CO2 laser ablation of lower lip vermilion 10/2016  - s/p laser vermilionectomy 8/2017  - Previously followed with oral surgery  5. Seborrheic dermatitis   - ketoconazole shampoo   6. 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 Based on previous pictures, patient has picked off most of the lesion. This may have been a benign nevus that became slightly irritated after being picked at.   - Discussed with patient that this is a benign lesion, though the exact nature of the lesion is difficult to say given that he has  been picking at it.   - He will continue to monitor for new or changing lesions  - Discussed importance of continued sun protection    #Lentigines  #actinic skin damage  - Reviewed the compounding benefits of incremental changes to sun protective clothing behaviors including increased frequency of sunscreen and sun protective clothing like broad brimmed hats and longsleeved UPF containing clothing      Procedures Performed:   None    Follow-up: prn for new or changing lesions    Staff and Medical Student:   IMook, MS3, saw and discussed this patient with the attending physician Dr. Chaparro Shirley MD.     ____________________________________________    CC: Derm Problem (Spot check behind left ear)    HPI:  Mr. Cesar Montejo is a(n) 58 year old male with history of NMSC who presents today as a return patient for a spot check on a lesion located at the posterior aspect of his left ear. Patient reports he first noticed a brownish papule posterior to his left ear about one month ago. There was initially some mild discomfort associated with the lesion but it does not bother him anymore. He notified the dermatology office of this lesion who stated that this had been there at a previous skin check he had done on 1/16/24. Since making the dermatology appointment, the lesion has shrunk in size. He occasionally picks at the lesion. The lesion did bleed once, but patient believes this was due to the fact he had been picking at it.     Patient is otherwise feeling well, without additional skin concerns.    Labs:  None reviewed.    Physical Exam:  Vitals: There were no vitals taken for this visit.  SKIN: Focused examination of posterior left ear was performed.  - At posterior aspect of left ear, there is a 2 mm light brown slightly raised papule with a few small telangiectasia noted  - No other lesions of concern on areas examined.     Medications:  Current Outpatient Medications   Medication    aspirin 81 MG EC  tablet    atorvastatin (LIPITOR) 40 MG tablet    Cholecalciferol (VITAMIN D) 50 MCG (2000 UT) CAPS    COENZYME Q10 PO    doxycycline monohydrate (MONODOX) 100 MG capsule    ketoconazole (NIZORAL) 2 % external cream    ketoconazole (NIZORAL) 2 % external shampoo    Krill Oil 1000 MG CAPS    Melatonin 10 MG TABS tablet    mirtazapine (REMERON) 15 MG tablet    NONFORMULARY    NONFORMULARY    simvastatin (ZOCOR) 20 MG tablet    simvastatin (ZOCOR) 40 MG tablet    triamcinolone (KENALOG) 0.1 % external ointment    venlafaxine (EFFEXOR XR) 75 MG 24 hr capsule    famciclovir (FAMVIR) 500 MG tablet     No current facility-administered medications for this visit.      Past Medical History:   Patient Active Problem List   Diagnosis    Insomnia    Adjustment reaction    CARDIOVASCULAR SCREENING; LDL GOAL LESS THAN 130    Pure hypercholesterolemia    GERD (gastroesophageal reflux disease)    Right shoulder pain    Calcific tendonitis    Solar lentiginosis    Skin cancer screening    Dermatitis, seborrheic    Family history of skin cancer    AK (actinic keratosis)    History of actinic keratosis    History of basal cell cancer    Seborrheic keratosis    Cherry angioma    Actinic cheilitis    History of nonmelanoma skin cancer    History of multiple concussions    Elevated blood pressure reading without diagnosis of hypertension    Dream enactment behavior    Disturbance in sleep behavior    Dyspnea and respiratory abnormality    Chest pain    H/O magnetic resonance imaging of brain and brain stem    History of echocardiogram    Encounter for neuropsychological testing 2018 with Dr. Park    Benign neoplasm of skin of right upper extremity    Illness anxiety disorder     Past Medical History:   Diagnosis Date    Anxiety     Basal cell carcinoma     Gastro-oesophageal reflux disease     H/O magnetic resonance imaging of brain and brain stem 6/12/2018    MR BRAIN WITHOUT AND WITH CONTRAST 11/29/2017 9:10 PM   HISTORY:  Left-sided  numbness. Disequilibrium.   COMPARISON: MR brain 2015.   TECHNIQUE: Sagittal T1, axial T2, axial FLAIR, axial GRE, axial diffusion scan, coronal FLAIR, axial post Gd enhanced T1 weighted images.   FINDINGS: There is no intracranial hemorrhage, mass, or recent infarct. There is a cyst in the floor of the right maxilla    Head injury 2012    Had a bad concussion with post concussion synd for year    High cholesterol     History of echocardiogram 2018    TUYET Keyes MD 2018  Narrative     [id number] [facility]28 [id number]^MECHE^BLAKE^KRISTAL       [facility] [address] [place] [address] Name: JONATHAN FIORE MRN: [id number] : 1965 Study Date: 2018 09:13 AM Age: 52 yrs Gender: Male Patient Location: [facility] Reason For Study: Chest Pain Ordering Physician:     Hypertension early     Not on meds. Usually in pre-hypertesion categ.    Insomnia     Squamous cell carcinoma         CC No referring provider defined for this encounter. on close of this encounter.

## 2024-03-12 NOTE — PROGRESS NOTES
I have personally examined this patient and agree with the medical student's documentation and plan of care. I have reviewed and amended the medical student's note as necessary.The documentation accurately reflects my clinical observations, diagnoses, treatment and follow-up plans.     Chaparro Shirley MD  Dermatology Staff      [facility] Dermatology Note  Encounter Date: Mar 12, 2024  Office Visit     Dermatology Problem List:  1. NMSC  - Superficial BCC, left cheek, s/p Mohs 6/16  - SCCIS right cheek, s/p Mohs 6/16  2. Verruca vulgaris, left plantar foot - resolved  - Cryo 06/25/2021, prior sal acid  3. Actinic keratoses. Cryo.  4. Actinic cheilitis  - s/p CO2 laser ablation of lower lip vermilion 10/2016  - s/p laser vermilionectomy 8/2017  - Previously followed with oral surgery  5. Seborrheic dermatitis   - ketoconazole shampoo   6. 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 Based on previous pictures, patient has picked off most of the lesion. This may have been a benign nevus that became slightly irritated after being picked at.   - Discussed with patient that this is a benign lesion, though the exact nature of the lesion is difficult to say given that he has been picking at it.   - He will continue to monitor for new or changing lesions  - Discussed importance of continued sun protection    #Lentigines  #actinic skin damage  - Reviewed the compounding benefits of incremental changes to sun protective clothing behaviors including increased frequency of sunscreen and sun protective  clothing like broad brimmed hats and longsleeved UPF containing clothing      Procedures Performed:   None    Follow-up: prn for new or changing lesions    Staff and Medical Student:   I, Mook Jaeegr, MS3, saw and discussed this patient with the attending physician Dr. Chaparro Shirley MD.     ____________________________________________    CC: Derm Problem (Spot check behind left ear)    HPI:  Mr. Cesar Montejo is a(n) 58 year old male with history of NMSC who presents today as a return patient for a spot check on a lesion located at the posterior aspect of his left ear. Patient reports he first noticed a brownish papule posterior to his left ear about one month ago. There was initially some mild discomfort associated with the lesion but it does not bother him anymore. He notified the dermatology office of this lesion who stated that this had been there at a previous skin check he had done on 1/16/24. Since making the dermatology appointment, the lesion has shrunk in size. He occasionally picks at the lesion. The lesion did bleed once, but patient believes this was due to the fact he had been picking at it.     Patient is otherwise feeling well, without additional skin concerns.    Labs:  None reviewed.    Physical Exam:  Vitals: There were no vitals taken for this visit.  SKIN: Focused examination of posterior left ear was performed.  - At posterior aspect of left ear, there is a 2 mm light brown slightly raised papule with a few small telangiectasia noted  - No other lesions of concern on areas examined.     Medications:  Current Outpatient Medications   Medication    aspirin 81 MG EC tablet    atorvastatin (LIPITOR) 40 MG tablet    Cholecalciferol (VITAMIN D) 50 MCG (2000 UT) CAPS    COENZYME Q10 PO    doxycycline monohydrate (MONODOX) 100 MG capsule    ketoconazole (NIZORAL) 2 % external cream    ketoconazole (NIZORAL) 2 % external shampoo    Krill Oil 1000 MG CAPS    Melatonin 10 MG TABS tablet    mirtazapine  (REMERON) 15 MG tablet    NONFORMULARY    NONFORMULARY    simvastatin (ZOCOR) 20 MG tablet    simvastatin (ZOCOR) 40 MG tablet    triamcinolone (KENALOG) 0.1 % external ointment    venlafaxine (EFFEXOR XR) 75 MG 24 hr capsule    famciclovir (FAMVIR) 500 MG tablet     No current facility-administered medications for this visit.      Past Medical History:   Patient Active Problem List   Diagnosis    Insomnia    Adjustment reaction    CARDIOVASCULAR SCREENING; LDL GOAL LESS THAN 130    Pure hypercholesterolemia    GERD (gastroesophageal reflux disease)    Right shoulder pain    Calcific tendonitis    Solar lentiginosis    Skin cancer screening    Dermatitis, seborrheic    Family history of skin cancer    AK (actinic keratosis)    History of actinic keratosis    History of basal cell cancer    Seborrheic keratosis    Cherry angioma    Actinic cheilitis    History of nonmelanoma skin cancer    History of multiple concussions    Elevated blood pressure reading without diagnosis of hypertension    Dream enactment behavior    Disturbance in sleep behavior    Dyspnea and respiratory abnormality    Chest pain    H/O magnetic resonance imaging of brain and brain stem    History of echocardiogram    Encounter for neuropsychological testing 2018 with Dr. Park    Benign neoplasm of skin of right upper extremity    Illness anxiety disorder     Past Medical History:   Diagnosis Date    Anxiety     Basal cell carcinoma     Gastro-oesophageal reflux disease     H/O magnetic resonance imaging of brain and brain stem 6/12/2018    MR BRAIN WITHOUT AND WITH CONTRAST 11/29/2017 9:10 PM   HISTORY:  Left-sided numbness. Disequilibrium.   COMPARISON: MR brain 4/18/2015.   TECHNIQUE: Sagittal T1, axial T2, axial FLAIR, axial GRE, axial diffusion scan, coronal FLAIR, axial post Gd enhanced T1 weighted images.   FINDINGS: There is no intracranial hemorrhage, mass, or recent infarct. There is a cyst in the floor of the right maxilla    Head  injury 2012    Had a bad concussion with post concussion synd for year    High cholesterol     History of echocardiogram 2018    TUYET Keyes MD 2018  Narrative     [id number] [facility]28 [id number]^MECHE^BLAKE^KRISTAL       [facility] [address] [place] [address] Name: JONATHAN FIORE MRN: [id number] : 1965 Study Date: 2018 09:13 AM Age: 52 yrs Gender: Male Patient Location: [facility] Reason For Study: Chest Pain Ordering Physician:     Hypertension early     Not on meds. Usually in pre-hypertesion categ.    Insomnia     Squamous cell carcinoma         CC No referring provider defined for this encounter. on close of this encounter.

## 2024-03-12 NOTE — NURSING NOTE
Chief Complaint   Patient presents with    Derm Problem     Spot check behind left ear     Charity BOTELLO RN-BSN  Dermatology Surgery  [phone]

## 2024-07-30 NOTE — PROGRESS NOTES
[facility] Dermatology Note  Encounter Date: Jul 31, 2024  Office Visit     Dermatology Problem List:  1. NMSC  - Superficial BCC, left cheek, s/p Mohs 6/16  - SCCIS right cheek, s/p Mohs 6/16  2. Verruca vulgaris, left plantar foot - resolved  - Cryo 06/25/2021, prior sal acid  3. Actinic keratoses. Cryo.  4. Actinic cheilitis  - s/p CO2 laser ablation of lower lip vermilion 10/2016  - s/p laser vermilionectomy 8/2017  - Previously followed with oral surgery  5. Seborrheic dermatitis   - ketoconazole shampoo   6. Benign bx:  - Focal ulceration with adjacent mild epidermal hyperplasia, R inner helix, s/p bx 2017  - Irregular epidermal hyperplasia with hypergranulosis and dermal fibrosis, L mid-helix, s/p shave bx 2018   7. 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  - Benign nature discussed    # Multiple benign nevi.  # Solar lentigines   - Counseled on ABCDEs of melanoma and sun protection - recommend SPF 30 or higher with frequent application   - Return sooner if noticing changing or symptomatic lesions     # History of NMSC. No evidence of recurrent disease.  # H/o actinic cheilitis.  - Continue photoprotection - recommend SPF 30 or higher with frequent reapplication  - Continue q6 months skin exams  (preferred)  - Advised to monitor for changing, non-healing, bleeding, painful, changing, or otherwise symptomatic lesions      Procedures Performed:   - Cryotherapy procedure note, location(s): see above. After verbal consent and discussion of risks and benefits including, but not limited to, dyspigmentation/scar, blister, and pain, 1 lesion(s) was(were) treated with 1-2 mm freeze  border for 1-2 cycles with liquid nitrogen. Post cryotherapy instructions were provided.    Follow-up: 6 month(s) in-person, or earlier for new or changing lesions    Staff and Scribe:     Scribe Disclosure:   I, PATRICIO QUEVEDO, am serving as a scribe; to document services personally performed by Mary Kumar MD-based on data collection and the provider's statements to me.      Provider Disclosure:   The documentation recorded by the scribe accurately reflects the services I personally performed and the decisions made by me.    Mary Kumar MD    Department of Dermatology  [facility]: Phone: [phone], Fax: [phone]  8/2/2024     ____________________________________________    CC: Skin Check (Here today for a skin check. No concerns. )    HPI:  Mr. Cesar Montejo is a(n) 58 year old male who presents today as a return patient for skin check.    Today, he mentions a hard spot on his R thumb that has not gone away.    Patient is otherwise feeling well, without additional skin concerns.    Labs Reviewed:  N/A    Physical Exam:  Vitals: There were no vitals taken for this visit.  SKIN: Total skin excluding the undergarment areas was performed. The exam included the head/face, neck, both arms, chest, back, abdomen, both legs, digits and/or nails.   - Hyperkeratotic papule on the R thumb  - Multiple regular brown pigmented macules and papules are identified on the trunk and extremities.   - Scattered brown macules on sun exposed areas.  - There is a firm tan/flesh colored papule that dimples with lateral pressure on the L calf.   - No other lesions of concern on areas examined.     Medications:  Current Outpatient Medications   Medication Sig Dispense Refill    aspirin 81 MG EC tablet 81mg tab by mouth nightly @ 11pm      atorvastatin (LIPITOR) 40 MG tablet TAKE 1 TABLET(40 MG) BY MOUTH DAILY 90 tablet 3     Cholecalciferol (VITAMIN D) 50 MCG (2000 UT) CAPS 2000 units by mouth nightly @ 11pm 30 capsule     COENZYME Q10 PO Take 1,000 mg by mouth daily @ 10 pm      doxycycline monohydrate (MONODOX) 100 MG capsule Take 1 capsule (100 mg) by mouth 2 times daily 20 capsule 0    ketoconazole (NIZORAL) 2 % external cream Apply topically daily To affected areas on the face. 60 g 3    ketoconazole (NIZORAL) 2 % external shampoo Massage into wet scalp, let sit 3-5 min, then rinse. Do this 3x weekly. 120 mL 5    Krill Oil 1000 MG CAPS 4 x 1000mg capsule by mouth @ 11pm      Melatonin 10 MG TABS tablet Take 1 tablet (10 mg) by mouth At Bedtime @11pm      mirtazapine (REMERON) 15 MG tablet Take 1 tablet (15 mg) by mouth At Bedtime 90 tablet 3    NONFORMULARY L-Alpha glycerylphosphorylcholine (GCP) 400 mg daily      NONFORMULARY Pyrroloquinoline quinone (PQQ) 600 mg daily      simvastatin (ZOCOR) 20 MG tablet 20mg tab by mouth nightly @ 11pm      simvastatin (ZOCOR) 40 MG tablet TAKE 1 TABLET(40 MG) BY MOUTH AT BEDTIME 90 tablet 1    triamcinolone (KENALOG) 0.1 % external ointment Apply topically 2 times daily 30 g 1    venlafaxine (EFFEXOR XR) 75 MG 24 hr capsule Take 1 capsule (75 mg) by mouth daily 90 capsule 3    famciclovir (FAMVIR) 500 MG tablet Take 1 tablet (500 mg) by mouth 3 times daily for 7 days 21 tablet 0     No current facility-administered medications for this visit.      Past Medical History:   Patient Active Problem List   Diagnosis    Insomnia    Adjustment reaction    CARDIOVASCULAR SCREENING; LDL GOAL LESS THAN 130    Pure hypercholesterolemia    GERD (gastroesophageal reflux disease)    Right shoulder pain    Calcific tendonitis    Solar lentiginosis    Skin cancer screening    Dermatitis, seborrheic    Family history of skin cancer    AK (actinic keratosis)    History of actinic keratosis    History of basal cell cancer    Seborrheic keratosis    Cherry angioma    Actinic cheilitis    History of nonmelanoma  skin cancer    History of multiple concussions    Elevated blood pressure reading without diagnosis of hypertension    Dream enactment behavior    Disturbance in sleep behavior    Dyspnea and respiratory abnormality    Chest pain    H/O magnetic resonance imaging of brain and brain stem    History of echocardiogram    Encounter for neuropsychological testing  with Dr. Park    Benign neoplasm of skin of right upper extremity    Illness anxiety disorder     Past Medical History:   Diagnosis Date    Anxiety     Basal cell carcinoma     Gastro-oesophageal reflux disease     H/O magnetic resonance imaging of brain and brain stem 2018    MR BRAIN WITHOUT AND WITH CONTRAST 2017 9:10 PM   HISTORY:  Left-sided numbness. Disequilibrium.   COMPARISON: MR brain 2015.   TECHNIQUE: Sagittal T1, axial T2, axial FLAIR, axial GRE, axial diffusion scan, coronal FLAIR, axial post Gd enhanced T1 weighted images.   FINDINGS: There is no intracranial hemorrhage, mass, or recent infarct. There is a cyst in the floor of the right maxilla    Head injury 2012    Had a bad concussion with post concussion synd for year    High cholesterol     History of echocardiogram 2018    TUYET Keyes MD 2018  Narrative     [id number]^MECHE^BLAKE^KRISTAL       [facility] [address] [place] [id number] Name: JONATHAN FIORE MRN: [id number] : 1965 Study Date: 2018 09:13 AM Age: 52 yrs Gender: Male Patient Location: [facility] Reason For Study: Chest Pain Ordering Physician:     Hypertension early     Not on meds. Usually in pre-hypertesion categ.    Insomnia     Squamous cell carcinoma         CC Referred Self, MD  No address on file on close of this encounter.

## 2024-07-31 ENCOUNTER — OFFICE VISIT (OUTPATIENT)
Dept: DERMATOLOGY | Facility: CLINIC | Age: 59
End: 2024-07-31
Payer: COMMERCIAL

## 2024-07-31 DIAGNOSIS — D23.9 DERMATOFIBROMA: ICD-10-CM

## 2024-07-31 DIAGNOSIS — D22.9 MULTIPLE BENIGN NEVI: ICD-10-CM

## 2024-07-31 DIAGNOSIS — Z85.828 HISTORY OF NONMELANOMA SKIN CANCER: ICD-10-CM

## 2024-07-31 DIAGNOSIS — B07.9 VERRUCA VULGARIS: Primary | ICD-10-CM

## 2024-07-31 DIAGNOSIS — L81.4 LENTIGINES: ICD-10-CM

## 2024-07-31 PROCEDURE — 17110 DESTRUCTION B9 LES UP TO 14: CPT | Performed by: DERMATOLOGY

## 2024-07-31 PROCEDURE — 99213 OFFICE O/P EST LOW 20 MIN: CPT | Mod: 25 | Performed by: DERMATOLOGY

## 2024-07-31 ASSESSMENT — PAIN SCALES - GENERAL: PAINLEVEL: NO PAIN (0)

## 2024-07-31 NOTE — LETTER
7/31/2024       RE: Cesar Montejo  [address]  [place] [address]     Dear Colleague,    Thank you for referring your patient, Cesar Montejo, to the [facility]MAT[facility] [place] at [facility]. Please see a copy of my visit note below.    [facility] Dermatology Note  Encounter Date: Jul 31, 2024  Office Visit     Dermatology Problem List:  1. NMSC  - Superficial BCC, left cheek, s/p Mohs 6/16  - SCCIS right cheek, s/p Mohs 6/16  2. Verruca vulgaris, left plantar foot - resolved  - Cryo 06/25/2021, prior sal acid  3. Actinic keratoses. Cryo.  4. Actinic cheilitis  - s/p CO2 laser ablation of lower lip vermilion 10/2016  - s/p laser vermilionectomy 8/2017  - Previously followed with oral surgery  5. Seborrheic dermatitis   - ketoconazole shampoo   6. Benign bx:  - Focal ulceration with adjacent mild epidermal hyperplasia, R inner helix, s/p bx 2017  - Irregular epidermal hyperplasia with hypergranulosis and dermal fibrosis, L mid-helix, s/p shave bx 2018   7. Wart  - cryo 7/31/24     Soc hx: He works as a graduate professor in science and techno, studies AI in medicine     ____________________________________________     Assessment & Plan:     # Keith R thumb  - Cryotherapy today, see procedure note below  - Recommended patient use salicylic acid for at home treatment, such as wart stick    # Dermatofibroma, L calf.  - Benign nature discussed    # Multiple benign nevi.  # Solar lentigines   - Counseled on ABCDEs of melanoma and sun protection - recommend SPF 30 or higher with frequent application   - Return sooner if noticing changing or symptomatic lesions     # History of NMSC. No evidence of recurrent disease.  # H/o actinic cheilitis.  - Continue photoprotection - recommend SPF 30 or higher with frequent reapplication  - Continue q6 months skin exams  (preferred)  - Advised to monitor for changing,  non-healing, bleeding, painful, changing, or otherwise symptomatic lesions      Procedures Performed:   - Cryotherapy procedure note, location(s): see above. After verbal consent and discussion of risks and benefits including, but not limited to, dyspigmentation/scar, blister, and pain, 1 lesion(s) was(were) treated with 1-2 mm freeze border for 1-2 cycles with liquid nitrogen. Post cryotherapy instructions were provided.    Follow-up: 6 month(s) in-person, or earlier for new or changing lesions    Staff and Scribe:     Scribe Disclosure:   I, PATRICIO QUEVEDO, am serving as a scribe; to document services personally performed by Mary Kumar MD-based on data collection and the provider's statements to me.      Provider Disclosure:   The documentation recorded by the scribe accurately reflects the services I personally performed and the decisions made by me.    Mary Kumar MD    Department of Dermatology  [facility]: Phone: [phone], Fax: [phone]  8/2/2024     ____________________________________________    CC: Skin Check (Here today for a skin check. No concerns. )    HPI:  Mr. Cesar Montejo is a(n) 58 year old male who presents today as a return patient for skin check.    Today, he mentions a hard spot on his R thumb that has not gone away.    Patient is otherwise feeling well, without additional skin concerns.    Labs Reviewed:  N/A    Physical Exam:  Vitals: There were no vitals taken for this visit.  SKIN: Total skin excluding the undergarment areas was performed. The exam included the head/face, neck, both arms, chest, back, abdomen, both legs, digits and/or nails.   - Hyperkeratotic papule on the R thumb  - Multiple regular brown pigmented macules and papules are identified on the trunk and extremities.   - Scattered brown macules on sun exposed areas.  - There is a firm tan/flesh colored papule  that dimples with lateral pressure on the L calf.   - No other lesions of concern on areas examined.     Medications:  Current Outpatient Medications   Medication Sig Dispense Refill     aspirin 81 MG EC tablet 81mg tab by mouth nightly @ 11pm       atorvastatin (LIPITOR) 40 MG tablet TAKE 1 TABLET(40 MG) BY MOUTH DAILY 90 tablet 3     Cholecalciferol (VITAMIN D) 50 MCG (2000 UT) CAPS 2000 units by mouth nightly @ 11pm 30 capsule      COENZYME Q10 PO Take 1,000 mg by mouth daily @ 10 pm       doxycycline monohydrate (MONODOX) 100 MG capsule Take 1 capsule (100 mg) by mouth 2 times daily 20 capsule 0     ketoconazole (NIZORAL) 2 % external cream Apply topically daily To affected areas on the face. 60 g 3     ketoconazole (NIZORAL) 2 % external shampoo Massage into wet scalp, let sit 3-5 min, then rinse. Do this 3x weekly. 120 mL 5     Krill Oil 1000 MG CAPS 4 x 1000mg capsule by mouth @ 11pm       Melatonin 10 MG TABS tablet Take 1 tablet (10 mg) by mouth At Bedtime @11pm       mirtazapine (REMERON) 15 MG tablet Take 1 tablet (15 mg) by mouth At Bedtime 90 tablet 3     NONFORMULARY L-Alpha glycerylphosphorylcholine (GCP) 400 mg daily       NONFORMULARY Pyrroloquinoline quinone (PQQ) 600 mg daily       simvastatin (ZOCOR) 20 MG tablet 20mg tab by mouth nightly @ 11pm       simvastatin (ZOCOR) 40 MG tablet TAKE 1 TABLET(40 MG) BY MOUTH AT BEDTIME 90 tablet 1     triamcinolone (KENALOG) 0.1 % external ointment Apply topically 2 times daily 30 g 1     venlafaxine (EFFEXOR XR) 75 MG 24 hr capsule Take 1 capsule (75 mg) by mouth daily 90 capsule 3     famciclovir (FAMVIR) 500 MG tablet Take 1 tablet (500 mg) by mouth 3 times daily for 7 days 21 tablet 0     No current facility-administered medications for this visit.      Past Medical History:   Patient Active Problem List   Diagnosis     Insomnia     Adjustment reaction     CARDIOVASCULAR SCREENING; LDL GOAL LESS THAN 130     Pure hypercholesterolemia     GERD  (gastroesophageal reflux disease)     Right shoulder pain     Calcific tendonitis     Solar lentiginosis     Skin cancer screening     Dermatitis, seborrheic     Family history of skin cancer     AK (actinic keratosis)     History of actinic keratosis     History of basal cell cancer     Seborrheic keratosis     Cherry angioma     Actinic cheilitis     History of nonmelanoma skin cancer     History of multiple concussions     Elevated blood pressure reading without diagnosis of hypertension     Dream enactment behavior     Disturbance in sleep behavior     Dyspnea and respiratory abnormality     Chest pain     H/O magnetic resonance imaging of brain and brain stem     History of echocardiogram     Encounter for neuropsychological testing  with Dr. Park     Benign neoplasm of skin of right upper extremity     Illness anxiety disorder     Past Medical History:   Diagnosis Date     Anxiety      Basal cell carcinoma      Gastro-oesophageal reflux disease      H/O magnetic resonance imaging of brain and brain stem 2018    MR BRAIN WITHOUT AND WITH CONTRAST 2017 9:10 PM   HISTORY:  Left-sided numbness. Disequilibrium.   COMPARISON: MR brain 2015.   TECHNIQUE: Sagittal T1, axial T2, axial FLAIR, axial GRE, axial diffusion scan, coronal FLAIR, axial post Gd enhanced T1 weighted images.   FINDINGS: There is no intracranial hemorrhage, mass, or recent infarct. There is a cyst in the floor of the right maxilla     Head injury 2012    Had a bad concussion with post concussion synd for year     High cholesterol      History of echocardiogram 2018    TUYET Keyes MD 2018  Narrative     [id number]^MECHE^BLAKE^KRISTAL       [facility] [address] [place] [id number] Name: JONATHAN FIORE MRN: [id number] : 1965 Study Date: 2018 09:13 AM Age: 52 yrs Gender: Male Patient Location: [facility]  Reason For Study: Chest Pain Ordering Physician:      Hypertension early 2000    Not on meds. Usually in pre-hypertesion categ.     Insomnia      Squamous cell carcinoma         CC Referred Self, MD  No address on file on close of this encounter.      Again, thank you for allowing me to participate in the care of your patient.      Sincerely,    Mary Kumar MD

## 2024-07-31 NOTE — NURSING NOTE
Dermatology Rooming Note    Cesar Montejo's goals for this visit include:   Chief Complaint   Patient presents with    Skin Check     Here today for a skin check. No concerns.      Kathleen Casas RN

## 2024-09-03 ENCOUNTER — PATIENT OUTREACH (OUTPATIENT)
Dept: CARE COORDINATION | Facility: CLINIC | Age: 59
End: 2024-09-03
Payer: COMMERCIAL

## 2024-09-17 ENCOUNTER — PATIENT OUTREACH (OUTPATIENT)
Dept: CARE COORDINATION | Facility: CLINIC | Age: 59
End: 2024-09-17
Payer: COMMERCIAL

## 2024-10-15 ENCOUNTER — PATIENT OUTREACH (OUTPATIENT)
Dept: CARE COORDINATION | Facility: CLINIC | Age: 59
End: 2024-10-15
Payer: COMMERCIAL

## 2024-10-31 ENCOUNTER — OFFICE VISIT (OUTPATIENT)
Dept: FAMILY MEDICINE | Facility: CLINIC | Age: 59
End: 2024-10-31
Payer: COMMERCIAL

## 2024-10-31 VITALS
HEART RATE: 90 BPM | OXYGEN SATURATION: 97 % | BODY MASS INDEX: 25.91 KG/M2 | SYSTOLIC BLOOD PRESSURE: 136 MMHG | RESPIRATION RATE: 14 BRPM | DIASTOLIC BLOOD PRESSURE: 78 MMHG | WEIGHT: 195.5 LBS | HEIGHT: 73 IN | TEMPERATURE: 97.7 F

## 2024-10-31 DIAGNOSIS — F41.1 GAD (GENERALIZED ANXIETY DISORDER): ICD-10-CM

## 2024-10-31 DIAGNOSIS — F41.8 MIXED ANXIETY AND DEPRESSIVE DISORDER: ICD-10-CM

## 2024-10-31 DIAGNOSIS — Z00.00 ROUTINE HISTORY AND PHYSICAL EXAMINATION OF ADULT: Primary | ICD-10-CM

## 2024-10-31 DIAGNOSIS — Z12.5 SCREENING FOR PROSTATE CANCER: ICD-10-CM

## 2024-10-31 DIAGNOSIS — E78.00 PURE HYPERCHOLESTEROLEMIA: ICD-10-CM

## 2024-10-31 PROCEDURE — 36415 COLL VENOUS BLD VENIPUNCTURE: CPT | Performed by: FAMILY MEDICINE

## 2024-10-31 PROCEDURE — G0103 PSA SCREENING: HCPCS | Performed by: FAMILY MEDICINE

## 2024-10-31 PROCEDURE — 80053 COMPREHEN METABOLIC PANEL: CPT | Performed by: FAMILY MEDICINE

## 2024-10-31 PROCEDURE — 99396 PREV VISIT EST AGE 40-64: CPT | Mod: 25 | Performed by: FAMILY MEDICINE

## 2024-10-31 PROCEDURE — 90471 IMMUNIZATION ADMIN: CPT | Performed by: FAMILY MEDICINE

## 2024-10-31 PROCEDURE — 90480 ADMN SARSCOV2 VAC 1/ONLY CMP: CPT | Performed by: FAMILY MEDICINE

## 2024-10-31 PROCEDURE — 90673 RIV3 VACCINE NO PRESERV IM: CPT | Performed by: FAMILY MEDICINE

## 2024-10-31 PROCEDURE — 91320 SARSCV2 VAC 30MCG TRS-SUC IM: CPT | Performed by: FAMILY MEDICINE

## 2024-10-31 PROCEDURE — 84443 ASSAY THYROID STIM HORMONE: CPT | Performed by: FAMILY MEDICINE

## 2024-10-31 PROCEDURE — 80061 LIPID PANEL: CPT | Performed by: FAMILY MEDICINE

## 2024-10-31 RX ORDER — MIRTAZAPINE 15 MG/1
15 TABLET, FILM COATED ORAL AT BEDTIME
Qty: 90 TABLET | Refills: 3 | Status: SHIPPED | OUTPATIENT
Start: 2024-10-31

## 2024-10-31 RX ORDER — VENLAFAXINE HYDROCHLORIDE 75 MG/1
75 CAPSULE, EXTENDED RELEASE ORAL DAILY
Qty: 90 CAPSULE | Refills: 3 | Status: SHIPPED | OUTPATIENT
Start: 2024-10-31

## 2024-10-31 SDOH — HEALTH STABILITY: PHYSICAL HEALTH: ON AVERAGE, HOW MANY DAYS PER WEEK DO YOU ENGAGE IN MODERATE TO STRENUOUS EXERCISE (LIKE A BRISK WALK)?: 6 DAYS

## 2024-10-31 SDOH — HEALTH STABILITY: PHYSICAL HEALTH: ON AVERAGE, HOW MANY MINUTES DO YOU ENGAGE IN EXERCISE AT THIS LEVEL?: 30 MIN

## 2024-10-31 ASSESSMENT — SOCIAL DETERMINANTS OF HEALTH (SDOH): HOW OFTEN DO YOU GET TOGETHER WITH FRIENDS OR RELATIVES?: ONCE A WEEK

## 2024-10-31 ASSESSMENT — PAIN SCALES - GENERAL: PAINLEVEL_OUTOF10: MILD PAIN (2)

## 2024-10-31 NOTE — PROGRESS NOTES
"Preventive Care Visit  [facility] INTEGRATED PRIMARY CARE  Rocky Messina MD, Family Medicine  Oct 31, 2024      Assessment & Plan     Routine history and physical examination of adult  Overvioleta rodriguez   Very active in his work  - Comprehensive metabolic panel (BMP + Alb, Alk Phos, ALT, AST, Total. Bili, TP); Future  - Comprehensive metabolic panel (BMP + Alb, Alk Phos, ALT, AST, Total. Bili, TP)    Pure hypercholesterolemia  recheck  - Lipid panel reflex to direct LDL Non-fasting; Future  - Lipid panel reflex to direct LDL Non-fasting    Mixed anxiety and depressive disorder  Overall Well controlled on   - mirtazapine (REMERON) 15 MG tablet; Take 1 tablet (15 mg) by mouth at bedtime.  - TSH with free T4 reflex; Future  - TSH with free T4 reflex    PREMA (generalized anxiety disorder)  Well controlled   - venlafaxine (EFFEXOR XR) 75 MG 24 hr capsule; Take 1 capsule (75 mg) by mouth daily.    Screening for prostate cancer  sent  - PSA, screen; Future  - PSA, screen            BMI  Estimated body mass index is 26.15 kg/m  as calculated from the following:    Height as of this encounter: 1.842 m (6' 0.5\").    Weight as of this encounter: 88.7 kg (195 lb 8 oz).       Counseling  Appropriate preventive services were addressed with this patient via screening, questionnaire, or discussion as appropriate for fall prevention, nutrition, physical activity, Tobacco-use cessation, social engagement, weight loss and cognition.  Checklist reviewing preventive services available has been given to the patient.  Reviewed patient's diet, addressing concerns and/or questions.       Regular exercise  See Patient Instructions    Anjel Robles is a 59 year old, presenting for the following:  Physical        10/31/2024    11:19 AM   Additional Questions   Roomed by Krysten MIGUEL          HPI      Depression and Anxiety   How are you doing with your depression since your last visit? No change  How are you doing with your " anxiety since your last visit?  No change  Are you having other symptoms that might be associated with depression or anxiety? Yes:  fatigue  Have you had a significant life event? Health Concerns   Do you have any concerns with your use of alcohol or other drugs? No    Social History     Tobacco Use    Smoking status: Former     Current packs/day: 0.00     Average packs/day: 0.5 packs/day for 5.0 years (2.5 ttl pk-yrs)     Types: Cigarettes     Start date: 1986     Quit date: 1991     Years since quittin.8    Smokeless tobacco: Never    Tobacco comments:     After  no longer a half pack a day. Very occasional--maybe 75 cigarettes a year--91 to 98   Vaping Use    Vaping status: Never Used   Substance Use Topics    Alcohol use: Yes     Alcohol/week: 1.0 - 2.0 standard drink of alcohol     Comment: 1 to 2 glasses of red wine per day    Drug use: No     Comment: quit more than 20 years ago         12/3/2019    11:54 AM 2021    11:47 AM 2022    12:03 PM   PHQ   PHQ-9 Total Score 9 4 4   Q9: Thoughts of better off dead/self-harm past 2 weeks Not at all Not at all  Not at all        Patient-reported         2019     1:57 PM 2019    11:04 AM 12/3/2019    11:54 AM   PREMA-7 SCORE   Total Score 7 (mild anxiety)     Total Score 7 9 6         2022    12:03 PM   Last PHQ-9   1.  Little interest or pleasure in doing things 1    2.  Feeling down, depressed, or hopeless 1    3.  Trouble falling or staying asleep, or sleeping too much 0    4.  Feeling tired or having little energy 1    5.  Poor appetite or overeating 0    6.  Feeling bad about yourself 0    7.  Trouble concentrating 1    8.  Moving slowly or restless 0    Q9: Thoughts of better off dead/self-harm past 2 weeks 0    PHQ-9 Total Score 4       Patient-reported         12/3/2019    11:54 AM   PREMA-7    1. Feeling nervous, anxious, or on edge 1   2. Not being able to stop or control worrying 1   3. Worrying too much about different  things 1   4. Trouble relaxing 1   5. Being so restless that it is hard to sit still 0   6. Becoming easily annoyed or irritable 1   7. Feeling afraid, as if something awful might happen 1   PREMA-7 Total Score 6   If you checked any problems, how difficult have they made it for you to do your work, take care of things at home, or get along with other people? Somewhat difficult       Suicide Assessment Five-step Evaluation and Treatment (SAFE-T)      Health Care Directive  Patient does not have a Health Care Directive:       10/31/2024   General Health   How would you rate your overall physical health? Good   Feel stress (tense, anxious, or unable to sleep) To some extent      (!) STRESS CONCERN      10/31/2024   Nutrition   Three or more servings of calcium each day? Yes   Diet: Carbohydrate counting   How many servings of fruit and vegetables per day? (!) 2-3   How many sweetened beverages each day? 0-1            10/31/2024   Exercise   Days per week of moderate/strenous exercise 6 days   Average minutes spent exercising at this level 30 min            10/31/2024   Social Factors   Frequency of gathering with friends or relatives Once a week   Worry food won't last until get money to buy more No   Food not last or not have enough money for food? No   Do you have housing? (Housing is defined as stable permanent housing and does not include staying ouside in a car, in a tent, in an abandoned building, in an overnight shelter, or couch-surfing.) Yes   Are you worried about losing your housing? No   Lack of transportation? No   Unable to get utilities (heat,electricity)? No            10/31/2024   Fall Risk   Fallen 2 or more times in the past year? No    Trouble with walking or balance? No        Patient-reported          10/31/2024   Dental   Dentist two times every year? Yes            10/31/2024   TB Screening   Were you born outside of the [place]? No                  10/31/2024   Substance Use   Alcohol more than  3/day or more than 7/wk No   Do you use any other substances recreationally? No        Social History     Tobacco Use    Smoking status: Former     Current packs/day: 0.00     Average packs/day: 0.5 packs/day for 5.0 years (2.5 ttl pk-yrs)     Types: Cigarettes     Start date: 1986     Quit date: 1991     Years since quittin.8    Smokeless tobacco: Never    Tobacco comments:     After  no longer a half pack a day. Very occasional--maybe 75 cigarettes a year--91 to 98   Vaping Use    Vaping status: Never Used   Substance Use Topics    Alcohol use: Yes     Alcohol/week: 1.0 - 2.0 standard drink of alcohol     Comment: 1 to 2 glasses of red wine per day    Drug use: No     Comment: quit more than 20 years ago           10/31/2024   STI Screening   New sexual partner(s) since last STI/HIV test? No      Last PSA:   PSA   Date Value Ref Range Status   2021 1.65 0 - 4 ug/L Final     Comment:     Assay Method:  Chemiluminescence using Siemens Vista analyzer     Prostate Specific Antigen Screen   Date Value Ref Range Status   10/03/2023 1.72 0.00 - 3.50 ng/mL Final   10/13/2022 1.10 0.00 - 4.00 ug/L Final     ASCVD Risk   The 10-year ASCVD risk score (Kavya KINGSLEY, et al., 2019) is: 10.5%    Values used to calculate the score:      Age: 59 years      Sex: Male      Is Non- : No      Diabetic: No      Tobacco smoker: No      Systolic Blood Pressure: 136 mmHg      Is BP treated: No      HDL Cholesterol: 33 mg/dL      Total Cholesterol: 204 mg/dL           Reviewed and updated as needed this visit by Provider                    Past Medical History:   Diagnosis Date    Anxiety     Basal cell carcinoma     Gastro-oesophageal reflux disease     H/O magnetic resonance imaging of brain and brain stem 2018    MR BRAIN WITHOUT AND WITH CONTRAST 2017 9:10 PM   HISTORY:  Left-sided numbness. Disequilibrium.   COMPARISON: MR brain 2015.   TECHNIQUE: Sagittal T1, axial  "T2, axial FLAIR, axial GRE, axial diffusion scan, coronal FLAIR, axial post Gd enhanced T1 weighted images.   FINDINGS: There is no intracranial hemorrhage, mass, or recent infarct. There is a cyst in the floor of the right maxilla    Head injury 2012    Had a bad concussion with post concussion synd for year    High cholesterol     History of echocardiogram 2018    TUYET Keyes MD 2018  Narrative     [id number]^MECHE^BLAKE^KRISTAL       [facility] [address] [place] [id number] Name: JONATHAN FIORE MRN: [id number] : 1965 Study Date: 2018 09:13 AM Age: 52 yrs Gender: Male Patient Location: [facility] Reason For Study: Chest Pain Ordering Physician:     Hypertension early     Not on meds. Usually in pre-hypertesion categ.    Insomnia     Squamous cell carcinoma          Review of Systems  Constitutional, HEENT, cardiovascular, pulmonary, gi and gu systems are negative, except as otherwise noted.     Objective    Exam  /78 (BP Location: Left arm, Patient Position: Sitting, Cuff Size: Adult Regular)   Pulse 90   Temp 97.7  F (36.5  C) (Temporal)   Resp 14   Ht 1.842 m (6' 0.5\")   Wt 88.7 kg (195 lb 8 oz)   SpO2 97%   BMI 26.15 kg/m     Estimated body mass index is 26.15 kg/m  as calculated from the following:    Height as of this encounter: 1.842 m (6' 0.5\").    Weight as of this encounter: 88.7 kg (195 lb 8 oz).    Physical Exam  GENERAL: alert and no distress  EYES: Eyes grossly normal to inspection, PERRL and conjunctivae and sclerae normal  HENT: ear canals and TM's normal, nose and mouth without ulcers or lesions  NECK: no adenopathy, no asymmetry, masses, or scars  RESP: lungs clear to auscultation - no rales, rhonchi or wheezes  CV: regular rate and rhythm, normal S1 S2, no S3 or S4, no murmur, click or rub, no peripheral edema  ABDOMEN: soft, nontender, no " hepatosplenomegaly, no masses and bowel sounds normal  MS: no gross musculoskeletal defects noted, no edema  SKIN: no suspicious lesions or rashes  NEURO: Normal strength and tone, mentation intact and speech normal  PSYCH: mentation appears normal, affect normal/bright  LYMPH: no cervical, supraclavicular, axillary, or inguinal adenopathy  Diabetic foot exam: normal DP and PT pulses, no trophic changes or ulcerative lesions, and normal sensory exam        Signed Electronically by: Rocky Messina MD

## 2024-11-01 LAB
ALBUMIN SERPL BCG-MCNC: 4.8 G/DL (ref 3.5–5.2)
ALP SERPL-CCNC: 66 U/L (ref 40–150)
ALT SERPL W P-5'-P-CCNC: 36 U/L (ref 0–70)
ANION GAP SERPL CALCULATED.3IONS-SCNC: 15 MMOL/L (ref 7–15)
AST SERPL W P-5'-P-CCNC: 22 U/L (ref 0–45)
BILIRUB SERPL-MCNC: 0.7 MG/DL
BUN SERPL-MCNC: 13.9 MG/DL (ref 6–20)
CALCIUM SERPL-MCNC: 9.3 MG/DL (ref 8.8–10.4)
CHLORIDE SERPL-SCNC: 100 MMOL/L (ref 98–107)
CHOLEST SERPL-MCNC: 231 MG/DL
CREAT SERPL-MCNC: 0.94 MG/DL (ref 0.67–1.17)
EGFRCR SERPLBLD CKD-EPI 2021: >90 ML/MIN/1.73M2
FASTING STATUS PATIENT QL REPORTED: YES
FASTING STATUS PATIENT QL REPORTED: YES
GLUCOSE SERPL-MCNC: 98 MG/DL (ref 70–99)
HCO3 SERPL-SCNC: 23 MMOL/L (ref 22–29)
HDLC SERPL-MCNC: 36 MG/DL
LDLC SERPL CALC-MCNC: 165 MG/DL
NONHDLC SERPL-MCNC: 195 MG/DL
POTASSIUM SERPL-SCNC: 4.3 MMOL/L (ref 3.4–5.3)
PROT SERPL-MCNC: 7.2 G/DL (ref 6.4–8.3)
PSA SERPL DL<=0.01 NG/ML-MCNC: 1.51 NG/ML (ref 0–3.5)
SODIUM SERPL-SCNC: 138 MMOL/L (ref 135–145)
TRIGL SERPL-MCNC: 149 MG/DL
TSH SERPL DL<=0.005 MIU/L-ACNC: 1.61 UIU/ML (ref 0.3–4.2)

## 2025-02-10 ENCOUNTER — OFFICE VISIT (OUTPATIENT)
Dept: DERMATOLOGY | Facility: CLINIC | Age: 60
End: 2025-02-10
Payer: COMMERCIAL

## 2025-02-10 DIAGNOSIS — D22.9 MULTIPLE BENIGN NEVI: Primary | ICD-10-CM

## 2025-02-10 DIAGNOSIS — Z85.828 HISTORY OF BASAL CELL CARCINOMA: ICD-10-CM

## 2025-02-10 DIAGNOSIS — L81.4 LENTIGINES: ICD-10-CM

## 2025-02-10 DIAGNOSIS — L82.1 SEBORRHEIC KERATOSES: ICD-10-CM

## 2025-02-10 DIAGNOSIS — L73.8 SEBACEOUS HYPERPLASIA: ICD-10-CM

## 2025-02-10 DIAGNOSIS — Z86.007 HISTORY OF SQUAMOUS CELL CARCINOMA IN SITU: ICD-10-CM

## 2025-02-10 DIAGNOSIS — Z85.828 HISTORY OF NONMELANOMA SKIN CANCER: ICD-10-CM

## 2025-02-10 DIAGNOSIS — D18.01 CHERRY ANGIOMA: ICD-10-CM

## 2025-02-10 DIAGNOSIS — Z12.83 ENCOUNTER FOR SCREENING FOR MALIGNANT NEOPLASM OF SKIN: ICD-10-CM

## 2025-02-10 PROCEDURE — 99213 OFFICE O/P EST LOW 20 MIN: CPT | Performed by: STUDENT IN AN ORGANIZED HEALTH CARE EDUCATION/TRAINING PROGRAM

## 2025-02-10 NOTE — LETTER
2/10/2025      Cesar Montejo  [address]  [place] [address]      Dear Colleague,    Thank you for referring your patient, Cesar Montejo, to the [facility]. Please see a copy of my visit note below.    [facility] Dermatology Note  Encounter Date: Feb 10, 2025  Office Visit     Dermatology Problem List:  1. NMSC  - Superficial BCC, left cheek, s/p Mohs 6/16  - SCCIS right cheek, s/p Mohs 6/16  2. Verruca vulgaris, left plantar foot - resolved  - Cryo 06/25/2021, prior sal acid  3. Actinic keratoses. Cryo.  4. Actinic cheilitis  - s/p CO2 laser ablation of lower lip vermilion 10/2016  - s/p laser vermilionectomy 8/2017  - Previously followed with oral surgery  5. Seborrheic dermatitis   - ketoconazole shampoo   6. Benign bx:  - Focal ulceration with adjacent mild epidermal hyperplasia, R inner helix, s/p bx 2017  - Irregular epidermal hyperplasia with hypergranulosis and dermal fibrosis, L mid-helix, s/p shave bx 2018   7. Wart  - cryo 7/31/24     Soc hx: He works as a graduate professor in science and techno, studies AI in medicine     ____________________________________________     Assessment & Plan:    # Sebaceous hyperplasia  Reassured benign.    # Multiple benign nevi  # Solar lentigines  Counseled ABCDEs of melanoma and importance of self-skin exams  - Recommend sunscreen SPF 30+, wide-brimmed hats, UPF clothing, sun avoidance    # Cherry angiomas  # Seborrheic keratoses  Reassured benign    #Hx SCCIS & BCC  - No obvious evidence of recurrence at site of previous malignancy    Procedures Performed:   None    Follow up: pt is already scheduled for 7/30/2025 full body skin cancer screening with Dr. Kumar    Staff and Scribe:   Gordon Garcia PA-C  [facility] Dermatology  ____________________________________________    CC: Derm Problem (Full Body exam )    HPI:  Mr. Cesar Montejo is a(n) 59 year old male who presents today as a  return patient.    Last seen July 2024 by Mary Kumar MD for skin check and warts on right thumb, treated with cryotherapy.    # Full body skin cancer screening  Personal history of non-melanoma skin cancer  Spot(s) of concern on L neck, present for several months, not tender, hasn't bleed. Seen by Dr. Shirley last March 2024, diagnosed as sebaceous hyperplasia.  Wart on R thumb has resolved    Labs Reviewed:  N/A    Physical Exam:  Vitals: There were no vitals taken for this visit.  SKIN: Full skin, which includes the head/face, both arms, chest, back, abdomen,both legs, genitalia and/or groin buttocks, digits and/or nails, was examined.  - no obvious evidence of recurrence at site of previous malignancy  - yellow oily papule with central dell in L post auricular  - scattered brown papules on trunk and extremities   - scattered light brown macules on sun exposed areas  - pink vascular papules on trunk and extremities  - brown, stuck on papules on trunk and extremities   No other lesions of concern on areas examined.       Medications:  Current Outpatient Medications   Medication Sig Dispense Refill     aspirin 81 MG EC tablet 81mg tab by mouth nightly @ 11pm       atorvastatin (LIPITOR) 40 MG tablet TAKE 1 TABLET(40 MG) BY MOUTH DAILY 90 tablet 3     Cholecalciferol (VITAMIN D) 50 MCG (2000 UT) CAPS 2000 units by mouth nightly @ 11pm 30 capsule      COENZYME Q10 PO Take 1,000 mg by mouth daily @ 10 pm       doxycycline monohydrate (MONODOX) 100 MG capsule Take 1 capsule (100 mg) by mouth 2 times daily 20 capsule 0     ketoconazole (NIZORAL) 2 % external cream Apply topically daily To affected areas on the face. 60 g 3     ketoconazole (NIZORAL) 2 % external shampoo Massage into wet scalp, let sit 3-5 min, then rinse. Do this 3x weekly. 120 mL 5     Krill Oil 1000 MG CAPS 4 x 1000mg capsule by mouth @ 11pm       Melatonin 10 MG TABS tablet Take 1 tablet (10 mg) by mouth At Bedtime @11pm       mirtazapine  (REMERON) 15 MG tablet Take 1 tablet (15 mg) by mouth at bedtime. 90 tablet 3     NONFORMULARY L-Alpha glycerylphosphorylcholine (GCP) 400 mg daily       NONFORMULARY Pyrroloquinoline quinone (PQQ) 600 mg daily       simvastatin (ZOCOR) 20 MG tablet 20mg tab by mouth nightly @ 11pm       simvastatin (ZOCOR) 40 MG tablet TAKE 1 TABLET(40 MG) BY MOUTH AT BEDTIME 90 tablet 1     triamcinolone (KENALOG) 0.1 % external ointment Apply topically 2 times daily 30 g 1     venlafaxine (EFFEXOR XR) 75 MG 24 hr capsule Take 1 capsule (75 mg) by mouth daily. 90 capsule 3     famciclovir (FAMVIR) 500 MG tablet Take 1 tablet (500 mg) by mouth 3 times daily for 7 days 21 tablet 0     No current facility-administered medications for this visit.      Past Medical History:   Patient Active Problem List   Diagnosis     Insomnia     Adjustment reaction     CARDIOVASCULAR SCREENING; LDL GOAL LESS THAN 130     Pure hypercholesterolemia     GERD (gastroesophageal reflux disease)     Right shoulder pain     Calcific tendonitis     Solar lentiginosis     Skin cancer screening     Dermatitis, seborrheic     Family history of skin cancer     AK (actinic keratosis)     History of actinic keratosis     History of basal cell cancer     Seborrheic keratosis     Cherry angioma     Actinic cheilitis     History of nonmelanoma skin cancer     History of multiple concussions     Elevated blood pressure reading without diagnosis of hypertension     Dream enactment behavior     Disturbance in sleep behavior     Dyspnea and respiratory abnormality     Chest pain     H/O magnetic resonance imaging of brain and brain stem     History of echocardiogram     Encounter for neuropsychological testing 2018 with Dr. Pakr     Benign neoplasm of skin of right upper extremity     Illness anxiety disorder     Past Medical History:   Diagnosis Date     Anxiety      Basal cell carcinoma      Gastro-oesophageal reflux disease      H/O magnetic resonance imaging of  brain and brain stem 2018    MR BRAIN WITHOUT AND WITH CONTRAST 2017 9:10 PM   HISTORY:  Left-sided numbness. Disequilibrium.   COMPARISON: MR brain 2015.   TECHNIQUE: Sagittal T1, axial T2, axial FLAIR, axial GRE, axial diffusion scan, coronal FLAIR, axial post Gd enhanced T1 weighted images.   FINDINGS: There is no intracranial hemorrhage, mass, or recent infarct. There is a cyst in the floor of the right maxilla     Head injury 2012    Had a bad concussion with post concussion synd for year     High cholesterol      History of echocardiogram 2018    TUYET Keyes MD 2018  Narrative     [id number]^MECHE^BLAKE^R       [facility] [facility] [address] [place] [id number] Name: JONATHAN FIORE MRN: [id number] : 1965 Study Date: 2018 09:13 AM Age: 52 yrs Gender: Male Patient Location: [facility] Reason For Study: Chest Pain Ordering Physician:      Hypertension early     Not on meds. Usually in pre-hypertesion categ.     Insomnia      Squamous cell carcinoma         CC Referred Self, MD  No address on file on close of this encounter.      Attestation with edits by Chaparro Shirley MD at 2/10/2025 12:45 PM:  I have personally examined this patient and agree with the PA's documentation and plan of care. I have reviewed and amended the PA's note. The documentation accurately reflects my clinical observations, diagnoses, treatment and follow-up plans.     Chaparro Shirley MD  Dermatology Staff      Again, thank you for allowing me to participate in the care of your patient.        Sincerely,        Chaparro Shirley MD    Electronically signed

## 2025-02-10 NOTE — PROGRESS NOTES
[facility] Dermatology Note  Encounter Date: Feb 10, 2025  Office Visit     Dermatology Problem List:  1. NMSC  - Superficial BCC, left cheek, s/p Mohs 6/16  - SCCIS right cheek, s/p Mohs 6/16  2. Verruca vulgaris, left plantar foot - resolved  - Cryo 06/25/2021, prior sal acid  3. Actinic keratoses. Cryo.  4. Actinic cheilitis  - s/p CO2 laser ablation of lower lip vermilion 10/2016  - s/p laser vermilionectomy 8/2017  - Previously followed with oral surgery  5. Seborrheic dermatitis   - ketoconazole shampoo   6. Benign bx:  - Focal ulceration with adjacent mild epidermal hyperplasia, R inner helix, s/p bx 2017  - Irregular epidermal hyperplasia with hypergranulosis and dermal fibrosis, L mid-helix, s/p shave bx 2018   7. Wart  - cryo 7/31/24     Soc hx: He works as a graduate professor in science and techno, studies AI in medicine     ____________________________________________     Assessment & Plan:    # Sebaceous hyperplasia  Reassured benign.    # Multiple benign nevi  # Solar lentigines  Counseled ABCDEs of melanoma and importance of self-skin exams  - Recommend sunscreen SPF 30+, wide-brimmed hats, UPF clothing, sun avoidance    # Cherry angiomas  # Seborrheic keratoses  Reassured benign    #Hx SCCIS & BCC  - No obvious evidence of recurrence at site of previous malignancy    Procedures Performed:   None    Follow up: pt is already scheduled for 7/30/2025 full body skin cancer screening with Dr. Kumar    Staff and Scribe:   Gordon Garcia PA-C  [facility] Dermatology  ____________________________________________    CC: Derm Problem (Full Body exam )    HPI:  Mr. Cesar Montejo is a(n) 59 year old male who presents today as a return patient.    Last seen July 2024 by Mary Kumar MD for skin check and warts on right thumb, treated with cryotherapy.    # Full body skin cancer screening  Personal history of non-melanoma skin cancer  Spot(s) of concern on L neck, present for  several months, not tender, hasn't bleed. Seen by Dr. Shirley last March 2024, diagnosed as sebaceous hyperplasia.  Wart on R thumb has resolved    Labs Reviewed:  N/A    Physical Exam:  Vitals: There were no vitals taken for this visit.  SKIN: Full skin, which includes the head/face, both arms, chest, back, abdomen,both legs, genitalia and/or groin buttocks, digits and/or nails, was examined.  - no obvious evidence of recurrence at site of previous malignancy  - yellow oily papule with central dell in L post auricular  - scattered brown papules on trunk and extremities   - scattered light brown macules on sun exposed areas  - pink vascular papules on trunk and extremities  - brown, stuck on papules on trunk and extremities   No other lesions of concern on areas examined.       Medications:  Current Outpatient Medications   Medication Sig Dispense Refill    aspirin 81 MG EC tablet 81mg tab by mouth nightly @ 11pm      atorvastatin (LIPITOR) 40 MG tablet TAKE 1 TABLET(40 MG) BY MOUTH DAILY 90 tablet 3    Cholecalciferol (VITAMIN D) 50 MCG (2000 UT) CAPS 2000 units by mouth nightly @ 11pm 30 capsule     COENZYME Q10 PO Take 1,000 mg by mouth daily @ 10 pm      doxycycline monohydrate (MONODOX) 100 MG capsule Take 1 capsule (100 mg) by mouth 2 times daily 20 capsule 0    ketoconazole (NIZORAL) 2 % external cream Apply topically daily To affected areas on the face. 60 g 3    ketoconazole (NIZORAL) 2 % external shampoo Massage into wet scalp, let sit 3-5 min, then rinse. Do this 3x weekly. 120 mL 5    Krill Oil 1000 MG CAPS 4 x 1000mg capsule by mouth @ 11pm      Melatonin 10 MG TABS tablet Take 1 tablet (10 mg) by mouth At Bedtime @11pm      mirtazapine (REMERON) 15 MG tablet Take 1 tablet (15 mg) by mouth at bedtime. 90 tablet 3    NONFORMULARY L-Alpha glycerylphosphorylcholine (GCP) 400 mg daily      NONFORMULARY Pyrroloquinoline quinone (PQQ) 600 mg daily      simvastatin (ZOCOR) 20 MG tablet 20mg tab by mouth nightly  @ 11pm      simvastatin (ZOCOR) 40 MG tablet TAKE 1 TABLET(40 MG) BY MOUTH AT BEDTIME 90 tablet 1    triamcinolone (KENALOG) 0.1 % external ointment Apply topically 2 times daily 30 g 1    venlafaxine (EFFEXOR XR) 75 MG 24 hr capsule Take 1 capsule (75 mg) by mouth daily. 90 capsule 3    famciclovir (FAMVIR) 500 MG tablet Take 1 tablet (500 mg) by mouth 3 times daily for 7 days 21 tablet 0     No current facility-administered medications for this visit.      Past Medical History:   Patient Active Problem List   Diagnosis    Insomnia    Adjustment reaction    CARDIOVASCULAR SCREENING; LDL GOAL LESS THAN 130    Pure hypercholesterolemia    GERD (gastroesophageal reflux disease)    Right shoulder pain    Calcific tendonitis    Solar lentiginosis    Skin cancer screening    Dermatitis, seborrheic    Family history of skin cancer    AK (actinic keratosis)    History of actinic keratosis    History of basal cell cancer    Seborrheic keratosis    Cherry angioma    Actinic cheilitis    History of nonmelanoma skin cancer    History of multiple concussions    Elevated blood pressure reading without diagnosis of hypertension    Dream enactment behavior    Disturbance in sleep behavior    Dyspnea and respiratory abnormality    Chest pain    H/O magnetic resonance imaging of brain and brain stem    History of echocardiogram    Encounter for neuropsychological testing 2018 with Dr. Park    Benign neoplasm of skin of right upper extremity    Illness anxiety disorder     Past Medical History:   Diagnosis Date    Anxiety     Basal cell carcinoma     Gastro-oesophageal reflux disease     H/O magnetic resonance imaging of brain and brain stem 6/12/2018    MR BRAIN WITHOUT AND WITH CONTRAST 11/29/2017 9:10 PM   HISTORY:  Left-sided numbness. Disequilibrium.   COMPARISON: MR brain 4/18/2015.   TECHNIQUE: Sagittal T1, axial T2, axial FLAIR, axial GRE, axial diffusion scan, coronal FLAIR, axial post Gd enhanced T1 weighted images.    FINDINGS: There is no intracranial hemorrhage, mass, or recent infarct. There is a cyst in the floor of the right maxilla    Head injury 2012    Had a bad concussion with post concussion synd for year    High cholesterol     History of echocardiogram 2018    TUYET Keyes MD 2018  Narrative     [id number] [facility]28 [id number]^MECHE^BLAKE^KRISTAL       [facility] Echocardiography Laboratory [address] [place], [place] [address] Name: JONATHAN FIORE MRN: [id number] : 1965 Study Date: 2018 09:13 AM Age: 52 yrs Gender: Male Patient Location: [facility] [place] Reason For Study: Chest Pain Ordering Physician:     Hypertension early     Not on meds. Usually in pre-hypertesion categ.    Insomnia     Squamous cell carcinoma         CC Referred Self, MD  No address on file on close of this encounter.

## 2025-02-26 DIAGNOSIS — E78.00 HYPERCHOLESTEREMIA: ICD-10-CM

## 2025-02-26 DIAGNOSIS — F41.8 MIXED ANXIETY AND DEPRESSIVE DISORDER: ICD-10-CM

## 2025-02-26 RX ORDER — ATORVASTATIN CALCIUM 40 MG/1
40 TABLET, FILM COATED ORAL DAILY
Qty: 90 TABLET | Refills: 0 | Status: SHIPPED | OUTPATIENT
Start: 2025-02-26

## 2025-07-27 NOTE — PROGRESS NOTES
[facility] Dermatology Note  Encounter Date: Jul 30, 2025  Office Visit     Dermatology Problem List:  # NMSC  - Superficial BCC, left cheek, s/p Mohs 6/16  - SCCIS right cheek, s/p Mohs 6/16  # Verruca vulgaris, left plantar foot - resolved  - Cryo 06/25/2021, prior sal acid  # Actinic keratoses. Cryo.  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 No visible lesion today. He wonders about mucocele. Advised reassuring if comes/goes. Notify me for persistence/symptoms.    # H/o actinic cheilitis  - s/p CO2 laser ablation of lower lip vermilion 10/2016  - s/p laser vermilionectomy 8/2017  - continue to monitor    # Wart, R thumb - resolved with cryo    # Benign lesions - SKs, cherry angiomas, lentigenes  - No treatment required    # Multiple benign nevi   - Monitor for ABCDEs of melanoma   - Continue sun protection - recommend SPF 30 or higher with frequent application   - Return sooner if noticing changing or symptomatic lesions     # History of NMSC. No evidence of recurrent disease.  - Continue photoprotection - recommend SPF 30 or higher with frequent reapplication  - Continue yearly skin exams  - Advised to monitor for changing, non-healing, bleeding, painful, changing, or otherwise symptomatic lesions      Procedures Performed:   None    Follow up: 6 months for FBSC per pt preference, sooner if concerns.     Staff and Scribe:     Scribe  Disclosure:   I, Tatyana Walters, am serving as a scribe; to document services personally performed by Mary Kumar MD -based on data collection and the provider's statements to me.     Provider Disclosure:   The documentation recorded by the scribe accurately reflects the services I personally performed and the decisions made by me.    Mary Kumar MD    Department of Dermatology  [facility]: Phone: [phone], Fax: [phone]  7/30/2025      ____________________________________________    CC: Skin Check (Here today for a skin check. No concerns. )    HPI:  Mr. Cesar Montejo is a(n) 59 year old male who presents today as a return patient for a FBSC.     Thinks he may have an oil gland spot on his lip. Reports this spot comes and goes.   Wart resolved after cryo at last visit.     Labs Reviewed:  N/A    Physical exam:  Vitals: There were no vitals taken for this visit.  GEN: This is a well developed, well-nourished male in no acute distress, in a pleasant mood.    SKIN: Total skin excluding the undergarment areas was performed. The exam included the head/face, neck, both arms, chest, back, abdomen, both legs, digits and/or nails.   - no appreciable lesion on lower lip  - Multiple regular brown pigmented macules and papules are identified on the trunk and extremities. .   - Scattered brown macules on sun exposed areas.  - There are dome shaped bright red papules on the trunk and extremities.   - Waxy stuck on papules and plaques on trunk and extremities.   - No other lesions of concern on areas examined.     Medications:  Current Outpatient Medications   Medication Sig Dispense Refill    aspirin 81 MG EC tablet 81mg tab by mouth nightly @ 11pm      atorvastatin (LIPITOR) 40 MG tablet Take 1 tablet (40 mg) by mouth daily. 90 tablet 0    Cholecalciferol (VITAMIN D) 50 MCG (2000 UT) CAPS 2000 units by mouth  nightly @ 11pm 30 capsule     COENZYME Q10 PO Take 1,000 mg by mouth daily @ 10 pm      doxycycline monohydrate (MONODOX) 100 MG capsule Take 1 capsule (100 mg) by mouth 2 times daily 20 capsule 0    famciclovir (FAMVIR) 500 MG tablet Take 1 tablet (500 mg) by mouth 3 times daily for 7 days 21 tablet 0    ketoconazole (NIZORAL) 2 % external cream Apply topically daily To affected areas on the face. 60 g 3    ketoconazole (NIZORAL) 2 % external shampoo Massage into wet scalp, let sit 3-5 min, then rinse. Do this 3x weekly. 120 mL 5    Krill Oil 1000 MG CAPS 4 x 1000mg capsule by mouth @ 11pm      Melatonin 10 MG TABS tablet Take 1 tablet (10 mg) by mouth At Bedtime @11pm      mirtazapine (REMERON) 15 MG tablet Take 1 tablet (15 mg) by mouth at bedtime. 90 tablet 3    NONFORMULARY L-Alpha glycerylphosphorylcholine (GCP) 400 mg daily      NONFORMULARY Pyrroloquinoline quinone (PQQ) 600 mg daily      simvastatin (ZOCOR) 20 MG tablet 20mg tab by mouth nightly @ 11pm      simvastatin (ZOCOR) 40 MG tablet TAKE 1 TABLET(40 MG) BY MOUTH AT BEDTIME 90 tablet 1    triamcinolone (KENALOG) 0.1 % external ointment Apply topically 2 times daily 30 g 1    venlafaxine (EFFEXOR XR) 75 MG 24 hr capsule Take 1 capsule (75 mg) by mouth daily. 90 capsule 3     No current facility-administered medications for this visit.      Past Medical History:   Patient Active Problem List   Diagnosis    Insomnia    Adjustment reaction    CARDIOVASCULAR SCREENING; LDL GOAL LESS THAN 130    Pure hypercholesterolemia    GERD (gastroesophageal reflux disease)    Right shoulder pain    Calcific tendonitis    Solar lentiginosis    Skin cancer screening    Dermatitis, seborrheic    Family history of skin cancer    AK (actinic keratosis)    History of actinic keratosis    History of basal cell cancer    Seborrheic keratosis    Cherry angioma    Actinic cheilitis    History of nonmelanoma skin cancer    History of multiple concussions    Elevated blood  pressure reading without diagnosis of hypertension    Dream enactment behavior    Disturbance in sleep behavior    Dyspnea and respiratory abnormality    Chest pain    H/O magnetic resonance imaging of brain and brain stem    History of echocardiogram    Encounter for neuropsychological testing  with Dr. Park    Benign neoplasm of skin of right upper extremity    Illness anxiety disorder     Past Medical History:   Diagnosis Date    Anxiety     Basal cell carcinoma     Gastro-oesophageal reflux disease     H/O magnetic resonance imaging of brain and brain stem 2018    MR BRAIN WITHOUT AND WITH CONTRAST 2017 9:10 PM   HISTORY:  Left-sided numbness. Disequilibrium.   COMPARISON: MR brain 2015.   TECHNIQUE: Sagittal T1, axial T2, axial FLAIR, axial GRE, axial diffusion scan, coronal FLAIR, axial post Gd enhanced T1 weighted images.   FINDINGS: There is no intracranial hemorrhage, mass, or recent infarct. There is a cyst in the floor of the right maxilla    Head injury 2012    Had a bad concussion with post concussion synd for year    High cholesterol     History of echocardiogram 2018    TUYET Keyes MD 2018  Narrative     [id number]^MECHE^BLAKE^KRISTAL       [facility] Echocardiography Laboratory [address] [place] [id number] Name: JONATHAN FIORE MRN: [id number] : 1965 Study Date: 2018 09:13 AM Age: 52 yrs Gender: Male Patient Location: [facility] Reason For Study: Chest Pain Ordering Physician:     Hypertension early     Not on meds. Usually in pre-hypertesion categ.    Insomnia     Squamous cell carcinoma         CC Referred Angel, MD  No address on file on close of this encounter.     Otolaryngologist Procedure Text (A): After obtaining clear surgical margins the patient was sent to otolaryngology for surgical repair.  The patient understands they will receive post-surgical care and follow-up from the referring physician's office.

## 2025-07-30 ENCOUNTER — OFFICE VISIT (OUTPATIENT)
Dept: DERMATOLOGY | Facility: CLINIC | Age: 60
End: 2025-07-30
Payer: COMMERCIAL

## 2025-07-30 DIAGNOSIS — L81.4 SOLAR LENTIGO: ICD-10-CM

## 2025-07-30 DIAGNOSIS — D18.01 CHERRY ANGIOMA: ICD-10-CM

## 2025-07-30 DIAGNOSIS — E78.00 HYPERCHOLESTEREMIA: ICD-10-CM

## 2025-07-30 DIAGNOSIS — Z12.83 SKIN CANCER SCREENING: Primary | ICD-10-CM

## 2025-07-30 DIAGNOSIS — D22.9 MULTIPLE BENIGN NEVI: ICD-10-CM

## 2025-07-30 DIAGNOSIS — Z85.828 HISTORY OF NONMELANOMA SKIN CANCER: ICD-10-CM

## 2025-07-30 DIAGNOSIS — L82.1 SEBORRHEIC KERATOSES: ICD-10-CM

## 2025-07-30 DIAGNOSIS — F41.8 MIXED ANXIETY AND DEPRESSIVE DISORDER: ICD-10-CM

## 2025-07-30 PROCEDURE — 1126F AMNT PAIN NOTED NONE PRSNT: CPT | Performed by: DERMATOLOGY

## 2025-07-30 PROCEDURE — 99213 OFFICE O/P EST LOW 20 MIN: CPT | Performed by: DERMATOLOGY

## 2025-07-30 RX ORDER — ATORVASTATIN CALCIUM 40 MG/1
40 TABLET, FILM COATED ORAL DAILY
Qty: 90 TABLET | Refills: 0 | Status: SHIPPED | OUTPATIENT
Start: 2025-07-30

## 2025-07-30 ASSESSMENT — PAIN SCALES - GENERAL: PAINLEVEL_OUTOF10: NO PAIN (0)

## 2025-07-30 NOTE — LETTER
7/30/2025       RE: Cesar Montejo  [address]  [place] [address]     Dear Colleague,    Thank you for referring your patient, Cesar Montejo, to the [facility]MAT[facility] [place] [facility]. Please see a copy of my visit note below.    [facility] Dermatology Note  Encounter Date: Jul 30, 2025  Office Visit     Dermatology Problem List:  # NMSC  - Superficial BCC, left cheek, s/p Mohs 6/16  - SCCIS right cheek, s/p Mohs 6/16  # Verruca vulgaris, left plantar foot - resolved  - Cryo 06/25/2021, prior sal acid  # Actinic keratoses. Cryo.  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 No visible lesion today. He wonders about mucocele. Advised reassuring if comes/goes. Notify me for persistence/symptoms.    # H/o actinic cheilitis  - s/p CO2 laser ablation of lower lip vermilion 10/2016  - s/p laser vermilionectomy 8/2017  - continue to monitor    # Wart, R thumb - resolved with cryo    # Benign lesions - SKs, cherry angiomas, lentigenes  - No treatment required    # Multiple benign nevi   - Monitor for ABCDEs of melanoma   - Continue sun protection - recommend SPF 30 or higher with frequent application   - Return sooner if noticing changing or symptomatic lesions     # History of NMSC. No evidence of recurrent disease.  - Continue photoprotection - recommend  SPF 30 or higher with frequent reapplication  - Continue yearly skin exams  - Advised to monitor for changing, non-healing, bleeding, painful, changing, or otherwise symptomatic lesions      Procedures Performed:   None    Follow up: 6 months for FBSC per pt preference, sooner if concerns.     Staff and Scribe:     Scribe Disclosure:   I, Tatyana Walters, am serving as a scribe; to document services personally performed by Mary Kumar MD -based on data collection and the provider's statements to me.     Provider Disclosure:   The documentation recorded by the scribe accurately reflects the services I personally performed and the decisions made by me.    Mary Kumar MD    Department of Dermatology  [facility]: Phone: [phone], Fax: [phone]  7/30/2025      ____________________________________________    CC: Skin Check (Here today for a skin check. No concerns. )    HPI:  Mr. Cesar Montejo is a(n) 59 year old male who presents today as a return patient for a FBSC.     Thinks he may have an oil gland spot on his lip. Reports this spot comes and goes.   Wart resolved after cryo at last visit.     Labs Reviewed:  N/A    Physical exam:  Vitals: There were no vitals taken for this visit.  GEN: This is a well developed, well-nourished male in no acute distress, in a pleasant mood.    SKIN: Total skin excluding the undergarment areas was performed. The exam included the head/face, neck, both arms, chest, back, abdomen, both legs, digits and/or nails.   - no appreciable lesion on lower lip  - Multiple regular brown pigmented macules and papules are identified on the trunk and extremities. .   - Scattered brown macules on sun exposed areas.  - There are dome shaped bright red papules on the trunk and extremities.   - Waxy stuck on papules and plaques on trunk and extremities.   - No other lesions of concern on  areas examined.     Medications:  Current Outpatient Medications   Medication Sig Dispense Refill     aspirin 81 MG EC tablet 81mg tab by mouth nightly @ 11pm       atorvastatin (LIPITOR) 40 MG tablet Take 1 tablet (40 mg) by mouth daily. 90 tablet 0     Cholecalciferol (VITAMIN D) 50 MCG (2000 UT) CAPS 2000 units by mouth nightly @ 11pm 30 capsule      COENZYME Q10 PO Take 1,000 mg by mouth daily @ 10 pm       doxycycline monohydrate (MONODOX) 100 MG capsule Take 1 capsule (100 mg) by mouth 2 times daily 20 capsule 0     famciclovir (FAMVIR) 500 MG tablet Take 1 tablet (500 mg) by mouth 3 times daily for 7 days 21 tablet 0     ketoconazole (NIZORAL) 2 % external cream Apply topically daily To affected areas on the face. 60 g 3     ketoconazole (NIZORAL) 2 % external shampoo Massage into wet scalp, let sit 3-5 min, then rinse. Do this 3x weekly. 120 mL 5     Krill Oil 1000 MG CAPS 4 x 1000mg capsule by mouth @ 11pm       Melatonin 10 MG TABS tablet Take 1 tablet (10 mg) by mouth At Bedtime @11pm       mirtazapine (REMERON) 15 MG tablet Take 1 tablet (15 mg) by mouth at bedtime. 90 tablet 3     NONFORMULARY L-Alpha glycerylphosphorylcholine (GCP) 400 mg daily       NONFORMULARY Pyrroloquinoline quinone (PQQ) 600 mg daily       simvastatin (ZOCOR) 20 MG tablet 20mg tab by mouth nightly @ 11pm       simvastatin (ZOCOR) 40 MG tablet TAKE 1 TABLET(40 MG) BY MOUTH AT BEDTIME 90 tablet 1     triamcinolone (KENALOG) 0.1 % external ointment Apply topically 2 times daily 30 g 1     venlafaxine (EFFEXOR XR) 75 MG 24 hr capsule Take 1 capsule (75 mg) by mouth daily. 90 capsule 3     No current facility-administered medications for this visit.      Past Medical History:   Patient Active Problem List   Diagnosis     Insomnia     Adjustment reaction     CARDIOVASCULAR SCREENING; LDL GOAL LESS THAN 130     Pure hypercholesterolemia     GERD (gastroesophageal reflux disease)     Right shoulder pain     Calcific tendonitis     Solar  lentiginosis     Skin cancer screening     Dermatitis, seborrheic     Family history of skin cancer     AK (actinic keratosis)     History of actinic keratosis     History of basal cell cancer     Seborrheic keratosis     Cherry angioma     Actinic cheilitis     History of nonmelanoma skin cancer     History of multiple concussions     Elevated blood pressure reading without diagnosis of hypertension     Dream enactment behavior     Disturbance in sleep behavior     Dyspnea and respiratory abnormality     Chest pain     H/O magnetic resonance imaging of brain and brain stem     History of echocardiogram     Encounter for neuropsychological testing  with Dr. Park     Benign neoplasm of skin of right upper extremity     Illness anxiety disorder     Past Medical History:   Diagnosis Date     Anxiety      Basal cell carcinoma      Gastro-oesophageal reflux disease      H/O magnetic resonance imaging of brain and brain stem 2018    MR BRAIN WITHOUT AND WITH CONTRAST 2017 9:10 PM   HISTORY:  Left-sided numbness. Disequilibrium.   COMPARISON: MR brain 2015.   TECHNIQUE: Sagittal T1, axial T2, axial FLAIR, axial GRE, axial diffusion scan, coronal FLAIR, axial post Gd enhanced T1 weighted images.   FINDINGS: There is no intracranial hemorrhage, mass, or recent infarct. There is a cyst in the floor of the right maxilla     Head injury 2012    Had a bad concussion with post concussion synd for year     High cholesterol      History of echocardiogram 2018    TUYET Keyes MD 2018  Narrative     [id number]^MECHE^BLAKE^KRISTAL       [facility],[place] Echocardi[facility] Laboratory [address] [place] [id number] Name: JONATHAN FIORE MRN: [id number] : 1965 Study Date: 2018 09:13 AM Age: 52 yrs Gender: Male Patient Location: [facility] Reason For Study: Chest Pain Ordering Physician:      Hypertension early     Not on meds.  Usually in pre-hypertesion categ.     Insomnia      Squamous cell carcinoma         CC Referred Self, MD  No address on file on close of this encounter.      Again, thank you for allowing me to participate in the care of your patient.      Sincerely,    Mary Kumar MD

## 2025-07-30 NOTE — PATIENT INSTRUCTIONS

## 2025-09-01 ENCOUNTER — HOSPITAL ENCOUNTER (EMERGENCY)
Facility: CLINIC | Age: 60
Discharge: HOME | End: 2025-09-01
Attending: EMERGENCY MEDICINE
Payer: COMMERCIAL

## 2025-09-01 ENCOUNTER — APPOINTMENT (OUTPATIENT)
Dept: GENERAL RADIOLOGY | Facility: CLINIC | Age: 60
End: 2025-09-01
Attending: EMERGENCY MEDICINE
Payer: COMMERCIAL

## 2025-09-01 VITALS
WEIGHT: 185.7 LBS | OXYGEN SATURATION: 98 % | HEART RATE: 95 BPM | DIASTOLIC BLOOD PRESSURE: 91 MMHG | BODY MASS INDEX: 24.61 KG/M2 | HEIGHT: 73 IN | RESPIRATION RATE: 16 BRPM | SYSTOLIC BLOOD PRESSURE: 147 MMHG | TEMPERATURE: 98.7 F

## 2025-09-01 DIAGNOSIS — Z77.120 MOLD EXPOSURE: Primary | ICD-10-CM

## 2025-09-01 PROCEDURE — 99283 EMERGENCY DEPT VISIT LOW MDM: CPT | Mod: 25 | Performed by: EMERGENCY MEDICINE

## 2025-09-01 PROCEDURE — 71046 X-RAY EXAM CHEST 2 VIEWS: CPT

## 2025-09-01 ASSESSMENT — ACTIVITIES OF DAILY LIVING (ADL): ADLS_ACUITY_SCORE: 41

## 2025-09-01 ASSESSMENT — COLUMBIA-SUICIDE SEVERITY RATING SCALE - C-SSRS
2. HAVE YOU ACTUALLY HAD ANY THOUGHTS OF KILLING YOURSELF IN THE PAST MONTH?: NO
1. IN THE PAST MONTH, HAVE YOU WISHED YOU WERE DEAD OR WISHED YOU COULD GO TO SLEEP AND NOT WAKE UP?: NO
6. HAVE YOU EVER DONE ANYTHING, STARTED TO DO ANYTHING, OR PREPARED TO DO ANYTHING TO END YOUR LIFE?: NO

## 2025-09-03 ASSESSMENT — ENCOUNTER SYMPTOMS: NERVOUS/ANXIOUS: 1

## (undated) RX ORDER — SODIUM CHLORIDE 9 MG/ML
INJECTION, SOLUTION INTRAVENOUS
Status: DISPENSED
Start: 2018-05-29

## (undated) RX ORDER — FENTANYL CITRATE 50 UG/ML
INJECTION, SOLUTION INTRAMUSCULAR; INTRAVENOUS
Status: DISPENSED
Start: 2017-04-17